# Patient Record
Sex: MALE | Race: WHITE | Employment: FULL TIME | URBAN - METROPOLITAN AREA
[De-identification: names, ages, dates, MRNs, and addresses within clinical notes are randomized per-mention and may not be internally consistent; named-entity substitution may affect disease eponyms.]

---

## 2020-02-12 ENCOUNTER — HOSPITAL ENCOUNTER (EMERGENCY)
Age: 30
Discharge: HOME | End: 2020-02-12
Payer: OTHER GOVERNMENT

## 2020-02-12 VITALS
TEMPERATURE: 98.5 F | DIASTOLIC BLOOD PRESSURE: 90 MMHG | HEART RATE: 88 BPM | RESPIRATION RATE: 14 BRPM | BODY MASS INDEX: 28.88 KG/M2 | HEIGHT: 74 IN | OXYGEN SATURATION: 96 % | SYSTOLIC BLOOD PRESSURE: 177 MMHG | WEIGHT: 225 LBS

## 2020-02-12 PROCEDURE — 6370000000 HC RX 637 (ALT 250 FOR IP): Performed by: PHYSICIAN ASSISTANT

## 2020-02-12 PROCEDURE — 99282 EMERGENCY DEPT VISIT SF MDM: CPT

## 2020-02-12 RX ORDER — OXYMETAZOLINE HYDROCHLORIDE 0.05 G/100ML
2 SPRAY NASAL ONCE
Status: COMPLETED | OUTPATIENT
Start: 2020-02-12 | End: 2020-02-12

## 2020-02-12 RX ADMIN — Medication 2 SPRAY: at 18:10

## 2020-02-12 NOTE — ED NOTES
Discharge instructions given. Patient verbalizes understanding. No other noted or stated problems at this time. Patient will follow up with primary care.      Latasha Mcclellan RN  02/12/20 1887
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
## 2020-10-08 ENCOUNTER — APPOINTMENT (OUTPATIENT)
Dept: CT IMAGING | Age: 30
DRG: 003 | End: 2020-10-08
Payer: COMMERCIAL

## 2020-10-08 ENCOUNTER — HOSPITAL ENCOUNTER (INPATIENT)
Age: 30
LOS: 27 days | Discharge: NURSING, REHAB OR LONG-TERM CARE | DRG: 003 | End: 2020-11-04
Attending: EMERGENCY MEDICINE | Admitting: SURGERY
Payer: COMMERCIAL

## 2020-10-08 ENCOUNTER — APPOINTMENT (OUTPATIENT)
Dept: GENERAL RADIOLOGY | Age: 30
DRG: 003 | End: 2020-10-08
Payer: COMMERCIAL

## 2020-10-08 ENCOUNTER — ANESTHESIA EVENT (OUTPATIENT)
Dept: EMERGENCY DEPT | Age: 30
End: 2020-10-08

## 2020-10-08 ENCOUNTER — ANESTHESIA EVENT (OUTPATIENT)
Dept: EMERGENCY DEPT | Age: 30
DRG: 003 | End: 2020-10-08
Payer: COMMERCIAL

## 2020-10-08 ENCOUNTER — ANESTHESIA (OUTPATIENT)
Dept: EMERGENCY DEPT | Age: 30
DRG: 003 | End: 2020-10-08
Payer: COMMERCIAL

## 2020-10-08 ENCOUNTER — ANESTHESIA (OUTPATIENT)
Dept: EMERGENCY DEPT | Age: 30
End: 2020-10-08
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
## 2020-10-08 LAB
AADO2: 392.5 MMHG
ABO/RH: NORMAL
ACETAMINOPHEN LEVEL: <5 MCG/ML (ref 10–30)
ALBUMIN SERPL-MCNC: 3.9 G/DL (ref 3.5–5.2)
ALP BLD-CCNC: 59 U/L (ref 40–129)
ALT SERPL-CCNC: 38 U/L (ref 0–40)
ANGLE (CLOT STRENGTH): 68.6 DEGREE (ref 59–74)
ANION GAP SERPL CALCULATED.3IONS-SCNC: 16 MMOL/L (ref 7–16)
APTT: 31.2 SEC (ref 24.5–35.1)
AST SERPL-CCNC: 46 U/L (ref 0–39)
B.E.: -7.4 MMOL/L (ref -3–3)
BILIRUB SERPL-MCNC: 0.3 MG/DL (ref 0–1.2)
BUN BLDV-MCNC: 10 MG/DL (ref 6–20)
CALCIUM SERPL-MCNC: 8.2 MG/DL (ref 8.6–10.2)
CHLORIDE BLD-SCNC: 97 MMOL/L (ref 98–107)
CO2: 24 MMOL/L (ref 22–29)
COHB: 1.1 % (ref 0–1.5)
COMMENT: ABNORMAL
CREAT SERPL-MCNC: 1 MG/DL (ref 0.7–1.2)
CRITICAL: ABNORMAL
DATE ANALYZED: ABNORMAL
DATE OF COLLECTION: ABNORMAL
EPL-TEG: 0 % (ref 0–15)
ETHANOL: 55 MG/DL (ref 0–0.08)
FIO2: 100 %
G-TEG: 3 K D/SC (ref 4.5–11)
GFR AFRICAN AMERICAN: >60
GFR NON-AFRICAN AMERICAN: >60 ML/MIN/1.73
GLUCOSE BLD-MCNC: 139 MG/DL (ref 74–99)
HCO3: 20.9 MMOL/L (ref 22–26)
HCT VFR BLD CALC: 49.3 % (ref 37–54)
HEMOGLOBIN: 17.2 G/DL (ref 12.5–16.5)
HHB: 0.8 % (ref 0–5)
INR BLD: 1.1
K (CLOTTING TIME): 1.6 MIN (ref 1–3)
LAB: ABNORMAL
LACTIC ACID: 5.4 MMOL/L (ref 0.5–2.2)
LY30 (FIBRINOLYSIS): 0 % (ref 0–8)
Lab: ABNORMAL
MA (MAX AMPLITUDE): 37.5 MM (ref 50–70)
MCH RBC QN AUTO: 31.9 PG (ref 26–35)
MCHC RBC AUTO-ENTMCNC: 34.9 % (ref 32–34.5)
MCV RBC AUTO: 91.5 FL (ref 80–99.9)
METHB: 0.6 % (ref 0–1.5)
MODE: AC
O2 CONTENT: 24.6 ML/DL
O2 SATURATION: 99.2 % (ref 92–98.5)
O2HB: 97.5 % (ref 94–97)
OPERATOR ID: ABNORMAL
PATIENT TEMP: 37 C
PCO2: 52.7 MMHG (ref 35–45)
PDW BLD-RTO: 12.1 FL (ref 11.5–15)
PEEP/CPAP: 8 CMH2O
PFO2: 2.43 MMHG/%
PH BLOOD GAS: 7.22 (ref 7.35–7.45)
PLATELET # BLD: 307 E9/L (ref 130–450)
PMV BLD AUTO: 9.9 FL (ref 7–12)
PO2: 242.8 MMHG (ref 75–100)
POTASSIUM SERPL-SCNC: 3.5 MMOL/L (ref 3.5–5)
POTASSIUM SERPL-SCNC: 3.7 MMOL/L (ref 3.5–5)
PROTHROMBIN TIME: 12 SEC (ref 9.3–12.4)
R (REACTION TIME): 4.3 MIN (ref 5–10)
RBC # BLD: 5.39 E12/L (ref 3.8–5.8)
RI(T): 162 %
RR MECHANICAL: 16 B/MIN
SALICYLATE, SERUM: <0.3 MG/DL (ref 0–30)
SODIUM BLD-SCNC: 137 MMOL/L (ref 132–146)
SOURCE, BLOOD GAS: ABNORMAL
THB: 17.6 G/DL (ref 11.5–16.5)
TIME ANALYZED: 2249
TOTAL PROTEIN: 6.6 G/DL (ref 6.4–8.3)
TRICYCLIC ANTIDEPRESSANTS SCREEN SERUM: NEGATIVE NG/ML
VT MECHANICAL: 500 ML
WBC # BLD: 26.2 E9/L (ref 4.5–11.5)

## 2020-10-08 PROCEDURE — 2500000003 HC RX 250 WO HCPCS

## 2020-10-08 PROCEDURE — 72170 X-RAY EXAM OF PELVIS: CPT

## 2020-10-08 PROCEDURE — 70486 CT MAXILLOFACIAL W/O DYE: CPT

## 2020-10-08 PROCEDURE — 87081 CULTURE SCREEN ONLY: CPT

## 2020-10-08 PROCEDURE — 72131 CT LUMBAR SPINE W/O DYE: CPT

## 2020-10-08 PROCEDURE — 80053 COMPREHEN METABOLIC PANEL: CPT

## 2020-10-08 PROCEDURE — 31500 INSERT EMERGENCY AIRWAY: CPT

## 2020-10-08 PROCEDURE — 70450 CT HEAD/BRAIN W/O DYE: CPT

## 2020-10-08 PROCEDURE — 72125 CT NECK SPINE W/O DYE: CPT

## 2020-10-08 PROCEDURE — 31500 INSERT EMERGENCY AIRWAY: CPT | Performed by: NURSE ANESTHETIST, CERTIFIED REGISTERED

## 2020-10-08 PROCEDURE — 71260 CT THORAX DX C+: CPT

## 2020-10-08 PROCEDURE — 74177 CT ABD & PELVIS W/CONTRAST: CPT

## 2020-10-08 PROCEDURE — 85610 PROTHROMBIN TIME: CPT

## 2020-10-08 PROCEDURE — 2000000000 HC ICU R&B

## 2020-10-08 PROCEDURE — 86850 RBC ANTIBODY SCREEN: CPT

## 2020-10-08 PROCEDURE — 6810039001 HC L1 TRAUMA PRIORITY

## 2020-10-08 PROCEDURE — 6360000002 HC RX W HCPCS

## 2020-10-08 PROCEDURE — 85347 COAGULATION TIME ACTIVATED: CPT

## 2020-10-08 PROCEDURE — 99285 EMERGENCY DEPT VISIT HI MDM: CPT

## 2020-10-08 PROCEDURE — 85730 THROMBOPLASTIN TIME PARTIAL: CPT

## 2020-10-08 PROCEDURE — 94002 VENT MGMT INPAT INIT DAY: CPT

## 2020-10-08 PROCEDURE — 84132 ASSAY OF SERUM POTASSIUM: CPT

## 2020-10-08 PROCEDURE — 70498 CT ANGIOGRAPHY NECK: CPT

## 2020-10-08 PROCEDURE — 82805 BLOOD GASES W/O2 SATURATION: CPT

## 2020-10-08 PROCEDURE — 86901 BLOOD TYPING SEROLOGIC RH(D): CPT

## 2020-10-08 PROCEDURE — 2580000003 HC RX 258: Performed by: SURGERY

## 2020-10-08 PROCEDURE — 99223 1ST HOSP IP/OBS HIGH 75: CPT | Performed by: SURGERY

## 2020-10-08 PROCEDURE — 71045 X-RAY EXAM CHEST 1 VIEW: CPT

## 2020-10-08 PROCEDURE — G0480 DRUG TEST DEF 1-7 CLASSES: HCPCS

## 2020-10-08 PROCEDURE — 36415 COLL VENOUS BLD VENIPUNCTURE: CPT

## 2020-10-08 PROCEDURE — 80307 DRUG TEST PRSMV CHEM ANLYZR: CPT

## 2020-10-08 PROCEDURE — 83605 ASSAY OF LACTIC ACID: CPT

## 2020-10-08 PROCEDURE — 85576 BLOOD PLATELET AGGREGATION: CPT

## 2020-10-08 PROCEDURE — 6360000004 HC RX CONTRAST MEDICATION: Performed by: EMERGENCY MEDICINE

## 2020-10-08 PROCEDURE — 0BH18EZ INSERTION OF ENDOTRACHEAL AIRWAY INTO TRACHEA, VIA NATURAL OR ARTIFICIAL OPENING ENDOSCOPIC: ICD-10-PCS | Performed by: SURGERY

## 2020-10-08 PROCEDURE — 85384 FIBRINOGEN ACTIVITY: CPT

## 2020-10-08 PROCEDURE — 86900 BLOOD TYPING SEROLOGIC ABO: CPT

## 2020-10-08 PROCEDURE — 85027 COMPLETE CBC AUTOMATED: CPT

## 2020-10-08 PROCEDURE — 5A1955Z RESPIRATORY VENTILATION, GREATER THAN 96 CONSECUTIVE HOURS: ICD-10-PCS | Performed by: SURGERY

## 2020-10-08 PROCEDURE — 72128 CT CHEST SPINE W/O DYE: CPT

## 2020-10-08 RX ORDER — SODIUM CHLORIDE 0.9 % (FLUSH) 0.9 %
10 SYRINGE (ML) INJECTION PRN
Status: DISCONTINUED | OUTPATIENT
Start: 2020-10-08 | End: 2020-10-09 | Stop reason: SDUPTHER

## 2020-10-08 RX ORDER — FENTANYL CITRATE 50 UG/ML
INJECTION, SOLUTION INTRAMUSCULAR; INTRAVENOUS
Status: DISPENSED
Start: 2020-10-08 | End: 2020-10-09

## 2020-10-08 RX ORDER — PROPOFOL 10 MG/ML
10 INJECTION, EMULSION INTRAVENOUS
Status: DISCONTINUED | OUTPATIENT
Start: 2020-10-08 | End: 2020-10-12

## 2020-10-08 RX ORDER — PROPOFOL 10 MG/ML
INJECTION, EMULSION INTRAVENOUS
Status: COMPLETED
Start: 2020-10-08 | End: 2020-10-09

## 2020-10-08 RX ADMIN — Medication 100 MCG/HR: at 23:48

## 2020-10-08 RX ADMIN — SODIUM CHLORIDE: 9 INJECTION, SOLUTION INTRAVENOUS at 23:39

## 2020-10-08 RX ADMIN — IOPAMIDOL 110 ML: 755 INJECTION, SOLUTION INTRAVENOUS at 23:45

## 2020-10-08 ASSESSMENT — PULMONARY FUNCTION TESTS
PIF_VALUE: 19
PIF_VALUE: 20
PIF_VALUE: 23

## 2020-10-09 ENCOUNTER — APPOINTMENT (OUTPATIENT)
Dept: GENERAL RADIOLOGY | Age: 30
DRG: 003 | End: 2020-10-09
Payer: COMMERCIAL

## 2020-10-09 ENCOUNTER — ANESTHESIA EVENT (OUTPATIENT)
Dept: OPERATING ROOM | Age: 30
DRG: 003 | End: 2020-10-09
Payer: COMMERCIAL

## 2020-10-09 ENCOUNTER — APPOINTMENT (OUTPATIENT)
Dept: CT IMAGING | Age: 30
DRG: 003 | End: 2020-10-09
Payer: COMMERCIAL

## 2020-10-09 ENCOUNTER — ANESTHESIA (OUTPATIENT)
Dept: OPERATING ROOM | Age: 30
DRG: 003 | End: 2020-10-09
Payer: COMMERCIAL

## 2020-10-09 VITALS
SYSTOLIC BLOOD PRESSURE: 112 MMHG | DIASTOLIC BLOOD PRESSURE: 50 MMHG | RESPIRATION RATE: 16 BRPM | OXYGEN SATURATION: 100 %

## 2020-10-09 PROBLEM — S22.42XA CLOSED FRACTURE OF MULTIPLE RIBS OF LEFT SIDE: Status: ACTIVE | Noted: 2020-10-09

## 2020-10-09 PROBLEM — S42.102A CLOSED FRACTURE OF LEFT SCAPULA: Status: ACTIVE | Noted: 2020-10-09

## 2020-10-09 PROBLEM — S06.5XAA SUBDURAL HEMATOMA: Status: ACTIVE | Noted: 2020-10-09

## 2020-10-09 PROBLEM — S06.9XAA ORBITAL ROOF CLOSED FRACTURE WITH INTRACRANIAL INJURY (HCC): Status: ACTIVE | Noted: 2020-10-09

## 2020-10-09 PROBLEM — S02.19XA CLOSED FRACTURE OF TEMPORAL BONE (HCC): Status: ACTIVE | Noted: 2020-10-09

## 2020-10-09 PROBLEM — S27.321A CONTUSION OF LEFT LUNG: Status: ACTIVE | Noted: 2020-10-09

## 2020-10-09 PROBLEM — S02.129A ORBITAL ROOF CLOSED FRACTURE WITH INTRACRANIAL INJURY (HCC): Status: ACTIVE | Noted: 2020-10-09

## 2020-10-09 LAB
AADO2: 150 MMHG
ABO/RH: NORMAL
ANGLE (CLOT STRENGTH): 67.4 DEGREE (ref 59–74)
ANION GAP SERPL CALCULATED.3IONS-SCNC: 12 MMOL/L (ref 7–16)
ANTIBODY SCREEN: NORMAL
B.E.: -2 MMOL/L (ref -3–3)
BASOPHILS ABSOLUTE: 0.09 E9/L (ref 0–0.2)
BASOPHILS RELATIVE PERCENT: 0.6 % (ref 0–2)
BUN BLDV-MCNC: 9 MG/DL (ref 6–20)
CALCIUM IONIZED: 1.11 MMOL/L (ref 1.15–1.33)
CALCIUM SERPL-MCNC: 7.7 MG/DL (ref 8.6–10.2)
CHLORIDE BLD-SCNC: 99 MMOL/L (ref 98–107)
CO2: 25 MMOL/L (ref 22–29)
COHB: 0.4 % (ref 0–1.5)
CREAT SERPL-MCNC: 0.9 MG/DL (ref 0.7–1.2)
CRITICAL: ABNORMAL
DATE ANALYZED: ABNORMAL
DATE OF COLLECTION: ABNORMAL
EOSINOPHILS ABSOLUTE: 0.06 E9/L (ref 0.05–0.5)
EOSINOPHILS RELATIVE PERCENT: 0.4 % (ref 0–6)
EPL-TEG: 0.1 % (ref 0–15)
FIO2: 50 %
G-TEG: 7.8 K D/SC (ref 4.5–11)
GFR AFRICAN AMERICAN: >60
GFR NON-AFRICAN AMERICAN: >60 ML/MIN/1.73
GLUCOSE BLD-MCNC: 116 MG/DL (ref 74–99)
HCO3: 22.5 MMOL/L (ref 22–26)
HCT VFR BLD CALC: 41.6 % (ref 37–54)
HEMOGLOBIN: 14.2 G/DL (ref 12.5–16.5)
HHB: 1.5 % (ref 0–5)
IMMATURE GRANULOCYTES #: 0.05 E9/L
IMMATURE GRANULOCYTES %: 0.3 % (ref 0–5)
K (CLOTTING TIME): 1.7 MIN (ref 1–3)
LAB: ABNORMAL
LACTIC ACID: 1.7 MMOL/L (ref 0.5–2.2)
LACTIC ACID: 2.1 MMOL/L (ref 0.5–2.2)
LACTIC ACID: 3.1 MMOL/L (ref 0.5–2.2)
LACTIC ACID: 3.2 MMOL/L (ref 0.5–2.2)
LY30 (FIBRINOLYSIS): 0.1 % (ref 0–8)
LYMPHOCYTES ABSOLUTE: 0.95 E9/L (ref 1.5–4)
LYMPHOCYTES RELATIVE PERCENT: 6.1 % (ref 20–42)
Lab: ABNORMAL
MA (MAX AMPLITUDE): 61.1 MM (ref 50–70)
MAGNESIUM: 1.7 MG/DL (ref 1.6–2.6)
MCH RBC QN AUTO: 31.1 PG (ref 26–35)
MCHC RBC AUTO-ENTMCNC: 34.1 % (ref 32–34.5)
MCV RBC AUTO: 91.2 FL (ref 80–99.9)
METHB: 0.4 % (ref 0–1.5)
MODE: AC
MONOCYTES ABSOLUTE: 1.05 E9/L (ref 0.1–0.95)
MONOCYTES RELATIVE PERCENT: 6.7 % (ref 2–12)
NEUTROPHILS ABSOLUTE: 13.49 E9/L (ref 1.8–7.3)
NEUTROPHILS RELATIVE PERCENT: 85.9 % (ref 43–80)
O2 SATURATION: 98.9 % (ref 92–98.5)
O2HB: 97.7 % (ref 94–97)
OPERATOR ID: 2577
OSMOLALITY: 289 MOSM/KG (ref 285–310)
PATIENT TEMP: 37 C
PCO2: 37.9 MMHG (ref 35–45)
PDW BLD-RTO: 12.1 FL (ref 11.5–15)
PEEP/CPAP: 8 CMH2O
PFO2: 3.03 MMHG/%
PH BLOOD GAS: 7.39 (ref 7.35–7.45)
PHOSPHORUS: 2 MG/DL (ref 2.5–4.5)
PLATELET # BLD: 182 E9/L (ref 130–450)
PMV BLD AUTO: 9.8 FL (ref 7–12)
PO2: 151.4 MMHG (ref 75–100)
POTASSIUM SERPL-SCNC: 4 MMOL/L (ref 3.5–5)
R (REACTION TIME): 4.9 MIN (ref 5–10)
RBC # BLD: 4.56 E12/L (ref 3.8–5.8)
RI(T): 0.99
RR MECHANICAL: 16 B/MIN
SODIUM BLD-SCNC: 136 MMOL/L (ref 132–146)
SODIUM BLD-SCNC: 136 MMOL/L (ref 132–146)
SODIUM BLD-SCNC: 137 MMOL/L (ref 132–146)
SOURCE, BLOOD GAS: ABNORMAL
THB: 14.9 G/DL (ref 11.5–16.5)
TIME ANALYZED: 528
VT MECHANICAL: 500 ML
WBC # BLD: 15.7 E9/L (ref 4.5–11.5)

## 2020-10-09 PROCEDURE — 0KQ70ZZ REPAIR RIGHT UPPER ARM MUSCLE, OPEN APPROACH: ICD-10-PCS | Performed by: SURGERY

## 2020-10-09 PROCEDURE — 70498 CT ANGIOGRAPHY NECK: CPT

## 2020-10-09 PROCEDURE — 83735 ASSAY OF MAGNESIUM: CPT

## 2020-10-09 PROCEDURE — 83605 ASSAY OF LACTIC ACID: CPT

## 2020-10-09 PROCEDURE — 6370000000 HC RX 637 (ALT 250 FOR IP): Performed by: SURGERY

## 2020-10-09 PROCEDURE — 2709999900 HC NON-CHARGEABLE SUPPLY: Performed by: NEUROLOGICAL SURGERY

## 2020-10-09 PROCEDURE — 36556 INSERT NON-TUNNEL CV CATH: CPT

## 2020-10-09 PROCEDURE — 85576 BLOOD PLATELET AGGREGATION: CPT

## 2020-10-09 PROCEDURE — 99291 CRITICAL CARE FIRST HOUR: CPT | Performed by: SURGERY

## 2020-10-09 PROCEDURE — 73130 X-RAY EXAM OF HAND: CPT

## 2020-10-09 PROCEDURE — 2500000003 HC RX 250 WO HCPCS

## 2020-10-09 PROCEDURE — 2580000003 HC RX 258: Performed by: STUDENT IN AN ORGANIZED HEALTH CARE EDUCATION/TRAINING PROGRAM

## 2020-10-09 PROCEDURE — 71045 X-RAY EXAM CHEST 1 VIEW: CPT

## 2020-10-09 PROCEDURE — 73562 X-RAY EXAM OF KNEE 3: CPT

## 2020-10-09 PROCEDURE — 73080 X-RAY EXAM OF ELBOW: CPT

## 2020-10-09 PROCEDURE — 6370000000 HC RX 637 (ALT 250 FOR IP): Performed by: NEUROLOGICAL SURGERY

## 2020-10-09 PROCEDURE — 70450 CT HEAD/BRAIN W/O DYE: CPT

## 2020-10-09 PROCEDURE — 6370000000 HC RX 637 (ALT 250 FOR IP): Performed by: STUDENT IN AN ORGANIZED HEALTH CARE EDUCATION/TRAINING PROGRAM

## 2020-10-09 PROCEDURE — 4A103BD MONITORING OF INTRACRANIAL PRESSURE, PERCUTANEOUS APPROACH: ICD-10-PCS | Performed by: NEUROLOGICAL SURGERY

## 2020-10-09 PROCEDURE — 84295 ASSAY OF SERUM SODIUM: CPT

## 2020-10-09 PROCEDURE — 3600000013 HC SURGERY LEVEL 3 ADDTL 15MIN: Performed by: NEUROLOGICAL SURGERY

## 2020-10-09 PROCEDURE — 2500000003 HC RX 250 WO HCPCS: Performed by: NURSE ANESTHETIST, CERTIFIED REGISTERED

## 2020-10-09 PROCEDURE — 0DH63UZ INSERTION OF FEEDING DEVICE INTO STOMACH, PERCUTANEOUS APPROACH: ICD-10-PCS | Performed by: SURGERY

## 2020-10-09 PROCEDURE — 6360000002 HC RX W HCPCS: Performed by: NURSE ANESTHETIST, CERTIFIED REGISTERED

## 2020-10-09 PROCEDURE — 82330 ASSAY OF CALCIUM: CPT

## 2020-10-09 PROCEDURE — 2000000000 HC ICU R&B

## 2020-10-09 PROCEDURE — 83930 ASSAY OF BLOOD OSMOLALITY: CPT

## 2020-10-09 PROCEDURE — 70480 CT ORBIT/EAR/FOSSA W/O DYE: CPT

## 2020-10-09 PROCEDURE — 85384 FIBRINOGEN ACTIVITY: CPT

## 2020-10-09 PROCEDURE — 85347 COAGULATION TIME ACTIVATED: CPT

## 2020-10-09 PROCEDURE — 0B918ZZ DRAINAGE OF TRACHEA, VIA NATURAL OR ARTIFICIAL OPENING ENDOSCOPIC: ICD-10-PCS | Performed by: SURGERY

## 2020-10-09 PROCEDURE — 3700000001 HC ADD 15 MINUTES (ANESTHESIA): Performed by: NEUROLOGICAL SURGERY

## 2020-10-09 PROCEDURE — 2580000003 HC RX 258

## 2020-10-09 PROCEDURE — 6360000002 HC RX W HCPCS: Performed by: SURGERY

## 2020-10-09 PROCEDURE — 0HQ0XZZ REPAIR SCALP SKIN, EXTERNAL APPROACH: ICD-10-PCS | Performed by: SURGERY

## 2020-10-09 PROCEDURE — 0B938ZZ DRAINAGE OF RIGHT MAIN BRONCHUS, VIA NATURAL OR ARTIFICIAL OPENING ENDOSCOPIC: ICD-10-PCS | Performed by: SURGERY

## 2020-10-09 PROCEDURE — 2580000003 HC RX 258: Performed by: SURGERY

## 2020-10-09 PROCEDURE — 94003 VENT MGMT INPAT SUBQ DAY: CPT

## 2020-10-09 PROCEDURE — 02HV33Z INSERTION OF INFUSION DEVICE INTO SUPERIOR VENA CAVA, PERCUTANEOUS APPROACH: ICD-10-PCS | Performed by: SURGERY

## 2020-10-09 PROCEDURE — 85025 COMPLETE CBC W/AUTO DIFF WBC: CPT

## 2020-10-09 PROCEDURE — 82805 BLOOD GASES W/O2 SATURATION: CPT

## 2020-10-09 PROCEDURE — 0B978ZZ DRAINAGE OF LEFT MAIN BRONCHUS, VIA NATURAL OR ARTIFICIAL OPENING ENDOSCOPIC: ICD-10-PCS | Performed by: SURGERY

## 2020-10-09 PROCEDURE — 2500000003 HC RX 250 WO HCPCS: Performed by: SURGERY

## 2020-10-09 PROCEDURE — 009600Z DRAINAGE OF CEREBRAL VENTRICLE WITH DRAINAGE DEVICE, OPEN APPROACH: ICD-10-PCS | Performed by: NEUROLOGICAL SURGERY

## 2020-10-09 PROCEDURE — 3700000000 HC ANESTHESIA ATTENDED CARE: Performed by: NEUROLOGICAL SURGERY

## 2020-10-09 PROCEDURE — 84100 ASSAY OF PHOSPHORUS: CPT

## 2020-10-09 PROCEDURE — 2500000003 HC RX 250 WO HCPCS: Performed by: NEUROLOGICAL SURGERY

## 2020-10-09 PROCEDURE — 90714 TD VACC NO PRESV 7 YRS+ IM: CPT | Performed by: SURGERY

## 2020-10-09 PROCEDURE — 2500000003 HC RX 250 WO HCPCS: Performed by: STUDENT IN AN ORGANIZED HEALTH CARE EDUCATION/TRAINING PROGRAM

## 2020-10-09 PROCEDURE — 90471 IMMUNIZATION ADMIN: CPT | Performed by: SURGERY

## 2020-10-09 PROCEDURE — 36415 COLL VENOUS BLD VENIPUNCTURE: CPT

## 2020-10-09 PROCEDURE — 80048 BASIC METABOLIC PNL TOTAL CA: CPT

## 2020-10-09 PROCEDURE — 3600000003 HC SURGERY LEVEL 3 BASE: Performed by: NEUROLOGICAL SURGERY

## 2020-10-09 PROCEDURE — 6360000004 HC RX CONTRAST MEDICATION: Performed by: RADIOLOGY

## 2020-10-09 PROCEDURE — 6360000002 HC RX W HCPCS

## 2020-10-09 PROCEDURE — 2580000003 HC RX 258: Performed by: NURSE ANESTHETIST, CERTIFIED REGISTERED

## 2020-10-09 PROCEDURE — 70496 CT ANGIOGRAPHY HEAD: CPT

## 2020-10-09 RX ORDER — DIAPER,BRIEF,INFANT-TODD,DISP
EACH MISCELLANEOUS 3 TIMES DAILY
Status: DISCONTINUED | OUTPATIENT
Start: 2020-10-09 | End: 2020-11-01

## 2020-10-09 RX ORDER — ONDANSETRON 2 MG/ML
4 INJECTION INTRAMUSCULAR; INTRAVENOUS EVERY 6 HOURS PRN
Status: DISCONTINUED | OUTPATIENT
Start: 2020-10-09 | End: 2020-11-04 | Stop reason: HOSPADM

## 2020-10-09 RX ORDER — SENNA PLUS 8.6 MG/1
1 TABLET ORAL DAILY PRN
Status: DISCONTINUED | OUTPATIENT
Start: 2020-10-09 | End: 2020-10-09

## 2020-10-09 RX ORDER — SODIUM CHLORIDE 9 MG/ML
INJECTION, SOLUTION INTRAVENOUS CONTINUOUS
Status: DISCONTINUED | OUTPATIENT
Start: 2020-10-09 | End: 2020-10-09

## 2020-10-09 RX ORDER — DEXAMETHASONE SODIUM PHOSPHATE 1 MG/ML
4 SOLUTION/ DROPS OPHTHALMIC 2 TIMES DAILY
Status: COMPLETED | OUTPATIENT
Start: 2020-10-09 | End: 2020-10-16

## 2020-10-09 RX ORDER — BACITRACIN, NEOMYCIN, POLYMYXIN B 400; 3.5; 5 [USP'U]/G; MG/G; [USP'U]/G
OINTMENT TOPICAL 2 TIMES DAILY
Status: DISCONTINUED | OUTPATIENT
Start: 2020-10-09 | End: 2020-10-24

## 2020-10-09 RX ORDER — VECURONIUM BROMIDE 1 MG/ML
INJECTION, POWDER, LYOPHILIZED, FOR SOLUTION INTRAVENOUS PRN
Status: DISCONTINUED | OUTPATIENT
Start: 2020-10-09 | End: 2020-10-09 | Stop reason: SDUPTHER

## 2020-10-09 RX ORDER — SODIUM CHLORIDE 9 MG/ML
INJECTION, SOLUTION INTRAVENOUS CONTINUOUS PRN
Status: DISCONTINUED | OUTPATIENT
Start: 2020-10-09 | End: 2020-10-09 | Stop reason: SDUPTHER

## 2020-10-09 RX ORDER — FENTANYL CITRATE 50 UG/ML
INJECTION, SOLUTION INTRAMUSCULAR; INTRAVENOUS PRN
Status: DISCONTINUED | OUTPATIENT
Start: 2020-10-09 | End: 2020-10-09 | Stop reason: SDUPTHER

## 2020-10-09 RX ORDER — PROMETHAZINE HYDROCHLORIDE 25 MG/1
12.5 TABLET ORAL EVERY 6 HOURS PRN
Status: DISCONTINUED | OUTPATIENT
Start: 2020-10-09 | End: 2020-10-09

## 2020-10-09 RX ORDER — ACETAMINOPHEN 650 MG
TABLET, EXTENDED RELEASE ORAL
Status: COMPLETED
Start: 2020-10-09 | End: 2020-10-09

## 2020-10-09 RX ORDER — VECURONIUM BROMIDE 1 MG/ML
10 INJECTION, POWDER, LYOPHILIZED, FOR SOLUTION INTRAVENOUS
Status: COMPLETED | OUTPATIENT
Start: 2020-10-09 | End: 2020-10-09

## 2020-10-09 RX ORDER — LEVETIRACETAM 5 MG/ML
500 INJECTION INTRAVASCULAR EVERY 12 HOURS
Status: DISCONTINUED | OUTPATIENT
Start: 2020-10-09 | End: 2020-10-10

## 2020-10-09 RX ORDER — SODIUM CHLORIDE 0.9 % (FLUSH) 0.9 %
10 SYRINGE (ML) INJECTION PRN
Status: DISCONTINUED | OUTPATIENT
Start: 2020-10-09 | End: 2020-11-04 | Stop reason: HOSPADM

## 2020-10-09 RX ORDER — SODIUM CHLORIDE 0.9 % (FLUSH) 0.9 %
10 SYRINGE (ML) INJECTION EVERY 12 HOURS SCHEDULED
Status: DISCONTINUED | OUTPATIENT
Start: 2020-10-09 | End: 2020-11-04 | Stop reason: HOSPADM

## 2020-10-09 RX ORDER — POTASSIUM CHLORIDE 29.8 MG/ML
20 INJECTION INTRAVENOUS
Status: COMPLETED | OUTPATIENT
Start: 2020-10-09 | End: 2020-10-09

## 2020-10-09 RX ORDER — CHLORHEXIDINE GLUCONATE 0.12 MG/ML
15 RINSE ORAL 2 TIMES DAILY
Status: DISCONTINUED | OUTPATIENT
Start: 2020-10-09 | End: 2020-10-24

## 2020-10-09 RX ORDER — TETANUS AND DIPHTHERIA TOXOIDS ADSORBED 2; 2 [LF]/.5ML; [LF]/.5ML
0.5 INJECTION INTRAMUSCULAR ONCE
Status: COMPLETED | OUTPATIENT
Start: 2020-10-09 | End: 2020-10-09

## 2020-10-09 RX ORDER — DIAPER,BRIEF,INFANT-TODD,DISP
EACH MISCELLANEOUS PRN
Status: DISCONTINUED | OUTPATIENT
Start: 2020-10-09 | End: 2020-10-09 | Stop reason: HOSPADM

## 2020-10-09 RX ORDER — VECURONIUM BROMIDE 1 MG/ML
INJECTION, POWDER, LYOPHILIZED, FOR SOLUTION INTRAVENOUS
Status: COMPLETED
Start: 2020-10-09 | End: 2020-10-09

## 2020-10-09 RX ORDER — 3% SODIUM CHLORIDE 3 G/100ML
50 INJECTION, SOLUTION INTRAVENOUS CONTINUOUS
Status: DISPENSED | OUTPATIENT
Start: 2020-10-09 | End: 2020-10-10

## 2020-10-09 RX ORDER — LIDOCAINE HYDROCHLORIDE AND EPINEPHRINE 10; 10 MG/ML; UG/ML
INJECTION, SOLUTION INFILTRATION; PERINEURAL PRN
Status: DISCONTINUED | OUTPATIENT
Start: 2020-10-09 | End: 2020-10-09 | Stop reason: HOSPADM

## 2020-10-09 RX ORDER — LEVETIRACETAM 5 MG/ML
500 INJECTION INTRAVASCULAR ONCE
Status: COMPLETED | OUTPATIENT
Start: 2020-10-09 | End: 2020-10-09

## 2020-10-09 RX ORDER — CIPROFLOXACIN HYDROCHLORIDE 3.5 MG/ML
4 SOLUTION/ DROPS TOPICAL 2 TIMES DAILY
Status: COMPLETED | OUTPATIENT
Start: 2020-10-09 | End: 2020-10-16

## 2020-10-09 RX ORDER — POLYVINYL ALCOHOL 14 MG/ML
1 SOLUTION/ DROPS OPHTHALMIC EVERY 4 HOURS
Status: DISCONTINUED | OUTPATIENT
Start: 2020-10-09 | End: 2020-10-12

## 2020-10-09 RX ADMIN — CHLORHEXIDINE GLUCONATE 0.12% ORAL RINSE 15 ML: 1.2 LIQUID ORAL at 23:37

## 2020-10-09 RX ADMIN — POLYVINYL ALCOHOL 1 DROP: 14 SOLUTION/ DROPS OPHTHALMIC at 04:30

## 2020-10-09 RX ADMIN — VECURONIUM BROMIDE 10 MG: 10 INJECTION, POWDER, LYOPHILIZED, FOR SOLUTION INTRAVENOUS at 00:22

## 2020-10-09 RX ADMIN — VECURONIUM BROMIDE 10 MG: 1 INJECTION, POWDER, LYOPHILIZED, FOR SOLUTION INTRAVENOUS at 00:22

## 2020-10-09 RX ADMIN — SODIUM CHLORIDE 25 ML/HR: 3 INJECTION, SOLUTION INTRAVENOUS at 07:22

## 2020-10-09 RX ADMIN — Medication 10 ML: at 23:37

## 2020-10-09 RX ADMIN — SODIUM CHLORIDE 3 G: 900 INJECTION INTRAVENOUS at 11:23

## 2020-10-09 RX ADMIN — Medication 10 ML: at 08:06

## 2020-10-09 RX ADMIN — SODIUM CHLORIDE: 9 INJECTION, SOLUTION INTRAVENOUS at 11:38

## 2020-10-09 RX ADMIN — METHOCARBAMOL 1000 MG: 100 INJECTION INTRAMUSCULAR; INTRAVENOUS at 15:34

## 2020-10-09 RX ADMIN — SODIUM CHLORIDE 3 G: 900 INJECTION INTRAVENOUS at 12:15

## 2020-10-09 RX ADMIN — BACITRACIN ZINC, POLYMYXIN B SULFATE, NEOMYCIN SULFATE: 400; 5000; 3.5 OINTMENT TOPICAL at 23:38

## 2020-10-09 RX ADMIN — SENNOSIDES 5 ML: 8.8 LIQUID ORAL at 23:38

## 2020-10-09 RX ADMIN — PROPOFOL INJECTABLE EMULSION 50 MCG/KG/MIN: 10 INJECTION, EMULSION INTRAVENOUS at 13:00

## 2020-10-09 RX ADMIN — BACITRACIN ZINC, POLYMYXIN B SULFATE, NEOMYCIN SULFATE: 400; 5000; 3.5 OINTMENT TOPICAL at 09:49

## 2020-10-09 RX ADMIN — DESMOPRESSIN ACETATE 36.28 MCG: 4 SOLUTION INTRAVENOUS at 00:40

## 2020-10-09 RX ADMIN — FAMOTIDINE 20 MG: 10 INJECTION INTRAVENOUS at 08:06

## 2020-10-09 RX ADMIN — METHOCARBAMOL 1000 MG: 100 INJECTION INTRAMUSCULAR; INTRAVENOUS at 23:39

## 2020-10-09 RX ADMIN — LEVETIRACETAM 500 MG: 5 INJECTION INTRAVENOUS at 06:17

## 2020-10-09 RX ADMIN — SODIUM CHLORIDE 3 G: 900 INJECTION INTRAVENOUS at 05:58

## 2020-10-09 RX ADMIN — Medication 200 MCG/HR: at 04:30

## 2020-10-09 RX ADMIN — LEVETIRACETAM 500 MG: 5 INJECTION INTRAVENOUS at 17:38

## 2020-10-09 RX ADMIN — PROPOFOL INJECTABLE EMULSION 45 MCG/KG/MIN: 10 INJECTION, EMULSION INTRAVENOUS at 22:04

## 2020-10-09 RX ADMIN — LEVETIRACETAM 500 MG: 5 INJECTION INTRAVENOUS at 05:42

## 2020-10-09 RX ADMIN — FENTANYL CITRATE 100 MCG: 50 INJECTION, SOLUTION INTRAMUSCULAR; INTRAVENOUS at 11:21

## 2020-10-09 RX ADMIN — FAMOTIDINE 20 MG: 10 INJECTION INTRAVENOUS at 02:32

## 2020-10-09 RX ADMIN — POLYVINYL ALCOHOL 1 DROP: 14 SOLUTION/ DROPS OPHTHALMIC at 07:24

## 2020-10-09 RX ADMIN — SODIUM PHOSPHATE, MONOBASIC, MONOHYDRATE AND SODIUM PHOSPHATE, DIBASIC, ANHYDROUS 10 MMOL: 276; 142 INJECTION, SOLUTION INTRAVENOUS at 09:17

## 2020-10-09 RX ADMIN — SODIUM CHLORIDE 3 G: 900 INJECTION INTRAVENOUS at 18:15

## 2020-10-09 RX ADMIN — TETANUS AND DIPHTHERIA TOXOIDS ADSORBED 0.5 ML: 2; 2 INJECTION INTRAMUSCULAR at 02:00

## 2020-10-09 RX ADMIN — SODIUM CHLORIDE 3 G: 900 INJECTION INTRAVENOUS at 01:00

## 2020-10-09 RX ADMIN — POTASSIUM CHLORIDE 20 MEQ: 400 INJECTION, SOLUTION INTRAVENOUS at 03:01

## 2020-10-09 RX ADMIN — POLYVINYL ALCOHOL 1 DROP: 14 SOLUTION/ DROPS OPHTHALMIC at 15:35

## 2020-10-09 RX ADMIN — POLYVINYL ALCOHOL 1 DROP: 14 SOLUTION/ DROPS OPHTHALMIC at 19:31

## 2020-10-09 RX ADMIN — PROPOFOL INJECTABLE EMULSION 50 MCG/KG/MIN: 10 INJECTION, EMULSION INTRAVENOUS at 02:00

## 2020-10-09 RX ADMIN — CIPROFLOXACIN HYDROCHLORIDE 4 DROP: 3 SOLUTION/ DROPS OPHTHALMIC at 23:38

## 2020-10-09 RX ADMIN — PROPOFOL INJECTABLE EMULSION 50 MCG/KG/MIN: 10 INJECTION, EMULSION INTRAVENOUS at 09:48

## 2020-10-09 RX ADMIN — DEXAMETHASONE SODIUM PHOSPHATE 4 DROP: 1 SOLUTION/ DROPS OPHTHALMIC at 23:38

## 2020-10-09 RX ADMIN — POTASSIUM CHLORIDE 20 MEQ: 400 INJECTION, SOLUTION INTRAVENOUS at 02:11

## 2020-10-09 RX ADMIN — Medication 125 MCG/HR: at 14:56

## 2020-10-09 RX ADMIN — BACITRACIN ZINC: 500 OINTMENT TOPICAL at 08:06

## 2020-10-09 RX ADMIN — MINERAL OIL AND PETROLATUM: 150; 830 OINTMENT OPHTHALMIC at 23:39

## 2020-10-09 RX ADMIN — POTASSIUM CHLORIDE 20 MEQ: 400 INJECTION, SOLUTION INTRAVENOUS at 03:52

## 2020-10-09 RX ADMIN — WATER 10 ML: 1 INJECTION INTRAMUSCULAR; INTRAVENOUS; SUBCUTANEOUS at 00:22

## 2020-10-09 RX ADMIN — PROPOFOL INJECTABLE EMULSION 45 MCG/KG/MIN: 10 INJECTION, EMULSION INTRAVENOUS at 17:38

## 2020-10-09 RX ADMIN — BACITRACIN ZINC: 500 OINTMENT TOPICAL at 23:38

## 2020-10-09 RX ADMIN — CHLORHEXIDINE GLUCONATE 0.12% ORAL RINSE 15 ML: 1.2 LIQUID ORAL at 08:06

## 2020-10-09 RX ADMIN — BACITRACIN ZINC: 500 OINTMENT TOPICAL at 14:17

## 2020-10-09 RX ADMIN — FAMOTIDINE 20 MG: 10 INJECTION INTRAVENOUS at 23:39

## 2020-10-09 RX ADMIN — IOPAMIDOL 60 ML: 755 INJECTION, SOLUTION INTRAVENOUS at 15:19

## 2020-10-09 RX ADMIN — METHOCARBAMOL 1000 MG: 100 INJECTION INTRAMUSCULAR; INTRAVENOUS at 09:17

## 2020-10-09 RX ADMIN — SODIUM CHLORIDE 50 ML/HR: 3 INJECTION, SOLUTION INTRAVENOUS at 22:48

## 2020-10-09 RX ADMIN — Medication: at 00:01

## 2020-10-09 RX ADMIN — VECURONIUM BROMIDE 5 MG: 10 INJECTION, POWDER, LYOPHILIZED, FOR SOLUTION INTRAVENOUS at 11:21

## 2020-10-09 RX ADMIN — Medication: at 09:17

## 2020-10-09 ASSESSMENT — PULMONARY FUNCTION TESTS
PIF_VALUE: 54
PIF_VALUE: 21
PIF_VALUE: 22
PIF_VALUE: 20
PIF_VALUE: 21
PIF_VALUE: 22
PIF_VALUE: 21
PIF_VALUE: 21
PIF_VALUE: 23
PIF_VALUE: 22
PIF_VALUE: 21
PIF_VALUE: 21
PIF_VALUE: 22
PIF_VALUE: 21
PIF_VALUE: 12
PIF_VALUE: 23
PIF_VALUE: 21
PIF_VALUE: 22
PIF_VALUE: 22
PIF_VALUE: 54
PIF_VALUE: 23
PIF_VALUE: 22
PIF_VALUE: 21
PIF_VALUE: 22
PIF_VALUE: 22
PIF_VALUE: 21
PIF_VALUE: 22
PIF_VALUE: 19
PIF_VALUE: 22
PIF_VALUE: 21
PIF_VALUE: 22
PIF_VALUE: 21
PIF_VALUE: 22
PIF_VALUE: 21
PIF_VALUE: 21
PIF_VALUE: 22
PIF_VALUE: 21
PIF_VALUE: 22
PIF_VALUE: 21
PIF_VALUE: 21
PIF_VALUE: 22
PIF_VALUE: 22
PIF_VALUE: 18
PIF_VALUE: 22
PIF_VALUE: 24
PIF_VALUE: 22
PIF_VALUE: 21
PIF_VALUE: 22
PIF_VALUE: 21
PIF_VALUE: 21
PIF_VALUE: 22
PIF_VALUE: 22
PIF_VALUE: 21
PIF_VALUE: 12
PIF_VALUE: 21
PIF_VALUE: 22
PIF_VALUE: 22
PIF_VALUE: 21
PIF_VALUE: 22
PIF_VALUE: 21
PIF_VALUE: 22
PIF_VALUE: 21
PIF_VALUE: 55
PIF_VALUE: 21
PIF_VALUE: 22
PIF_VALUE: 21
PIF_VALUE: 22
PIF_VALUE: 22
PIF_VALUE: 9
PIF_VALUE: 22
PIF_VALUE: 21
PIF_VALUE: 18
PIF_VALUE: 22

## 2020-10-09 ASSESSMENT — LIFESTYLE VARIABLES: SMOKING_STATUS: 1

## 2020-10-09 NOTE — PLAN OF CARE
Problem: Restraint Use - Nonviolent/Non-Self-Destructive Behavior:  Goal: Absence of restraint indications  Description: Absence of restraint indications  10/9/2020 1844 by Naila Orozco RN  Outcome: Not Met This Shift  10/9/2020 1415 by Naila Orozco RN  Outcome: Not Met This Shift  10/9/2020 1415 by Naila Orozco RN  Outcome: Not Met This Shift  10/9/2020 0559 by Troy Watkins RN  Outcome: Not Met This Shift     Problem: Restraint Use - Nonviolent/Non-Self-Destructive Behavior:  Goal: Absence of restraint-related injury  Description: Absence of restraint-related injury  10/9/2020 1844 by Naila Orozco RN  Outcome: Met This Shift  10/9/2020 1415 by Naila Orozco RN  Outcome: Met This Shift  10/9/2020 1415 by Naila Orozco RN  Outcome: Met This Shift  10/9/2020 0559 by Troy Watkins RN  Outcome: Met This Shift

## 2020-10-09 NOTE — ED NOTES
Bleeding from left ear, abrasions noted to left hand, leg and flank area     Triston Garcia, JOSEPH  10/08/20 5850

## 2020-10-09 NOTE — PROGRESS NOTES
fracture-nonoperative management per Ortho    Endo: Monitor Blood Sugars. Target blood glucose less than 180 in the ICU. DVT prophylaxis--SCDS,    GI Prophylaxis--Pepcid  Lines--central line, arterial line 10/9  CODE: FULL     DISPOSITION-Continue ICU Care    Critical care time exclusive of teaching and procedures = 35 min     Pt needs continuous ICU monitoring because the patient is at risk for deterioration from a neurological standpoint.     Jolene Plasencia MD, FACS  10/9/2020  8:36 AM
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
## 2020-10-09 NOTE — FLOWSHEET NOTE
Pt reaching for ETT and other critical equipment. Multiple attempts made to reorient/redirect/educate unsuccessful. Pt at high risk for self harm. Will initiate bilateral soft wrist restraints at this time for pt safety. Will continue to monitor.
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
## 2020-10-09 NOTE — PLAN OF CARE
Problem: Restraint Use - Nonviolent/Non-Self-Destructive Behavior:  Goal: Absence of restraint-related injury  Description: Absence of restraint-related injury  10/9/2020 1415 by Bret Mcallister RN  Outcome: Met This Shift     Problem: Restraint Use - Nonviolent/Non-Self-Destructive Behavior:  Goal: Absence of restraint indications  Description: Absence of restraint indications  10/9/2020 1415 by Bret Mcallister RN  Outcome: Not Met This Shift
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
## 2020-10-09 NOTE — ED NOTES
16 G placed to left AC prior to arrival from [facility] Codey Liao RN  10/08/20 5619
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
## 2020-10-09 NOTE — ANESTHESIA PROCEDURE NOTES
Airway  Date/Time: 10/8/2020 10:42 PM  Urgency: emergent      General Information and Staff    Patient location during procedure: ICU  Resident/CRNA: DEBORA Dolan CRNA  Performed: resident/CRNA     Consent for Airway (if performed for an anesthetic, see related documentation for consents)  Patient identity confirmed: per hospital policy  Consent: The procedure was performed in an emergent situation. Verbal consent not obtained. Written consent not obtained. Risks and benefits: risks, benefits and alternatives were not discussed      Code status verified:yes  Indications and Patient Condition  Indications for airway management: respiratory failure  Spontaneous ventilation: present  Sedation level: deep  Preoxygenated: yes  Patient position: sniffing  MILS not maintained throughout  Mask difficulty assessment: vent by bag mask    Final Airway Details  Final airway type: endotracheal airway      Successful airway: ETT  Cuffed: yes   Successful intubation technique: video laryngoscopy  Facilitating devices/methods: intubating stylet  Endotracheal tube insertion site: oral  Blade size: #4  ETT size (mm): 8.0  Cormack-Lehane Classification: grade IIb - view of arytenoids or posterior of glottis only  Placement verified by: chest auscultation and capnometry   Measured from: teeth  ETT to teeth (cm): 24  Number of attempts at approach: 1  Ventilation between attempts: bag mask  Number of other approaches attempted: 1    Other Attempts  Unsuccessful attempted airways: endotracheal tube  Unsuccessful attempted endotracheal techniques: direct laryngoscopy    Additional Comments  Anterior airway unable to intubate with direct laryngoscopy. Second attempt glidescope 4 used airway secured easily.   BBS= bilateral.

## 2020-10-09 NOTE — PROGRESS NOTES
Neurosurgery  Patient seen and examined/counseled wife extensively at bed side  perrl localized painful stimuli per nursing,sedated now  For ICP/ventric

## 2020-10-09 NOTE — CONSULTS
Topical, BID, Brandon Blake DO    propofol injection, 10 mcg/kg/min, Intravenous, Titrated, Marilu Delarosa DO, Last Rate: 27.2 mL/hr at 10/09/20 0948, 50 mcg/kg/min at 10/09/20 0948    fentaNYL 5 mcg/ml in 0.9%  ml infusion, 25 mcg/hr, Intravenous, Continuous, Hema Baumann MD, Last Rate: 25 mL/hr at 10/09/20 0912, 125 mcg/hr at 10/09/20 0912    ampicillin-sulbactam (UNASYN) 3 g ivpb minibag, 3 g, Intravenous, Q6H, Hema Baumann MD, Last Rate: 200 mL/hr at 10/09/20 1215, 3 g at 10/09/20 1215    Patient has no known allergies. family history is not on file. reports that he has been smoking. His smokeless tobacco use includes chew. He reports current alcohol use. Physical Exam:  Vitals:    10/09/20 1400   BP: 113/66   Pulse: 80   Resp: 16   Temp: 99.1 °F (37.3 °C)   SpO2: 100%     Wt Readings from Last 3 Encounters:   10/09/20 230 lb 2.6 oz (104.4 kg)           General: intubated,c collar  EENT: pt chemically paralyze,pupil equal and non reactive,unable to assess vision and movement  Midface stable,no step offs of mandible unable to asses occlusion secondary to oral tube  Neck: c-collar    Radiology:       Bilateral comminuted skull bone fractures involving bilateral temporal bones    and parietal bones.  Fractures are worse on the left side.  Mild right    holohemispheric subdural hematoma with mild midline shift.  Bilateral frontal    small hemorrhagic contusions.         Left orbital roof mildly displaced fracture.         No evidence for cervical fracture or subluxation.         Critical findings reported to the ER physician at time of dictation.               Assessment/Plan:no surgical intervention at this time for the left orbital roof. No evidence of CSF leak. Re consult for any changes      Tatum Ortega D.D.S., M.D.   Oral & Maxillofacial Surgery  10/9/2020  2:29 PM

## 2020-10-09 NOTE — CONSULTS
allergies. Social History:   TOBACCO:   reports that he has been smoking. His smokeless tobacco use includes chew. ETOH:   reports current alcohol use. DRUGS:   has no history on file for drug. ACTIVITIES OF DAILY LIVING:    OCCUPATION:    Family History:   History reviewed. No pertinent family history.     REVIEW OF SYSTEMS:  Intubated    PHYSICAL EXAM:    VITALS:  /71   Pulse 81   Temp 98.6 °F (37 °C) (Axillary)   Resp 17   Ht 6' 2\" (1.88 m)   Wt 230 lb 2.6 oz (104.4 kg)   SpO2 100%   BMI 29.55 kg/m²   CONSTITUTIONAL: Intubated, agitated, not following commands on this exam  MUSCULOSKELETAL:  Right upper Extremity:  · 3 cm laceration posterior arm just proximal to the elbow, does not appear to track to the joint  · No crepitance range of motion to shoulder, elbow, wrist, hand  · Compartments soft and compressible  · Patient is agitated and does make full composite fist and extend all fingers  · Radial pulse 2+ with brisk capillary refill    Left upper extremity:  · Some ecchymosis posterior shoulder  · No crepitance at the shoulder, elbow, wrist, hand  · Compartments are soft and compressible  · Patient is agitated and does make full composite fist and extend all fingers  · Radial pulse 2+ with brisk capillary refill    Bilateral lower extremities  · Multiple superficial abrasions otherwise skin intact  · Compartment soft compressible  · No crepitance to range of motion at the hip, knee, ankle or foot  · DP/PT pulses 2+ with brisk capillary refill      DATA:    CBC:   Lab Results   Component Value Date    WBC 15.7 10/09/2020    RBC 4.56 10/09/2020    HGB 14.2 10/09/2020    HCT 41.6 10/09/2020    MCV 91.2 10/09/2020    MCH 31.1 10/09/2020    MCHC 34.1 10/09/2020    RDW 12.1 10/09/2020     10/09/2020    MPV 9.8 10/09/2020     PT/INR:    Lab Results   Component Value Date    PROTIME 12.0 10/08/2020    INR 1.1 10/08/2020       Radiology Review:  CT chest:  Demonstrates a mildly displaced fracture of the medial scapular body. No other fractures or dislocations in the extremities    XR hand bilateral:  Demonstrates no acute fractures or dislocations    XR elbow right:  Demonstrates no acute fracture dislocation    XR knee:  Demonstrates no acute fracture dislocation    IMPRESSION:  · Left scapular body fracture    PLAN:  · Sling to left upper extremity  · Nonweightbearing at this time  · Patient will need secondary exam once awake  · Continue trauma management  · No planned orthopedic intervention at this time  · Discussed with Dr. Gabriela Calles        I have seen and evaluated the patient and agree with the above assessment on today's visit. I have performed the key components of the history and physical examination and concur completely with the findings and plans as documented. Agree with ROS, examination, FMH, PMH, PSH, SocHx, and allergies as above. Patient seen and examined. Patient with left scapular body fracture which can be treated nonoperatively. We will follow her closely for further occult injuries. Will reexamine when more awake. Please call with any questions or concerns. Physical Examination:   General appearance: alert, well appearing, and in no distress,  normal appearing weight. No visible signs of trauma   Mental status: alert, oriented to person, place, and time, normal mood, behavior, speech, dress, motor activity, and thought processes  Abdomen: soft, nondistended  Resp:   resp easy and unlabored, no audible wheezes note, normal symmetrical expansion of both hemithoraces  Cardiac: distal pulses palpable, skin and extremities well perfused  Neurological: alert, oriented X3, normal speech, no focal findings or movement disorder noted, motor and sensory grossly normal bilaterally, normal muscle tone, no tremors  HEENT: normochephalic atraumatic, external ears and eyes normal, sclera normal, neck supple, no nasal discharge.    Extremities:   peripheral pulses normal, no edema, redness or tenderness in the calves   Skin: normal coloration, no rashes or open wounds, no suspicious skin lesions noted  Psych: Affect euthymic   Musculoskeletal:   Extremity:  Agree with above examination. ELECTRONICALLY signed by:    Anna Denis MD  10/14/20   This is been dictated utilizing voice recognition software. All efforts have been made to make the note accurate although inadvertent errors may be present.
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
## 2020-10-09 NOTE — ED NOTES
10mg labatolol given at this time, 100mg fentanyl given at this time     Anthony Tubbs RN  10/08/20 [id number]

## 2020-10-09 NOTE — CONSULTS
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 airspace opacity or pleural effusion. The heart is prominent related to portable technique. No pneumothorax. 1.  No acute process in the chest. 2.  Endotracheal tube is 6 cm above the andriy. 3.  Nasogastric tube courses below the level of the diaphragm. Cta Neck W Contrast    Result Date: 10/9/2020  EXAMINATION: CTA OF THE NECK 10/8/2020 10:56 pm TECHNIQUE: CTA of the neck was performed with the administration of intravenous contrast. Multiplanar reformatted images are provided for review. MIP images are provided for review. Stenosis of the internal carotid arteries measured using NASCET criteria. Dose modulation, iterative reconstruction, and/or weight based adjustment of the mA/kV was utilized to reduce the radiation dose to as low as reasonably achievable. COMPARISON: None. HISTORY: ORDERING SYSTEM PROVIDED HISTORY: trauma TECHNOLOGIST PROVIDED HISTORY: Reason for exam:->trauma Has a \"code stroke\" or \"stroke alert\" been called? ->No What reading provider will be dictating this exam?->CRC FINDINGS: AORTIC ARCH/ARCH VESSELS: No dissection or arterial injury. No significant stenosis of the brachiocephalic or subclavian arteries. CAROTID ARTERIES: No dissection, arterial injury, or hemodynamically significant stenosis by NASCET criteria. VERTEBRAL ARTERIES: No dissection, arterial injury, or significant stenosis. SOFT TISSUES: The lung apices are clear. No cervical or superior mediastinal lymphadenopathy. The larynx and pharynx are unremarkable. No acute abnormality of the salivary and thyroid glands. BONES: Partially visualized calvarial comminuted acute fractures are seen bilaterally involving the left temporal occipital bone in the right posterior temporal bone. Right temporal underlying acute subdural hemorrhage is identified measuring up to 5 mm in greatest thickness. Incidental finding of right-sided lung azygos lobe is seen. Unremarkable CTA of the neck. Bilateral calvarial comminuted acute fractures, as discussed above. Underlying partially visualized right temporal acute subdural hemorrhage measuring up to 5 mm in thickness. Please refer to CT head examination, same date, for full description of findings. Xr Chest Abdomen Ng Placement    Result Date: 10/8/2020  EXAMINATION: ONE SUPINE XRAY VIEW(S) OF THE ABDOMEN 10/8/2020 9:52 pm COMPARISON: None. HISTORY: ORDERING SYSTEM PROVIDED HISTORY: trauma, og placement TECHNOLOGIST PROVIDED HISTORY: Reason for exam:->trauma, og placement What reading provider will be dictating this exam?->CRC FINDINGS: Nasogastric tube courses below the level of the diaphragm with distal tip in the expected location of the stomach. There is a distended gas-filled stomach. Satisfactory position of nasogastric tube. ASSESSMENT:  30 y.o. male with longitudinal left temporal bone and external auditory canal fractures. PLAN:  · Ciprodex drops (4 drops) BID to left ear. · Earwick placed by ENT. Will remove in 3-5 days. · Unable to assess facial nerve function at this time. Will assess when patient is awake and able.    · Plan for outpatient audiogram    Patient seen and examined with senior resident and discussed with attending.      Electronically signed by Luis Enrique Wagner DO on 10/9/20 at 3:48 PM EDT
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
## 2020-10-10 ENCOUNTER — APPOINTMENT (OUTPATIENT)
Dept: GENERAL RADIOLOGY | Age: 30
DRG: 003 | End: 2020-10-10
Payer: COMMERCIAL

## 2020-10-10 LAB
AADO2: 127.9 MMHG
ANION GAP SERPL CALCULATED.3IONS-SCNC: 10 MMOL/L (ref 7–16)
B.E.: -2 MMOL/L (ref -3–3)
BASOPHILS ABSOLUTE: 0.07 E9/L (ref 0–0.2)
BASOPHILS RELATIVE PERCENT: 0.8 % (ref 0–2)
BUN BLDV-MCNC: 11 MG/DL (ref 6–20)
CALCIUM IONIZED: 1.17 MMOL/L (ref 1.15–1.33)
CALCIUM SERPL-MCNC: 7.6 MG/DL (ref 8.6–10.2)
CHLORIDE BLD-SCNC: 107 MMOL/L (ref 98–107)
CO2: 24 MMOL/L (ref 22–29)
COHB: 0.5 % (ref 0–1.5)
CREAT SERPL-MCNC: 1 MG/DL (ref 0.7–1.2)
CRITICAL: ABNORMAL
DATE ANALYZED: ABNORMAL
DATE OF COLLECTION: ABNORMAL
EOSINOPHILS ABSOLUTE: 0.34 E9/L (ref 0.05–0.5)
EOSINOPHILS RELATIVE PERCENT: 3.8 % (ref 0–6)
FIO2: 40 %
GFR AFRICAN AMERICAN: >60
GFR NON-AFRICAN AMERICAN: >60 ML/MIN/1.73
GLUCOSE BLD-MCNC: 99 MG/DL (ref 74–99)
HCO3: 22.3 MMOL/L (ref 22–26)
HCT VFR BLD CALC: 34.8 % (ref 37–54)
HEMOGLOBIN: 11.7 G/DL (ref 12.5–16.5)
HHB: 2.5 % (ref 0–5)
IMMATURE GRANULOCYTES #: 0.03 E9/L
IMMATURE GRANULOCYTES %: 0.3 % (ref 0–5)
LAB: ABNORMAL
LACTIC ACID: 1.3 MMOL/L (ref 0.5–2.2)
LYMPHOCYTES ABSOLUTE: 1.03 E9/L (ref 1.5–4)
LYMPHOCYTES RELATIVE PERCENT: 11.6 % (ref 20–42)
Lab: ABNORMAL
MAGNESIUM: 1.9 MG/DL (ref 1.6–2.6)
MCH RBC QN AUTO: 31.1 PG (ref 26–35)
MCHC RBC AUTO-ENTMCNC: 33.6 % (ref 32–34.5)
MCV RBC AUTO: 92.6 FL (ref 80–99.9)
METHB: 0.3 % (ref 0–1.5)
MODE: AC
MONOCYTES ABSOLUTE: 0.92 E9/L (ref 0.1–0.95)
MONOCYTES RELATIVE PERCENT: 10.3 % (ref 2–12)
MRSA CULTURE ONLY: NORMAL
NEUTROPHILS ABSOLUTE: 6.51 E9/L (ref 1.8–7.3)
NEUTROPHILS RELATIVE PERCENT: 73.2 % (ref 43–80)
O2 SATURATION: 97.9 % (ref 92–98.5)
O2HB: 96.7 % (ref 94–97)
OPERATOR ID: 2577
PATIENT TEMP: 37 C
PCO2: 36.6 MMHG (ref 35–45)
PDW BLD-RTO: 12.8 FL (ref 11.5–15)
PEEP/CPAP: 8 CMH2O
PFO2: 2.63 MMHG/%
PH BLOOD GAS: 7.4 (ref 7.35–7.45)
PHOSPHORUS: 2.5 MG/DL (ref 2.5–4.5)
PLATELET # BLD: 143 E9/L (ref 130–450)
PMV BLD AUTO: 10.4 FL (ref 7–12)
PO2: 105.2 MMHG (ref 75–100)
POTASSIUM SERPL-SCNC: 4 MMOL/L (ref 3.5–5)
RBC # BLD: 3.76 E12/L (ref 3.8–5.8)
RI(T): 1.22
RR MECHANICAL: 16 B/MIN
SODIUM BLD-SCNC: 139 MMOL/L (ref 132–146)
SODIUM BLD-SCNC: 140 MMOL/L (ref 132–146)
SODIUM BLD-SCNC: 141 MMOL/L (ref 132–146)
SODIUM BLD-SCNC: 143 MMOL/L (ref 132–146)
SODIUM BLD-SCNC: 143 MMOL/L (ref 132–146)
SOURCE, BLOOD GAS: ABNORMAL
THB: 12.4 G/DL (ref 11.5–16.5)
TIME ANALYZED: 550
VT MECHANICAL: 500 ML
WBC # BLD: 8.9 E9/L (ref 4.5–11.5)

## 2020-10-10 PROCEDURE — 6360000002 HC RX W HCPCS: Performed by: SURGERY

## 2020-10-10 PROCEDURE — 94003 VENT MGMT INPAT SUBQ DAY: CPT

## 2020-10-10 PROCEDURE — 82330 ASSAY OF CALCIUM: CPT

## 2020-10-10 PROCEDURE — 85025 COMPLETE CBC W/AUTO DIFF WBC: CPT

## 2020-10-10 PROCEDURE — 2580000003 HC RX 258: Performed by: STUDENT IN AN ORGANIZED HEALTH CARE EDUCATION/TRAINING PROGRAM

## 2020-10-10 PROCEDURE — 83605 ASSAY OF LACTIC ACID: CPT

## 2020-10-10 PROCEDURE — 71045 X-RAY EXAM CHEST 1 VIEW: CPT

## 2020-10-10 PROCEDURE — 6370000000 HC RX 637 (ALT 250 FOR IP): Performed by: STUDENT IN AN ORGANIZED HEALTH CARE EDUCATION/TRAINING PROGRAM

## 2020-10-10 PROCEDURE — 2500000003 HC RX 250 WO HCPCS: Performed by: STUDENT IN AN ORGANIZED HEALTH CARE EDUCATION/TRAINING PROGRAM

## 2020-10-10 PROCEDURE — 84100 ASSAY OF PHOSPHORUS: CPT

## 2020-10-10 PROCEDURE — 2580000003 HC RX 258: Performed by: SURGERY

## 2020-10-10 PROCEDURE — 6360000002 HC RX W HCPCS: Performed by: STUDENT IN AN ORGANIZED HEALTH CARE EDUCATION/TRAINING PROGRAM

## 2020-10-10 PROCEDURE — 99291 CRITICAL CARE FIRST HOUR: CPT | Performed by: SURGERY

## 2020-10-10 PROCEDURE — 2500000003 HC RX 250 WO HCPCS: Performed by: SURGERY

## 2020-10-10 PROCEDURE — 2000000000 HC ICU R&B

## 2020-10-10 PROCEDURE — 36415 COLL VENOUS BLD VENIPUNCTURE: CPT

## 2020-10-10 PROCEDURE — 6370000000 HC RX 637 (ALT 250 FOR IP): Performed by: SURGERY

## 2020-10-10 PROCEDURE — 36620 INSERTION CATHETER ARTERY: CPT

## 2020-10-10 PROCEDURE — 83735 ASSAY OF MAGNESIUM: CPT

## 2020-10-10 PROCEDURE — 2580000003 HC RX 258

## 2020-10-10 PROCEDURE — 82805 BLOOD GASES W/O2 SATURATION: CPT

## 2020-10-10 PROCEDURE — 80048 BASIC METABOLIC PNL TOTAL CA: CPT

## 2020-10-10 PROCEDURE — 84295 ASSAY OF SERUM SODIUM: CPT

## 2020-10-10 RX ORDER — HEPARIN SODIUM (PORCINE) LOCK FLUSH IV SOLN 100 UNIT/ML 100 UNIT/ML
SOLUTION INTRAVENOUS
Status: DISPENSED
Start: 2020-10-10 | End: 2020-10-11

## 2020-10-10 RX ORDER — 3% SODIUM CHLORIDE 3 G/100ML
INJECTION, SOLUTION INTRAVENOUS
Status: COMPLETED
Start: 2020-10-10 | End: 2020-10-10

## 2020-10-10 RX ORDER — MAGNESIUM SULFATE IN WATER 40 MG/ML
2 INJECTION, SOLUTION INTRAVENOUS ONCE
Status: COMPLETED | OUTPATIENT
Start: 2020-10-10 | End: 2020-10-10

## 2020-10-10 RX ORDER — LEVETIRACETAM 100 MG/ML
500 SOLUTION ORAL 2 TIMES DAILY
Status: COMPLETED | OUTPATIENT
Start: 2020-10-10 | End: 2020-10-15

## 2020-10-10 RX ORDER — 3% SODIUM CHLORIDE 3 G/100ML
50 INJECTION, SOLUTION INTRAVENOUS CONTINUOUS
Status: DISCONTINUED | OUTPATIENT
Start: 2020-10-10 | End: 2020-10-13

## 2020-10-10 RX ADMIN — LEVETIRACETAM 500 MG: 100 SOLUTION ORAL at 18:44

## 2020-10-10 RX ADMIN — PROPOFOL INJECTABLE EMULSION 50 MCG/KG/MIN: 10 INJECTION, EMULSION INTRAVENOUS at 16:14

## 2020-10-10 RX ADMIN — PROPOFOL INJECTABLE EMULSION 50 MCG/KG/MIN: 10 INJECTION, EMULSION INTRAVENOUS at 19:16

## 2020-10-10 RX ADMIN — POLYVINYL ALCOHOL 1 DROP: 14 SOLUTION/ DROPS OPHTHALMIC at 23:49

## 2020-10-10 RX ADMIN — Medication 150 MCG/HR: at 11:35

## 2020-10-10 RX ADMIN — BACITRACIN ZINC: 500 OINTMENT TOPICAL at 20:18

## 2020-10-10 RX ADMIN — FAMOTIDINE 20 MG: 10 INJECTION INTRAVENOUS at 20:18

## 2020-10-10 RX ADMIN — MINERAL OIL AND PETROLATUM: 150; 830 OINTMENT OPHTHALMIC at 06:19

## 2020-10-10 RX ADMIN — PROPOFOL INJECTABLE EMULSION 49.98 MCG/KG/MIN: 10 INJECTION, EMULSION INTRAVENOUS at 23:00

## 2020-10-10 RX ADMIN — METHOCARBAMOL 1000 MG: 100 INJECTION INTRAMUSCULAR; INTRAVENOUS at 20:20

## 2020-10-10 RX ADMIN — MINERAL OIL AND PETROLATUM: 150; 830 OINTMENT OPHTHALMIC at 02:15

## 2020-10-10 RX ADMIN — SODIUM CHLORIDE 50 ML/HR: 3 INJECTION, SOLUTION INTRAVENOUS at 09:59

## 2020-10-10 RX ADMIN — POLYVINYL ALCOHOL 1 DROP: 14 SOLUTION/ DROPS OPHTHALMIC at 08:05

## 2020-10-10 RX ADMIN — FAMOTIDINE 20 MG: 10 INJECTION INTRAVENOUS at 08:04

## 2020-10-10 RX ADMIN — SENNOSIDES 5 ML: 8.8 LIQUID ORAL at 20:19

## 2020-10-10 RX ADMIN — BACITRACIN ZINC, POLYMYXIN B SULFATE, NEOMYCIN SULFATE: 400; 5000; 3.5 OINTMENT TOPICAL at 20:19

## 2020-10-10 RX ADMIN — Medication 150 MCG/HR: at 21:03

## 2020-10-10 RX ADMIN — MINERAL OIL AND PETROLATUM: 150; 830 OINTMENT OPHTHALMIC at 21:10

## 2020-10-10 RX ADMIN — BACITRACIN ZINC: 500 OINTMENT TOPICAL at 08:04

## 2020-10-10 RX ADMIN — SODIUM CHLORIDE 3 G: 900 INJECTION INTRAVENOUS at 06:19

## 2020-10-10 RX ADMIN — SODIUM CHLORIDE 3 G: 900 INJECTION INTRAVENOUS at 23:49

## 2020-10-10 RX ADMIN — CIPROFLOXACIN HYDROCHLORIDE 4 DROP: 3 SOLUTION/ DROPS OPHTHALMIC at 20:20

## 2020-10-10 RX ADMIN — CHLORHEXIDINE GLUCONATE 0.12% ORAL RINSE 15 ML: 1.2 LIQUID ORAL at 20:18

## 2020-10-10 RX ADMIN — PROPOFOL INJECTABLE EMULSION 40 MCG/KG/MIN: 10 INJECTION, EMULSION INTRAVENOUS at 11:34

## 2020-10-10 RX ADMIN — MINERAL OIL AND PETROLATUM: 150; 830 OINTMENT OPHTHALMIC at 08:04

## 2020-10-10 RX ADMIN — CALCIUM GLUCONATE 1 G: 98 INJECTION, SOLUTION INTRAVENOUS at 09:58

## 2020-10-10 RX ADMIN — LEVETIRACETAM 500 MG: 5 INJECTION INTRAVENOUS at 06:19

## 2020-10-10 RX ADMIN — SODIUM CHLORIDE 3 G: 900 INJECTION INTRAVENOUS at 00:56

## 2020-10-10 RX ADMIN — POLYVINYL ALCOHOL 1 DROP: 14 SOLUTION/ DROPS OPHTHALMIC at 11:42

## 2020-10-10 RX ADMIN — DEXAMETHASONE SODIUM PHOSPHATE 4 DROP: 1 SOLUTION/ DROPS OPHTHALMIC at 08:04

## 2020-10-10 RX ADMIN — POLYVINYL ALCOHOL 1 DROP: 14 SOLUTION/ DROPS OPHTHALMIC at 20:24

## 2020-10-10 RX ADMIN — Medication 10 ML: at 20:21

## 2020-10-10 RX ADMIN — METHOCARBAMOL 1000 MG: 100 INJECTION INTRAMUSCULAR; INTRAVENOUS at 09:08

## 2020-10-10 RX ADMIN — CHLORHEXIDINE GLUCONATE 0.12% ORAL RINSE 15 ML: 1.2 LIQUID ORAL at 08:04

## 2020-10-10 RX ADMIN — PROPOFOL INJECTABLE EMULSION 30 MCG/KG/MIN: 10 INJECTION, EMULSION INTRAVENOUS at 06:32

## 2020-10-10 RX ADMIN — PROPOFOL INJECTABLE EMULSION 40 MCG/KG/MIN: 10 INJECTION, EMULSION INTRAVENOUS at 03:07

## 2020-10-10 RX ADMIN — Medication 10 ML: at 08:04

## 2020-10-10 RX ADMIN — MINERAL OIL AND PETROLATUM: 150; 830 OINTMENT OPHTHALMIC at 15:09

## 2020-10-10 RX ADMIN — POLYVINYL ALCOHOL 1 DROP: 14 SOLUTION/ DROPS OPHTHALMIC at 04:49

## 2020-10-10 RX ADMIN — SODIUM PHOSPHATE, MONOBASIC, MONOHYDRATE AND SODIUM PHOSPHATE, DIBASIC, ANHYDROUS 20 MMOL: 276; 142 INJECTION, SOLUTION INTRAVENOUS at 11:42

## 2020-10-10 RX ADMIN — SODIUM CHLORIDE 50 ML/HR: 3 INJECTION, SOLUTION INTRAVENOUS at 21:00

## 2020-10-10 RX ADMIN — CIPROFLOXACIN HYDROCHLORIDE 4 DROP: 3 SOLUTION/ DROPS OPHTHALMIC at 08:04

## 2020-10-10 RX ADMIN — BACITRACIN ZINC, POLYMYXIN B SULFATE, NEOMYCIN SULFATE: 400; 5000; 3.5 OINTMENT TOPICAL at 08:04

## 2020-10-10 RX ADMIN — METHOCARBAMOL 1000 MG: 100 INJECTION INTRAMUSCULAR; INTRAVENOUS at 16:22

## 2020-10-10 RX ADMIN — SODIUM CHLORIDE 3 G: 900 INJECTION INTRAVENOUS at 18:44

## 2020-10-10 RX ADMIN — BACITRACIN ZINC: 500 OINTMENT TOPICAL at 15:10

## 2020-10-10 RX ADMIN — Medication 125 MCG/HR: at 01:14

## 2020-10-10 RX ADMIN — SODIUM CHLORIDE 3 G: 900 INJECTION INTRAVENOUS at 12:13

## 2020-10-10 RX ADMIN — METHOCARBAMOL 1000 MG: 100 INJECTION INTRAMUSCULAR; INTRAVENOUS at 02:15

## 2020-10-10 RX ADMIN — MAGNESIUM SULFATE 2 G: 2 INJECTION INTRAVENOUS at 09:58

## 2020-10-10 RX ADMIN — DEXAMETHASONE SODIUM PHOSPHATE 4 DROP: 1 SOLUTION/ DROPS OPHTHALMIC at 20:20

## 2020-10-10 RX ADMIN — POLYVINYL ALCOHOL 1 DROP: 14 SOLUTION/ DROPS OPHTHALMIC at 15:08

## 2020-10-10 ASSESSMENT — PULMONARY FUNCTION TESTS
PIF_VALUE: 22
PIF_VALUE: 22
PIF_VALUE: 23
PIF_VALUE: 22
PIF_VALUE: 22
PIF_VALUE: 21
PIF_VALUE: 21
PIF_VALUE: 23
PIF_VALUE: 23
PIF_VALUE: 22
PIF_VALUE: 22
PIF_VALUE: 21
PIF_VALUE: 21
PIF_VALUE: 22
PIF_VALUE: 21
PIF_VALUE: 22
PIF_VALUE: 22
PIF_VALUE: 21
PIF_VALUE: 22
PIF_VALUE: 22
PIF_VALUE: 21
PIF_VALUE: 23
PIF_VALUE: 21
PIF_VALUE: 22
PIF_VALUE: 21
PIF_VALUE: 22
PIF_VALUE: 22
PIF_VALUE: 21

## 2020-10-10 NOTE — PROGRESS NOTES
Neurosurg progress note  VITALS:  /65   Pulse 65   Temp 100 °F (37.8 °C) (Axillary)   Resp 16   Ht 6' 2\" (1.88 m)   Wt 235 lb 14.3 oz (107 kg)   SpO2 100%   BMI 30.29 kg/m²   24HR INTAKE/OUTPUT:    Intake/Output Summary (Last 24 hours) at 10/10/2020 0934  Last data filed at 10/10/2020 0840  Gross per 24 hour   Intake 3076.68 ml   Output 1301 ml   Net 1775.68 ml     Xr Pelvis (1-2 Views)    Result Date: 10/8/2020  EXAMINATION: ONE XRAY VIEW OF THE PELVIS 10/8/2020 9:52 pm COMPARISON: None. HISTORY: ORDERING SYSTEM PROVIDED HISTORY: trauma TECHNOLOGIST PROVIDED HISTORY: Reason for exam:->trauma What reading provider will be dictating this exam?->CRC FINDINGS: Left ischial tuberosity is not included on this examination. Entire left hip is not included on this examination. No fracture or dislocation involving pelvis or visualized hips. No diastasis involving sacroiliac joints or symphysis pubis. No fracture or dislocation involving visualized pelvis or hips. Xr Elbow Right (min 3 Views)    Result Date: 10/9/2020  EXAMINATION: THREE XRAY VIEWS OF THE RIGHT ELBOW 10/9/2020 7:00 am COMPARISON: None. HISTORY: ORDERING SYSTEM PROVIDED HISTORY: trauma, Skull fx TECHNOLOGIST PROVIDED HISTORY: Reason for exam:->trauma, Skull fx What reading provider will be dictating this exam?->CRC FINDINGS: There is no fracture dislocation. There is no elbow joint effusion. There are tiny degenerative spurs. No acute process     Xr Hand Left (min 3 Views)    Result Date: 10/9/2020  EXAMINATION: THREE XRAY VIEWS OF THE LEFT HAND; THREE XRAY VIEWS OF THE RIGHT HAND 10/9/2020 7:01 am COMPARISON: None. HISTORY: ORDERING SYSTEM PROVIDED HISTORY: trauma TECHNOLOGIST PROVIDED HISTORY: Reason for exam:->trauma What reading provider will be dictating this exam?->CRC FINDINGS: Positioning of both hands does somewhat limit evaluation. There are no definite fractures or dislocations.   Joint spaces are normal.     No acute process     Xr Hand Right (min 3 Views)    Result Date: 10/9/2020  EXAMINATION: THREE XRAY VIEWS OF THE LEFT HAND; THREE XRAY VIEWS OF THE RIGHT HAND 10/9/2020 7:01 am COMPARISON: None. HISTORY: ORDERING SYSTEM PROVIDED HISTORY: trauma TECHNOLOGIST PROVIDED HISTORY: Reason for exam:->trauma What reading provider will be dictating this exam?->CRC FINDINGS: Positioning of both hands does somewhat limit evaluation. There are no definite fractures or dislocations. Joint spaces are normal.     No acute process     Xr Knee Left (3 Views)    Result Date: 10/9/2020  EXAMINATION: THREE XRAY VIEWS OF THE LEFT KNEE 10/9/2020 7:03 am COMPARISON: None. HISTORY: ORDERING SYSTEM PROVIDED HISTORY: trauma TECHNOLOGIST PROVIDED HISTORY: Reason for exam:->trauma What reading provider will be dictating this exam?->CRC FINDINGS: There is no fracture dislocation. There is no joint effusion or degenerative change. No acute process     Xr Knee Right (3 Views)    Result Date: 10/9/2020  EXAMINATION: THREE XRAY VIEWS OF THE RIGHT KNEE 10/9/2020 8:06 am COMPARISON: None. HISTORY: ORDERING SYSTEM PROVIDED HISTORY: trauma TECHNOLOGIST PROVIDED HISTORY: Reason for exam:->trauma What reading provider will be dictating this exam?->CRC FINDINGS: There is no fracture dislocation. There is no joint space narrowing or effusion. No acute process     Ct Head Wo Contrast    Result Date: 10/9/2020  EXAMINATION: CT OF THE HEAD WITHOUT CONTRAST  10/9/2020 4:07 am TECHNIQUE: CT of the head was performed without the administration of intravenous contrast. Dose modulation, iterative reconstruction, and/or weight based adjustment of the mA/kV was utilized to reduce the radiation dose to as low as reasonably achievable. COMPARISON: CT head 10/08/2020 HISTORY: ORDERING SYSTEM PROVIDED HISTORY: evaluate head bleed TECHNOLOGIST PROVIDED HISTORY: Has a \"code stroke\" or \"stroke alert\" been called? ->No Reason for exam:->evaluate head bleed What reading provider will be dictating this exam?->CRC FINDINGS: BRAIN/VENTRICLES: Interval increase in size of right frontal parenchymal contusion, measuring 14 x 7 x 10 mm, previously 10 x 5 x 5 mm. Additional right frontal contusion, more inferiorly has also increased in size. Punctate contusions in the anterior-inferior frontal lobe along the gyrus rectus have also increased. Scattered bilateral hemispheric subarachnoid hemorrhage. Right convexity subdural hematoma measures up to 6 mm in thickness, not substantially changed. Trace left parietal subdural hematoma measuring 2 mm in thickness. 5 mm leftward midline shift, not substantially changed. No herniation. Patent basal cisterns. ORBITS: The visualized portion of the orbits demonstrate no acute abnormality. SINUSES: Nasopharyngeal opacities with partially imaged esophagogastric and endotracheal tubes. Scattered paranasal sinus opacities. SOFT TISSUES/SKULL:  Nondisplaced left temporal bone fracture extending to the otic capsule. Comminuted mildly displaced left parietal fracture. Nondisplaced right temporal bone fracture which is otic capsule sparing. Diffuse scalp swelling with posterior scalp contusions. Skin staples present. Mild interval increase in size of scattered parenchymal hematomas, mainly in the right frontal lobe, suspicious for diffuse axonal injury. No substantial change in acute right convexity subdural and left parietal subdural hematomas. Stable 5 mm leftward midline shift. Unchanged calvarial fractures, notable for a nondisplaced left temporal bone fracture possibly extending to the otic capsule. Recommend follow-up temporal bone CT.      Ct Head Wo Contrast    Result Date: 10/9/2020  EXAMINATION: CT OF THE HEAD WITHOUT CONTRAST  10/9/2020 12:11 pm TECHNIQUE: CT of the head was performed without the administration of intravenous contrast. Dose modulation, iterative reconstruction, and/or weight based adjustment of the mA/kV was utilized to reduce the radiation dose to as low as reasonably achievable. COMPARISON: 8 hours prior. HISTORY: ORDERING SYSTEM PROVIDED HISTORY: s/p ventric TECHNOLOGIST PROVIDED HISTORY: Reason for exam:->s/p ventric Has a \"code stroke\" or \"stroke alert\" been called? ->No What reading provider will be dictating this exam?->CRC FINDINGS: There has been interval placement of a right frontal approach ventriculostomy catheter terminating within the right lateral ventricle frontal horn abutting the septum pellucidum. There is split like configuration of the right lateral ventricle that may reflect over shunting. There is no temporal horn dilatation. There is diffuse cerebral edema with diffuse sulcal effacement. There is similar appearance of multiple foci of hemorrhagic contusions involving the right frontal lobe and to lesser extent left frontal lobe and right temporal lobe. The hemorrhagic contusions demonstrates mild surrounding edema. There is similar appearance of acute subarachnoid hemorrhage along the bilateral frontal convexities and right temporal convexity and to a lesser extent at the vertices. There is small volume of subdural hemorrhage along the right lateral tentorial leaflet. There is small volume subarachnoid hemorrhage within the interpeduncular cistern. There is new wedge-shaped region of low attenuation involving the left middle cerebellar peduncle and left cerebellar hemisphere, concerning for acute ischemia. There is no significant midline shift. There are bilateral parietal fractures, comminuted on the left, extending into the left temporal bone including the mastoid segment. Please refer to concurrently performed CT temporal bone imaging report. There is additional depressed fracture of the left superior orbital wall. There is diffuse subgaleal soft tissue swelling. Interval placement of right ventriculostomy catheter terminating within the right lateral ventricle frontal horn.   Interval development of slit-like appearance of the right lateral ventricle. Suspect acute ischemia involving the left middle cerebellar peduncle and left cerebellar hemisphere. Similar appearance of intracranial hemorrhage in hemorrhagic contusions as above. Findings discussed with Dr. Severo Ro at 12:53 p.m. on December 9, 2020. Ct Head Wo Contrast    Result Date: 10/9/2020  EXAMINATION: CT OF THE HEAD and cervical spine WITHOUT CONTRAST; CT OF THE FACE WITHOUT CONTRAST  10/8/2020 9:56 pm TECHNIQUE: CT of the head was performed without the administration of intravenous contrast. Dose modulation, iterative reconstruction, and/or weight based adjustment of the mA/kV was utilized to reduce the radiation dose to as low as reasonably achievable.; CT of the face was performed without the administration of intravenous contrast. Multiplanar reformatted images are provided for review. Dose modulation, iterative reconstruction, and/or weight based adjustment of the mA/kV was utilized to reduce the radiation dose to as low as reasonably achievable.; CT of the cervical spine was performed without the administration of intravenous contrast. Multiplanar reformatted images are provided for review. Dose modulation, iterative reconstruction, and/or weight based adjustment of the mA/kV was utilized to reduce the radiation dose to as low as reasonably achievable. COMPARISON: None. HISTORY: ORDERING SYSTEM PROVIDED HISTORY: trauma TECHNOLOGIST PROVIDED HISTORY: Reason for exam:->trauma Has a \"code stroke\" or \"stroke alert\" been called? ->No What reading provider will be dictating this exam?->CRC FINDINGS: Head: There is mild right frontoparietal holohemispheric subdural hemorrhage, measuring up to 6 mm. Mild mass effect and minimal midline shift of approximately 4 mm. Visualized skull demonstrates multiple mildly displaced fractures in the skull, involving left temporal bone, right temporal bone, bilateral parietal bones.   Left temporoparietal bone skull fractures appear to be comminuted in multiple locations. There also small hemorrhagic contusions in bilateral frontal lobes. Small amount of subarachnoid hemorrhage in the right frontal lobe. Mild fluid layering in the sphenoid sinus. Fractures involving the left orbit as well. The mastoid air cells are clear. However, left temporal bone fracture does extend through the region of the left external auditory canal and extends to the middle ear. Cervical spine: Vertebral body heights are intact. Alignment is intact. Facets are normally aligned. The odontoid process is intact. No significant prevertebral soft tissue swelling. Facial bones: Mandible is intact. The zygomatic arches are intact. Nasal bones are intact. Nasal bony septum is midline. Sphenoid temporal buttress is intact. Mildly displaced fracture of the roof of the left orbit. The orbital floor is intact. Globes are intact and not proptotic. No retro bulbar soft tissue hematoma. Mild left periorbital preseptal soft tissue swelling. Bilateral comminuted skull bone fractures involving bilateral temporal bones and parietal bones. Fractures are worse on the left side. Mild right holohemispheric subdural hematoma with mild midline shift. Bilateral frontal small hemorrhagic contusions. Left orbital roof mildly displaced fracture. No evidence for cervical fracture or subluxation. Critical findings reported to the ER physician at time of dictation. Ct Iac Posterior Fossa Wo Contrast    Result Date: 10/9/2020  EXAMINATION: CT OF THE INTERNAL AUDITORY CANAL WITHOUT CONTRAST 10/9/2020 12:11 pm TECHNIQUE: CT of the internal auditory canal was performed without contrast was performed without the administration of intravenous contrast. Multiplanar reformatted images are provided for review.  Dose modulation, iterative reconstruction, and/or weight based adjustment of the mA/kV was utilized to reduce the radiation dose to as low as reasonably achievable. COMPARISON: None HISTORY: ORDERING SYSTEM PROVIDED HISTORY: TRAUMA TECHNOLOGIST PROVIDED HISTORY: Reason for exam:->trauma What reading provider will be dictating this exam?->CRC FINDINGS: RIGHT TEMPORAL BONE:  The right external auditory canal is normal in appearance. There is no right temporal bone fracture identified. There is a small volume of fluid within the right mastoid air cells. The right middle ear is clear. The ossicles are normally aligned. The tegmen tympani is intact. The scutum is intact. There is no osseous dehiscence. The cochlea, vestibule and semicircular canals are normal in appearance. LEFT TEMPORAL BONE: There is a longitudinal fracture of the mastoid segment of the left temporal bone. There is incudomalleolar subluxation involving the head of the malleus and body of the incus. The fracture does not extend into the otic capsule. Hemorrhage is present within the left middle ear and mastoid air cells. A comminuted fracture of the squamous portion of the left temporal bone is noted. The cochlea, vestibule and semicircular canals are normal.     Longitudinal fracture of the mastoid portion of the left temporal bone with associated incudomalleolar subluxation involving the head of the malleus and body of the incus. The fracture does not extend into the otic capsule. Small volume of fluid within the right mastoid air cells but no acute traumatic injury of the mastoid portion of the right temporal bone evident.      Ct Facial Bones Wo Contrast    Result Date: 10/9/2020  EXAMINATION: CT OF THE HEAD and cervical spine WITHOUT CONTRAST; CT OF THE FACE WITHOUT CONTRAST  10/8/2020 9:56 pm TECHNIQUE: CT of the head was performed without the administration of intravenous contrast. Dose modulation, iterative reconstruction, and/or weight based adjustment of the mA/kV was utilized to reduce the radiation dose to as low as reasonably achievable.; CT of the face was performed without the administration of intravenous contrast. Multiplanar reformatted images are provided for review. Dose modulation, iterative reconstruction, and/or weight based adjustment of the mA/kV was utilized to reduce the radiation dose to as low as reasonably achievable.; CT of the cervical spine was performed without the administration of intravenous contrast. Multiplanar reformatted images are provided for review. Dose modulation, iterative reconstruction, and/or weight based adjustment of the mA/kV was utilized to reduce the radiation dose to as low as reasonably achievable. COMPARISON: None. HISTORY: ORDERING SYSTEM PROVIDED HISTORY: trauma TECHNOLOGIST PROVIDED HISTORY: Reason for exam:->trauma Has a \"code stroke\" or \"stroke alert\" been called? ->No What reading provider will be dictating this exam?->CRC FINDINGS: Head: There is mild right frontoparietal holohemispheric subdural hemorrhage, measuring up to 6 mm. Mild mass effect and minimal midline shift of approximately 4 mm. Visualized skull demonstrates multiple mildly displaced fractures in the skull, involving left temporal bone, right temporal bone, bilateral parietal bones. Left temporoparietal bone skull fractures appear to be comminuted in multiple locations. There also small hemorrhagic contusions in bilateral frontal lobes. Small amount of subarachnoid hemorrhage in the right frontal lobe. Mild fluid layering in the sphenoid sinus. Fractures involving the left orbit as well. The mastoid air cells are clear. However, left temporal bone fracture does extend through the region of the left external auditory canal and extends to the middle ear. Cervical spine: Vertebral body heights are intact. Alignment is intact. Facets are normally aligned. The odontoid process is intact. No significant prevertebral soft tissue swelling. Facial bones: Mandible is intact. The zygomatic arches are intact. Nasal bones are intact.   Nasal bony septum is midline. Sphenoid temporal buttress is intact. Mildly displaced fracture of the roof of the left orbit. The orbital floor is intact. Globes are intact and not proptotic. No retro bulbar soft tissue hematoma. Mild left periorbital preseptal soft tissue swelling. Bilateral comminuted skull bone fractures involving bilateral temporal bones and parietal bones. Fractures are worse on the left side. Mild right holohemispheric subdural hematoma with mild midline shift. Bilateral frontal small hemorrhagic contusions. Left orbital roof mildly displaced fracture. No evidence for cervical fracture or subluxation. Critical findings reported to the ER physician at time of dictation. Ct Chest W Contrast    Result Date: 10/9/2020  EXAMINATION: CT OF THE CHEST WITH CONTRAST 10/8/2020 10:56 pm TECHNIQUE: CT of the chest was performed with the administration of intravenous contrast. Multiplanar reformatted images are provided for review. Dose modulation, iterative reconstruction, and/or weight based adjustment of the mA/kV was utilized to reduce the radiation dose to as low as reasonably achievable. COMPARISON: None. HISTORY: ORDERING SYSTEM PROVIDED HISTORY: trauma TECHNOLOGIST PROVIDED HISTORY: Reason for exam:->trauma What reading provider will be dictating this exam?->CRC FINDINGS: An endotracheal and enteric tubes are noted in place and are appropriately positioned. Mediastinum: Normal heart size. The great vessels are within normal limits. No pericardial effusion. No significantly enlarged lymph nodes. Lungs/pleura: Mild dependent congestion involving the bilateral posterior lungs. No pulmonary mass or augustin consolidation. No pleural effusion. Upper Abdomen: Within normal limits. Soft Tissues/Bones: Nondisplaced acute fractures involving the lateral left 5th through 7th ribs. Nondisplaced acute fractures of the posterior left 4th through 7th ribs.   Acute mildly displaced fractures of the posterior left 8th rib. Mildly displaced acute fracture of the left body of the scapula. Nondisplaced acute fractures involving the lateral left 5th through 7th ribs. Nondisplaced acute fractures of the posterior left 4th through 7th ribs. Acute mildly displaced fractures of the posterior left 8th rib. Mildly displaced acute fracture of the left body of the scapula. Ct Cervical Spine Wo Contrast    Result Date: 10/9/2020  EXAMINATION: CT OF THE HEAD and cervical spine WITHOUT CONTRAST; CT OF THE FACE WITHOUT CONTRAST  10/8/2020 9:56 pm TECHNIQUE: CT of the head was performed without the administration of intravenous contrast. Dose modulation, iterative reconstruction, and/or weight based adjustment of the mA/kV was utilized to reduce the radiation dose to as low as reasonably achievable.; CT of the face was performed without the administration of intravenous contrast. Multiplanar reformatted images are provided for review. Dose modulation, iterative reconstruction, and/or weight based adjustment of the mA/kV was utilized to reduce the radiation dose to as low as reasonably achievable.; CT of the cervical spine was performed without the administration of intravenous contrast. Multiplanar reformatted images are provided for review. Dose modulation, iterative reconstruction, and/or weight based adjustment of the mA/kV was utilized to reduce the radiation dose to as low as reasonably achievable. COMPARISON: None. HISTORY: ORDERING SYSTEM PROVIDED HISTORY: trauma TECHNOLOGIST PROVIDED HISTORY: Reason for exam:->trauma Has a \"code stroke\" or \"stroke alert\" been called? ->No What reading provider will be dictating this exam?->CRC FINDINGS: Head: There is mild right frontoparietal holohemispheric subdural hemorrhage, measuring up to 6 mm. Mild mass effect and minimal midline shift of approximately 4 mm.   Visualized skull demonstrates multiple mildly displaced fractures in the skull, involving left temporal bone, right temporal bone, bilateral parietal bones. Left temporoparietal bone skull fractures appear to be comminuted in multiple locations. There also small hemorrhagic contusions in bilateral frontal lobes. Small amount of subarachnoid hemorrhage in the right frontal lobe. Mild fluid layering in the sphenoid sinus. Fractures involving the left orbit as well. The mastoid air cells are clear. However, left temporal bone fracture does extend through the region of the left external auditory canal and extends to the middle ear. Cervical spine: Vertebral body heights are intact. Alignment is intact. Facets are normally aligned. The odontoid process is intact. No significant prevertebral soft tissue swelling. Facial bones: Mandible is intact. The zygomatic arches are intact. Nasal bones are intact. Nasal bony septum is midline. Sphenoid temporal buttress is intact. Mildly displaced fracture of the roof of the left orbit. The orbital floor is intact. Globes are intact and not proptotic. No retro bulbar soft tissue hematoma. Mild left periorbital preseptal soft tissue swelling. Bilateral comminuted skull bone fractures involving bilateral temporal bones and parietal bones. Fractures are worse on the left side. Mild right holohemispheric subdural hematoma with mild midline shift. Bilateral frontal small hemorrhagic contusions. Left orbital roof mildly displaced fracture. No evidence for cervical fracture or subluxation. Critical findings reported to the ER physician at time of dictation. Ct Thoracic Spine Wo Contrast    Result Date: 10/9/2020  EXAMINATION: CT OF THE THORACIC SPINE WITHOUT CONTRAST  10/8/2020 10:56 pm: TECHNIQUE: CT of the thoracic spine was performed without the administration of intravenous contrast. Multiplanar reformatted images are provided for review.  Dose modulation, iterative reconstruction, and/or weight based adjustment of the mA/kV was utilized to reduce the radiation dose to as low as reasonably achievable. COMPARISON: Same day lumbar spine CT; same day CT chest, abdomen and pelvis HISTORY: ORDERING SYSTEM PROVIDED HISTORY: trauma TECHNOLOGIST PROVIDED HISTORY: Reason for exam:->trauma What reading provider will be dictating this exam?->CRC FINDINGS: BONES/ALIGNMENT: There is normal alignment of the spine. The vertebral body heights are maintained. No osseous destructive lesion is seen. DEGENERATIVE CHANGES: Mild spondylosis without significant central canal narrowing. Mild right foraminal narrowing at T11-T12. SOFT TISSUES: No paraspinal mass is seen. No acute thoracic spine abnormality. Ct Lumbar Spine Wo Contrast    Result Date: 10/9/2020  EXAMINATION: CT OF THE LUMBAR SPINE WITHOUT CONTRAST  10/8/2020 TECHNIQUE: CT of the lumbar spine was performed without the administration of intravenous contrast. Multiplanar reformatted images are provided for review. Dose modulation, iterative reconstruction, and/or weight based adjustment of the mA/kV was utilized to reduce the radiation dose to as low as reasonably achievable. COMPARISON: None HISTORY: ORDERING SYSTEM PROVIDED HISTORY: trauma TECHNOLOGIST PROVIDED HISTORY: Reason for exam:->trauma What reading provider will be dictating this exam?->CRC FINDINGS: BONES/ALIGNMENT: Vertebral body heights are maintained. No compression deformity. L5 pars defects with grade 1 anterolisthesis of L5 on S1. DEGENERATIVE CHANGES: Moderate disc desiccation at L5-S1. No significant central canal stenosis. Moderate-to-severe foraminal stenosis at the listhesis level. SOFT TISSUES/RETROPERITONEUM: No paraspinal mass is seen. No acute lumbar spine abnormality. L5 pars defects with grade 1 anterolisthesis of L5 on S1 and moderate-to-severe foraminal stenosis.      Ct Abdomen Pelvis W Iv Contrast Additional Contrast? None    Result Date: 10/9/2020  EXAMINATION: CT OF THE ABDOMEN AND PELVIS WITH CONTRAST 10/8/2020 10:56 pm TECHNIQUE: CT of the abdomen and pelvis was performed with the administration of intravenous contrast. Multiplanar reformatted images are provided for review. Dose modulation, iterative reconstruction, and/or weight based adjustment of the mA/kV was utilized to reduce the radiation dose to as low as reasonably achievable. COMPARISON: None. HISTORY: ORDERING SYSTEM PROVIDED HISTORY: trauma TECHNOLOGIST PROVIDED HISTORY: Additional Contrast?->None Reason for exam:->trauma What reading provider will be dictating this exam?->CRC FINDINGS: Lower Chest: Described in detail on separate report of CT of the chest. Organs: The liver, spleen, pancreas, and bilateral adrenal glands are within normal limits. No radiopaque gallstones. No CT evidence of acute cholecystitis. No intra or extrahepatic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

## 2020-10-10 NOTE — PLAN OF CARE
Problem: Falls - Risk of:  Goal: Will remain free from falls  Description: Will remain free from falls  Outcome: Met This Shift     Problem: Pain:  Goal: Pain level will decrease  Description: Pain level will decrease  Outcome: Met This Shift     Problem: Airway Clearance - Ineffective:  Goal: Ability to maintain a clear airway will improve  Description: Ability to maintain a clear airway will improve  Outcome: Met This Shift     Problem: Pain:  Goal: Pain level will decrease  Description: Pain level will decrease  Outcome: Met This Shift     Problem: Restraint Use - Nonviolent/Non-Self-Destructive Behavior:  Goal: Absence of restraint-related injury  Description: Absence of restraint-related injury  10/10/2020 0847 by Julito Sheriff RN  Outcome: Met This Shift  10/10/2020 0320 by Analisa Vernon RN  Outcome: Met This Shift  10/10/2020 0319 by Analisa Vernon RN  Outcome: Met This Shift
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
## 2020-10-10 NOTE — PLAN OF CARE
Problem: Falls - Risk of:  Goal: Will remain free from falls  Description: Will remain free from falls  10/10/2020 1629 by Dottie Abrams RN  Outcome: Met This Shift  10/10/2020 0847 by Dottie Abrams RN  Outcome: Met This Shift     Problem: Skin Integrity:  Goal: Will show no infection signs and symptoms  Description: Will show no infection signs and symptoms  Outcome: Met This Shift     Problem: Pain:  Goal: Pain level will decrease  Description: Pain level will decrease  Outcome: Met This Shift     Problem: Airway Clearance - Ineffective:  Goal: Ability to maintain a clear airway will improve  Description: Ability to maintain a clear airway will improve  Outcome: Met This Shift     Problem: Pain:  Goal: Pain level will decrease  Description: Pain level will decrease  Outcome: Met This Shift     Problem: Restraint Use - Nonviolent/Non-Self-Destructive Behavior:  Goal: Absence of restraint-related injury  Description: Absence of restraint-related injury  10/10/2020 1629 by Dottie Abrams RN  Outcome: Met This Shift  10/10/2020 0847 by Dottie Abrams RN  Outcome: Met This Shift  10/10/2020 0320 by Nelida Bergeron RN  Outcome: Met This Shift  10/10/2020 0319 by Nelida Bergeron RN  Outcome: Met This Shift

## 2020-10-10 NOTE — FLOWSHEET NOTE
Pt intubated and sedated. Continues to pull at lines and tubes and moves about spontaneously. Verbal re-direction unsuccessful at this time. Bilateral soft wrist restraints continued for patient safety. Will continue to monitor.
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
## 2020-10-10 NOTE — PLAN OF CARE
Problem: Restraint Use - Nonviolent/Non-Self-Destructive Behavior:  Goal: Absence of restraint-related injury  Description: Absence of restraint-related injury       Outcome: Met This Shift     Problem: Restraint Use - Nonviolent/Non-Self-Destructive Behavior:  Goal: Absence of restraint indications  Description: Absence of restraint indications       Outcome: Not Met This Shift

## 2020-10-11 ENCOUNTER — APPOINTMENT (OUTPATIENT)
Dept: GENERAL RADIOLOGY | Age: 30
DRG: 003 | End: 2020-10-11
Payer: COMMERCIAL

## 2020-10-11 LAB
AADO2: 95.2 MMHG
ANION GAP SERPL CALCULATED.3IONS-SCNC: 8 MMOL/L (ref 7–16)
B.E.: -2.6 MMOL/L (ref -3–3)
BASOPHILS ABSOLUTE: 0.02 E9/L (ref 0–0.2)
BASOPHILS RELATIVE PERCENT: 0.3 % (ref 0–2)
BUN BLDV-MCNC: 8 MG/DL (ref 6–20)
CALCIUM IONIZED: 1.16 MMOL/L (ref 1.15–1.33)
CALCIUM SERPL-MCNC: 7.6 MG/DL (ref 8.6–10.2)
CHLORIDE BLD-SCNC: 114 MMOL/L (ref 98–107)
CO2: 21 MMOL/L (ref 22–29)
COHB: 0.3 % (ref 0–1.5)
CREAT SERPL-MCNC: 0.8 MG/DL (ref 0.7–1.2)
CRITICAL: ABNORMAL
DATE ANALYZED: ABNORMAL
DATE OF COLLECTION: ABNORMAL
EOSINOPHILS ABSOLUTE: 0.21 E9/L (ref 0.05–0.5)
EOSINOPHILS RELATIVE PERCENT: 2.8 % (ref 0–6)
FIO2: 40 %
GFR AFRICAN AMERICAN: >60
GFR NON-AFRICAN AMERICAN: >60 ML/MIN/1.73
GLUCOSE BLD-MCNC: 133 MG/DL (ref 74–99)
HCO3: 21.8 MMOL/L (ref 22–26)
HCT VFR BLD CALC: 30.6 % (ref 37–54)
HEMOGLOBIN: 10.1 G/DL (ref 12.5–16.5)
HHB: 2.1 % (ref 0–5)
IMMATURE GRANULOCYTES #: 0.03 E9/L
IMMATURE GRANULOCYTES %: 0.4 % (ref 0–5)
LAB: ABNORMAL
LYMPHOCYTES ABSOLUTE: 0.84 E9/L (ref 1.5–4)
LYMPHOCYTES RELATIVE PERCENT: 11.1 % (ref 20–42)
Lab: ABNORMAL
MAGNESIUM: 2.2 MG/DL (ref 1.6–2.6)
MCH RBC QN AUTO: 30.9 PG (ref 26–35)
MCHC RBC AUTO-ENTMCNC: 33 % (ref 32–34.5)
MCV RBC AUTO: 93.6 FL (ref 80–99.9)
METHB: 0.2 % (ref 0–1.5)
MODE: AC
MONOCYTES ABSOLUTE: 0.5 E9/L (ref 0.1–0.95)
MONOCYTES RELATIVE PERCENT: 6.6 % (ref 2–12)
NEUTROPHILS ABSOLUTE: 5.97 E9/L (ref 1.8–7.3)
NEUTROPHILS RELATIVE PERCENT: 78.8 % (ref 43–80)
O2 SATURATION: 98.8 % (ref 92–98.5)
O2HB: 97.4 % (ref 94–97)
OPERATOR ID: 2577
PATIENT TEMP: 37 C
PCO2: 36.2 MMHG (ref 35–45)
PDW BLD-RTO: 12.8 FL (ref 11.5–15)
PEEP/CPAP: 8 CMH2O
PFO2: 3.46 MMHG/%
PH BLOOD GAS: 7.4 (ref 7.35–7.45)
PHOSPHORUS: 1.8 MG/DL (ref 2.5–4.5)
PLATELET # BLD: 139 E9/L (ref 130–450)
PMV BLD AUTO: 10.5 FL (ref 7–12)
PO2: 138.4 MMHG (ref 75–100)
POTASSIUM SERPL-SCNC: 3.8 MMOL/L (ref 3.5–5)
RBC # BLD: 3.27 E12/L (ref 3.8–5.8)
RI(T): 0.69
RR MECHANICAL: 16 B/MIN
SODIUM BLD-SCNC: 143 MMOL/L (ref 132–146)
SODIUM BLD-SCNC: 144 MMOL/L (ref 132–146)
SODIUM BLD-SCNC: 145 MMOL/L (ref 132–146)
SODIUM BLD-SCNC: 146 MMOL/L (ref 132–146)
SODIUM BLD-SCNC: 148 MMOL/L (ref 132–146)
SOURCE, BLOOD GAS: ABNORMAL
THB: 11.1 G/DL (ref 11.5–16.5)
TIME ANALYZED: 347
TRIGL SERPL-MCNC: 584 MG/DL (ref 0–149)
VT MECHANICAL: 500 ML
WBC # BLD: 7.6 E9/L (ref 4.5–11.5)

## 2020-10-11 PROCEDURE — 6360000002 HC RX W HCPCS: Performed by: SURGERY

## 2020-10-11 PROCEDURE — 6370000000 HC RX 637 (ALT 250 FOR IP): Performed by: STUDENT IN AN ORGANIZED HEALTH CARE EDUCATION/TRAINING PROGRAM

## 2020-10-11 PROCEDURE — 80048 BASIC METABOLIC PNL TOTAL CA: CPT

## 2020-10-11 PROCEDURE — 2580000003 HC RX 258: Performed by: SURGERY

## 2020-10-11 PROCEDURE — 2500000003 HC RX 250 WO HCPCS: Performed by: SURGERY

## 2020-10-11 PROCEDURE — 82805 BLOOD GASES W/O2 SATURATION: CPT

## 2020-10-11 PROCEDURE — 6360000002 HC RX W HCPCS: Performed by: STUDENT IN AN ORGANIZED HEALTH CARE EDUCATION/TRAINING PROGRAM

## 2020-10-11 PROCEDURE — 99291 CRITICAL CARE FIRST HOUR: CPT | Performed by: SURGERY

## 2020-10-11 PROCEDURE — 84478 ASSAY OF TRIGLYCERIDES: CPT

## 2020-10-11 PROCEDURE — 71045 X-RAY EXAM CHEST 1 VIEW: CPT

## 2020-10-11 PROCEDURE — 36415 COLL VENOUS BLD VENIPUNCTURE: CPT

## 2020-10-11 PROCEDURE — 84100 ASSAY OF PHOSPHORUS: CPT

## 2020-10-11 PROCEDURE — 2000000000 HC ICU R&B

## 2020-10-11 PROCEDURE — 6370000000 HC RX 637 (ALT 250 FOR IP): Performed by: SURGERY

## 2020-10-11 PROCEDURE — 83735 ASSAY OF MAGNESIUM: CPT

## 2020-10-11 PROCEDURE — 82330 ASSAY OF CALCIUM: CPT

## 2020-10-11 PROCEDURE — 85025 COMPLETE CBC W/AUTO DIFF WBC: CPT

## 2020-10-11 PROCEDURE — 2580000003 HC RX 258: Performed by: STUDENT IN AN ORGANIZED HEALTH CARE EDUCATION/TRAINING PROGRAM

## 2020-10-11 PROCEDURE — 36620 INSERTION CATHETER ARTERY: CPT

## 2020-10-11 PROCEDURE — 37799 UNLISTED PX VASCULAR SURGERY: CPT

## 2020-10-11 PROCEDURE — 84295 ASSAY OF SERUM SODIUM: CPT

## 2020-10-11 PROCEDURE — 94003 VENT MGMT INPAT SUBQ DAY: CPT

## 2020-10-11 RX ORDER — FAMOTIDINE 20 MG/1
20 TABLET, FILM COATED ORAL 2 TIMES DAILY
Status: DISCONTINUED | OUTPATIENT
Start: 2020-10-11 | End: 2020-11-01

## 2020-10-11 RX ORDER — OXYCODONE HCL 5 MG/5 ML
10 SOLUTION, ORAL ORAL EVERY 6 HOURS
Status: DISCONTINUED | OUTPATIENT
Start: 2020-10-11 | End: 2020-10-15

## 2020-10-11 RX ADMIN — Medication 100 MCG/HR: at 15:56

## 2020-10-11 RX ADMIN — SENNOSIDES 5 ML: 8.8 LIQUID ORAL at 20:32

## 2020-10-11 RX ADMIN — BACITRACIN ZINC: 500 OINTMENT TOPICAL at 13:09

## 2020-10-11 RX ADMIN — SODIUM CHLORIDE 3 G: 900 INJECTION INTRAVENOUS at 18:03

## 2020-10-11 RX ADMIN — POTASSIUM & SODIUM PHOSPHATES POWDER PACK 280-160-250 MG 500 MG: 280-160-250 PACK at 16:17

## 2020-10-11 RX ADMIN — BACITRACIN ZINC, POLYMYXIN B SULFATE, NEOMYCIN SULFATE: 400; 5000; 3.5 OINTMENT TOPICAL at 20:34

## 2020-10-11 RX ADMIN — POLYVINYL ALCOHOL 1 DROP: 14 SOLUTION/ DROPS OPHTHALMIC at 03:16

## 2020-10-11 RX ADMIN — SODIUM CHLORIDE 50 ML/HR: 3 INJECTION, SOLUTION INTRAVENOUS at 07:56

## 2020-10-11 RX ADMIN — PROPOFOL INJECTABLE EMULSION 50 MCG/KG/MIN: 10 INJECTION, EMULSION INTRAVENOUS at 16:24

## 2020-10-11 RX ADMIN — CIPROFLOXACIN HYDROCHLORIDE 4 DROP: 3 SOLUTION/ DROPS OPHTHALMIC at 08:43

## 2020-10-11 RX ADMIN — Medication 10 ML: at 20:31

## 2020-10-11 RX ADMIN — SODIUM CHLORIDE 3 G: 900 INJECTION INTRAVENOUS at 05:45

## 2020-10-11 RX ADMIN — POTASSIUM & SODIUM PHOSPHATES POWDER PACK 280-160-250 MG 500 MG: 280-160-250 PACK at 20:32

## 2020-10-11 RX ADMIN — OXYCODONE HYDROCHLORIDE 10 MG: 5 SOLUTION ORAL at 21:30

## 2020-10-11 RX ADMIN — Medication 150 MCG/HR: at 05:15

## 2020-10-11 RX ADMIN — OXYCODONE HYDROCHLORIDE 10 MG: 5 SOLUTION ORAL at 10:30

## 2020-10-11 RX ADMIN — MINERAL OIL AND PETROLATUM: 150; 830 OINTMENT OPHTHALMIC at 01:15

## 2020-10-11 RX ADMIN — FAMOTIDINE 20 MG: 10 INJECTION INTRAVENOUS at 08:43

## 2020-10-11 RX ADMIN — PROPOFOL INJECTABLE EMULSION 50 MCG/KG/MIN: 10 INJECTION, EMULSION INTRAVENOUS at 23:59

## 2020-10-11 RX ADMIN — PROPOFOL INJECTABLE EMULSION 50 MCG/KG/MIN: 10 INJECTION, EMULSION INTRAVENOUS at 20:13

## 2020-10-11 RX ADMIN — BACITRACIN ZINC, POLYMYXIN B SULFATE, NEOMYCIN SULFATE: 400; 5000; 3.5 OINTMENT TOPICAL at 08:43

## 2020-10-11 RX ADMIN — LEVETIRACETAM 500 MG: 100 SOLUTION ORAL at 20:33

## 2020-10-11 RX ADMIN — POLYVINYL ALCOHOL 1 DROP: 14 SOLUTION/ DROPS OPHTHALMIC at 15:31

## 2020-10-11 RX ADMIN — MINERAL OIL AND PETROLATUM: 150; 830 OINTMENT OPHTHALMIC at 17:07

## 2020-10-11 RX ADMIN — PROPOFOL INJECTABLE EMULSION 50 MCG/KG/MIN: 10 INJECTION, EMULSION INTRAVENOUS at 03:08

## 2020-10-11 RX ADMIN — POLYVINYL ALCOHOL 1 DROP: 14 SOLUTION/ DROPS OPHTHALMIC at 11:45

## 2020-10-11 RX ADMIN — CHLORHEXIDINE GLUCONATE 0.12% ORAL RINSE 15 ML: 1.2 LIQUID ORAL at 08:43

## 2020-10-11 RX ADMIN — MINERAL OIL AND PETROLATUM: 150; 830 OINTMENT OPHTHALMIC at 21:30

## 2020-10-11 RX ADMIN — PROPOFOL INJECTABLE EMULSION 50 MCG/KG/MIN: 10 INJECTION, EMULSION INTRAVENOUS at 05:45

## 2020-10-11 RX ADMIN — MINERAL OIL AND PETROLATUM: 150; 830 OINTMENT OPHTHALMIC at 13:09

## 2020-10-11 RX ADMIN — PROPOFOL INJECTABLE EMULSION 50 MCG/KG/MIN: 10 INJECTION, EMULSION INTRAVENOUS at 09:33

## 2020-10-11 RX ADMIN — MINERAL OIL AND PETROLATUM: 150; 830 OINTMENT OPHTHALMIC at 08:03

## 2020-10-11 RX ADMIN — METHOCARBAMOL 1000 MG: 100 INJECTION INTRAMUSCULAR; INTRAVENOUS at 03:15

## 2020-10-11 RX ADMIN — POTASSIUM & SODIUM PHOSPHATES POWDER PACK 280-160-250 MG 500 MG: 280-160-250 PACK at 13:09

## 2020-10-11 RX ADMIN — SODIUM CHLORIDE 3 G: 900 INJECTION INTRAVENOUS at 12:19

## 2020-10-11 RX ADMIN — POLYVINYL ALCOHOL 1 DROP: 14 SOLUTION/ DROPS OPHTHALMIC at 08:04

## 2020-10-11 RX ADMIN — OXYCODONE HYDROCHLORIDE 10 MG: 5 SOLUTION ORAL at 16:17

## 2020-10-11 RX ADMIN — BACITRACIN ZINC: 500 OINTMENT TOPICAL at 08:43

## 2020-10-11 RX ADMIN — LEVETIRACETAM 500 MG: 100 SOLUTION ORAL at 08:43

## 2020-10-11 RX ADMIN — POTASSIUM BICARBONATE 40 MEQ: 782 TABLET, EFFERVESCENT ORAL at 10:30

## 2020-10-11 RX ADMIN — MINERAL OIL AND PETROLATUM: 150; 830 OINTMENT OPHTHALMIC at 05:45

## 2020-10-11 RX ADMIN — CIPROFLOXACIN HYDROCHLORIDE 4 DROP: 3 SOLUTION/ DROPS OPHTHALMIC at 20:35

## 2020-10-11 RX ADMIN — Medication 10 ML: at 08:43

## 2020-10-11 RX ADMIN — DEXAMETHASONE SODIUM PHOSPHATE 4 DROP: 1 SOLUTION/ DROPS OPHTHALMIC at 20:34

## 2020-10-11 RX ADMIN — POLYVINYL ALCOHOL 1 DROP: 14 SOLUTION/ DROPS OPHTHALMIC at 19:55

## 2020-10-11 RX ADMIN — BACITRACIN ZINC: 500 OINTMENT TOPICAL at 20:33

## 2020-10-11 RX ADMIN — SODIUM CHLORIDE 50 ML/HR: 3 INJECTION, SOLUTION INTRAVENOUS at 18:00

## 2020-10-11 RX ADMIN — FAMOTIDINE 20 MG: 20 TABLET ORAL at 20:33

## 2020-10-11 RX ADMIN — DEXAMETHASONE SODIUM PHOSPHATE 4 DROP: 1 SOLUTION/ DROPS OPHTHALMIC at 08:43

## 2020-10-11 RX ADMIN — CHLORHEXIDINE GLUCONATE 0.12% ORAL RINSE 15 ML: 1.2 LIQUID ORAL at 20:31

## 2020-10-11 ASSESSMENT — PULMONARY FUNCTION TESTS
PIF_VALUE: 23
PIF_VALUE: 24
PIF_VALUE: 24
PIF_VALUE: 26
PIF_VALUE: 27
PIF_VALUE: 23
PIF_VALUE: 25
PIF_VALUE: 23
PIF_VALUE: 25
PIF_VALUE: 24
PIF_VALUE: 25
PIF_VALUE: 25
PIF_VALUE: 24
PIF_VALUE: 23
PIF_VALUE: 23
PIF_VALUE: 24
PIF_VALUE: 25
PIF_VALUE: 27
PIF_VALUE: 23
PIF_VALUE: 24
PIF_VALUE: 25
PIF_VALUE: 25
PIF_VALUE: 27
PIF_VALUE: 23
PIF_VALUE: 26
PIF_VALUE: 23
PIF_VALUE: 25
PIF_VALUE: 25
PIF_VALUE: 24
PIF_VALUE: 27
PIF_VALUE: 24

## 2020-10-11 NOTE — FLOWSHEET NOTE
Pt becomes extremely agitated at times and reaches for ETT/critical medical equipment. Pt unable to follow verbal commands at this time. Bilateral soft wrist restraints remain in place for pt safety.

## 2020-10-11 NOTE — CONSULTS
Provides: 1152 kcal, 53 gm pro, 787 ml free water  Additional Calorie Sources:   propofol @ 27.2 ml/hr = 718 lipid kcal    Anthropometric Measures:  · Height: 6' 2\" (188 cm)  · Current Body Weight: 230 lb (104.3 kg)(first measured 10/9, noted bed wt 253# 10/11 w/ +fluid balance at this time)   · Admission Body Weight: 230 lb (104.3 kg)(bed scale 10/9)    · Usual Body Weight: (UTO, no wt hx per EMR)     · Ideal Body Weight: 190 lbs; % Ideal Body Weight 121.1 %   · BMI: 29.5  · BMI Categories: Overweight (BMI 25.0-29. 9)       Nutrition Diagnosis:   · Inadequate oral intake related to impaired respiratory function as evidenced by NPO or clear liquid status due to medical condition, intubation, nutrition support - enteral nutrition      Nutrition Interventions:   Food and/or Nutrient Delivery:  Modify Tube Feeding(Recommend Immune Enhancing formula @ 40 ml/hr + 1 protein modular daily)  Nutrition Education/Counseling:  Education not indicated   Coordination of Nutrition Care:  Continued Inpatient Monitoring    Goals: Tolerance to TF at goal rate       Nutrition Monitoring and Evaluation:   Food/Nutrient Intake Outcomes:  Enteral Nutrition Intake/Tolerance  Physical Signs/Symptoms Outcomes:  Biochemical Data, GI Status, Fluid Status or Edema, Hemodynamic Status, Nutrition Focused Physical Findings, Skin, Weight     Discharge Planning:     Too soon to determine     Electronically signed by Villa Haji, MS, RD, LD on 10/11/20 at 12:24 PM EDT    Contact: [phone]

## 2020-10-11 NOTE — DISCHARGE SUMMARY
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 the mA/kV was utilized to reduce the radiation dose to as low as reasonably achievable. COMPARISON: CT head from 12/30 5 p.m. HISTORY: ORDERING SYSTEM PROVIDED HISTORY: ischemic TECHNOLOGIST PROVIDED HISTORY: Reason for exam:->ischemic Has a \"code stroke\" or \"stroke alert\" been called? ->No What reading provider will be dictating this exam?->CRC; ORDERING SYSTEM PROVIDED HISTORY: trauma TECHNOLOGIST PROVIDED HISTORY: Reason for exam:->trauma Has a \"code stroke\" or \"stroke alert\" been called? ->No What reading provider will be dictating this exam?->CRC FINDINGS: CTA NECK: AORTIC ARCH/ARCH VESSELS: No dissection or arterial injury. No significant stenosis of the brachiocephalic or subclavian arteries. CAROTID ARTERIES: No dissection, arterial injury, or hemodynamically significant stenosis by NASCET criteria. VERTEBRAL ARTERIES: No dissection, arterial injury, or significant stenosis. SOFT TISSUES: There are patchy and nodular infiltrates within the right lung. BONES: See below. CTA HEAD: ANTERIOR CIRCULATION: No significant stenosis of the intracranial internal carotid, anterior cerebral, or middle cerebral arteries. No aneurysm. Bilateral posterior communicating arteries are present.  POSTERIOR CIRCULATION: No significant stenosis of the vertebral, basilar, or posterior cerebral arteries. No aneurysm. OTHER: No dural venous sinus thrombosis on this non-dedicated study. BRAIN: There is diffuse scalp soft tissue thickening with redemonstration of a comminuted fractures of the posterior skull extending through the left temporal bone. .  A right frontal approach endoventricular device is present with re-expansion of the right lateral ventricle. There are intraparenchymal hematomas within the right frontal and right temporal lobes as well as a small amount of subdural blood over the right cerebral convexity. There is 3.7 mm of right-to-left midline shift. Re-expansion of the right lateral ventricle since the prior exam obtained at 12:35 PM. Otherwise, stable appearance of the brain and skull. No acute arterial injury visualized within the head or neck. Incidentally noted patchy and nodular infiltrates within the right lung, worrisome for pneumonia. Cta Neck W Contrast    Result Date: 10/9/2020  EXAMINATION: CTA OF THE NECK 10/8/2020 10:56 pm TECHNIQUE: CTA of the neck was performed with the administration of intravenous contrast. Multiplanar reformatted images are provided for review. MIP images are provided for review. Stenosis of the internal carotid arteries measured using NASCET criteria. Dose modulation, iterative reconstruction, and/or weight based adjustment of the mA/kV was utilized to reduce the radiation dose to as low as reasonably achievable. COMPARISON: None. HISTORY: ORDERING SYSTEM PROVIDED HISTORY: trauma TECHNOLOGIST PROVIDED HISTORY: Reason for exam:->trauma Has a \"code stroke\" or \"stroke alert\" been called? ->No What reading provider will be dictating this exam?->CRC FINDINGS: AORTIC ARCH/ARCH VESSELS: No dissection or arterial injury. No significant stenosis of the brachiocephalic or subclavian arteries.  CAROTID ARTERIES: No dissection, arterial injury, or hemodynamically as low as reasonably achievable.; CTA of the neck was performed with the administration of intravenous contrast. Multiplanar reformatted images are provided for review. MIP images are provided for review. Stenosis of the internal carotid arteries measured using NASCET criteria. Dose modulation, iterative reconstruction, and/or weight based adjustment of the mA/kV was utilized to reduce the radiation dose to as low as reasonably achievable. COMPARISON: CT head from 12/30 5 p.m. HISTORY: ORDERING SYSTEM PROVIDED HISTORY: ischemic TECHNOLOGIST PROVIDED HISTORY: Reason for exam:->ischemic Has a \"code stroke\" or \"stroke alert\" been called? ->No What reading provider will be dictating this exam?->CRC; ORDERING SYSTEM PROVIDED HISTORY: trauma TECHNOLOGIST PROVIDED HISTORY: Reason for exam:->trauma Has a \"code stroke\" or \"stroke alert\" been called? ->No What reading provider will be dictating this exam?->CRC FINDINGS: CTA NECK: AORTIC ARCH/ARCH VESSELS: No dissection or arterial injury. No significant stenosis of the brachiocephalic or subclavian arteries. CAROTID ARTERIES: No dissection, arterial injury, or hemodynamically significant stenosis by NASCET criteria. VERTEBRAL ARTERIES: No dissection, arterial injury, or significant stenosis. SOFT TISSUES: There are patchy and nodular infiltrates within the right lung. BONES: See below. CTA HEAD: ANTERIOR CIRCULATION: No significant stenosis of the intracranial internal carotid, anterior cerebral, or middle cerebral arteries. No aneurysm. Bilateral posterior communicating arteries are present. POSTERIOR CIRCULATION: No significant stenosis of the vertebral, basilar, or posterior cerebral arteries. No aneurysm. OTHER: No dural venous sinus thrombosis on this non-dedicated study. BRAIN: There is diffuse scalp soft tissue thickening with redemonstration of a comminuted fractures of the posterior skull extending through the left temporal bone. .  A right frontal approach endoventricular device is present with re-expansion of the right lateral ventricle. There are intraparenchymal hematomas within the right frontal and right temporal lobes as well as a small amount of subdural blood over the right cerebral convexity. There is 3.7 mm of right-to-left midline shift. Re-expansion of the right lateral ventricle since the prior exam obtained at 12:35 PM. Otherwise, stable appearance of the brain and skull. No acute arterial injury visualized within the head or neck. Incidentally noted patchy and nodular infiltrates within the right lung, worrisome for pneumonia. Discharge Exam:  Patient with eyes open but nonverbal.     Not following commands  Heart: Regular  Lungs: Fairly clear bilaterally  Abdomen: Soft; bowel sounds active; PEG in place; nontender  Skin: Warm/dry  Extremities: Moving all 4 extremities       Disposition: long term care facility    In process/preliminary results:  Outstanding Order Results     No orders found from 9/9/2020 to 10/9/2020. Patient Instructions:   Discharge Medication List as of 11/4/2020  3:55 PM           Details   cloNIDine (CATAPRES) 0.2 MG tablet Take 1 tablet by mouth 3 times daily, Disp-60 tablet,R-3DC to SNF      QUEtiapine (SEROQUEL) 200 MG tablet Take 1 tablet by mouth 2 times daily, Disp-60 tablet,R-3DC to SNF              Details   Omega-3 Fatty Acids (FISH OIL) 1000 MG CPDR Take 3,000 mg by mouth 2 times dailyHistorical Med      hydrOXYzine (ATARAX) 25 MG tablet Take 25 mg by mouth 3 times daily as needed for ItchingHistorical Med      venlafaxine 37.5 MG extended release tablet Take 37.5 mg by mouth daily (with breakfast)Historical Med             SURGERY DISCHARGE INSTRUCTIONS    You may be drowsy or lightheaded after receiving sedation or anesthesia. A responsible person should be with you for the next 24 hours. · FOLLOW UP: Call office to schedule follow-up appointment in 1 weeks.     · DIET: Advance your diet as tolerated. Start with light diet and progress to your normal diet as you feel like eating. If you experience nausea or repeated episodes of vomiting which persist beyond 12-24 hours, notify your doctor. · ACTIVITY: Rest today. Increase activity gradually. No driving while on prescription pain medication. No heavy lifting for 4 weeks - nothing over 10 lbs, or heavier than a milk jug.    · SHOWER/BATHING: Okay to shower in 24 hours after procedure. No tub bathing, swimming or soaking for 2 weeks. · MEDICATIONS: Take as prescribed. You may take over the counter ibuprofen or tylenol for pain as directed, limit total amount of acetaminophen (tylenol) to 3 grams per 24-hour period. Okay to resume anticoagulant medication after 24hrs. You may experience constipation while taking pain medication, you may take over the counter stool softeners (Docusate/Colace or Senokot-S) as directed. SPECIAL INSTRUCTIONS:   Call physician if they or any other problems occur:  - Call the office if you have a fever over >101F, or if your incision becomes red, tender, or drains more than a small amount of clear fluid  - Fever over 101°    - Redness, swelling, hardness or warmth at the operative site  - Unrelieved nausea    - Foul smelling or cloudy drainage at the operative site   - Unrelieved pain    - Blood soaked dressing (Some oozing may be normal)       Subdural Hematoma: Care Instructions  Your Care Instructions  A subdural hematoma is a buildup of blood between the layers of tissue that cover the brain. The blood collects under the layer closest to the skull. (This layer is called the dura.) The bleeding is most often caused by a head injury, but there can be other causes. In an older adult, even a minor injury can lead to a subdural hematoma. The buildup of blood inside the skull can put pressure on the brain. This may cause symptoms, such as a severe headache, confusion, or seizures.   There are two kinds of hematomas: acute and chronic. With an acute hematoma, symptoms start soon after the injury. With a chronic hematoma, it may be days or weeks before symptoms appear. Doctors use imaging tests to find the buildup of blood. You may have a test such as a CT scan or MRI. The doctor may also do a test to check the pressure inside your skull. Bleeding inside the skull may get worse over time. So it is very important to pay attention to your symptoms. And be sure to see your doctor for follow-up testing. In some cases, treatment is needed to remove the blood. This helps relieve the pressure on the brain. Your doctor may make one or two small holes in your skull. For a large hematoma, the doctor may need to remove a piece of the skull. You may not need treatment if you have a small hematoma that is not causing symptoms. If you take aspirin or some other blood thinner, you may need to stop taking it. The doctor may give you treatment to undo the effects of the blood thinner. This can help prevent more bleeding in the skull. The doctor has checked you carefully, but problems can develop later. If you notice any problems or new symptoms, get medical treatment right away. Follow-up care is a key part of your treatment and safety. Be sure to make and go to all appointments, and call your doctor if you are having problems. It's also a good idea to know your test results and keep a list of the medicines you take. How can you care for yourself at home? For an acute hematoma  Follow your doctor's instructions. He or she will tell you if you need someone to watch you closely for the next 24 hours or longer. For a chronic hematoma  Get plenty of sleep at night, and take it easy during the day. Rest is the best way to recover. Avoid activities that are physically or mentally demanding. These include housework, exercise, paperwork, video games, text messaging, and using the computer.  You may need to change your work or school schedule for a while. Return to your normal activities slowly. Do not try to do too much at once. For either type of hematoma  Do not drink alcohol until your doctor says it is okay. Don't drive a car, ride a bike, or operate machinery until your doctor says it's okay. If you take aspirin or some other blood thinner, be sure to talk to your doctor. He or she will tell you if and when to start taking this medicine again. Make sure that you understand exactly what your doctor wants you to do. If you normally take medicine, your doctor will tell you if and when you can restart it. He or she will also give you instructions about taking any new medicines. When should you call for help? Call 911 anytime you think you may need emergency care. For example, call if:    You have a seizure.     You passed out (lost consciousness).     You are confused or can't stay awake. Call your doctor now or seek immediate medical care if:    You have new or worse vomiting.     You feel less alert.     You have new weakness or numbness in any part of your body.     You have a headache that is getting worse. Watch closely for changes in your health, and be sure to contact your doctor if:    You do not get better as expected.     You have new symptoms, such as headaches, trouble concentrating, or changes in mood. Where can you learn more? Go to https://[employer]amemploi.us.[employer]. org and sign in to your [employer] account. Enter Y396 in the State [facility] facility box to learn more about \"Subdural Hematoma: Care Instructions. \"     If you do not have an account, please click on the \"Sign Up Now\" link. Current as of: November 20, 2019               Content Version: 12.6  © 8446-0840 [employer]. Care instructions adapted under license by [facility] ([facility]).  If you have questions about a medical condition or this instruction, always ask your healthcare professional. [address]  any warranty or liability for your use of this information.    -       Follow up:   [address] [place] [address] [phone] [facility] [phone]  Schedule an appointment as soon as possible for a visit in 1 week  For routine followup, for post-op check    Rosaleen Mcburney, C/ Steve Decker [phone]  [address], [address] [place] [address] [phone]      As needed       Signed:  Miranda Johnson MD  11/11/2020  8:09 AM

## 2020-10-11 NOTE — PLAN OF CARE
Problem: Falls - Risk of:  Goal: Will remain free from falls  Description: Will remain free from falls  Outcome: Met This Shift     Problem: Skin Integrity:  Goal: Will show no infection signs and symptoms  Description: Will show no infection signs and symptoms  Outcome: Met This Shift  Goal: Absence of new skin breakdown  Description: Absence of new skin breakdown  Outcome: Met This Shift     Problem: Pain:  Goal: Pain level will decrease  Description: Pain level will decrease  Outcome: Met This Shift  Goal: Control of acute pain  Description: Control of acute pain  Outcome: Met This Shift    Problem: Airway Clearance - Ineffective:  Goal: Ability to maintain a clear airway will improve  Description: Ability to maintain a clear airway will improve  Outcome: Met This Shift     Problem: Anxiety/Stress:  Goal: Level of anxiety will decrease  Description: Level of anxiety will decrease  Outcome: Met This Shift     Problem: Aspiration:  Goal: Absence of aspiration  Description: Absence of aspiration  Outcome: Met This Shift     Problem: Cardiac Output - Decreased:  Goal: Hemodynamic stability will improve  Description: Hemodynamic stability will improve  Outcome: Met This Shift     Problem: Fluid Volume - Imbalance:  Goal: Absence of imbalanced fluid volume signs and symptoms  Description: Absence of imbalanced fluid volume signs and symptoms  Outcome: Met This Shift     Problem: Gas Exchange - Impaired:  Goal: Levels of oxygenation will improve  Description: Levels of oxygenation will improve  Outcome: Met This Shift     Problem: Nutrition Deficit:  Goal: Ability to achieve adequate nutritional intake will improve  Description: Ability to achieve adequate nutritional intake will improve  Outcome: Met This Shift     Problem: Pain:  Goal: Pain level will decrease  Description: Pain level will decrease  Outcome: Met This Shift    Goal: Control of acute pain  Description: Control of acute pain  Outcome: Met This Shift Problem: Skin Integrity - Impaired:  Goal: Will show no infection signs and symptoms  Description: Will show no infection signs and symptoms  Outcome: Met This Shift     Problem: Mental Status - Impaired:  Goal: Mental status will be restored to baseline  Description: Mental status will be restored to baseline  Outcome: Met This Shift     Problem: Restraint Use - Nonviolent/Non-Self-Destructive Behavior:  Goal: Absence of restraint-related injury  Description: Absence of restraint-related injury  Outcome: Met This Shift     Problem: Restraint Use - Nonviolent/Non-Self-Destructive Behavior:  Goal: Absence of restraint indications  Description: Absence of restraint indications  Outcome: Not Met This Shift

## 2020-10-11 NOTE — PLAN OF CARE
Problem: Falls - Risk of:  Goal: Will remain free from falls  Description: Will remain free from falls  10/11/2020 0326 by Chaya Carbajal RN  Outcome: Met This Shift  10/10/2020 2228 by Dayanara Fuel  Outcome: Met This Shift  10/10/2020 1629 by Aime Lua RN  Outcome: Met This Shift  Goal: Absence of physical injury  Description: Absence of physical injury  10/11/2020 0326 by Chaya Carbajal RN  Outcome: Met This Shift  10/10/2020 2228 by Dayanara Fuel  Outcome: Met This Shift     Problem: Skin Integrity:  Goal: Will show no infection signs and symptoms  Description: Will show no infection signs and symptoms  10/11/2020 0326 by Chaya Carbajal RN  Outcome: Met This Shift  10/10/2020 2228 by Dayanara Fuel  Outcome: Met This Shift  10/10/2020 1629 by Aime Lua RN  Outcome: Met This Shift  Goal: Absence of new skin breakdown  Description: Absence of new skin breakdown  10/11/2020 0326 by Chaya Carbajal RN  Outcome: Met This Shift  10/10/2020 2228 by Dayanara Fuel  Outcome: Met This Shift     Problem: Pain:  Description: Pain management should include both nonpharmacologic and pharmacologic interventions.   Goal: Pain level will decrease  Description: Pain level will decrease  10/11/2020 0326 by Chaya Carbajal RN  Outcome: Met This Shift  10/10/2020 2228 by Dayanara Fuel  Outcome: Met This Shift  Goal: Control of acute pain  Description: Control of acute pain  10/11/2020 0326 by Chaya Carbajal RN  Outcome: Met This Shift  10/10/2020 2228 by Dayanara Fuel  Outcome: Met This Shift  Goal: Control of chronic pain  Description: Control of chronic pain  10/11/2020 0326 by Chaya Carbajal RN  Outcome: Met This Shift  10/10/2020 2228 by Dayanara Fuel  Outcome: Met This Shift     Problem: Airway Clearance - Ineffective:  Goal: Ability to maintain a clear airway will improve  Description: Ability to maintain a clear airway will improve  10/11/2020 0326 by Chaya Carbajal RN  Outcome: Met This Shift  10/10/2020 2228 by Reinier Ruvalcaba  Outcome: Met This Shift     Problem: Anxiety/Stress:  Goal: Level of anxiety will decrease  Description: Level of anxiety will decrease  10/11/2020 0326 by Porfirio Dumont RN  Outcome: Met This Shift  10/10/2020 2228 by Reinier Ruvalcaba  Outcome: Met This Shift     Problem: Aspiration:  Goal: Absence of aspiration  Description: Absence of aspiration  10/11/2020 0326 by Porfirio Dumont RN  Outcome: Met This Shift  10/10/2020 2228 by Reinier Ruvalcaba  Outcome: Met This Shift  10/10/2020 1629 by Farshad Ceballos RN  Outcome: Ongoing     Problem:  Bowel Function - Altered:  Goal: Bowel elimination is within specified parameters  Description: Bowel elimination is within specified parameters  10/11/2020 0326 by Porfirio Dumont RN  Outcome: Met This Shift  10/10/2020 2228 by Reinier Ruvalcaba  Outcome: Met This Shift     Problem: Cardiac Output - Decreased:  Goal: Hemodynamic stability will improve  Description: Hemodynamic stability will improve  10/11/2020 0326 by Porfirio Dumont RN  Outcome: Met This Shift  10/10/2020 2228 by Reinier Ruvalcaba  Outcome: Met This Shift     Problem: Fluid Volume - Imbalance:  Goal: Absence of imbalanced fluid volume signs and symptoms  Description: Absence of imbalanced fluid volume signs and symptoms  10/11/2020 0326 by Porfirio Dumont RN  Outcome: Met This Shift  10/10/2020 2228 by Reinier Ruvalcaba  Outcome: Met This Shift     Problem: Gas Exchange - Impaired:  Goal: Levels of oxygenation will improve  Description: Levels of oxygenation will improve  10/11/2020 0326 by Porfirio Dumont RN  Outcome: Met This Shift  10/10/2020 2228 by Reinier Ruvalcaba  Outcome: Met This Shift     Problem: Mental Status - Impaired:  Goal: Mental status will be restored to baseline  Description: Mental status will be restored to baseline  10/11/2020 0326 by Porfirio Dumont RN  Outcome: Met This Shift  10/10/2020 2228 by Reinier Ruvalcaba  Outcome: Met This Shift     Problem: Nutrition Deficit:  Goal: Ability to achieve adequate nutritional intake will improve  Description: Ability to achieve adequate nutritional intake will improve  10/11/2020 0326 by Henry Acosta RN  Outcome: Met This Shift  10/10/2020 2228 by Emeka Hernandez  Outcome: Met This Shift     Problem: Pain:  Description: Pain management should include both nonpharmacologic and pharmacologic interventions.   Goal: Pain level will decrease  Description: Pain level will decrease  10/11/2020 0326 by Henry Acosta RN  Outcome: Met This Shift  10/10/2020 2228 by Emeka Hernandez  Outcome: Met This Shift  Goal: Recognizes and communicates pain  Description: Recognizes and communicates pain  10/11/2020 0326 by Henry Acosta RN  Outcome: Met This Shift  10/10/2020 2228 by Emeka Hernandez  Outcome: Met This Shift  Goal: Control of acute pain  Description: Control of acute pain  10/11/2020 0326 by Henry Acosta RN  Outcome: Met This Shift  10/10/2020 2228 by Emeka Hernandez  Outcome: Met This Shift     Problem: Skin Integrity - Impaired:  Goal: Will show no infection signs and symptoms  Description: Will show no infection signs and symptoms  10/11/2020 0326 by Henry Acosta RN  Outcome: Met This Shift  10/10/2020 2228 by Emeka Hernandez  Outcome: Met This Shift     Problem: Sleep Pattern Disturbance:  Goal: Appears well-rested  Description: Appears well-rested  10/11/2020 0326 by Henry Acosta RN  Outcome: Met This Shift  10/10/2020 2228 by Emeka Hernandez  Outcome: Met This Shift     Problem: Restraint Use - Nonviolent/Non-Self-Destructive Behavior:  Goal: Absence of restraint-related injury  Description: Absence of restraint-related injury  10/11/2020 0326 by Henry Acosta RN  Outcome: Met This Shift  10/10/2020 2315 by Henry Acosta RN  Outcome: Met This Shift  10/10/2020 2228 by Emeka Hernandez  Outcome: Met This Shift  10/10/2020 1629 by Raysa Wright RN  Outcome: Met This Shift

## 2020-10-11 NOTE — PLAN OF CARE
Problem: Falls - Risk of:  Goal: Will remain free from falls  Description: Will remain free from falls  10/10/2020 2228 by Sallie Lancaster  Outcome: Met This Shift  10/10/2020 1629 by Lizzie Gomez RN  Outcome: Met This Shift  10/10/2020 0847 by Lizzie Gomez RN  Outcome: Met This Shift  Goal: Absence of physical injury  Description: Absence of physical injury  Outcome: Met This Shift     Problem: Skin Integrity:  Goal: Will show no infection signs and symptoms  Description: Will show no infection signs and symptoms  10/10/2020 2228 by Sallie Lancaster  Outcome: Met This Shift  10/10/2020 1629 by Lizzie Gomez RN  Outcome: Met This Shift  Goal: Absence of new skin breakdown  Description: Absence of new skin breakdown  Outcome: Met This Shift     Problem: Pain:  Goal: Pain level will decrease  Description: Pain level will decrease  10/10/2020 2228 by Sallie Lancaster  Outcome: Met This Shift  10/10/2020 0847 by Lizzie Gomez RN  Outcome: Met This Shift  Goal: Control of acute pain  Description: Control of acute pain  Outcome: Met This Shift  Goal: Control of chronic pain  Description: Control of chronic pain  Outcome: Met This Shift     Problem: Airway Clearance - Ineffective:  Goal: Ability to maintain a clear airway will improve  Description: Ability to maintain a clear airway will improve  10/10/2020 2228 by Sallie Lancaster  Outcome: Met This Shift  10/10/2020 0847 by Lizzie Gomez RN  Outcome: Met This Shift     Problem: Anxiety/Stress:  Goal: Level of anxiety will decrease  Description: Level of anxiety will decrease  10/10/2020 2228 by Sallie Lancaster  Outcome: Met This Shift  10/10/2020 0847 by Lizzie Gomez RN  Outcome: Ongoing     Problem: Aspiration:  Goal: Absence of aspiration  Description: Absence of aspiration  10/10/2020 2228 by Sallie Lancaster  Outcome: Met This Shift  10/10/2020 1629 by Lizzie Gomez RN  Outcome: Ongoing     Problem:  Bowel Function - Altered:  Goal: Bowel elimination is within specified parameters  Description: Bowel elimination is within specified parameters  10/10/2020 2228 by Lamount Mask  Outcome: Met This Shift  10/10/2020 0847 by Kimberly Herrera RN  Outcome: Ongoing     Problem: Cardiac Output - Decreased:  Goal: Hemodynamic stability will improve  Description: Hemodynamic stability will improve  Outcome: Met This Shift     Problem: Fluid Volume - Imbalance:  Goal: Absence of imbalanced fluid volume signs and symptoms  Description: Absence of imbalanced fluid volume signs and symptoms  Outcome: Met This Shift     Problem: Gas Exchange - Impaired:  Goal: Levels of oxygenation will improve  Description: Levels of oxygenation will improve  Outcome: Met This Shift     Problem: Mental Status - Impaired:  Goal: Mental status will be restored to baseline  Description: Mental status will be restored to baseline  10/10/2020 2228 by Lamount Mask  Outcome: Met This Shift  10/10/2020 0847 by Kimberly Herrera RN  Outcome: Ongoing     Problem: Nutrition Deficit:  Goal: Ability to achieve adequate nutritional intake will improve  Description: Ability to achieve adequate nutritional intake will improve  Outcome: Met This Shift     Problem: Pain:  Goal: Pain level will decrease  Description: Pain level will decrease  10/10/2020 2228 by Lamount Mask  Outcome: Met This Shift  10/10/2020 0847 by Kimberly Herrera RN  Outcome: Met This Shift  Goal: Recognizes and communicates pain  Description: Recognizes and communicates pain  Outcome: Met This Shift  Goal: Control of acute pain  Description: Control of acute pain  Outcome: Met This Shift     Problem: Skin Integrity - Impaired:  Goal: Will show no infection signs and symptoms  Description: Will show no infection signs and symptoms  Outcome: Met This Shift     Problem: Sleep Pattern Disturbance:  Goal: Appears well-rested  Description: Appears well-rested  Outcome: Met This Shift     Problem: Restraint Use - Nonviolent/Non-Self-Destructive Behavior:  Goal: Absence of restraint indications  Description: Absence of restraint indications  10/10/2020 2228 by Karla Ashley  Outcome: Met This Shift  10/10/2020 1629 by Adalberto Platt RN  Outcome: Not Met This Shift  10/10/2020 0847 by Adalberto Platt RN  Outcome: Not Met This Shift  Goal: Absence of restraint-related injury  Description: Absence of restraint-related injury  10/10/2020 2228 by Karla Ashley  Outcome: Met This Shift  10/10/2020 1629 by Adalberto Platt, JOSEPH  Outcome: Met This Shift  10/10/2020 0847 by Adalberto Platt RN  Outcome: Met This Shift

## 2020-10-11 NOTE — FLOWSHEET NOTE
Pt becomes agitated at times and reaches for ETT/critical medical equipment. Bilateral soft wrist restraints remain in place for pt safety.
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
## 2020-10-11 NOTE — FLOWSHEET NOTE
Pt will reach for lines, tubes, equipment and fails to redirect to verbal  Commands. Restraints secured for patient safety.
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
## 2020-10-11 NOTE — PLAN OF CARE
Problem: Restraint Use - Nonviolent/Non-Self-Destructive Behavior:  Goal: Absence of restraint-related injury  Description: Absence of restraint-related injury  10/10/2020 2315 by Mignon Pabon RN  Outcome: Met This Shift  10/10/2020 2228 by Lamount Mask  Outcome: Met This Shift  10/10/2020 1629 by Kimberly Herrera RN  Outcome: Met This Shift     Problem: Restraint Use - Nonviolent/Non-Self-Destructive Behavior:  Goal: Absence of restraint indications  Description: Absence of restraint indications  10/10/2020 2315 by Mignon Pabon RN  Outcome: Not Met This Shift  10/10/2020 2228 by Lamtricia Lynch  Outcome: Met This Shift  10/10/2020 1629 by Kimberly Herrera RN  Outcome: Not Met This Shift

## 2020-10-12 ENCOUNTER — APPOINTMENT (OUTPATIENT)
Dept: GENERAL RADIOLOGY | Age: 30
DRG: 003 | End: 2020-10-12
Payer: COMMERCIAL

## 2020-10-12 LAB
AADO2: 109.7 MMHG
ANION GAP SERPL CALCULATED.3IONS-SCNC: 10 MMOL/L (ref 7–16)
ANISOCYTOSIS: ABNORMAL
B.E.: -0.8 MMOL/L (ref -3–3)
BASOPHILS ABSOLUTE: 0 E9/L (ref 0–0.2)
BASOPHILS RELATIVE PERCENT: 0.2 % (ref 0–2)
BUN BLDV-MCNC: 6 MG/DL (ref 6–20)
CALCIUM IONIZED: 1.23 MMOL/L (ref 1.15–1.33)
CALCIUM SERPL-MCNC: 7.7 MG/DL (ref 8.6–10.2)
CHLORIDE BLD-SCNC: 118 MMOL/L (ref 98–107)
CO2: 24 MMOL/L (ref 22–29)
COHB: 0.3 % (ref 0–1.5)
CREAT SERPL-MCNC: 0.9 MG/DL (ref 0.7–1.2)
CRITICAL: ABNORMAL
DATE ANALYZED: ABNORMAL
DATE OF COLLECTION: ABNORMAL
EOSINOPHILS ABSOLUTE: 0 E9/L (ref 0.05–0.5)
EOSINOPHILS RELATIVE PERCENT: 1.7 % (ref 0–6)
FIO2: 40 %
GFR AFRICAN AMERICAN: >60
GFR NON-AFRICAN AMERICAN: >60 ML/MIN/1.73
GLUCOSE BLD-MCNC: 147 MG/DL (ref 74–99)
HCO3: 24.6 MMOL/L (ref 22–26)
HCT VFR BLD CALC: 31 % (ref 37–54)
HEMOGLOBIN: 10.1 G/DL (ref 12.5–16.5)
HHB: 2.6 % (ref 0–5)
LAB: ABNORMAL
LYMPHOCYTES ABSOLUTE: 0.48 E9/L (ref 1.5–4)
LYMPHOCYTES RELATIVE PERCENT: 5.3 % (ref 20–42)
Lab: ABNORMAL
MAGNESIUM: 2.1 MG/DL (ref 1.6–2.6)
MCH RBC QN AUTO: 31.2 PG (ref 26–35)
MCHC RBC AUTO-ENTMCNC: 32.6 % (ref 32–34.5)
MCV RBC AUTO: 95.7 FL (ref 80–99.9)
METAMYELOCYTES RELATIVE PERCENT: 0.9 % (ref 0–1)
METHB: 0.3 % (ref 0–1.5)
MODE: AC
MONOCYTES ABSOLUTE: 0.48 E9/L (ref 0.1–0.95)
MONOCYTES RELATIVE PERCENT: 5.3 % (ref 2–12)
NEUTROPHILS ABSOLUTE: 8.64 E9/L (ref 1.8–7.3)
NEUTROPHILS RELATIVE PERCENT: 88.6 % (ref 43–80)
O2 SATURATION: 98.1 % (ref 92–98.5)
O2HB: 96.8 % (ref 94–97)
OPERATOR ID: 2577
PATIENT TEMP: 37 C
PCO2: 43.7 MMHG (ref 35–45)
PDW BLD-RTO: 13.1 FL (ref 11.5–15)
PEEP/CPAP: 8 CMH2O
PFO2: 2.88 MMHG/%
PH BLOOD GAS: 7.37 (ref 7.35–7.45)
PHOSPHORUS: 3 MG/DL (ref 2.5–4.5)
PLATELET # BLD: 176 E9/L (ref 130–450)
PMV BLD AUTO: 10.2 FL (ref 7–12)
PO2: 115.2 MMHG (ref 75–100)
POLYCHROMASIA: ABNORMAL
POTASSIUM SERPL-SCNC: 3.8 MMOL/L (ref 3.5–5)
RBC # BLD: 3.24 E12/L (ref 3.8–5.8)
RI(T): 0.95
RR MECHANICAL: 16 B/MIN
SODIUM BLD-SCNC: 150 MMOL/L (ref 132–146)
SODIUM BLD-SCNC: 150 MMOL/L (ref 132–146)
SODIUM BLD-SCNC: 152 MMOL/L (ref 132–146)
SODIUM BLD-SCNC: 152 MMOL/L (ref 132–146)
SODIUM BLD-SCNC: 153 MMOL/L (ref 132–146)
SOURCE, BLOOD GAS: ABNORMAL
THB: 10.8 G/DL (ref 11.5–16.5)
TIME ANALYZED: 514
VT MECHANICAL: 500 ML
WBC # BLD: 9.6 E9/L (ref 4.5–11.5)

## 2020-10-12 PROCEDURE — 6370000000 HC RX 637 (ALT 250 FOR IP): Performed by: STUDENT IN AN ORGANIZED HEALTH CARE EDUCATION/TRAINING PROGRAM

## 2020-10-12 PROCEDURE — 2580000003 HC RX 258: Performed by: SURGERY

## 2020-10-12 PROCEDURE — 87070 CULTURE OTHR SPECIMN AEROBIC: CPT

## 2020-10-12 PROCEDURE — 2580000003 HC RX 258: Performed by: STUDENT IN AN ORGANIZED HEALTH CARE EDUCATION/TRAINING PROGRAM

## 2020-10-12 PROCEDURE — 80048 BASIC METABOLIC PNL TOTAL CA: CPT

## 2020-10-12 PROCEDURE — 85025 COMPLETE CBC W/AUTO DIFF WBC: CPT

## 2020-10-12 PROCEDURE — 36415 COLL VENOUS BLD VENIPUNCTURE: CPT

## 2020-10-12 PROCEDURE — 2000000000 HC ICU R&B

## 2020-10-12 PROCEDURE — 84295 ASSAY OF SERUM SODIUM: CPT

## 2020-10-12 PROCEDURE — 82805 BLOOD GASES W/O2 SATURATION: CPT

## 2020-10-12 PROCEDURE — 6360000002 HC RX W HCPCS: Performed by: SURGERY

## 2020-10-12 PROCEDURE — 87205 SMEAR GRAM STAIN: CPT

## 2020-10-12 PROCEDURE — 83735 ASSAY OF MAGNESIUM: CPT

## 2020-10-12 PROCEDURE — 71045 X-RAY EXAM CHEST 1 VIEW: CPT

## 2020-10-12 PROCEDURE — 84100 ASSAY OF PHOSPHORUS: CPT

## 2020-10-12 PROCEDURE — 94003 VENT MGMT INPAT SUBQ DAY: CPT

## 2020-10-12 PROCEDURE — 6360000002 HC RX W HCPCS: Performed by: STUDENT IN AN ORGANIZED HEALTH CARE EDUCATION/TRAINING PROGRAM

## 2020-10-12 PROCEDURE — 87206 SMEAR FLUORESCENT/ACID STAI: CPT

## 2020-10-12 PROCEDURE — 70450 CT HEAD/BRAIN W/O DYE: CPT

## 2020-10-12 PROCEDURE — 6370000000 HC RX 637 (ALT 250 FOR IP): Performed by: SURGERY

## 2020-10-12 PROCEDURE — 82330 ASSAY OF CALCIUM: CPT

## 2020-10-12 PROCEDURE — 99291 CRITICAL CARE FIRST HOUR: CPT | Performed by: SURGERY

## 2020-10-12 PROCEDURE — 2500000003 HC RX 250 WO HCPCS: Performed by: SURGERY

## 2020-10-12 RX ORDER — VENLAFAXINE HYDROCHLORIDE 37.5 MG/1
37.5 TABLET, EXTENDED RELEASE ORAL
COMMUNITY

## 2020-10-12 RX ORDER — POLYVINYL ALCOHOL 14 MG/ML
1 SOLUTION/ DROPS OPHTHALMIC EVERY 4 HOURS
Status: DISCONTINUED | OUTPATIENT
Start: 2020-10-12 | End: 2020-10-24

## 2020-10-12 RX ORDER — LABETALOL HYDROCHLORIDE 5 MG/ML
10 INJECTION, SOLUTION INTRAVENOUS EVERY 30 MIN PRN
Status: DISCONTINUED | OUTPATIENT
Start: 2020-10-12 | End: 2020-10-23

## 2020-10-12 RX ORDER — MORPHINE SULFATE 2 MG/ML
2 INJECTION, SOLUTION INTRAMUSCULAR; INTRAVENOUS EVERY 4 HOURS PRN
Status: DISCONTINUED | OUTPATIENT
Start: 2020-10-12 | End: 2020-10-13

## 2020-10-12 RX ORDER — PROPOFOL 10 MG/ML
10 INJECTION, EMULSION INTRAVENOUS
Status: DISCONTINUED | OUTPATIENT
Start: 2020-10-12 | End: 2020-10-21

## 2020-10-12 RX ORDER — ACETAMINOPHEN 160 MG/5ML
650 SOLUTION ORAL EVERY 4 HOURS PRN
Status: DISCONTINUED | OUTPATIENT
Start: 2020-10-12 | End: 2020-10-15

## 2020-10-12 RX ORDER — MORPHINE SULFATE 4 MG/ML
4 INJECTION, SOLUTION INTRAMUSCULAR; INTRAVENOUS EVERY 4 HOURS PRN
Status: DISCONTINUED | OUTPATIENT
Start: 2020-10-12 | End: 2020-10-13

## 2020-10-12 RX ORDER — HEPARIN SODIUM 10000 [USP'U]/ML
5000 INJECTION, SOLUTION INTRAVENOUS; SUBCUTANEOUS EVERY 8 HOURS SCHEDULED
Status: DISCONTINUED | OUTPATIENT
Start: 2020-10-12 | End: 2020-10-25

## 2020-10-12 RX ORDER — OXYCODONE HYDROCHLORIDE 10 MG/1
10 TABLET ORAL EVERY 6 HOURS
Status: DISCONTINUED | OUTPATIENT
Start: 2020-10-12 | End: 2020-10-12

## 2020-10-12 RX ORDER — LEVETIRACETAM 100 MG/ML
750 SOLUTION ORAL 2 TIMES DAILY
Status: CANCELLED | OUTPATIENT
Start: 2020-10-12 | End: 2020-10-15

## 2020-10-12 RX ADMIN — CHLORHEXIDINE GLUCONATE 0.12% ORAL RINSE 15 ML: 1.2 LIQUID ORAL at 21:20

## 2020-10-12 RX ADMIN — MINERAL OIL AND PETROLATUM: 150; 830 OINTMENT OPHTHALMIC at 01:01

## 2020-10-12 RX ADMIN — CHLORHEXIDINE GLUCONATE 0.12% ORAL RINSE 15 ML: 1.2 LIQUID ORAL at 08:47

## 2020-10-12 RX ADMIN — HEPARIN SODIUM 5000 UNITS: 10000 INJECTION INTRAVENOUS; SUBCUTANEOUS at 21:21

## 2020-10-12 RX ADMIN — OXYCODONE HYDROCHLORIDE 10 MG: 5 SOLUTION ORAL at 04:05

## 2020-10-12 RX ADMIN — FAMOTIDINE 20 MG: 20 TABLET ORAL at 21:20

## 2020-10-12 RX ADMIN — BACITRACIN ZINC: 500 OINTMENT TOPICAL at 21:20

## 2020-10-12 RX ADMIN — POTASSIUM & SODIUM PHOSPHATES POWDER PACK 280-160-250 MG 500 MG: 280-160-250 PACK at 21:20

## 2020-10-12 RX ADMIN — POTASSIUM & SODIUM PHOSPHATES POWDER PACK 280-160-250 MG 500 MG: 280-160-250 PACK at 12:30

## 2020-10-12 RX ADMIN — BACITRACIN ZINC: 500 OINTMENT TOPICAL at 13:29

## 2020-10-12 RX ADMIN — POTASSIUM & SODIUM PHOSPHATES POWDER PACK 280-160-250 MG 500 MG: 280-160-250 PACK at 16:09

## 2020-10-12 RX ADMIN — SENNOSIDES 5 ML: 8.8 LIQUID ORAL at 21:42

## 2020-10-12 RX ADMIN — BACITRACIN ZINC, POLYMYXIN B SULFATE, NEOMYCIN SULFATE: 400; 5000; 3.5 OINTMENT TOPICAL at 08:47

## 2020-10-12 RX ADMIN — BACITRACIN ZINC: 500 OINTMENT TOPICAL at 08:48

## 2020-10-12 RX ADMIN — FAMOTIDINE 20 MG: 20 TABLET ORAL at 08:47

## 2020-10-12 RX ADMIN — BACITRACIN ZINC, POLYMYXIN B SULFATE, NEOMYCIN SULFATE: 400; 5000; 3.5 OINTMENT TOPICAL at 21:40

## 2020-10-12 RX ADMIN — MINERAL OIL AND PETROLATUM: 150; 830 OINTMENT OPHTHALMIC at 13:29

## 2020-10-12 RX ADMIN — Medication 10 ML: at 08:47

## 2020-10-12 RX ADMIN — POTASSIUM BICARBONATE 20 MEQ: 782 TABLET, EFFERVESCENT ORAL at 08:45

## 2020-10-12 RX ADMIN — LEVETIRACETAM 500 MG: 100 SOLUTION ORAL at 08:48

## 2020-10-12 RX ADMIN — POLYVINYL ALCOHOL 1 DROP: 14 SOLUTION/ DROPS OPHTHALMIC at 00:01

## 2020-10-12 RX ADMIN — OXYCODONE HYDROCHLORIDE 10 MG: 5 SOLUTION ORAL at 16:07

## 2020-10-12 RX ADMIN — PROPOFOL INJECTABLE EMULSION 45 MCG/KG/MIN: 10 INJECTION, EMULSION INTRAVENOUS at 10:17

## 2020-10-12 RX ADMIN — SODIUM CHLORIDE 3 G: 900 INJECTION INTRAVENOUS at 00:00

## 2020-10-12 RX ADMIN — MINERAL OIL AND PETROLATUM: 150; 830 OINTMENT OPHTHALMIC at 05:56

## 2020-10-12 RX ADMIN — DEXAMETHASONE SODIUM PHOSPHATE 4 DROP: 1 SOLUTION/ DROPS OPHTHALMIC at 08:47

## 2020-10-12 RX ADMIN — CIPROFLOXACIN HYDROCHLORIDE 4 DROP: 3 SOLUTION/ DROPS OPHTHALMIC at 08:47

## 2020-10-12 RX ADMIN — PROPOFOL INJECTABLE EMULSION 30 MCG/KG/MIN: 10 INJECTION, EMULSION INTRAVENOUS at 13:27

## 2020-10-12 RX ADMIN — CIPROFLOXACIN HYDROCHLORIDE 4 DROP: 3 SOLUTION/ DROPS OPHTHALMIC at 21:20

## 2020-10-12 RX ADMIN — HEPARIN SODIUM 5000 UNITS: 10000 INJECTION INTRAVENOUS; SUBCUTANEOUS at 13:38

## 2020-10-12 RX ADMIN — POLYVINYL ALCOHOL 1 DROP: 14 SOLUTION/ DROPS OPHTHALMIC at 08:35

## 2020-10-12 RX ADMIN — SODIUM CHLORIDE 50 ML/HR: 3 INJECTION, SOLUTION INTRAVENOUS at 16:13

## 2020-10-12 RX ADMIN — PROPOFOL INJECTABLE EMULSION 50 MCG/KG/MIN: 10 INJECTION, EMULSION INTRAVENOUS at 06:34

## 2020-10-12 RX ADMIN — OXYCODONE HYDROCHLORIDE 10 MG: 5 SOLUTION ORAL at 21:34

## 2020-10-12 RX ADMIN — MINERAL OIL AND PETROLATUM: 150; 830 OINTMENT OPHTHALMIC at 21:20

## 2020-10-12 RX ADMIN — POLYVINYL ALCOHOL 1 DROP: 14 SOLUTION/ DROPS OPHTHALMIC at 03:18

## 2020-10-12 RX ADMIN — POTASSIUM & SODIUM PHOSPHATES POWDER PACK 280-160-250 MG 500 MG: 280-160-250 PACK at 08:48

## 2020-10-12 RX ADMIN — SODIUM CHLORIDE 3 G: 900 INJECTION INTRAVENOUS at 18:41

## 2020-10-12 RX ADMIN — Medication 100 MCG/HR: at 02:37

## 2020-10-12 RX ADMIN — MINERAL OIL AND PETROLATUM: 150; 830 OINTMENT OPHTHALMIC at 08:48

## 2020-10-12 RX ADMIN — ACETAMINOPHEN ORAL SOLUTION 650 MG: 650 SOLUTION ORAL at 16:03

## 2020-10-12 RX ADMIN — POLYVINYL ALCOHOL 1 DROP: 14 SOLUTION/ DROPS OPHTHALMIC at 16:07

## 2020-10-12 RX ADMIN — PROPOFOL INJECTABLE EMULSION 30 MCG/KG/MIN: 10 INJECTION, EMULSION INTRAVENOUS at 21:00

## 2020-10-12 RX ADMIN — SODIUM CHLORIDE 3 G: 900 INJECTION INTRAVENOUS at 05:56

## 2020-10-12 RX ADMIN — OXYCODONE HYDROCHLORIDE 10 MG: 5 SOLUTION ORAL at 10:23

## 2020-10-12 RX ADMIN — POLYVINYL ALCOHOL 1 DROP: 14 SOLUTION/ DROPS OPHTHALMIC at 20:21

## 2020-10-12 RX ADMIN — DEXAMETHASONE SODIUM PHOSPHATE 4 DROP: 1 SOLUTION/ DROPS OPHTHALMIC at 21:20

## 2020-10-12 RX ADMIN — Medication 10 ML: at 21:42

## 2020-10-12 RX ADMIN — SODIUM CHLORIDE 3 G: 900 INJECTION INTRAVENOUS at 11:45

## 2020-10-12 RX ADMIN — PROPOFOL INJECTABLE EMULSION 50 MCG/KG/MIN: 10 INJECTION, EMULSION INTRAVENOUS at 02:38

## 2020-10-12 RX ADMIN — SODIUM CHLORIDE 50 ML/HR: 3 INJECTION, SOLUTION INTRAVENOUS at 04:21

## 2020-10-12 RX ADMIN — POLYVINYL ALCOHOL 1 DROP: 14 SOLUTION/ DROPS OPHTHALMIC at 11:40

## 2020-10-12 RX ADMIN — LEVETIRACETAM 500 MG: 100 SOLUTION ORAL at 21:34

## 2020-10-12 ASSESSMENT — PULMONARY FUNCTION TESTS
PIF_VALUE: 24
PIF_VALUE: 31
PIF_VALUE: 25
PIF_VALUE: 24
PIF_VALUE: 26
PIF_VALUE: 25
PIF_VALUE: 25
PIF_VALUE: 26
PIF_VALUE: 23
PIF_VALUE: 28
PIF_VALUE: 23
PIF_VALUE: 25
PIF_VALUE: 21
PIF_VALUE: 24
PIF_VALUE: 24
PIF_VALUE: 28
PIF_VALUE: 24
PIF_VALUE: 28
PIF_VALUE: 24
PIF_VALUE: 24
PIF_VALUE: 25
PIF_VALUE: 27
PIF_VALUE: 32
PIF_VALUE: 26
PIF_VALUE: 26

## 2020-10-12 ASSESSMENT — PAIN SCALES - GENERAL
PAINLEVEL_OUTOF10: 0
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
## 2020-10-12 NOTE — PLAN OF CARE
Problem: Mental Status - Impaired:  Goal: Mental status will be restored to baseline  Description: Mental status will be restored to baseline  10/11/2020 1046 by Nicolás Panchal RN  Outcome: Not Met This Shift     Problem: Restraint Use - Nonviolent/Non-Self-Destructive Behavior:  Goal: Absence of restraint indications  Description: Absence of restraint indications  10/11/2020 2159 by Catalina Porter RN  Outcome: Not Met This Shift  10/11/2020 1814 by Nicolás Panchal RN  Outcome: Not Met This Shift  10/11/2020 1046 by Nicolás Panchal RN  Outcome: Not Met This Shift     Problem: Restraint Use - Nonviolent/Non-Self-Destructive Behavior:  Goal: Absence of restraint-related injury  Description: Absence of restraint-related injury  10/11/2020 2159 by Catalina Porter RN  Outcome: Met This Shift  10/11/2020 1814 by Nicolás Panchal RN  Outcome: Met This Shift  10/11/2020 1046 by Nicolás Panchal RN  Outcome: Met This Shift

## 2020-10-12 NOTE — PROGRESS NOTES
Neurosurg progress note  VITALS:  /60   Pulse 76   Temp 99.9 °F (37.7 °C) (Axillary)   Resp 16   Ht 6' 2\" (1.88 m)   Wt 253 lb 4.9 oz (114.9 kg)   SpO2 96%   BMI 32.52 kg/m²   24HR PULSE RANGE: Pulse  Av.4  Min: 69  Max: 89  Xr Pelvis (1-2 Views)    Result Date: 10/8/2020  EXAMINATION: ONE XRAY VIEW OF THE PELVIS 10/8/2020 9:52 pm COMPARISON: None. HISTORY: ORDERING SYSTEM PROVIDED HISTORY: trauma TECHNOLOGIST PROVIDED HISTORY: Reason for exam:->trauma What reading provider will be dictating this exam?->CRC FINDINGS: Left ischial tuberosity is not included on this examination. Entire left hip is not included on this examination. No fracture or dislocation involving pelvis or visualized hips. No diastasis involving sacroiliac joints or symphysis pubis. No fracture or dislocation involving visualized pelvis or hips. Xr Elbow Right (min 3 Views)    Result Date: 10/9/2020  EXAMINATION: THREE XRAY VIEWS OF THE RIGHT ELBOW 10/9/2020 7:00 am COMPARISON: None. HISTORY: ORDERING SYSTEM PROVIDED HISTORY: trauma, Skull fx TECHNOLOGIST PROVIDED HISTORY: Reason for exam:->trauma, Skull fx What reading provider will be dictating this exam?->CRC FINDINGS: There is no fracture dislocation. There is no elbow joint effusion. There are tiny degenerative spurs. No acute process     Xr Hand Left (min 3 Views)    Result Date: 10/9/2020  EXAMINATION: THREE XRAY VIEWS OF THE LEFT HAND; THREE XRAY VIEWS OF THE RIGHT HAND 10/9/2020 7:01 am COMPARISON: None. HISTORY: ORDERING SYSTEM PROVIDED HISTORY: trauma TECHNOLOGIST PROVIDED HISTORY: Reason for exam:->trauma What reading provider will be dictating this exam?->CRC FINDINGS: Positioning of both hands does somewhat limit evaluation. There are no definite fractures or dislocations.   Joint spaces are normal.     No acute process     Xr Hand Right (min 3 Views)    Result Date: 10/9/2020  EXAMINATION: THREE XRAY VIEWS OF THE LEFT HAND; THREE XRAY VIEWS OF THE RIGHT HAND 10/9/2020 7:01 am COMPARISON: None. HISTORY: ORDERING SYSTEM PROVIDED HISTORY: trauma TECHNOLOGIST PROVIDED HISTORY: Reason for exam:->trauma What reading provider will be dictating this exam?->CRC FINDINGS: Positioning of both hands does somewhat limit evaluation. There are no definite fractures or dislocations. Joint spaces are normal.     No acute process     Xr Knee Left (3 Views)    Result Date: 10/9/2020  EXAMINATION: THREE XRAY VIEWS OF THE LEFT KNEE 10/9/2020 7:03 am COMPARISON: None. HISTORY: ORDERING SYSTEM PROVIDED HISTORY: trauma TECHNOLOGIST PROVIDED HISTORY: Reason for exam:->trauma What reading provider will be dictating this exam?->CRC FINDINGS: There is no fracture dislocation. There is no joint effusion or degenerative change. No acute process     Xr Knee Right (3 Views)    Result Date: 10/9/2020  EXAMINATION: THREE XRAY VIEWS OF THE RIGHT KNEE 10/9/2020 8:06 am COMPARISON: None. HISTORY: ORDERING SYSTEM PROVIDED HISTORY: trauma TECHNOLOGIST PROVIDED HISTORY: Reason for exam:->trauma What reading provider will be dictating this exam?->CRC FINDINGS: There is no fracture dislocation. There is no joint space narrowing or effusion. No acute process     Ct Head Wo Contrast    Result Date: 10/9/2020  EXAMINATION: CT OF THE HEAD WITHOUT CONTRAST  10/9/2020 4:07 am TECHNIQUE: CT of the head was performed without the administration of intravenous contrast. Dose modulation, iterative reconstruction, and/or weight based adjustment of the mA/kV was utilized to reduce the radiation dose to as low as reasonably achievable. COMPARISON: CT head 10/08/2020 HISTORY: ORDERING SYSTEM PROVIDED HISTORY: evaluate head bleed TECHNOLOGIST PROVIDED HISTORY: Has a \"code stroke\" or \"stroke alert\" been called? ->No Reason for exam:->evaluate head bleed What reading provider will be dictating this exam?->CRC FINDINGS: BRAIN/VENTRICLES: Interval increase in size of right frontal parenchymal contusion, measuring 14 x 7 x 10 mm, previously 10 x 5 x 5 mm. Additional right frontal contusion, more inferiorly has also increased in size. Punctate contusions in the anterior-inferior frontal lobe along the gyrus rectus have also increased. Scattered bilateral hemispheric subarachnoid hemorrhage. Right convexity subdural hematoma measures up to 6 mm in thickness, not substantially changed. Trace left parietal subdural hematoma measuring 2 mm in thickness. 5 mm leftward midline shift, not substantially changed. No herniation. Patent basal cisterns. ORBITS: The visualized portion of the orbits demonstrate no acute abnormality. SINUSES: Nasopharyngeal opacities with partially imaged esophagogastric and endotracheal tubes. Scattered paranasal sinus opacities. SOFT TISSUES/SKULL:  Nondisplaced left temporal bone fracture extending to the otic capsule. Comminuted mildly displaced left parietal fracture. Nondisplaced right temporal bone fracture which is otic capsule sparing. Diffuse scalp swelling with posterior scalp contusions. Skin staples present. Mild interval increase in size of scattered parenchymal hematomas, mainly in the right frontal lobe, suspicious for diffuse axonal injury. No substantial change in acute right convexity subdural and left parietal subdural hematomas. Stable 5 mm leftward midline shift. Unchanged calvarial fractures, notable for a nondisplaced left temporal bone fracture possibly extending to the otic capsule. Recommend follow-up temporal bone CT. Ct Head Wo Contrast    Result Date: 10/9/2020  EXAMINATION: CT OF THE HEAD WITHOUT CONTRAST  10/9/2020 12:11 pm TECHNIQUE: CT of the head was performed without the administration of intravenous contrast. Dose modulation, iterative reconstruction, and/or weight based adjustment of the mA/kV was utilized to reduce the radiation dose to as low as reasonably achievable. COMPARISON: 8 hours prior.  HISTORY: ORDERING SYSTEM PROVIDED HISTORY: s/p ventric TECHNOLOGIST PROVIDED HISTORY: Reason for exam:->s/p ventric Has a \"code stroke\" or \"stroke alert\" been called? ->No What reading provider will be dictating this exam?->CRC FINDINGS: There has been interval placement of a right frontal approach ventriculostomy catheter terminating within the right lateral ventricle frontal horn abutting the septum pellucidum. There is split like configuration of the right lateral ventricle that may reflect over shunting. There is no temporal horn dilatation. There is diffuse cerebral edema with diffuse sulcal effacement. There is similar appearance of multiple foci of hemorrhagic contusions involving the right frontal lobe and to lesser extent left frontal lobe and right temporal lobe. The hemorrhagic contusions demonstrates mild surrounding edema. There is similar appearance of acute subarachnoid hemorrhage along the bilateral frontal convexities and right temporal convexity and to a lesser extent at the vertices. There is small volume of subdural hemorrhage along the right lateral tentorial leaflet. There is small volume subarachnoid hemorrhage within the interpeduncular cistern. There is new wedge-shaped region of low attenuation involving the left middle cerebellar peduncle and left cerebellar hemisphere, concerning for acute ischemia. There is no significant midline shift. There are bilateral parietal fractures, comminuted on the left, extending into the left temporal bone including the mastoid segment. Please refer to concurrently performed CT temporal bone imaging report. There is additional depressed fracture of the left superior orbital wall. There is diffuse subgaleal soft tissue swelling. Interval placement of right ventriculostomy catheter terminating within the right lateral ventricle frontal horn. Interval development of slit-like appearance of the right lateral ventricle.  Suspect acute ischemia involving the left middle cerebellar peduncle and Small amount of subarachnoid hemorrhage in the right frontal lobe. Mild fluid layering in the sphenoid sinus. Fractures involving the left orbit as well. The mastoid air cells are clear. However, left temporal bone fracture does extend through the region of the left external auditory canal and extends to the middle ear. Cervical spine: Vertebral body heights are intact. Alignment is intact. Facets are normally aligned. The odontoid process is intact. No significant prevertebral soft tissue swelling. Facial bones: Mandible is intact. The zygomatic arches are intact. Nasal bones are intact. Nasal bony septum is midline. Sphenoid temporal buttress is intact. Mildly displaced fracture of the roof of the left orbit. The orbital floor is intact. Globes are intact and not proptotic. No retro bulbar soft tissue hematoma. Mild left periorbital preseptal soft tissue swelling. Bilateral comminuted skull bone fractures involving bilateral temporal bones and parietal bones. Fractures are worse on the left side. Mild right holohemispheric subdural hematoma with mild midline shift. Bilateral frontal small hemorrhagic contusions. Left orbital roof mildly displaced fracture. No evidence for cervical fracture or subluxation. Critical findings reported to the ER physician at time of dictation. Ct Iac Posterior Fossa Wo Contrast    Result Date: 10/10/2020  EXAMINATION: CT OF THE INTERNAL AUDITORY CANAL WITHOUT CONTRAST 10/9/2020 12:11 pm TECHNIQUE: CT of the internal auditory canal was performed without contrast was performed without the administration of intravenous contrast. Multiplanar reformatted images are provided for review. Dose modulation, iterative reconstruction, and/or weight based adjustment of the mA/kV was utilized to reduce the radiation dose to as low as reasonably achievable.  COMPARISON: None HISTORY: ORDERING SYSTEM PROVIDED HISTORY: TRAUMA TECHNOLOGIST PROVIDED HISTORY: Reason for exam:->trauma What reading provider will be dictating this exam?->CRC FINDINGS: RIGHT TEMPORAL BONE:  The right external auditory canal is normal in appearance. There is no right temporal bone fracture identified. There is a small volume of fluid within the right mastoid air cells. The right middle ear is clear. The ossicles are normally aligned. The tegmen tympani is intact. The scutum is intact. There is no osseous dehiscence. The cochlea, vestibule and semicircular canals are normal in appearance. LEFT TEMPORAL BONE: There is a longitudinal fracture of the mastoid segment of the left temporal bone. There is incudomalleolar subluxation involving the head of the malleus and body of the incus. The fracture does not extend into the otic capsule. Hemorrhage is present within the left middle ear and mastoid air cells. A comminuted fracture of the squamous portion of the left temporal bone is noted. The cochlea, vestibule and semicircular canals are normal.     Longitudinal fracture of the mastoid portion of the left temporal bone with associated incudomalleolar subluxation involving the head of the malleus and body of the incus. The fracture does not extend into the otic capsule. Small volume of fluid within the right mastoid air cells but no acute traumatic injury of the mastoid portion of the right temporal bone evident. Ct Facial Bones Wo Contrast    Result Date: 10/9/2020  EXAMINATION: CT OF THE HEAD and cervical spine WITHOUT CONTRAST; CT OF THE FACE WITHOUT CONTRAST  10/8/2020 9:56 pm TECHNIQUE: CT of the head was performed without the administration of intravenous contrast. Dose modulation, iterative reconstruction, and/or weight based adjustment of the mA/kV was utilized to reduce the radiation dose to as low as reasonably achievable.; CT of the face was performed without the administration of intravenous contrast. Multiplanar reformatted images are provided for review.  Dose modulation, iterative reconstruction, and/or weight based adjustment of the mA/kV was utilized to reduce the radiation dose to as low as reasonably achievable.; CT of the cervical spine was performed without the administration of intravenous contrast. Multiplanar reformatted images are provided for review. Dose modulation, iterative reconstruction, and/or weight based adjustment of the mA/kV was utilized to reduce the radiation dose to as low as reasonably achievable. COMPARISON: None. HISTORY: ORDERING SYSTEM PROVIDED HISTORY: trauma TECHNOLOGIST PROVIDED HISTORY: Reason for exam:->trauma Has a \"code stroke\" or \"stroke alert\" been called? ->No What reading provider will be dictating this exam?->CRC FINDINGS: Head: There is mild right frontoparietal holohemispheric subdural hemorrhage, measuring up to 6 mm. Mild mass effect and minimal midline shift of approximately 4 mm. Visualized skull demonstrates multiple mildly displaced fractures in the skull, involving left temporal bone, right temporal bone, bilateral parietal bones. Left temporoparietal bone skull fractures appear to be comminuted in multiple locations. There also small hemorrhagic contusions in bilateral frontal lobes. Small amount of subarachnoid hemorrhage in the right frontal lobe. Mild fluid layering in the sphenoid sinus. Fractures involving the left orbit as well. The mastoid air cells are clear. However, left temporal bone fracture does extend through the region of the left external auditory canal and extends to the middle ear. Cervical spine: Vertebral body heights are intact. Alignment is intact. Facets are normally aligned. The odontoid process is intact. No significant prevertebral soft tissue swelling. Facial bones: Mandible is intact. The zygomatic arches are intact. Nasal bones are intact. Nasal bony septum is midline. Sphenoid temporal buttress is intact. Mildly displaced fracture of the roof of the left orbit.   The orbital floor is intact. Globes are intact and not proptotic. No retro bulbar soft tissue hematoma. Mild left periorbital preseptal soft tissue swelling. Bilateral comminuted skull bone fractures involving bilateral temporal bones and parietal bones. Fractures are worse on the left side. Mild right holohemispheric subdural hematoma with mild midline shift. Bilateral frontal small hemorrhagic contusions. Left orbital roof mildly displaced fracture. No evidence for cervical fracture or subluxation. Critical findings reported to the ER physician at time of dictation. Ct Chest W Contrast    Result Date: 10/9/2020  EXAMINATION: CT OF THE CHEST WITH CONTRAST 10/8/2020 10:56 pm TECHNIQUE: CT of the chest was performed with the administration of intravenous contrast. Multiplanar reformatted images are provided for review. Dose modulation, iterative reconstruction, and/or weight based adjustment of the mA/kV was utilized to reduce the radiation dose to as low as reasonably achievable. COMPARISON: None. HISTORY: ORDERING SYSTEM PROVIDED HISTORY: trauma TECHNOLOGIST PROVIDED HISTORY: Reason for exam:->trauma What reading provider will be dictating this exam?->CRC FINDINGS: An endotracheal and enteric tubes are noted in place and are appropriately positioned. Mediastinum: Normal heart size. The great vessels are within normal limits. No pericardial effusion. No significantly enlarged lymph nodes. Lungs/pleura: Mild dependent congestion involving the bilateral posterior lungs. No pulmonary mass or augustin consolidation. No pleural effusion. Upper Abdomen: Within normal limits. Soft Tissues/Bones: Nondisplaced acute fractures involving the lateral left 5th through 7th ribs. Nondisplaced acute fractures of the posterior left 4th through 7th ribs. Acute mildly displaced fractures of the posterior left 8th rib. Mildly displaced acute fracture of the left body of the scapula.      Nondisplaced acute fractures involving the lateral left 5th through 7th ribs. Nondisplaced acute fractures of the posterior left 4th through 7th ribs. Acute mildly displaced fractures of the posterior left 8th rib. Mildly displaced acute fracture of the left body of the scapula. Ct Cervical Spine Wo Contrast    Result Date: 10/9/2020  EXAMINATION: CT OF THE HEAD and cervical spine WITHOUT CONTRAST; CT OF THE FACE WITHOUT CONTRAST  10/8/2020 9:56 pm TECHNIQUE: CT of the head was performed without the administration of intravenous contrast. Dose modulation, iterative reconstruction, and/or weight based adjustment of the mA/kV was utilized to reduce the radiation dose to as low as reasonably achievable.; CT of the face was performed without the administration of intravenous contrast. Multiplanar reformatted images are provided for review. Dose modulation, iterative reconstruction, and/or weight based adjustment of the mA/kV was utilized to reduce the radiation dose to as low as reasonably achievable.; CT of the cervical spine was performed without the administration of intravenous contrast. Multiplanar reformatted images are provided for review. Dose modulation, iterative reconstruction, and/or weight based adjustment of the mA/kV was utilized to reduce the radiation dose to as low as reasonably achievable. COMPARISON: None. HISTORY: ORDERING SYSTEM PROVIDED HISTORY: trauma TECHNOLOGIST PROVIDED HISTORY: Reason for exam:->trauma Has a \"code stroke\" or \"stroke alert\" been called? ->No What reading provider will be dictating this exam?->CRC FINDINGS: Head: There is mild right frontoparietal holohemispheric subdural hemorrhage, measuring up to 6 mm. Mild mass effect and minimal midline shift of approximately 4 mm. Visualized skull demonstrates multiple mildly displaced fractures in the skull, involving left temporal bone, right temporal bone, bilateral parietal bones. Left temporoparietal bone skull fractures appear to be comminuted in multiple locations.   There also small hemorrhagic contusions in bilateral frontal lobes. Small amount of subarachnoid hemorrhage in the right frontal lobe. Mild fluid layering in the sphenoid sinus. Fractures involving the left orbit as well. The mastoid air cells are clear. However, left temporal bone fracture does extend through the region of the left external auditory canal and extends to the middle ear. Cervical spine: Vertebral body heights are intact. Alignment is intact. Facets are normally aligned. The odontoid process is intact. No significant prevertebral soft tissue swelling. Facial bones: Mandible is intact. The zygomatic arches are intact. Nasal bones are intact. Nasal bony septum is midline. Sphenoid temporal buttress is intact. Mildly displaced fracture of the roof of the left orbit. The orbital floor is intact. Globes are intact and not proptotic. No retro bulbar soft tissue hematoma. Mild left periorbital preseptal soft tissue swelling. Bilateral comminuted skull bone fractures involving bilateral temporal bones and parietal bones. Fractures are worse on the left side. Mild right holohemispheric subdural hematoma with mild midline shift. Bilateral frontal small hemorrhagic contusions. Left orbital roof mildly displaced fracture. No evidence for cervical fracture or subluxation. Critical findings reported to the ER physician at time of dictation. Ct Thoracic Spine Wo Contrast    Result Date: 10/9/2020  EXAMINATION: CT OF THE THORACIC SPINE WITHOUT CONTRAST  10/8/2020 10:56 pm: TECHNIQUE: CT of the thoracic spine was performed without the administration of intravenous contrast. Multiplanar reformatted images are provided for review. Dose modulation, iterative reconstruction, and/or weight based adjustment of the mA/kV was utilized to reduce the radiation dose to as low as reasonably achievable.  COMPARISON: Same day lumbar spine CT; same day CT chest, abdomen and pelvis HISTORY: ORDERING SYSTEM review. Dose modulation, iterative reconstruction, and/or weight based adjustment of the mA/kV was utilized to reduce the radiation dose to as low as reasonably achievable. COMPARISON: None. HISTORY: ORDERING SYSTEM PROVIDED HISTORY: trauma TECHNOLOGIST PROVIDED HISTORY: Additional Contrast?->None Reason for exam:->trauma What reading provider will be dictating this exam?->CRC FINDINGS: Lower Chest: Described in detail on separate report of CT of the chest. Organs: The liver, spleen, pancreas, and bilateral adrenal glands are within normal limits. No radiopaque gallstones. No CT evidence of acute cholecystitis. No intra or extrahepatic ductal dilatation. The bilateral kidneys are within normal limits. No renal cysts or masses are noted. No hydronephrosis or hydroureter. GI/Bowel: The small and large bowel demonstrate normal caliber and appearance. A normal-appearing appendix is identified. Pelvis: A soft tissue structure is noted in the distal right inguinal canal which could represent a deeply retracted testicle versus an undescended testicle. Recommend clinical correlation. The urinary bladder is nearly decompressed with a Bentley catheter in place. Peritoneum/Retroperitoneum: No free fluid or free air. Bones/Soft Tissues: Multiple rib fractures, as described in report of CT of the chest.  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 A soft tissue structure is noted in the distal right inguinal canal presumably representing the right testicle and could represent a deeply retracted testicle versus an undescended testicle. Recommend clinical correlation. Xr Chest Portable    Result Date: 10/9/2020  EXAMINATION: ONE XRAY VIEW OF THE CHEST 10/9/2020 6:59 am COMPARISON: 10/09/2020 HISTORY: ORDERING SYSTEM PROVIDED HISTORY: intubated TECHNOLOGIST PROVIDED HISTORY: Reason for exam:->intubated What reading provider will be dictating this exam?->CRC FINDINGS: Lines/tubes are appropriate. achievable.; CTA of the neck was performed with the administration of intravenous contrast. Multiplanar reformatted images are provided for review. MIP images are provided for review. Stenosis of the internal carotid arteries measured using NASCET criteria. Dose modulation, iterative reconstruction, and/or weight based adjustment of the mA/kV was utilized to reduce the radiation dose to as low as reasonably achievable. COMPARISON: CT head from 12/30 5 p.m. HISTORY: ORDERING SYSTEM PROVIDED HISTORY: ischemic TECHNOLOGIST PROVIDED HISTORY: Reason for exam:->ischemic Has a \"code stroke\" or \"stroke alert\" been called? ->No What reading provider will be dictating this exam?->CRC; ORDERING SYSTEM PROVIDED HISTORY: trauma TECHNOLOGIST PROVIDED HISTORY: Reason for exam:->trauma Has a \"code stroke\" or \"stroke alert\" been called? ->No What reading provider will be dictating this exam?->CRC FINDINGS: CTA NECK: AORTIC ARCH/ARCH VESSELS: No dissection or arterial injury. No significant stenosis of the brachiocephalic or subclavian arteries. CAROTID ARTERIES: No dissection, arterial injury, or hemodynamically significant stenosis by NASCET criteria. VERTEBRAL ARTERIES: No dissection, arterial injury, or significant stenosis. SOFT TISSUES: There are patchy and nodular infiltrates within the right lung. BONES: See below. CTA HEAD: ANTERIOR CIRCULATION: No significant stenosis of the intracranial internal carotid, anterior cerebral, or middle cerebral arteries. No aneurysm. Bilateral posterior communicating arteries are present. POSTERIOR CIRCULATION: No significant stenosis of the vertebral, basilar, or posterior cerebral arteries. No aneurysm. OTHER: No dural venous sinus thrombosis on this non-dedicated study. BRAIN: There is diffuse scalp soft tissue thickening with redemonstration of a comminuted fractures of the posterior skull extending through the left temporal bone. .  A right frontal approach endoventricular device is present with re-expansion of the right lateral ventricle. There are intraparenchymal hematomas within the right frontal and right temporal lobes as well as a small amount of subdural blood over the right cerebral convexity. There is 3.7 mm of right-to-left midline shift. Re-expansion of the right lateral ventricle since the prior exam obtained at 12:35 PM. Otherwise, stable appearance of the brain and skull. No acute arterial injury visualized within the head or neck. Incidentally noted patchy and nodular infiltrates within the right lung, worrisome for pneumonia. Cta Neck W Contrast    Result Date: 10/9/2020  EXAMINATION: CTA OF THE NECK 10/8/2020 10:56 pm TECHNIQUE: CTA of the neck was performed with the administration of intravenous contrast. Multiplanar reformatted images are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

## 2020-10-12 NOTE — PLAN OF CARE
Problem: Restraint Use - Nonviolent/Non-Self-Destructive Behavior:  Goal: Absence of restraint indications  Description: Absence of restraint indications  10/12/2020 0818 by Grisel Gordon RN  Outcome: Not Met This Shift     Problem: Restraint Use - Nonviolent/Non-Self-Destructive Behavior:  Goal: Absence of restraint-related injury  Description: Absence of restraint-related injury  10/12/2020 0818 by Grisel Gordon RN  Outcome: Met This Shift

## 2020-10-12 NOTE — PLAN OF CARE
Problem: Falls - Risk of:  Goal: Will remain free from falls  Description: Will remain free from falls  10/12/2020 0643 by Morgan Hunt  Outcome: Met This Shift  10/12/2020 0003 by Aria Quintana RN  Outcome: Met This Shift  10/11/2020 2200 by Aria Quintana RN  Outcome: Met This Shift  10/11/2020 2159 by Aria Quintana RN  Outcome: Met This Shift  Goal: Absence of physical injury  Description: Absence of physical injury  10/12/2020 0643 by Morgan Hunt  Outcome: Met This Shift  10/12/2020 0003 by Aria Quintana RN  Outcome: Met This Shift  10/11/2020 2200 by Aria Quintana RN  Outcome: Met This Shift  10/11/2020 2159 by Aria Quintana RN  Outcome: Met This Shift     Problem: Skin Integrity:  Goal: Will show no infection signs and symptoms  Description: Will show no infection signs and symptoms  10/12/2020 0643 by Morgan Hunt  Outcome: Met This Shift  10/12/2020 0003 by Aria Quintana RN  Outcome: Met This Shift  10/11/2020 2200 by Aria Quintana RN  Outcome: Met This Shift  Goal: Absence of new skin breakdown  Description: Absence of new skin breakdown  10/12/2020 0643 by Morgan Hunt  Outcome: Met This Shift  10/12/2020 0003 by Aria Quintana RN  Outcome: Met This Shift  10/11/2020 2200 by Aria Quintana RN  Outcome: Met This Shift     Problem: Pain:  Goal: Pain level will decrease  Description: Pain level will decrease  10/12/2020 0643 by Morgan Hunt  Outcome: Met This Shift  10/12/2020 0003 by Aria Qunitana RN  Outcome: Met This Shift  10/11/2020 2200 by Aria Quintana RN  Outcome: Met This Shift  Goal: Control of acute pain  Description: Control of acute pain  10/12/2020 0643 by Morgan Hunt  Outcome: Met This Shift  10/12/2020 0003 by Aria Quintana RN  Outcome: Met This Shift  10/11/2020 2200 by Aria Quintana RN  Outcome: Met This Shift  Goal: Control of chronic pain  Description: Control of chronic pain  10/12/2020 0643 by Morgan Hunt  Outcome: Met This Shift  10/12/2020 0003 by Aria Quintana RN  Outcome: 2200 by Catalina Porter RN  Outcome: Met This Shift     Problem: Gas Exchange - Impaired:  Goal: Levels of oxygenation will improve  Description: Levels of oxygenation will improve  10/12/2020 0643 by Cimarron Coupe  Outcome: Met This Shift  10/12/2020 0003 by Catalina Porter RN  Outcome: Met This Shift  10/11/2020 2200 by Catalina Porter RN  Outcome: Met This Shift     Problem: Mental Status - Impaired:  Goal: Mental status will be restored to baseline  Description: Mental status will be restored to baseline  10/12/2020 0643 by Josiah Coupe  Outcome: Met This Shift  10/12/2020 0003 by Catalina Porter RN  Outcome: Met This Shift  10/11/2020 2200 by Catalina Porter RN  Outcome: Met This Shift     Problem: Nutrition Deficit:  Goal: Ability to achieve adequate nutritional intake will improve  Description: Ability to achieve adequate nutritional intake will improve  10/12/2020 0643 by Josiah Coupe  Outcome: Met This Shift  10/12/2020 0003 by Catalina Porter RN  Outcome: Met This Shift  10/11/2020 2200 by Catalina Porter RN  Outcome: Met This Shift     Problem: Pain:  Goal: Pain level will decrease  Description: Pain level will decrease  10/12/2020 0643 by Josiah Coupe  Outcome: Met This Shift  10/12/2020 0003 by Catalina Porter RN  Outcome: Met This Shift  10/11/2020 2200 by Catalina Porter RN  Outcome: Met This Shift  Goal: Recognizes and communicates pain  Description: Recognizes and communicates pain  10/12/2020 0643 by Josiah Garrido  Outcome: Met This Shift  10/12/2020 0003 by Catalnia Porter RN  Outcome: Met This Shift  10/11/2020 2200 by Catalina Porter RN  Outcome: Met This Shift  Goal: Control of acute pain  Description: Control of acute pain  10/12/2020 0643 by Josiah Coupe  Outcome: Met This Shift  10/12/2020 0003 by Catalina Porter RN  Outcome: Met This Shift  10/11/2020 2200 by Catalina Porter RN  Outcome: Met This Shift     Problem: Skin Integrity - Impaired:  Goal: Will show no infection signs and symptoms  Description: Will show no infection signs and symptoms  10/12/2020 0643 by Kaia Das  Outcome: Met This Shift  10/12/2020 0003 by Orion Logan RN  Outcome: Met This Shift  10/11/2020 2200 by Orion Logan RN  Outcome: Met This Shift     Problem: Sleep Pattern Disturbance:  Goal: Appears well-rested  Description: Appears well-rested  10/12/2020 0643 by Kaia Das  Outcome: Met This Shift  10/12/2020 0003 by Orion Logan RN  Outcome: Met This Shift  10/11/2020 2200 by Orion Lgoan RN  Outcome: Met This Shift     Problem: Restraint Use - Nonviolent/Non-Self-Destructive Behavior:  Goal: Absence of restraint indications  Description: Absence of restraint indications  10/12/2020 0643 by Kaia Das  Outcome: Met This Shift  10/12/2020 0003 by Orion Logan RN  Outcome: Not Met This Shift  10/11/2020 2200 by Orion Logan RN  Outcome: Not Met This Shift  10/11/2020 2159 by Orion Logan RN  Outcome: Not Met This Shift  10/11/2020 1814 by Sarah Park RN  Outcome: Not Met This Shift  Goal: Absence of restraint-related injury  Description: Absence of restraint-related injury  10/12/2020 0643 by Kaia Das  Outcome: Met This Shift  10/12/2020 0003 by Orion Logan RN  Outcome: Met This Shift  10/11/2020 2200 by Orion Logan RN  Outcome: Met This Shift  10/11/2020 2159 by Orion Logan RN  Outcome: Met This Shift  10/11/2020 1814 by Sarah Park RN  Outcome: Met This Shift

## 2020-10-12 NOTE — FLOWSHEET NOTE
Portable stat head ct scan ordered by Dr Myrtle Haque. Yamileth in xray dept notified. Pt readied for scan.

## 2020-10-12 NOTE — FLOWSHEET NOTE
Pt withdrawing to painful stimulus on left side of body, no movement noted over right side of body. Perrla 3-2mm. Propofol and Fentanyl IV paused for sedation vacation no change in assessment after meds paused. Dr. Yaz Zapien examined patient and confirmed findings. Dr. Link Hiss also aware. Dr. Yane Bazan notified via [employer].

## 2020-10-12 NOTE — PLAN OF CARE
Problem: Falls - Risk of:  Goal: Will remain free from falls  Description: Will remain free from falls  10/12/2020 0003 by Emilie Dan RN  Outcome: Met This Shift  10/11/2020 2200 by Emilie Dan RN  Outcome: Met This Shift  10/11/2020 2159 by Emilie Dan RN  Outcome: Met This Shift  10/11/2020 1046 by Richard Nash RN  Outcome: Met This Shift  Goal: Absence of physical injury  Description: Absence of physical injury  10/12/2020 0003 by Emilie Dan RN  Outcome: Met This Shift  10/11/2020 2200 by Emilie Dan RN  Outcome: Met This Shift  10/11/2020 2159 by Emilie Dan RN  Outcome: Met This Shift     Problem: Skin Integrity:  Goal: Will show no infection signs and symptoms  Description: Will show no infection signs and symptoms  10/12/2020 0003 by Emilie Dan RN  Outcome: Met This Shift  10/11/2020 2200 by Emilie Dan RN  Outcome: Met This Shift  10/11/2020 1046 by Richard Nash RN  Outcome: Met This Shift  Goal: Absence of new skin breakdown  Description: Absence of new skin breakdown  10/12/2020 0003 by Emilie Dan RN  Outcome: Met This Shift  10/11/2020 2200 by Emilie Dan RN  Outcome: Met This Shift  10/11/2020 1046 by Richard Nash RN  Outcome: Met This Shift     Problem: Pain:  Description: Pain management should include both nonpharmacologic and pharmacologic interventions.   Goal: Pain level will decrease  Description: Pain level will decrease  10/12/2020 0003 by Emilie Dan RN  Outcome: Met This Shift  10/11/2020 2200 by Emilie Dan RN  Outcome: Met This Shift  10/11/2020 1046 by Richard Nash RN  Outcome: Met This Shift  Goal: Control of acute pain  Description: Control of acute pain  10/12/2020 0003 by Emilie Dan RN  Outcome: Met This Shift  10/11/2020 2200 by Emilie Dan RN  Outcome: Met This Shift  10/11/2020 1046 by Richard Nash RN  Outcome: Met This Shift  Goal: Control of chronic pain  Description: Control of chronic pain  10/12/2020 0003 by Suresh Ta RN  Outcome: Met This Shift  10/11/2020 2200 by Suresh Ta RN  Outcome: Met This Shift     Problem: Airway Clearance - Ineffective:  Goal: Ability to maintain a clear airway will improve  Description: Ability to maintain a clear airway will improve  10/12/2020 0003 by Suresh Ta RN  Outcome: Met This Shift  10/11/2020 2200 by Suresh Ta RN  Outcome: Met This Shift  10/11/2020 1046 by Yolis Anglin RN  Outcome: Met This Shift     Problem: Anxiety/Stress:  Goal: Level of anxiety will decrease  Description: Level of anxiety will decrease  10/12/2020 0003 by Suresh Ta RN  Outcome: Met This Shift  10/11/2020 2200 by Suresh Ta RN  Outcome: Met This Shift  10/11/2020 1046 by Yolis Anglin RN  Outcome: Met This Shift     Problem: Aspiration:  Goal: Absence of aspiration  Description: Absence of aspiration  10/12/2020 0003 by Suresh Ta RN  Outcome: Met This Shift  10/11/2020 2200 by Suresh Ta RN  Outcome: Met This Shift  10/11/2020 1046 by Yolis Anglin RN  Outcome: Met This Shift     Problem:  Bowel Function - Altered:  Goal: Bowel elimination is within specified parameters  Description: Bowel elimination is within specified parameters  10/12/2020 0003 by Suresh Ta RN  Outcome: Met This Shift  10/11/2020 2200 by Suresh Ta RN  Outcome: Met This Shift     Problem: Cardiac Output - Decreased:  Goal: Hemodynamic stability will improve  Description: Hemodynamic stability will improve  10/12/2020 0003 by Suresh Ta RN  Outcome: Met This Shift  10/11/2020 2200 by Suresh Ta RN  Outcome: Met This Shift  10/11/2020 1046 by Yolis Anglin RN  Outcome: Met This Shift     Problem: Fluid Volume - Imbalance:  Goal: Absence of imbalanced fluid volume signs and symptoms  Description: Absence of imbalanced fluid volume signs and symptoms  10/12/2020 0003 by Suresh Ta RN  Outcome: Met This Shift  10/11/2020 2200 by Suresh Ta RN  Outcome: Met This infection signs and symptoms  Description: Will show no infection signs and symptoms  10/12/2020 0003 by Maryclare Claude, RN  Outcome: Met This Shift  10/11/2020 2200 by Maryclare Claude, RN  Outcome: Met This Shift  10/11/2020 1046 by Luisa Ruvalcaba RN  Outcome: Met This Shift     Problem: Sleep Pattern Disturbance:  Goal: Appears well-rested  Description: Appears well-rested  10/12/2020 0003 by Maryclare Claude, RN  Outcome: Met This Shift  10/11/2020 2200 by Maryclare Claude, RN  Outcome: Met This Shift     Problem: Restraint Use - Nonviolent/Non-Self-Destructive Behavior:  Goal: Absence of restraint-related injury  Description: Absence of restraint-related injury  10/12/2020 0003 by Maryclare Claude, RN  Outcome: Met This Shift  10/11/2020 2200 by Maryclare Claude, RN  Outcome: Met This Shift  10/11/2020 2159 by Maryclare Claude, RN  Outcome: Met This Shift  10/11/2020 1814 by Luisa Ruvalcaba RN  Outcome: Met This Shift  10/11/2020 1046 by Luisa Ruvalcaba RN  Outcome: Met This Shift

## 2020-10-12 NOTE — PLAN OF CARE
Problem: Falls - Risk of:  Goal: Will remain free from falls  Description: Will remain free from falls  10/11/2020 2159 by Nico Douglas RN  Outcome: Met This Shift  10/11/2020 1046 by Lucio Henry RN  Outcome: Met This Shift     Problem: Falls - Risk of:  Goal: Will remain free from falls  Description: Will remain free from falls  10/11/2020 2200 by Nico Douglas RN  Outcome: Met This Shift  10/11/2020 2159 by Nico Douglas RN  Outcome: Met This Shift  10/11/2020 1046 by Lucio Henry RN  Outcome: Met This Shift  Goal: Absence of physical injury  Description: Absence of physical injury  10/11/2020 2200 by Nico Douglas RN  Outcome: Met This Shift  10/11/2020 2159 by Nico Douglas RN  Outcome: Met This Shift     Problem: Skin Integrity:  Goal: Will show no infection signs and symptoms  Description: Will show no infection signs and symptoms  10/11/2020 2200 by Nico Douglas RN  Outcome: Met This Shift  10/11/2020 1046 by Lucio Henry RN  Outcome: Met This Shift  Goal: Absence of new skin breakdown  Description: Absence of new skin breakdown  10/11/2020 2200 by Nico Douglas RN  Outcome: Met This Shift  10/11/2020 1046 by Lucio Henry RN  Outcome: Met This Shift     Problem: Pain:  Description: Pain management should include both nonpharmacologic and pharmacologic interventions.   Goal: Pain level will decrease  Description: Pain level will decrease  10/11/2020 2200 by Nico Douglas RN  Outcome: Met This Shift  10/11/2020 1046 by Lucio Henry RN  Outcome: Met This Shift  Goal: Control of acute pain  Description: Control of acute pain  10/11/2020 2200 by Nico Douglas RN  Outcome: Met This Shift  10/11/2020 1046 by Lucio Henry RN  Outcome: Met This Shift  Goal: Control of chronic pain  Description: Control of chronic pain  Outcome: Met This Shift     Problem: Airway Clearance - Ineffective:  Goal: Ability to maintain a clear airway will improve  Description: Ability to maintain a clear airway will improve  10/11/2020 2200 by Mayra Valencia RN  Outcome: Met This Shift  10/11/2020 1046 by Aramis Tillman RN  Outcome: Met This Shift     Problem: Anxiety/Stress:  Goal: Level of anxiety will decrease  Description: Level of anxiety will decrease  10/11/2020 2200 by Mayra Valencia RN  Outcome: Met This Shift  10/11/2020 1046 by Aramis Tillman RN  Outcome: Met This Shift     Problem: Aspiration:  Goal: Absence of aspiration  Description: Absence of aspiration  10/11/2020 2200 by Mayra Valencia RN  Outcome: Met This Shift  10/11/2020 1046 by Aramis Tillman RN  Outcome: Met This Shift     Problem:  Bowel Function - Altered:  Goal: Bowel elimination is within specified parameters  Description: Bowel elimination is within specified parameters  Outcome: Met This Shift     Problem: Cardiac Output - Decreased:  Goal: Hemodynamic stability will improve  Description: Hemodynamic stability will improve  10/11/2020 2200 by Mayra Valencia RN  Outcome: Met This Shift  10/11/2020 1046 by Aramis Tillman RN  Outcome: Met This Shift     Problem: Fluid Volume - Imbalance:  Goal: Absence of imbalanced fluid volume signs and symptoms  Description: Absence of imbalanced fluid volume signs and symptoms  10/11/2020 2200 by Mayra Valencia RN  Outcome: Met This Shift  10/11/2020 1046 by Aramis Tillman RN  Outcome: Met This Shift     Problem: Gas Exchange - Impaired:  Goal: Levels of oxygenation will improve  Description: Levels of oxygenation will improve  10/11/2020 2200 by Mayra Valencia RN  Outcome: Met This Shift  10/11/2020 1046 by Aramis Tillman RN  Outcome: Met This Shift     Problem: Mental Status - Impaired:  Goal: Mental status will be restored to baseline  Description: Mental status will be restored to baseline  10/11/2020 2200 by Mayra Valencia RN  Outcome: Met This Shift  10/11/2020 1046 by Aramis Tillman RN  Outcome: Not Met This Shift     Problem: Nutrition Deficit:  Goal: Ability to achieve adequate nutritional intake will improve  Description: Ability to achieve adequate nutritional intake will improve  10/11/2020 2200 by Maggie Zhu RN  Outcome: Met This Shift  10/11/2020 1046 by Coleen Sebastian RN  Outcome: Met This Shift     Problem: Pain:  Description: Pain management should include both nonpharmacologic and pharmacologic interventions.   Goal: Pain level will decrease  Description: Pain level will decrease  10/11/2020 2200 by Maggie Zhu RN  Outcome: Met This Shift  10/11/2020 1046 by Coleen Sebastian RN  Outcome: Met This Shift  Goal: Recognizes and communicates pain  Description: Recognizes and communicates pain  Outcome: Met This Shift  Goal: Control of acute pain  Description: Control of acute pain  Outcome: Met This Shift     Problem: Skin Integrity - Impaired:  Goal: Will show no infection signs and symptoms  Description: Will show no infection signs and symptoms  10/11/2020 2200 by Maggie Zhu RN  Outcome: Met This Shift  10/11/2020 1046 by Coleen Sebastian RN  Outcome: Met This Shift     Problem: Sleep Pattern Disturbance:  Goal: Appears well-rested  Description: Appears well-rested  Outcome: Met This Shift     Problem: Restraint Use - Nonviolent/Non-Self-Destructive Behavior:  Goal: Absence of restraint-related injury  Description: Absence of restraint-related injury  10/11/2020 2200 by Maggie Zhu RN  Outcome: Met This Shift  10/11/2020 2159 by Maggie Zhu RN  Outcome: Met This Shift  10/11/2020 1814 by Coleen Sebastian RN  Outcome: Met This Shift  10/11/2020 1046 by Coleen Sebastian RN  Outcome: Met This Shift

## 2020-10-13 ENCOUNTER — APPOINTMENT (OUTPATIENT)
Dept: GENERAL RADIOLOGY | Age: 30
DRG: 003 | End: 2020-10-13
Payer: COMMERCIAL

## 2020-10-13 LAB
AADO2: 214 MMHG
ANION GAP SERPL CALCULATED.3IONS-SCNC: 8 MMOL/L (ref 7–16)
ANISOCYTOSIS: ABNORMAL
B.E.: -1.8 MMOL/L (ref -3–3)
BASOPHILS ABSOLUTE: 0 E9/L (ref 0–0.2)
BASOPHILS RELATIVE PERCENT: 0.3 % (ref 0–2)
BUN BLDV-MCNC: 9 MG/DL (ref 6–20)
CALCIUM IONIZED: 1.24 MMOL/L (ref 1.15–1.33)
CALCIUM SERPL-MCNC: 8.2 MG/DL (ref 8.6–10.2)
CHLORIDE BLD-SCNC: 118 MMOL/L (ref 98–107)
CO2: 28 MMOL/L (ref 22–29)
COHB: 0.2 % (ref 0–1.5)
CREAT SERPL-MCNC: 0.8 MG/DL (ref 0.7–1.2)
CRITICAL: ABNORMAL
DATE ANALYZED: ABNORMAL
DATE OF COLLECTION: ABNORMAL
EOSINOPHILS ABSOLUTE: 0.1 E9/L (ref 0.05–0.5)
EOSINOPHILS RELATIVE PERCENT: 0.9 % (ref 0–6)
FIO2: 50 %
GFR AFRICAN AMERICAN: >60
GFR NON-AFRICAN AMERICAN: >60 ML/MIN/1.73
GLUCOSE BLD-MCNC: 151 MG/DL (ref 74–99)
GRAM STAIN ORDERABLE: NORMAL
HCO3: 22.7 MMOL/L (ref 22–26)
HCT VFR BLD CALC: 30.6 % (ref 37–54)
HEMOGLOBIN: 9.7 G/DL (ref 12.5–16.5)
HHB: 4.3 % (ref 0–5)
HYPOCHROMIA: ABNORMAL
LAB: ABNORMAL
LYMPHOCYTES ABSOLUTE: 0.22 E9/L (ref 1.5–4)
LYMPHOCYTES RELATIVE PERCENT: 1.8 % (ref 20–42)
Lab: ABNORMAL
MAGNESIUM: 2 MG/DL (ref 1.6–2.6)
MCH RBC QN AUTO: 31.2 PG (ref 26–35)
MCHC RBC AUTO-ENTMCNC: 31.7 % (ref 32–34.5)
MCV RBC AUTO: 98.4 FL (ref 80–99.9)
METHB: 0.3 % (ref 0–1.5)
MODE: AC
MONOCYTES ABSOLUTE: 0.9 E9/L (ref 0.1–0.95)
MONOCYTES RELATIVE PERCENT: 7.9 % (ref 2–12)
NEUTROPHILS ABSOLUTE: 10.08 E9/L (ref 1.8–7.3)
NEUTROPHILS RELATIVE PERCENT: 89.5 % (ref 43–80)
O2 SATURATION: 96.8 % (ref 92–98.5)
O2HB: 95.2 % (ref 94–97)
OPERATOR ID: 2577
PATIENT TEMP: 37 C
PCO2: 37.4 MMHG (ref 35–45)
PDW BLD-RTO: 13.2 FL (ref 11.5–15)
PEEP/CPAP: 8 CMH2O
PFO2: 1.76 MMHG/%
PH BLOOD GAS: 7.4 (ref 7.35–7.45)
PHOSPHORUS: 3.7 MG/DL (ref 2.5–4.5)
PLATELET # BLD: 184 E9/L (ref 130–450)
PMV BLD AUTO: 10.1 FL (ref 7–12)
PO2: 87.9 MMHG (ref 75–100)
POLYCHROMASIA: ABNORMAL
POTASSIUM SERPL-SCNC: 3.5 MMOL/L (ref 3.5–5)
RBC # BLD: 3.11 E12/L (ref 3.8–5.8)
RI(T): 2.43
RR MECHANICAL: 16 B/MIN
SODIUM BLD-SCNC: 151 MMOL/L (ref 132–146)
SODIUM BLD-SCNC: 151 MMOL/L (ref 132–146)
SODIUM BLD-SCNC: 152 MMOL/L (ref 132–146)
SODIUM BLD-SCNC: 154 MMOL/L (ref 132–146)
SODIUM BLD-SCNC: 157 MMOL/L (ref 132–146)
SOURCE, BLOOD GAS: ABNORMAL
THB: 11 G/DL (ref 11.5–16.5)
TIME ANALYZED: 430
VT MECHANICAL: 500 ML
WBC # BLD: 11.2 E9/L (ref 4.5–11.5)

## 2020-10-13 PROCEDURE — 83735 ASSAY OF MAGNESIUM: CPT

## 2020-10-13 PROCEDURE — 99291 CRITICAL CARE FIRST HOUR: CPT | Performed by: SURGERY

## 2020-10-13 PROCEDURE — 84295 ASSAY OF SERUM SODIUM: CPT

## 2020-10-13 PROCEDURE — 85025 COMPLETE CBC W/AUTO DIFF WBC: CPT

## 2020-10-13 PROCEDURE — 2580000003 HC RX 258: Performed by: SURGERY

## 2020-10-13 PROCEDURE — 82330 ASSAY OF CALCIUM: CPT

## 2020-10-13 PROCEDURE — 6360000002 HC RX W HCPCS

## 2020-10-13 PROCEDURE — 6370000000 HC RX 637 (ALT 250 FOR IP): Performed by: SURGERY

## 2020-10-13 PROCEDURE — 6370000000 HC RX 637 (ALT 250 FOR IP): Performed by: STUDENT IN AN ORGANIZED HEALTH CARE EDUCATION/TRAINING PROGRAM

## 2020-10-13 PROCEDURE — 71045 X-RAY EXAM CHEST 1 VIEW: CPT

## 2020-10-13 PROCEDURE — 6360000002 HC RX W HCPCS: Performed by: SURGERY

## 2020-10-13 PROCEDURE — 36415 COLL VENOUS BLD VENIPUNCTURE: CPT

## 2020-10-13 PROCEDURE — 94003 VENT MGMT INPAT SUBQ DAY: CPT

## 2020-10-13 PROCEDURE — 82805 BLOOD GASES W/O2 SATURATION: CPT

## 2020-10-13 PROCEDURE — 2500000003 HC RX 250 WO HCPCS: Performed by: STUDENT IN AN ORGANIZED HEALTH CARE EDUCATION/TRAINING PROGRAM

## 2020-10-13 PROCEDURE — 6360000002 HC RX W HCPCS: Performed by: STUDENT IN AN ORGANIZED HEALTH CARE EDUCATION/TRAINING PROGRAM

## 2020-10-13 PROCEDURE — 84100 ASSAY OF PHOSPHORUS: CPT

## 2020-10-13 PROCEDURE — 80048 BASIC METABOLIC PNL TOTAL CA: CPT

## 2020-10-13 PROCEDURE — 2000000000 HC ICU R&B

## 2020-10-13 PROCEDURE — 94640 AIRWAY INHALATION TREATMENT: CPT

## 2020-10-13 RX ORDER — SODIUM CHLORIDE FOR INHALATION 3 %
4 VIAL, NEBULIZER (ML) INHALATION PRN
Status: DISCONTINUED | OUTPATIENT
Start: 2020-10-13 | End: 2020-10-14

## 2020-10-13 RX ORDER — MORPHINE SULFATE 4 MG/ML
4 INJECTION, SOLUTION INTRAMUSCULAR; INTRAVENOUS EVERY 4 HOURS PRN
Status: DISCONTINUED | OUTPATIENT
Start: 2020-10-13 | End: 2020-10-19

## 2020-10-13 RX ORDER — MORPHINE SULFATE 2 MG/ML
2 INJECTION, SOLUTION INTRAMUSCULAR; INTRAVENOUS EVERY 4 HOURS PRN
Status: DISCONTINUED | OUTPATIENT
Start: 2020-10-13 | End: 2020-10-19

## 2020-10-13 RX ORDER — PROPRANOLOL HYDROCHLORIDE 10 MG/1
10 TABLET ORAL 3 TIMES DAILY
Status: DISCONTINUED | OUTPATIENT
Start: 2020-10-13 | End: 2020-10-15

## 2020-10-13 RX ORDER — MORPHINE SULFATE 2 MG/ML
INJECTION, SOLUTION INTRAMUSCULAR; INTRAVENOUS
Status: COMPLETED
Start: 2020-10-13 | End: 2020-10-13

## 2020-10-13 RX ORDER — MORPHINE SULFATE 2 MG/ML
2 INJECTION, SOLUTION INTRAMUSCULAR; INTRAVENOUS ONCE
Status: COMPLETED | OUTPATIENT
Start: 2020-10-13 | End: 2020-10-13

## 2020-10-13 RX ADMIN — POTASSIUM & SODIUM PHOSPHATES POWDER PACK 280-160-250 MG 500 MG: 280-160-250 PACK at 14:00

## 2020-10-13 RX ADMIN — POLYVINYL ALCOHOL 1 DROP: 14 SOLUTION/ DROPS OPHTHALMIC at 08:11

## 2020-10-13 RX ADMIN — POLYVINYL ALCOHOL 1 DROP: 14 SOLUTION/ DROPS OPHTHALMIC at 00:26

## 2020-10-13 RX ADMIN — MORPHINE SULFATE 2 MG: 2 INJECTION, SOLUTION INTRAMUSCULAR; INTRAVENOUS at 15:35

## 2020-10-13 RX ADMIN — SENNOSIDES 5 ML: 8.8 LIQUID ORAL at 22:33

## 2020-10-13 RX ADMIN — MORPHINE SULFATE 2 MG: 2 INJECTION, SOLUTION INTRAMUSCULAR; INTRAVENOUS at 03:44

## 2020-10-13 RX ADMIN — POLYVINYL ALCOHOL 1 DROP: 14 SOLUTION/ DROPS OPHTHALMIC at 20:30

## 2020-10-13 RX ADMIN — BACITRACIN ZINC, POLYMYXIN B SULFATE, NEOMYCIN SULFATE: 400; 5000; 3.5 OINTMENT TOPICAL at 22:06

## 2020-10-13 RX ADMIN — OXYCODONE HYDROCHLORIDE 10 MG: 5 SOLUTION ORAL at 09:36

## 2020-10-13 RX ADMIN — LABETALOL HYDROCHLORIDE 10 MG: 5 INJECTION INTRAVENOUS at 06:43

## 2020-10-13 RX ADMIN — BACITRACIN ZINC: 500 OINTMENT TOPICAL at 14:09

## 2020-10-13 RX ADMIN — OXYCODONE HYDROCHLORIDE 10 MG: 5 SOLUTION ORAL at 03:50

## 2020-10-13 RX ADMIN — PROPOFOL INJECTABLE EMULSION 30 MCG/KG/MIN: 10 INJECTION, EMULSION INTRAVENOUS at 00:59

## 2020-10-13 RX ADMIN — LABETALOL HYDROCHLORIDE 10 MG: 5 INJECTION INTRAVENOUS at 03:03

## 2020-10-13 RX ADMIN — POLYVINYL ALCOHOL 1 DROP: 14 SOLUTION/ DROPS OPHTHALMIC at 11:51

## 2020-10-13 RX ADMIN — POTASSIUM & SODIUM PHOSPHATES POWDER PACK 280-160-250 MG 500 MG: 280-160-250 PACK at 22:28

## 2020-10-13 RX ADMIN — HEPARIN SODIUM 5000 UNITS: 10000 INJECTION INTRAVENOUS; SUBCUTANEOUS at 14:09

## 2020-10-13 RX ADMIN — CHLORHEXIDINE GLUCONATE 0.12% ORAL RINSE 15 ML: 1.2 LIQUID ORAL at 22:03

## 2020-10-13 RX ADMIN — HEPARIN SODIUM 5000 UNITS: 10000 INJECTION INTRAVENOUS; SUBCUTANEOUS at 22:21

## 2020-10-13 RX ADMIN — LABETALOL HYDROCHLORIDE 10 MG: 5 INJECTION INTRAVENOUS at 16:13

## 2020-10-13 RX ADMIN — FAMOTIDINE 20 MG: 20 TABLET ORAL at 22:23

## 2020-10-13 RX ADMIN — LABETALOL HYDROCHLORIDE 10 MG: 5 INJECTION INTRAVENOUS at 22:13

## 2020-10-13 RX ADMIN — MORPHINE SULFATE 2 MG: 2 INJECTION, SOLUTION INTRAMUSCULAR; INTRAVENOUS at 02:53

## 2020-10-13 RX ADMIN — BACITRACIN ZINC: 500 OINTMENT TOPICAL at 09:06

## 2020-10-13 RX ADMIN — PROPOFOL INJECTABLE EMULSION 30 MCG/KG/MIN: 10 INJECTION, EMULSION INTRAVENOUS at 13:09

## 2020-10-13 RX ADMIN — POTASSIUM & SODIUM PHOSPHATES POWDER PACK 280-160-250 MG 500 MG: 280-160-250 PACK at 18:11

## 2020-10-13 RX ADMIN — SODIUM CHLORIDE SOLN NEBU 3% 4 ML: 3 NEBU SOLN at 12:23

## 2020-10-13 RX ADMIN — OXYCODONE HYDROCHLORIDE 10 MG: 5 SOLUTION ORAL at 16:07

## 2020-10-13 RX ADMIN — CIPROFLOXACIN HYDROCHLORIDE 4 DROP: 3 SOLUTION/ DROPS OPHTHALMIC at 22:05

## 2020-10-13 RX ADMIN — Medication 10 ML: at 22:09

## 2020-10-13 RX ADMIN — POLYVINYL ALCOHOL 1 DROP: 14 SOLUTION/ DROPS OPHTHALMIC at 03:50

## 2020-10-13 RX ADMIN — SODIUM CHLORIDE 3 G: 900 INJECTION INTRAVENOUS at 00:26

## 2020-10-13 RX ADMIN — POTASSIUM BICARBONATE 40 MEQ: 782 TABLET, EFFERVESCENT ORAL at 22:08

## 2020-10-13 RX ADMIN — SODIUM CHLORIDE 3 G: 900 INJECTION INTRAVENOUS at 11:51

## 2020-10-13 RX ADMIN — ACETAMINOPHEN ORAL SOLUTION 650 MG: 650 SOLUTION ORAL at 18:36

## 2020-10-13 RX ADMIN — MINERAL OIL AND PETROLATUM: 150; 830 OINTMENT OPHTHALMIC at 05:25

## 2020-10-13 RX ADMIN — SODIUM CHLORIDE 50 ML/HR: 3 INJECTION, SOLUTION INTRAVENOUS at 02:14

## 2020-10-13 RX ADMIN — SODIUM CHLORIDE 3 G: 900 INJECTION INTRAVENOUS at 18:29

## 2020-10-13 RX ADMIN — Medication 10 ML: at 14:54

## 2020-10-13 RX ADMIN — MINERAL OIL AND PETROLATUM: 150; 830 OINTMENT OPHTHALMIC at 02:53

## 2020-10-13 RX ADMIN — MINERAL OIL AND PETROLATUM: 150; 830 OINTMENT OPHTHALMIC at 22:07

## 2020-10-13 RX ADMIN — LEVETIRACETAM 500 MG: 100 SOLUTION ORAL at 09:06

## 2020-10-13 RX ADMIN — MINERAL OIL AND PETROLATUM: 150; 830 OINTMENT OPHTHALMIC at 09:36

## 2020-10-13 RX ADMIN — POLYVINYL ALCOHOL 1 DROP: 14 SOLUTION/ DROPS OPHTHALMIC at 16:14

## 2020-10-13 RX ADMIN — LEVETIRACETAM 500 MG: 100 SOLUTION ORAL at 22:24

## 2020-10-13 RX ADMIN — ACETAMINOPHEN ORAL SOLUTION 650 MG: 650 SOLUTION ORAL at 03:44

## 2020-10-13 RX ADMIN — DEXAMETHASONE SODIUM PHOSPHATE 4 DROP: 1 SOLUTION/ DROPS OPHTHALMIC at 22:05

## 2020-10-13 RX ADMIN — SODIUM CHLORIDE 3 G: 900 INJECTION INTRAVENOUS at 05:22

## 2020-10-13 RX ADMIN — PROPRANOLOL HYDROCHLORIDE 10 MG: 10 TABLET ORAL at 16:17

## 2020-10-13 RX ADMIN — Medication 10 ML: at 14:53

## 2020-10-13 RX ADMIN — LABETALOL HYDROCHLORIDE 10 MG: 5 INJECTION INTRAVENOUS at 14:52

## 2020-10-13 RX ADMIN — POTASSIUM BICARBONATE 40 MEQ: 782 TABLET, EFFERVESCENT ORAL at 06:35

## 2020-10-13 RX ADMIN — FAMOTIDINE 20 MG: 20 TABLET ORAL at 09:06

## 2020-10-13 RX ADMIN — SODIUM CHLORIDE SOLN NEBU 3% 4 ML: 3 NEBU SOLN at 01:56

## 2020-10-13 RX ADMIN — ACETAMINOPHEN ORAL SOLUTION 650 MG: 650 SOLUTION ORAL at 22:37

## 2020-10-13 RX ADMIN — MINERAL OIL AND PETROLATUM: 150; 830 OINTMENT OPHTHALMIC at 18:11

## 2020-10-13 RX ADMIN — Medication 10 ML: at 09:06

## 2020-10-13 RX ADMIN — OXYCODONE HYDROCHLORIDE 10 MG: 5 SOLUTION ORAL at 22:25

## 2020-10-13 RX ADMIN — POTASSIUM & SODIUM PHOSPHATES POWDER PACK 280-160-250 MG 500 MG: 280-160-250 PACK at 09:06

## 2020-10-13 RX ADMIN — MINERAL OIL AND PETROLATUM: 150; 830 OINTMENT OPHTHALMIC at 14:09

## 2020-10-13 RX ADMIN — CIPROFLOXACIN HYDROCHLORIDE 4 DROP: 3 SOLUTION/ DROPS OPHTHALMIC at 09:07

## 2020-10-13 RX ADMIN — BACITRACIN ZINC: 500 OINTMENT TOPICAL at 22:03

## 2020-10-13 RX ADMIN — DEXAMETHASONE SODIUM PHOSPHATE 4 DROP: 1 SOLUTION/ DROPS OPHTHALMIC at 09:07

## 2020-10-13 RX ADMIN — PROPRANOLOL HYDROCHLORIDE 10 MG: 10 TABLET ORAL at 23:21

## 2020-10-13 RX ADMIN — PROPOFOL INJECTABLE EMULSION 30 MCG/KG/MIN: 10 INJECTION, EMULSION INTRAVENOUS at 18:29

## 2020-10-13 RX ADMIN — PROPOFOL INJECTABLE EMULSION 30 MCG/KG/MIN: 10 INJECTION, EMULSION INTRAVENOUS at 07:26

## 2020-10-13 RX ADMIN — CHLORHEXIDINE GLUCONATE 0.12% ORAL RINSE 15 ML: 1.2 LIQUID ORAL at 09:06

## 2020-10-13 RX ADMIN — BACITRACIN ZINC, POLYMYXIN B SULFATE, NEOMYCIN SULFATE: 400; 5000; 3.5 OINTMENT TOPICAL at 11:51

## 2020-10-13 RX ADMIN — HEPARIN SODIUM 5000 UNITS: 10000 INJECTION INTRAVENOUS; SUBCUTANEOUS at 05:22

## 2020-10-13 ASSESSMENT — PULMONARY FUNCTION TESTS
PIF_VALUE: 23
PIF_VALUE: 25
PIF_VALUE: 24
PIF_VALUE: 26
PIF_VALUE: 24
PIF_VALUE: 23
PIF_VALUE: 21
PIF_VALUE: 26
PIF_VALUE: 27
PIF_VALUE: 25
PIF_VALUE: 26
PIF_VALUE: 26
PIF_VALUE: 25
PIF_VALUE: 24
PIF_VALUE: 21
PIF_VALUE: 25
PIF_VALUE: 20
PIF_VALUE: 24
PIF_VALUE: 25
PIF_VALUE: 22
PIF_VALUE: 26
PIF_VALUE: 24
PIF_VALUE: 23
PIF_VALUE: 19
PIF_VALUE: 27
PIF_VALUE: 23
PEFR_L/MIN: 70
PIF_VALUE: 26
PIF_VALUE: 28
PIF_VALUE: 24
PIF_VALUE: 24
PIF_VALUE: 23
PIF_VALUE: 26
PIF_VALUE: 24
PIF_VALUE: 22

## 2020-10-13 ASSESSMENT — PAIN SCALES - GENERAL
PAINLEVEL_OUTOF10: 0
PAINLEVEL_OUTOF10: 3
PAINLEVEL_OUTOF10: 8
PAINLEVEL_OUTOF10: 4
PAINLEVEL_OUTOF10: 0
PAINLEVEL_OUTOF10: 3
PAINLEVEL_OUTOF10: 4
PAINLEVEL_OUTOF10: 0
PAINLEVEL_OUTOF10: 6
PAINLEVEL_OUTOF10: 4
PAINLEVEL_OUTOF10: 0
PAINLEVEL_OUTOF10: 6
PAINLEVEL_OUTOF10: 0
PAINLEVEL_OUTOF10: 0
PAINLEVEL_OUTOF10: 4

## 2020-10-13 NOTE — FLOWSHEET NOTE
Although pt purposeful with LUE, restraint no longer required at this time. Will continue to monitor.

## 2020-10-13 NOTE — PROGRESS NOTES
Neurosurg progress note  VITALS:  BP (!) 147/64   Pulse 93   Temp 99.1 °F (37.3 °C) (Axillary)   Resp 19   Ht 6' 2\" (1.88 m)   Wt 266 lb 1.5 oz (120.7 kg)   SpO2 95%   BMI 34.16 kg/m²   24HR INTAKE/OUTPUT:    Intake/Output Summary (Last 24 hours) at 10/13/2020 0804  Last data filed at 10/13/2020 0700  Gross per 24 hour   Intake 2270 ml   Output 2250 ml   Net 20 ml     ICP PRESSURE RANGE:  ICP Mean (mmHg)  Av.2 mmHg  Min: 6 mmHg  Max: 34 mmHg  Xr Pelvis (1-2 Views)    Result Date: 10/8/2020  EXAMINATION: ONE XRAY VIEW OF THE PELVIS 10/8/2020 9:52 pm COMPARISON: None. HISTORY: ORDERING SYSTEM PROVIDED HISTORY: trauma TECHNOLOGIST PROVIDED HISTORY: Reason for exam:->trauma What reading provider will be dictating this exam?->CRC FINDINGS: Left ischial tuberosity is not included on this examination. Entire left hip is not included on this examination. No fracture or dislocation involving pelvis or visualized hips. No diastasis involving sacroiliac joints or symphysis pubis. No fracture or dislocation involving visualized pelvis or hips. Xr Elbow Right (min 3 Views)    Result Date: 10/9/2020  EXAMINATION: THREE XRAY VIEWS OF THE RIGHT ELBOW 10/9/2020 7:00 am COMPARISON: None. HISTORY: ORDERING SYSTEM PROVIDED HISTORY: trauma, Skull fx TECHNOLOGIST PROVIDED HISTORY: Reason for exam:->trauma, Skull fx What reading provider will be dictating this exam?->CRC FINDINGS: There is no fracture dislocation. There is no elbow joint effusion. There are tiny degenerative spurs. No acute process     Xr Hand Left (min 3 Views)    Result Date: 10/9/2020  EXAMINATION: THREE XRAY VIEWS OF THE LEFT HAND; THREE XRAY VIEWS OF THE RIGHT HAND 10/9/2020 7:01 am COMPARISON: None. HISTORY: ORDERING SYSTEM PROVIDED HISTORY: trauma TECHNOLOGIST PROVIDED HISTORY: Reason for exam:->trauma What reading provider will be dictating this exam?->CRC FINDINGS: Positioning of both hands does somewhat limit evaluation.   There are alert\" been called? ->No Reason for exam:->evaluate head bleed What reading provider will be dictating this exam?->CRC FINDINGS: BRAIN/VENTRICLES: Interval increase in size of right frontal parenchymal contusion, measuring 14 x 7 x 10 mm, previously 10 x 5 x 5 mm. Additional right frontal contusion, more inferiorly has also increased in size. Punctate contusions in the anterior-inferior frontal lobe along the gyrus rectus have also increased. Scattered bilateral hemispheric subarachnoid hemorrhage. Right convexity subdural hematoma measures up to 6 mm in thickness, not substantially changed. Trace left parietal subdural hematoma measuring 2 mm in thickness. 5 mm leftward midline shift, not substantially changed. No herniation. Patent basal cisterns. ORBITS: The visualized portion of the orbits demonstrate no acute abnormality. SINUSES: Nasopharyngeal opacities with partially imaged esophagogastric and endotracheal tubes. Scattered paranasal sinus opacities. SOFT TISSUES/SKULL:  Nondisplaced left temporal bone fracture extending to the otic capsule. Comminuted mildly displaced left parietal fracture. Nondisplaced right temporal bone fracture which is otic capsule sparing. Diffuse scalp swelling with posterior scalp contusions. Skin staples present. Mild interval increase in size of scattered parenchymal hematomas, mainly in the right frontal lobe, suspicious for diffuse axonal injury. No substantial change in acute right convexity subdural and left parietal subdural hematomas. Stable 5 mm leftward midline shift. Unchanged calvarial fractures, notable for a nondisplaced left temporal bone fracture possibly extending to the otic capsule. Recommend follow-up temporal bone CT.      Ct Head Wo Contrast    Result Date: 10/9/2020  EXAMINATION: CT OF THE HEAD WITHOUT CONTRAST  10/9/2020 12:11 pm TECHNIQUE: CT of the head was performed without the administration of intravenous contrast. Dose modulation, iterative reconstruction, and/or weight based adjustment of the mA/kV was utilized to reduce the radiation dose to as low as reasonably achievable. COMPARISON: 8 hours prior. HISTORY: ORDERING SYSTEM PROVIDED HISTORY: s/p ventric TECHNOLOGIST PROVIDED HISTORY: Reason for exam:->s/p ventric Has a \"code stroke\" or \"stroke alert\" been called? ->No What reading provider will be dictating this exam?->CRC FINDINGS: There has been interval placement of a right frontal approach ventriculostomy catheter terminating within the right lateral ventricle frontal horn abutting the septum pellucidum. There is split like configuration of the right lateral ventricle that may reflect over shunting. There is no temporal horn dilatation. There is diffuse cerebral edema with diffuse sulcal effacement. There is similar appearance of multiple foci of hemorrhagic contusions involving the right frontal lobe and to lesser extent left frontal lobe and right temporal lobe. The hemorrhagic contusions demonstrates mild surrounding edema. There is similar appearance of acute subarachnoid hemorrhage along the bilateral frontal convexities and right temporal convexity and to a lesser extent at the vertices. There is small volume of subdural hemorrhage along the right lateral tentorial leaflet. There is small volume subarachnoid hemorrhage within the interpeduncular cistern. There is new wedge-shaped region of low attenuation involving the left middle cerebellar peduncle and left cerebellar hemisphere, concerning for acute ischemia. There is no significant midline shift. There are bilateral parietal fractures, comminuted on the left, extending into the left temporal bone including the mastoid segment. Please refer to concurrently performed CT temporal bone imaging report. There is additional depressed fracture of the left superior orbital wall. There is diffuse subgaleal soft tissue swelling.      Interval placement of right ventriculostomy catheter terminating within the right lateral ventricle frontal horn. Interval development of slit-like appearance of the right lateral ventricle. Suspect acute ischemia involving the left middle cerebellar peduncle and left cerebellar hemisphere. Similar appearance of intracranial hemorrhage in hemorrhagic contusions as above. Findings discussed with Dr. Siena Starks at 12:53 p.m. on December 9, 2020. Ct Head Wo Contrast    Result Date: 10/9/2020  EXAMINATION: CT OF THE HEAD and cervical spine WITHOUT CONTRAST; CT OF THE FACE WITHOUT CONTRAST  10/8/2020 9:56 pm TECHNIQUE: CT of the head was performed without the administration of intravenous contrast. Dose modulation, iterative reconstruction, and/or weight based adjustment of the mA/kV was utilized to reduce the radiation dose to as low as reasonably achievable.; CT of the face was performed without the administration of intravenous contrast. Multiplanar reformatted images are provided for review. Dose modulation, iterative reconstruction, and/or weight based adjustment of the mA/kV was utilized to reduce the radiation dose to as low as reasonably achievable.; CT of the cervical spine was performed without the administration of intravenous contrast. Multiplanar reformatted images are provided for review. Dose modulation, iterative reconstruction, and/or weight based adjustment of the mA/kV was utilized to reduce the radiation dose to as low as reasonably achievable. COMPARISON: None. HISTORY: ORDERING SYSTEM PROVIDED HISTORY: trauma TECHNOLOGIST PROVIDED HISTORY: Reason for exam:->trauma Has a \"code stroke\" or \"stroke alert\" been called? ->No What reading provider will be dictating this exam?->CRC FINDINGS: Head: There is mild right frontoparietal holohemispheric subdural hemorrhage, measuring up to 6 mm. Mild mass effect and minimal midline shift of approximately 4 mm.   Visualized skull demonstrates multiple mildly displaced fractures in the skull, involving left temporal bone, right temporal bone, bilateral parietal bones. Left temporoparietal bone skull fractures appear to be comminuted in multiple locations. There also small hemorrhagic contusions in bilateral frontal lobes. Small amount of subarachnoid hemorrhage in the right frontal lobe. Mild fluid layering in the sphenoid sinus. Fractures involving the left orbit as well. The mastoid air cells are clear. However, left temporal bone fracture does extend through the region of the left external auditory canal and extends to the middle ear. Cervical spine: Vertebral body heights are intact. Alignment is intact. Facets are normally aligned. The odontoid process is intact. No significant prevertebral soft tissue swelling. Facial bones: Mandible is intact. The zygomatic arches are intact. Nasal bones are intact. Nasal bony septum is midline. Sphenoid temporal buttress is intact. Mildly displaced fracture of the roof of the left orbit. The orbital floor is intact. Globes are intact and not proptotic. No retro bulbar soft tissue hematoma. Mild left periorbital preseptal soft tissue swelling. Bilateral comminuted skull bone fractures involving bilateral temporal bones and parietal bones. Fractures are worse on the left side. Mild right holohemispheric subdural hematoma with mild midline shift. Bilateral frontal small hemorrhagic contusions. Left orbital roof mildly displaced fracture. No evidence for cervical fracture or subluxation. Critical findings reported to the ER physician at time of dictation. Ct Iac Posterior Fossa Wo Contrast    Result Date: 10/10/2020  EXAMINATION: CT OF THE INTERNAL AUDITORY CANAL WITHOUT CONTRAST 10/9/2020 12:11 pm TECHNIQUE: CT of the internal auditory canal was performed without contrast was performed without the administration of intravenous contrast. Multiplanar reformatted images are provided for review.  Dose modulation, iterative reconstruction, and/or weight based adjustment of the mA/kV was utilized to reduce the radiation dose to as low as reasonably achievable. COMPARISON: None HISTORY: ORDERING SYSTEM PROVIDED HISTORY: TRAUMA TECHNOLOGIST PROVIDED HISTORY: Reason for exam:->trauma What reading provider will be dictating this exam?->CRC FINDINGS: RIGHT TEMPORAL BONE:  The right external auditory canal is normal in appearance. There is no right temporal bone fracture identified. There is a small volume of fluid within the right mastoid air cells. The right middle ear is clear. The ossicles are normally aligned. The tegmen tympani is intact. The scutum is intact. There is no osseous dehiscence. The cochlea, vestibule and semicircular canals are normal in appearance. LEFT TEMPORAL BONE: There is a longitudinal fracture of the mastoid segment of the left temporal bone. There is incudomalleolar subluxation involving the head of the malleus and body of the incus. The fracture does not extend into the otic capsule. Hemorrhage is present within the left middle ear and mastoid air cells. A comminuted fracture of the squamous portion of the left temporal bone is noted. The cochlea, vestibule and semicircular canals are normal.     Longitudinal fracture of the mastoid portion of the left temporal bone with associated incudomalleolar subluxation involving the head of the malleus and body of the incus. The fracture does not extend into the otic capsule. Small volume of fluid within the right mastoid air cells but no acute traumatic injury of the mastoid portion of the right temporal bone evident.      Ct Facial Bones Wo Contrast    Result Date: 10/9/2020  EXAMINATION: CT OF THE HEAD and cervical spine WITHOUT CONTRAST; CT OF THE FACE WITHOUT CONTRAST  10/8/2020 9:56 pm TECHNIQUE: CT of the head was performed without the administration of intravenous contrast. Dose modulation, iterative reconstruction, and/or weight based adjustment of the mA/kV was utilized to reduce the zygomatic arches are intact. Nasal bones are intact. Nasal bony septum is midline. Sphenoid temporal buttress is intact. Mildly displaced fracture of the roof of the left orbit. The orbital floor is intact. Globes are intact and not proptotic. No retro bulbar soft tissue hematoma. Mild left periorbital preseptal soft tissue swelling. Bilateral comminuted skull bone fractures involving bilateral temporal bones and parietal bones. Fractures are worse on the left side. Mild right holohemispheric subdural hematoma with mild midline shift. Bilateral frontal small hemorrhagic contusions. Left orbital roof mildly displaced fracture. No evidence for cervical fracture or subluxation. Critical findings reported to the ER physician at time of dictation. Ct Chest W Contrast    Result Date: 10/9/2020  EXAMINATION: CT OF THE CHEST WITH CONTRAST 10/8/2020 10:56 pm TECHNIQUE: CT of the chest was performed with the administration of intravenous contrast. Multiplanar reformatted images are provided for review. Dose modulation, iterative reconstruction, and/or weight based adjustment of the mA/kV was utilized to reduce the radiation dose to as low as reasonably achievable. COMPARISON: None. HISTORY: ORDERING SYSTEM PROVIDED HISTORY: trauma TECHNOLOGIST PROVIDED HISTORY: Reason for exam:->trauma What reading provider will be dictating this exam?->CRC FINDINGS: An endotracheal and enteric tubes are noted in place and are appropriately positioned. Mediastinum: Normal heart size. The great vessels are within normal limits. No pericardial effusion. No significantly enlarged lymph nodes. Lungs/pleura: Mild dependent congestion involving the bilateral posterior lungs. No pulmonary mass or augustin consolidation. No pleural effusion. Upper Abdomen: Within normal limits. Soft Tissues/Bones: Nondisplaced acute fractures involving the lateral left 5th through 7th ribs.   Nondisplaced acute fractures of the posterior left 4th through 7th ribs. Acute mildly displaced fractures of the posterior left 8th rib. Mildly displaced acute fracture of the left body of the scapula. Nondisplaced acute fractures involving the lateral left 5th through 7th ribs. Nondisplaced acute fractures of the posterior left 4th through 7th ribs. Acute mildly displaced fractures of the posterior left 8th rib. Mildly displaced acute fracture of the left body of the scapula. Ct Cervical Spine Wo Contrast    Result Date: 10/9/2020  EXAMINATION: CT OF THE HEAD and cervical spine WITHOUT CONTRAST; CT OF THE FACE WITHOUT CONTRAST  10/8/2020 9:56 pm TECHNIQUE: CT of the head was performed without the administration of intravenous contrast. Dose modulation, iterative reconstruction, and/or weight based adjustment of the mA/kV was utilized to reduce the radiation dose to as low as reasonably achievable.; CT of the face was performed without the administration of intravenous contrast. Multiplanar reformatted images are provided for review. Dose modulation, iterative reconstruction, and/or weight based adjustment of the mA/kV was utilized to reduce the radiation dose to as low as reasonably achievable.; CT of the cervical spine was performed without the administration of intravenous contrast. Multiplanar reformatted images are provided for review. Dose modulation, iterative reconstruction, and/or weight based adjustment of the mA/kV was utilized to reduce the radiation dose to as low as reasonably achievable. COMPARISON: None. HISTORY: ORDERING SYSTEM PROVIDED HISTORY: trauma TECHNOLOGIST PROVIDED HISTORY: Reason for exam:->trauma Has a \"code stroke\" or \"stroke alert\" been called? ->No What reading provider will be dictating this exam?->CRC FINDINGS: Head: There is mild right frontoparietal holohemispheric subdural hemorrhage, measuring up to 6 mm. Mild mass effect and minimal midline shift of approximately 4 mm.   Visualized skull demonstrates multiple mildly displaced fractures in the skull, involving left temporal bone, right temporal bone, bilateral parietal bones. Left temporoparietal bone skull fractures appear to be comminuted in multiple locations. There also small hemorrhagic contusions in bilateral frontal lobes. Small amount of subarachnoid hemorrhage in the right frontal lobe. Mild fluid layering in the sphenoid sinus. Fractures involving the left orbit as well. The mastoid air cells are clear. However, left temporal bone fracture does extend through the region of the left external auditory canal and extends to the middle ear. Cervical spine: Vertebral body heights are intact. Alignment is intact. Facets are normally aligned. The odontoid process is intact. No significant prevertebral soft tissue swelling. Facial bones: Mandible is intact. The zygomatic arches are intact. Nasal bones are intact. Nasal bony septum is midline. Sphenoid temporal buttress is intact. Mildly displaced fracture of the roof of the left orbit. The orbital floor is intact. Globes are intact and not proptotic. No retro bulbar soft tissue hematoma. Mild left periorbital preseptal soft tissue swelling. Bilateral comminuted skull bone fractures involving bilateral temporal bones and parietal bones. Fractures are worse on the left side. Mild right holohemispheric subdural hematoma with mild midline shift. Bilateral frontal small hemorrhagic contusions. Left orbital roof mildly displaced fracture. No evidence for cervical fracture or subluxation. Critical findings reported to the ER physician at time of dictation. Ct Thoracic Spine Wo Contrast    Result Date: 10/9/2020  EXAMINATION: CT OF THE THORACIC SPINE WITHOUT CONTRAST  10/8/2020 10:56 pm: TECHNIQUE: CT of the thoracic spine was performed without the administration of intravenous contrast. Multiplanar reformatted images are provided for review.  Dose modulation, iterative reconstruction, and/or weight based adjustment of the mA/kV was utilized to reduce the radiation dose to as low as reasonably achievable. COMPARISON: Same day lumbar spine CT; same day CT chest, abdomen and pelvis HISTORY: ORDERING SYSTEM PROVIDED HISTORY: trauma TECHNOLOGIST PROVIDED HISTORY: Reason for exam:->trauma What reading provider will be dictating this exam?->CRC FINDINGS: BONES/ALIGNMENT: There is normal alignment of the spine. The vertebral body heights are maintained. No osseous destructive lesion is seen. DEGENERATIVE CHANGES: Mild spondylosis without significant central canal narrowing. Mild right foraminal narrowing at T11-T12. SOFT TISSUES: No paraspinal mass is seen. No acute thoracic spine abnormality. Ct Lumbar Spine Wo Contrast    Result Date: 10/9/2020  EXAMINATION: CT OF THE LUMBAR SPINE WITHOUT CONTRAST  10/8/2020 TECHNIQUE: CT of the lumbar spine was performed without the administration of intravenous contrast. Multiplanar reformatted images are provided for review. Dose modulation, iterative reconstruction, and/or weight based adjustment of the mA/kV was utilized to reduce the radiation dose to as low as reasonably achievable. COMPARISON: None HISTORY: ORDERING SYSTEM PROVIDED HISTORY: trauma TECHNOLOGIST PROVIDED HISTORY: Reason for exam:->trauma What reading provider will be dictating this exam?->CRC FINDINGS: BONES/ALIGNMENT: Vertebral body heights are maintained. No compression deformity. L5 pars defects with grade 1 anterolisthesis of L5 on S1. DEGENERATIVE CHANGES: Moderate disc desiccation at L5-S1. No significant central canal stenosis. Moderate-to-severe foraminal stenosis at the listhesis level. SOFT TISSUES/RETROPERITONEUM: No paraspinal mass is seen. No acute lumbar spine abnormality. L5 pars defects with grade 1 anterolisthesis of L5 on S1 and moderate-to-severe foraminal stenosis.      Ct Abdomen Pelvis W Iv Contrast Additional Contrast? None    Result Date: 10/9/2020  EXAMINATION: CT OF THE ABDOMEN AND PELVIS WITH CONTRAST 10/8/2020 10:56 pm TECHNIQUE: CT of the abdomen and pelvis was performed with the administration of intravenous contrast. Multiplanar reformatted images are provided for review. Dose modulation, iterative reconstruction, and/or weight based adjustment of the mA/kV was utilized to reduce the radiation dose to as low as reasonably achievable. COMPARISON: None. HISTORY: ORDERING SYSTEM PROVIDED HISTORY: trauma TECHNOLOGIST PROVIDED HISTORY: Additional Contrast?->None Reason for exam:->trauma What reading provider will be dictating this exam?->CRC FINDINGS: Lower Chest: Described in detail on separate report of CT of the chest. Organs: The liver, spleen, pancreas, and bilateral adrenal glands are within normal limits. No radiopaque gallstones. No CT evidence of acute cholecystitis. No intra or extrahepatic ductal dilatation. The bilateral kidneys are within normal limits. No renal cysts or masses are noted. No hydronephrosis or hydroureter. GI/Bowel: The small and large bowel demonstrate normal caliber and appearance. A normal-appearing appendix is identified. Pelvis: A soft tissue structure is noted in the distal right inguinal canal which could represent a deeply retracted testicle versus an undescended testicle. Recommend clinical correlation. The urinary bladder is nearly decompressed with a Bentley catheter in place. Peritoneum/Retroperitoneum: No free fluid or free air. Bones/Soft Tissues: Multiple rib fractures, as described in report of CT of the chest.  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 A soft tissue structure is noted in the distal right inguinal canal presumably representing the right testicle and could represent a deeply retracted testicle versus an undescended testicle. Recommend clinical correlation.      Xr Chest Portable    Result Date: 10/9/2020  EXAMINATION: ONE XRAY VIEW OF THE CHEST 10/9/2020 6:59 am COMPARISON: 10/09/2020 HISTORY: ORDERING SYSTEM PROVIDED HISTORY: intubated TECHNOLOGIST PROVIDED HISTORY: Reason for exam:->intubated What reading provider will be dictating this exam?->CRC FINDINGS: Lines/tubes are appropriate. Heart size is normal.  There are no infiltrates or effusions. No acute process     Xr Chest Portable    Result Date: 10/9/2020  EXAMINATION: ONE XRAY VIEW OF THE CHEST 10/9/2020 12:42 am COMPARISON: 10/08/2020 at this 2248 hours. Lavell Pham HISTORY: ORDERING SYSTEM PROVIDED HISTORY: Line Placement TECHNOLOGIST PROVIDED HISTORY: Reason for exam:->Line Placement What reading provider will be dictating this exam?->CRC FINDINGS: Heart is not enlarged. Endotracheal tube and enteric tube are in place. Left IJ CVC is in place with tip in the proximal SVC. No pneumothorax. No pleural effusions. No pulmonary edema or pneumothorax. Endotracheal tube, enteric tube and left IJ CVC is in place with no pneumothorax. Xr Chest 1 View    Result Date: 10/8/2020  EXAMINATION: ONE XRAY VIEW OF THE CHEST 10/8/2020 9:52 pm COMPARISON: None. HISTORY: ORDERING SYSTEM PROVIDED HISTORY: trauma TECHNOLOGIST PROVIDED HISTORY: Reason for exam:->trauma What reading provider will be dictating this exam?->CRC FINDINGS: Endotracheal tube is present with distal tip approximately 6 cm above the andriy. Nasogastric tube courses below the level of the diaphragm with distal tip not included on this examination. No focal airspace opacity or pleural effusion. The heart is prominent related to portable technique. No pneumothorax. 1.  No acute process in the chest. 2.  Endotracheal tube is 6 cm above the andriy. 3.  Nasogastric tube courses below the level of the diaphragm.      Cta Neck W Contrast    Result Date: 10/9/2020  EXAMINATION: CTA OF THE HEAD WITH CONTRAST; CTA OF THE NECK 10/9/2020 3:17 pm: TECHNIQUE: CTA of the head/brain was performed with the administration of intravenous contrast. Multiplanar reformatted images are provided for review. MIP images are provided for review. Dose modulation, iterative reconstruction, and/or weight based adjustment of the mA/kV was utilized to reduce the radiation dose to as low as reasonably achievable.; CTA of the neck was performed with the administration of intravenous contrast. Multiplanar reformatted images are provided for review. MIP images are provided for review. Stenosis of the internal carotid arteries measured using NASCET criteria. Dose modulation, iterative reconstruction, and/or weight based adjustment of the mA/kV was utilized to reduce the radiation dose to as low as reasonably achievable. COMPARISON: CT head from 12/30 5 p.m. HISTORY: ORDERING SYSTEM PROVIDED HISTORY: ischemic TECHNOLOGIST PROVIDED HISTORY: Reason for exam:->ischemic Has a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

## 2020-10-13 NOTE — PROGRESS NOTES
L ear wick removed. Canal with dried blood, minimal swelling, no active bleeding, TM partially visualized. R TM and canal intact. Unable to assess facial nerve at this time secondary to intubation. Complete 7 day course of ciprodex drops.  Outpt follow up for audiogram and facial N check

## 2020-10-13 NOTE — PLAN OF CARE
Problem: Falls - Risk of:  Goal: Will remain free from falls  Outcome: Met This Shift     Problem: Falls - Risk of:  Goal: Absence of physical injury  Outcome: Met This Shift     Problem: Skin Integrity:  Goal: Will show no infection signs and symptoms  Outcome: Met This Shift     Problem: Skin Integrity:  Goal: Absence of new skin breakdown  Outcome: Met This Shift     Problem: Pain:  Description: Pain management should include both nonpharmacologic and pharmacologic interventions. Goal: Pain level will decrease  Outcome: Met This Shift     Problem: Pain:  Description: Pain management should include both nonpharmacologic and pharmacologic interventions. Goal: Control of acute pain  Outcome: Met This Shift     Problem: Aspiration:  Goal: Absence of aspiration  Outcome: Met This Shift     Problem: Pain:  Description: Pain management should include both nonpharmacologic and pharmacologic interventions. Goal: Pain level will decrease  Outcome: Met This Shift     Problem: Pain:  Description: Pain management should include both nonpharmacologic and pharmacologic interventions. Goal: Control of acute pain  Outcome: Met This Shift   Plan of care discussed with patient/family. Patient/family incorporated into plan of care. Randi Watts
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
## 2020-10-14 ENCOUNTER — APPOINTMENT (OUTPATIENT)
Dept: GENERAL RADIOLOGY | Age: 30
DRG: 003 | End: 2020-10-14
Payer: COMMERCIAL

## 2020-10-14 LAB
AADO2: 217.7 MMHG
ANION GAP SERPL CALCULATED.3IONS-SCNC: 10 MMOL/L (ref 7–16)
B.E.: 3.7 MMOL/L (ref -3–3)
BACTERIA: ABNORMAL /HPF
BASOPHILS ABSOLUTE: 0.05 E9/L (ref 0–0.2)
BASOPHILS RELATIVE PERCENT: 0.5 % (ref 0–2)
BILIRUBIN URINE: NEGATIVE
BLOOD, URINE: ABNORMAL
BUN BLDV-MCNC: 17 MG/DL (ref 6–20)
CALCIUM IONIZED: 1.22 MMOL/L (ref 1.15–1.33)
CALCIUM SERPL-MCNC: 8.4 MG/DL (ref 8.6–10.2)
CHLORIDE BLD-SCNC: 115 MMOL/L (ref 98–107)
CLARITY: CLEAR
CO2: 27 MMOL/L (ref 22–29)
COHB: 0.3 % (ref 0–1.5)
COLOR: YELLOW
CREAT SERPL-MCNC: 0.8 MG/DL (ref 0.7–1.2)
CRITICAL: ABNORMAL
DATE ANALYZED: ABNORMAL
DATE OF COLLECTION: ABNORMAL
EOSINOPHILS ABSOLUTE: 0.33 E9/L (ref 0.05–0.5)
EOSINOPHILS RELATIVE PERCENT: 3.1 % (ref 0–6)
EPITHELIAL CELLS, UA: ABNORMAL /HPF
FIO2: 50 %
GFR AFRICAN AMERICAN: >60
GFR NON-AFRICAN AMERICAN: >60 ML/MIN/1.73
GLUCOSE BLD-MCNC: 122 MG/DL (ref 74–99)
GLUCOSE URINE: NEGATIVE MG/DL
HCO3: 28.2 MMOL/L (ref 22–26)
HCT VFR BLD CALC: 31.3 % (ref 37–54)
HEMOGLOBIN: 9.4 G/DL (ref 12.5–16.5)
HHB: 5.4 % (ref 0–5)
IMMATURE GRANULOCYTES #: 0.12 E9/L
IMMATURE GRANULOCYTES %: 1.1 % (ref 0–5)
KETONES, URINE: NEGATIVE MG/DL
LAB: ABNORMAL
LEUKOCYTE ESTERASE, URINE: NEGATIVE
LYMPHOCYTES ABSOLUTE: 0.97 E9/L (ref 1.5–4)
LYMPHOCYTES RELATIVE PERCENT: 9 % (ref 20–42)
Lab: ABNORMAL
MAGNESIUM: 2.2 MG/DL (ref 1.6–2.6)
MCH RBC QN AUTO: 30 PG (ref 26–35)
MCHC RBC AUTO-ENTMCNC: 30 % (ref 32–34.5)
MCV RBC AUTO: 100 FL (ref 80–99.9)
METHB: 0.1 % (ref 0–1.5)
MODE: AC
MONOCYTES ABSOLUTE: 1.19 E9/L (ref 0.1–0.95)
MONOCYTES RELATIVE PERCENT: 11 % (ref 2–12)
NEUTROPHILS ABSOLUTE: 8.13 E9/L (ref 1.8–7.3)
NEUTROPHILS RELATIVE PERCENT: 75.3 % (ref 43–80)
NITRITE, URINE: NEGATIVE
O2 SATURATION: 96 % (ref 92–98.5)
O2HB: 94.2 % (ref 94–97)
OPERATOR ID: ABNORMAL
PATIENT TEMP: 37 C
PCO2: 42.6 MMHG (ref 35–45)
PDW BLD-RTO: 13.2 FL (ref 11.5–15)
PEEP/CPAP: 8 CMH2O
PFO2: 1.57 MMHG/%
PH BLOOD GAS: 7.44 (ref 7.35–7.45)
PH UA: 7.5 (ref 5–9)
PHOSPHORUS: 3.7 MG/DL (ref 2.5–4.5)
PLATELET # BLD: 210 E9/L (ref 130–450)
PMV BLD AUTO: 10.5 FL (ref 7–12)
PO2: 78.4 MMHG (ref 75–100)
POTASSIUM SERPL-SCNC: 3.5 MMOL/L (ref 3.5–5)
PROTEIN UA: 30 MG/DL
RBC # BLD: 3.13 E12/L (ref 3.8–5.8)
RBC UA: ABNORMAL /HPF (ref 0–2)
RI(T): 2.78
RR MECHANICAL: 16 B/MIN
SODIUM BLD-SCNC: 150 MMOL/L (ref 132–146)
SODIUM BLD-SCNC: 151 MMOL/L (ref 132–146)
SODIUM BLD-SCNC: 152 MMOL/L (ref 132–146)
SODIUM BLD-SCNC: 152 MMOL/L (ref 132–146)
SODIUM BLD-SCNC: 153 MMOL/L (ref 132–146)
SOURCE, BLOOD GAS: ABNORMAL
SPECIFIC GRAVITY UA: 1.02 (ref 1–1.03)
THB: 10.6 G/DL (ref 11.5–16.5)
TIME ANALYZED: 433
UROBILINOGEN, URINE: >=8 E.U./DL
VT MECHANICAL: 500 ML
WBC # BLD: 10.8 E9/L (ref 4.5–11.5)
WBC UA: ABNORMAL /HPF (ref 0–5)

## 2020-10-14 PROCEDURE — 99291 CRITICAL CARE FIRST HOUR: CPT | Performed by: SURGERY

## 2020-10-14 PROCEDURE — 6370000000 HC RX 637 (ALT 250 FOR IP): Performed by: STUDENT IN AN ORGANIZED HEALTH CARE EDUCATION/TRAINING PROGRAM

## 2020-10-14 PROCEDURE — 2000000000 HC ICU R&B

## 2020-10-14 PROCEDURE — 2580000003 HC RX 258: Performed by: SURGERY

## 2020-10-14 PROCEDURE — 87070 CULTURE OTHR SPECIMN AEROBIC: CPT

## 2020-10-14 PROCEDURE — 82805 BLOOD GASES W/O2 SATURATION: CPT

## 2020-10-14 PROCEDURE — 81001 URINALYSIS AUTO W/SCOPE: CPT

## 2020-10-14 PROCEDURE — 6370000000 HC RX 637 (ALT 250 FOR IP): Performed by: SURGERY

## 2020-10-14 PROCEDURE — 6360000002 HC RX W HCPCS: Performed by: SURGERY

## 2020-10-14 PROCEDURE — 84100 ASSAY OF PHOSPHORUS: CPT

## 2020-10-14 PROCEDURE — 37799 UNLISTED PX VASCULAR SURGERY: CPT

## 2020-10-14 PROCEDURE — 84295 ASSAY OF SERUM SODIUM: CPT

## 2020-10-14 PROCEDURE — 82330 ASSAY OF CALCIUM: CPT

## 2020-10-14 PROCEDURE — 83735 ASSAY OF MAGNESIUM: CPT

## 2020-10-14 PROCEDURE — 71045 X-RAY EXAM CHEST 1 VIEW: CPT

## 2020-10-14 PROCEDURE — 85025 COMPLETE CBC W/AUTO DIFF WBC: CPT

## 2020-10-14 PROCEDURE — 36415 COLL VENOUS BLD VENIPUNCTURE: CPT

## 2020-10-14 PROCEDURE — 87106 FUNGI IDENTIFICATION YEAST: CPT

## 2020-10-14 PROCEDURE — 87088 URINE BACTERIA CULTURE: CPT

## 2020-10-14 PROCEDURE — 6360000002 HC RX W HCPCS: Performed by: STUDENT IN AN ORGANIZED HEALTH CARE EDUCATION/TRAINING PROGRAM

## 2020-10-14 PROCEDURE — 99221 1ST HOSP IP/OBS SF/LOW 40: CPT | Performed by: ORTHOPAEDIC SURGERY

## 2020-10-14 PROCEDURE — 80048 BASIC METABOLIC PNL TOTAL CA: CPT

## 2020-10-14 PROCEDURE — 23570 CLTX SCAPULAR FX W/O MNPJ: CPT | Performed by: ORTHOPAEDIC SURGERY

## 2020-10-14 PROCEDURE — 87206 SMEAR FLUORESCENT/ACID STAI: CPT

## 2020-10-14 PROCEDURE — 94003 VENT MGMT INPAT SUBQ DAY: CPT

## 2020-10-14 PROCEDURE — 94640 AIRWAY INHALATION TREATMENT: CPT

## 2020-10-14 RX ORDER — ROCURONIUM BROMIDE 10 MG/ML
0.6 INJECTION, SOLUTION INTRAVENOUS SEE ADMIN INSTRUCTIONS
Status: DISCONTINUED | OUTPATIENT
Start: 2020-10-15 | End: 2020-10-16

## 2020-10-14 RX ORDER — SODIUM CHLORIDE FOR INHALATION 3 %
4 VIAL, NEBULIZER (ML) INHALATION EVERY 6 HOURS
Status: DISCONTINUED | OUTPATIENT
Start: 2020-10-14 | End: 2020-10-25

## 2020-10-14 RX ORDER — SODIUM CHLORIDE 9 MG/ML
INJECTION, SOLUTION INTRAVENOUS CONTINUOUS
Status: DISCONTINUED | OUTPATIENT
Start: 2020-10-15 | End: 2020-10-16

## 2020-10-14 RX ORDER — FUROSEMIDE 10 MG/ML
20 INJECTION INTRAMUSCULAR; INTRAVENOUS ONCE
Status: COMPLETED | OUTPATIENT
Start: 2020-10-14 | End: 2020-10-14

## 2020-10-14 RX ORDER — FENTANYL CITRATE 50 UG/ML
100 INJECTION, SOLUTION INTRAMUSCULAR; INTRAVENOUS SEE ADMIN INSTRUCTIONS
Status: DISCONTINUED | OUTPATIENT
Start: 2020-10-15 | End: 2020-10-16

## 2020-10-14 RX ADMIN — OXYCODONE HYDROCHLORIDE 10 MG: 5 SOLUTION ORAL at 21:35

## 2020-10-14 RX ADMIN — CIPROFLOXACIN HYDROCHLORIDE 4 DROP: 3 SOLUTION/ DROPS OPHTHALMIC at 21:12

## 2020-10-14 RX ADMIN — BACITRACIN ZINC, POLYMYXIN B SULFATE, NEOMYCIN SULFATE: 400; 5000; 3.5 OINTMENT TOPICAL at 08:47

## 2020-10-14 RX ADMIN — Medication 10 ML: at 08:46

## 2020-10-14 RX ADMIN — DEXAMETHASONE SODIUM PHOSPHATE 4 DROP: 1 SOLUTION/ DROPS OPHTHALMIC at 08:47

## 2020-10-14 RX ADMIN — PROPOFOL INJECTABLE EMULSION 25 MCG/KG/MIN: 10 INJECTION, EMULSION INTRAVENOUS at 20:17

## 2020-10-14 RX ADMIN — HEPARIN SODIUM 5000 UNITS: 10000 INJECTION INTRAVENOUS; SUBCUTANEOUS at 14:20

## 2020-10-14 RX ADMIN — POTASSIUM & SODIUM PHOSPHATES POWDER PACK 280-160-250 MG 500 MG: 280-160-250 PACK at 08:46

## 2020-10-14 RX ADMIN — CHLORHEXIDINE GLUCONATE 0.12% ORAL RINSE 15 ML: 1.2 LIQUID ORAL at 21:12

## 2020-10-14 RX ADMIN — ACETAMINOPHEN ORAL SOLUTION 650 MG: 650 SOLUTION ORAL at 18:30

## 2020-10-14 RX ADMIN — POLYVINYL ALCOHOL 1 DROP: 14 SOLUTION/ DROPS OPHTHALMIC at 21:11

## 2020-10-14 RX ADMIN — Medication 10 ML: at 21:16

## 2020-10-14 RX ADMIN — SENNOSIDES 5 ML: 8.8 LIQUID ORAL at 21:15

## 2020-10-14 RX ADMIN — MORPHINE SULFATE 4 MG: 4 INJECTION, SOLUTION INTRAMUSCULAR; INTRAVENOUS at 03:23

## 2020-10-14 RX ADMIN — MINERAL OIL AND PETROLATUM: 150; 830 OINTMENT OPHTHALMIC at 16:03

## 2020-10-14 RX ADMIN — POLYVINYL ALCOHOL 1 DROP: 14 SOLUTION/ DROPS OPHTHALMIC at 04:16

## 2020-10-14 RX ADMIN — POTASSIUM & SODIUM PHOSPHATES POWDER PACK 280-160-250 MG 500 MG: 280-160-250 PACK at 13:17

## 2020-10-14 RX ADMIN — HEPARIN SODIUM 5000 UNITS: 10000 INJECTION INTRAVENOUS; SUBCUTANEOUS at 05:47

## 2020-10-14 RX ADMIN — CIPROFLOXACIN HYDROCHLORIDE 4 DROP: 3 SOLUTION/ DROPS OPHTHALMIC at 08:47

## 2020-10-14 RX ADMIN — FAMOTIDINE 20 MG: 20 TABLET ORAL at 08:46

## 2020-10-14 RX ADMIN — PROPOFOL INJECTABLE EMULSION 30 MCG/KG/MIN: 10 INJECTION, EMULSION INTRAVENOUS at 06:04

## 2020-10-14 RX ADMIN — FUROSEMIDE 20 MG: 20 INJECTION, SOLUTION INTRAMUSCULAR; INTRAVENOUS at 16:02

## 2020-10-14 RX ADMIN — BACITRACIN ZINC, POLYMYXIN B SULFATE, NEOMYCIN SULFATE: 400; 5000; 3.5 OINTMENT TOPICAL at 21:14

## 2020-10-14 RX ADMIN — OXYCODONE HYDROCHLORIDE 10 MG: 5 SOLUTION ORAL at 04:15

## 2020-10-14 RX ADMIN — POLYVINYL ALCOHOL 1 DROP: 14 SOLUTION/ DROPS OPHTHALMIC at 16:03

## 2020-10-14 RX ADMIN — MINERAL OIL AND PETROLATUM: 150; 830 OINTMENT OPHTHALMIC at 10:11

## 2020-10-14 RX ADMIN — POLYVINYL ALCOHOL 1 DROP: 14 SOLUTION/ DROPS OPHTHALMIC at 12:01

## 2020-10-14 RX ADMIN — PROPRANOLOL HYDROCHLORIDE 10 MG: 10 TABLET ORAL at 21:16

## 2020-10-14 RX ADMIN — FAMOTIDINE 20 MG: 20 TABLET ORAL at 21:24

## 2020-10-14 RX ADMIN — BACITRACIN ZINC: 500 OINTMENT TOPICAL at 21:11

## 2020-10-14 RX ADMIN — OXYCODONE HYDROCHLORIDE 10 MG: 5 SOLUTION ORAL at 16:01

## 2020-10-14 RX ADMIN — DEXAMETHASONE SODIUM PHOSPHATE 4 DROP: 1 SOLUTION/ DROPS OPHTHALMIC at 21:13

## 2020-10-14 RX ADMIN — MINERAL OIL AND PETROLATUM: 150; 830 OINTMENT OPHTHALMIC at 14:20

## 2020-10-14 RX ADMIN — POTASSIUM BICARBONATE 40 MEQ: 782 TABLET, EFFERVESCENT ORAL at 08:30

## 2020-10-14 RX ADMIN — CHLORHEXIDINE GLUCONATE 0.12% ORAL RINSE 15 ML: 1.2 LIQUID ORAL at 08:48

## 2020-10-14 RX ADMIN — POLYVINYL ALCOHOL 1 DROP: 14 SOLUTION/ DROPS OPHTHALMIC at 08:30

## 2020-10-14 RX ADMIN — POLYVINYL ALCOHOL 1 DROP: 14 SOLUTION/ DROPS OPHTHALMIC at 00:20

## 2020-10-14 RX ADMIN — LEVETIRACETAM 500 MG: 100 SOLUTION ORAL at 08:45

## 2020-10-14 RX ADMIN — POTASSIUM BICARBONATE 40 MEQ: 782 TABLET, EFFERVESCENT ORAL at 16:01

## 2020-10-14 RX ADMIN — HEPARIN SODIUM 5000 UNITS: 10000 INJECTION INTRAVENOUS; SUBCUTANEOUS at 21:24

## 2020-10-14 RX ADMIN — ACETAMINOPHEN ORAL SOLUTION 650 MG: 650 SOLUTION ORAL at 02:03

## 2020-10-14 RX ADMIN — ACETAMINOPHEN ORAL SOLUTION 650 MG: 650 SOLUTION ORAL at 12:02

## 2020-10-14 RX ADMIN — MINERAL OIL AND PETROLATUM: 150; 830 OINTMENT OPHTHALMIC at 02:02

## 2020-10-14 RX ADMIN — BACITRACIN ZINC: 500 OINTMENT TOPICAL at 14:55

## 2020-10-14 RX ADMIN — OXYCODONE HYDROCHLORIDE 10 MG: 5 SOLUTION ORAL at 10:10

## 2020-10-14 RX ADMIN — PROPOFOL INJECTABLE EMULSION 30 MCG/KG/MIN: 10 INJECTION, EMULSION INTRAVENOUS at 01:11

## 2020-10-14 RX ADMIN — PROPOFOL INJECTABLE EMULSION 25 MCG/KG/MIN: 10 INJECTION, EMULSION INTRAVENOUS at 13:16

## 2020-10-14 RX ADMIN — MORPHINE SULFATE 2 MG: 2 INJECTION, SOLUTION INTRAMUSCULAR; INTRAVENOUS at 18:42

## 2020-10-14 RX ADMIN — PROPRANOLOL HYDROCHLORIDE 10 MG: 10 TABLET ORAL at 08:46

## 2020-10-14 RX ADMIN — SODIUM CHLORIDE SOLN NEBU 3% 4 ML: 3 NEBU SOLN at 14:38

## 2020-10-14 RX ADMIN — MINERAL OIL AND PETROLATUM: 150; 830 OINTMENT OPHTHALMIC at 21:24

## 2020-10-14 RX ADMIN — SODIUM CHLORIDE SOLN NEBU 3% 4 ML: 3 NEBU SOLN at 19:48

## 2020-10-14 RX ADMIN — BACITRACIN ZINC: 500 OINTMENT TOPICAL at 08:45

## 2020-10-14 RX ADMIN — PROPRANOLOL HYDROCHLORIDE 10 MG: 10 TABLET ORAL at 14:55

## 2020-10-14 RX ADMIN — LEVETIRACETAM 500 MG: 100 SOLUTION ORAL at 21:24

## 2020-10-14 ASSESSMENT — PULMONARY FUNCTION TESTS
PIF_VALUE: 27
PIF_VALUE: 24
PIF_VALUE: 25
PIF_VALUE: 29
PIF_VALUE: 28
PIF_VALUE: 30
PIF_VALUE: 25
PIF_VALUE: 26
PIF_VALUE: 27
PIF_VALUE: 26
PIF_VALUE: 25
PIF_VALUE: 29
PIF_VALUE: 25
PIF_VALUE: 26
PIF_VALUE: 28
PIF_VALUE: 28
PIF_VALUE: 25
PIF_VALUE: 28
PIF_VALUE: 27
PIF_VALUE: 33
PIF_VALUE: 32
PIF_VALUE: 25
PIF_VALUE: 28
PIF_VALUE: 24
PIF_VALUE: 28
PIF_VALUE: 25
PIF_VALUE: 27
PIF_VALUE: 28
PIF_VALUE: 22
PIF_VALUE: 27

## 2020-10-14 ASSESSMENT — PAIN SCALES - GENERAL
PAINLEVEL_OUTOF10: 8
PAINLEVEL_OUTOF10: 4
PAINLEVEL_OUTOF10: 4
PAINLEVEL_OUTOF10: 0
PAINLEVEL_OUTOF10: 8
PAINLEVEL_OUTOF10: 0
PAINLEVEL_OUTOF10: 4

## 2020-10-14 NOTE — PROGRESS NOTES
Neurosurg progress note  VITALS:  BP (!) 146/63   Pulse 70   Temp 100.4 °F (38 °C)   Resp 16   Ht 6' 2\" (1.88 m)   Wt 282 lb 10.1 oz (128.2 kg)   SpO2 96%   BMI 36.29 kg/m²   24HR INTAKE/OUTPUT:    Intake/Output Summary (Last 24 hours) at 10/14/2020 1034  Last data filed at 10/14/2020 1000  Gross per 24 hour   Intake 1994 ml   Output 1900 ml   Net 94 ml     Xr Pelvis (1-2 Views)    Result Date: 10/8/2020  EXAMINATION: ONE XRAY VIEW OF THE PELVIS 10/8/2020 9:52 pm COMPARISON: None. HISTORY: ORDERING SYSTEM PROVIDED HISTORY: trauma TECHNOLOGIST PROVIDED HISTORY: Reason for exam:->trauma What reading provider will be dictating this exam?->CRC FINDINGS: Left ischial tuberosity is not included on this examination. Entire left hip is not included on this examination. No fracture or dislocation involving pelvis or visualized hips. No diastasis involving sacroiliac joints or symphysis pubis. No fracture or dislocation involving visualized pelvis or hips. Xr Elbow Right (min 3 Views)    Result Date: 10/9/2020  EXAMINATION: THREE XRAY VIEWS OF THE RIGHT ELBOW 10/9/2020 7:00 am COMPARISON: None. HISTORY: ORDERING SYSTEM PROVIDED HISTORY: trauma, Skull fx TECHNOLOGIST PROVIDED HISTORY: Reason for exam:->trauma, Skull fx What reading provider will be dictating this exam?->CRC FINDINGS: There is no fracture dislocation. There is no elbow joint effusion. There are tiny degenerative spurs. No acute process     Xr Hand Left (min 3 Views)    Result Date: 10/9/2020  EXAMINATION: THREE XRAY VIEWS OF THE LEFT HAND; THREE XRAY VIEWS OF THE RIGHT HAND 10/9/2020 7:01 am COMPARISON: None. HISTORY: ORDERING SYSTEM PROVIDED HISTORY: trauma TECHNOLOGIST PROVIDED HISTORY: Reason for exam:->trauma What reading provider will be dictating this exam?->CRC FINDINGS: Positioning of both hands does somewhat limit evaluation. There are no definite fractures or dislocations.   Joint spaces are normal.     No acute process     Xr Hand Right (min 3 Views)    Result Date: 10/9/2020  EXAMINATION: THREE XRAY VIEWS OF THE LEFT HAND; THREE XRAY VIEWS OF THE RIGHT HAND 10/9/2020 7:01 am COMPARISON: None. HISTORY: ORDERING SYSTEM PROVIDED HISTORY: trauma TECHNOLOGIST PROVIDED HISTORY: Reason for exam:->trauma What reading provider will be dictating this exam?->CRC FINDINGS: Positioning of both hands does somewhat limit evaluation. There are no definite fractures or dislocations. Joint spaces are normal.     No acute process     Xr Knee Left (3 Views)    Result Date: 10/9/2020  EXAMINATION: THREE XRAY VIEWS OF THE LEFT KNEE 10/9/2020 7:03 am COMPARISON: None. HISTORY: ORDERING SYSTEM PROVIDED HISTORY: trauma TECHNOLOGIST PROVIDED HISTORY: Reason for exam:->trauma What reading provider will be dictating this exam?->CRC FINDINGS: There is no fracture dislocation. There is no joint effusion or degenerative change. No acute process     Xr Knee Right (3 Views)    Result Date: 10/9/2020  EXAMINATION: THREE XRAY VIEWS OF THE RIGHT KNEE 10/9/2020 8:06 am COMPARISON: None. HISTORY: ORDERING SYSTEM PROVIDED HISTORY: trauma TECHNOLOGIST PROVIDED HISTORY: Reason for exam:->trauma What reading provider will be dictating this exam?->CRC FINDINGS: There is no fracture dislocation. There is no joint space narrowing or effusion. No acute process     Ct Head Wo Contrast    Result Date: 10/9/2020  EXAMINATION: CT OF THE HEAD WITHOUT CONTRAST  10/9/2020 4:07 am TECHNIQUE: CT of the head was performed without the administration of intravenous contrast. Dose modulation, iterative reconstruction, and/or weight based adjustment of the mA/kV was utilized to reduce the radiation dose to as low as reasonably achievable. COMPARISON: CT head 10/08/2020 HISTORY: ORDERING SYSTEM PROVIDED HISTORY: evaluate head bleed TECHNOLOGIST PROVIDED HISTORY: Has a \"code stroke\" or \"stroke alert\" been called? ->No Reason for exam:->evaluate head bleed What reading provider will be dictating this exam?->CRC FINDINGS: BRAIN/VENTRICLES: Interval increase in size of right frontal parenchymal contusion, measuring 14 x 7 x 10 mm, previously 10 x 5 x 5 mm. Additional right frontal contusion, more inferiorly has also increased in size. Punctate contusions in the anterior-inferior frontal lobe along the gyrus rectus have also increased. Scattered bilateral hemispheric subarachnoid hemorrhage. Right convexity subdural hematoma measures up to 6 mm in thickness, not substantially changed. Trace left parietal subdural hematoma measuring 2 mm in thickness. 5 mm leftward midline shift, not substantially changed. No herniation. Patent basal cisterns. ORBITS: The visualized portion of the orbits demonstrate no acute abnormality. SINUSES: Nasopharyngeal opacities with partially imaged esophagogastric and endotracheal tubes. Scattered paranasal sinus opacities. SOFT TISSUES/SKULL:  Nondisplaced left temporal bone fracture extending to the otic capsule. Comminuted mildly displaced left parietal fracture. Nondisplaced right temporal bone fracture which is otic capsule sparing. Diffuse scalp swelling with posterior scalp contusions. Skin staples present. Mild interval increase in size of scattered parenchymal hematomas, mainly in the right frontal lobe, suspicious for diffuse axonal injury. No substantial change in acute right convexity subdural and left parietal subdural hematomas. Stable 5 mm leftward midline shift. Unchanged calvarial fractures, notable for a nondisplaced left temporal bone fracture possibly extending to the otic capsule. Recommend follow-up temporal bone CT.      Ct Head Wo Contrast    Result Date: 10/9/2020  EXAMINATION: CT OF THE HEAD WITHOUT CONTRAST  10/9/2020 12:11 pm TECHNIQUE: CT of the head was performed without the administration of intravenous contrast. Dose modulation, iterative reconstruction, and/or weight based adjustment of the mA/kV was utilized to reduce the radiation dose to as low as reasonably achievable. COMPARISON: 8 hours prior. HISTORY: ORDERING SYSTEM PROVIDED HISTORY: s/p ventric TECHNOLOGIST PROVIDED HISTORY: Reason for exam:->s/p ventric Has a \"code stroke\" or \"stroke alert\" been called? ->No What reading provider will be dictating this exam?->CRC FINDINGS: There has been interval placement of a right frontal approach ventriculostomy catheter terminating within the right lateral ventricle frontal horn abutting the septum pellucidum. There is split like configuration of the right lateral ventricle that may reflect over shunting. There is no temporal horn dilatation. There is diffuse cerebral edema with diffuse sulcal effacement. There is similar appearance of multiple foci of hemorrhagic contusions involving the right frontal lobe and to lesser extent left frontal lobe and right temporal lobe. The hemorrhagic contusions demonstrates mild surrounding edema. There is similar appearance of acute subarachnoid hemorrhage along the bilateral frontal convexities and right temporal convexity and to a lesser extent at the vertices. There is small volume of subdural hemorrhage along the right lateral tentorial leaflet. There is small volume subarachnoid hemorrhage within the interpeduncular cistern. There is new wedge-shaped region of low attenuation involving the left middle cerebellar peduncle and left cerebellar hemisphere, concerning for acute ischemia. There is no significant midline shift. There are bilateral parietal fractures, comminuted on the left, extending into the left temporal bone including the mastoid segment. Please refer to concurrently performed CT temporal bone imaging report. There is additional depressed fracture of the left superior orbital wall. There is diffuse subgaleal soft tissue swelling. Interval placement of right ventriculostomy catheter terminating within the right lateral ventricle frontal horn.   Interval development of slit-like appearance of the right lateral ventricle. Suspect acute ischemia involving the left middle cerebellar peduncle and left cerebellar hemisphere. Similar appearance of intracranial hemorrhage in hemorrhagic contusions as above. Findings discussed with Dr. Siena Starks at 12:53 p.m. on December 9, 2020. Ct Head Wo Contrast    Result Date: 10/9/2020  EXAMINATION: CT OF THE HEAD and cervical spine WITHOUT CONTRAST; CT OF THE FACE WITHOUT CONTRAST  10/8/2020 9:56 pm TECHNIQUE: CT of the head was performed without the administration of intravenous contrast. Dose modulation, iterative reconstruction, and/or weight based adjustment of the mA/kV was utilized to reduce the radiation dose to as low as reasonably achievable.; CT of the face was performed without the administration of intravenous contrast. Multiplanar reformatted images are provided for review. Dose modulation, iterative reconstruction, and/or weight based adjustment of the mA/kV was utilized to reduce the radiation dose to as low as reasonably achievable.; CT of the cervical spine was performed without the administration of intravenous contrast. Multiplanar reformatted images are provided for review. Dose modulation, iterative reconstruction, and/or weight based adjustment of the mA/kV was utilized to reduce the radiation dose to as low as reasonably achievable. COMPARISON: None. HISTORY: ORDERING SYSTEM PROVIDED HISTORY: trauma TECHNOLOGIST PROVIDED HISTORY: Reason for exam:->trauma Has a \"code stroke\" or \"stroke alert\" been called? ->No What reading provider will be dictating this exam?->CRC FINDINGS: Head: There is mild right frontoparietal holohemispheric subdural hemorrhage, measuring up to 6 mm. Mild mass effect and minimal midline shift of approximately 4 mm. Visualized skull demonstrates multiple mildly displaced fractures in the skull, involving left temporal bone, right temporal bone, bilateral parietal bones.   Left temporoparietal bone skull fractures appear to be comminuted in multiple locations. There also small hemorrhagic contusions in bilateral frontal lobes. Small amount of subarachnoid hemorrhage in the right frontal lobe. Mild fluid layering in the sphenoid sinus. Fractures involving the left orbit as well. The mastoid air cells are clear. However, left temporal bone fracture does extend through the region of the left external auditory canal and extends to the middle ear. Cervical spine: Vertebral body heights are intact. Alignment is intact. Facets are normally aligned. The odontoid process is intact. No significant prevertebral soft tissue swelling. Facial bones: Mandible is intact. The zygomatic arches are intact. Nasal bones are intact. Nasal bony septum is midline. Sphenoid temporal buttress is intact. Mildly displaced fracture of the roof of the left orbit. The orbital floor is intact. Globes are intact and not proptotic. No retro bulbar soft tissue hematoma. Mild left periorbital preseptal soft tissue swelling. Bilateral comminuted skull bone fractures involving bilateral temporal bones and parietal bones. Fractures are worse on the left side. Mild right holohemispheric subdural hematoma with mild midline shift. Bilateral frontal small hemorrhagic contusions. Left orbital roof mildly displaced fracture. No evidence for cervical fracture or subluxation. Critical findings reported to the ER physician at time of dictation. Ct Iac Posterior Fossa Wo Contrast    Result Date: 10/10/2020  EXAMINATION: CT OF THE INTERNAL AUDITORY CANAL WITHOUT CONTRAST 10/9/2020 12:11 pm TECHNIQUE: CT of the internal auditory canal was performed without contrast was performed without the administration of intravenous contrast. Multiplanar reformatted images are provided for review.  Dose modulation, iterative reconstruction, and/or weight based adjustment of the mA/kV was utilized to reduce the radiation dose to as low as reasonably achievable. COMPARISON: None HISTORY: ORDERING SYSTEM PROVIDED HISTORY: TRAUMA TECHNOLOGIST PROVIDED HISTORY: Reason for exam:->trauma What reading provider will be dictating this exam?->CRC FINDINGS: RIGHT TEMPORAL BONE:  The right external auditory canal is normal in appearance. There is no right temporal bone fracture identified. There is a small volume of fluid within the right mastoid air cells. The right middle ear is clear. The ossicles are normally aligned. The tegmen tympani is intact. The scutum is intact. There is no osseous dehiscence. The cochlea, vestibule and semicircular canals are normal in appearance. LEFT TEMPORAL BONE: There is a longitudinal fracture of the mastoid segment of the left temporal bone. There is incudomalleolar subluxation involving the head of the malleus and body of the incus. The fracture does not extend into the otic capsule. Hemorrhage is present within the left middle ear and mastoid air cells. A comminuted fracture of the squamous portion of the left temporal bone is noted. The cochlea, vestibule and semicircular canals are normal.     Longitudinal fracture of the mastoid portion of the left temporal bone with associated incudomalleolar subluxation involving the head of the malleus and body of the incus. The fracture does not extend into the otic capsule. Small volume of fluid within the right mastoid air cells but no acute traumatic injury of the mastoid portion of the right temporal bone evident.      Ct Facial Bones Wo Contrast    Result Date: 10/9/2020  EXAMINATION: CT OF THE HEAD and cervical spine WITHOUT CONTRAST; CT OF THE FACE WITHOUT CONTRAST  10/8/2020 9:56 pm TECHNIQUE: CT of the head was performed without the administration of intravenous contrast. Dose modulation, iterative reconstruction, and/or weight based adjustment of the mA/kV was utilized to reduce the radiation dose to as low as reasonably achievable.; CT of the face was performed without the administration of intravenous contrast. Multiplanar reformatted images are provided for review. Dose modulation, iterative reconstruction, and/or weight based adjustment of the mA/kV was utilized to reduce the radiation dose to as low as reasonably achievable.; CT of the cervical spine was performed without the administration of intravenous contrast. Multiplanar reformatted images are provided for review. Dose modulation, iterative reconstruction, and/or weight based adjustment of the mA/kV was utilized to reduce the radiation dose to as low as reasonably achievable. COMPARISON: None. HISTORY: ORDERING SYSTEM PROVIDED HISTORY: trauma TECHNOLOGIST PROVIDED HISTORY: Reason for exam:->trauma Has a \"code stroke\" or \"stroke alert\" been called? ->No What reading provider will be dictating this exam?->CRC FINDINGS: Head: There is mild right frontoparietal holohemispheric subdural hemorrhage, measuring up to 6 mm. Mild mass effect and minimal midline shift of approximately 4 mm. Visualized skull demonstrates multiple mildly displaced fractures in the skull, involving left temporal bone, right temporal bone, bilateral parietal bones. Left temporoparietal bone skull fractures appear to be comminuted in multiple locations. There also small hemorrhagic contusions in bilateral frontal lobes. Small amount of subarachnoid hemorrhage in the right frontal lobe. Mild fluid layering in the sphenoid sinus. Fractures involving the left orbit as well. The mastoid air cells are clear. However, left temporal bone fracture does extend through the region of the left external auditory canal and extends to the middle ear. Cervical spine: Vertebral body heights are intact. Alignment is intact. Facets are normally aligned. The odontoid process is intact. No significant prevertebral soft tissue swelling. Facial bones: Mandible is intact. The zygomatic arches are intact. Nasal bones are intact. Nasal bony septum is midline. Sphenoid temporal buttress is intact. Mildly displaced fracture of the roof of the left orbit. The orbital floor is intact. Globes are intact and not proptotic. No retro bulbar soft tissue hematoma. Mild left periorbital preseptal soft tissue swelling. Bilateral comminuted skull bone fractures involving bilateral temporal bones and parietal bones. Fractures are worse on the left side. Mild right holohemispheric subdural hematoma with mild midline shift. Bilateral frontal small hemorrhagic contusions. Left orbital roof mildly displaced fracture. No evidence for cervical fracture or subluxation. Critical findings reported to the ER physician at time of dictation. Ct Chest W Contrast    Result Date: 10/9/2020  EXAMINATION: CT OF THE CHEST WITH CONTRAST 10/8/2020 10:56 pm TECHNIQUE: CT of the chest was performed with the administration of intravenous contrast. Multiplanar reformatted images are provided for review. Dose modulation, iterative reconstruction, and/or weight based adjustment of the mA/kV was utilized to reduce the radiation dose to as low as reasonably achievable. COMPARISON: None. HISTORY: ORDERING SYSTEM PROVIDED HISTORY: trauma TECHNOLOGIST PROVIDED HISTORY: Reason for exam:->trauma What reading provider will be dictating this exam?->CRC FINDINGS: An endotracheal and enteric tubes are noted in place and are appropriately positioned. Mediastinum: Normal heart size. The great vessels are within normal limits. No pericardial effusion. No significantly enlarged lymph nodes. Lungs/pleura: Mild dependent congestion involving the bilateral posterior lungs. No pulmonary mass or augustin consolidation. No pleural effusion. Upper Abdomen: Within normal limits. Soft Tissues/Bones: Nondisplaced acute fractures involving the lateral left 5th through 7th ribs. Nondisplaced acute fractures of the posterior left 4th through 7th ribs. Acute mildly displaced fractures of the posterior left 8th rib. Mildly displaced acute fracture of the left body of the scapula. Nondisplaced acute fractures involving the lateral left 5th through 7th ribs. Nondisplaced acute fractures of the posterior left 4th through 7th ribs. Acute mildly displaced fractures of the posterior left 8th rib. Mildly displaced acute fracture of the left body of the scapula. Ct Cervical Spine Wo Contrast    Result Date: 10/9/2020  EXAMINATION: CT OF THE HEAD and cervical spine WITHOUT CONTRAST; CT OF THE FACE WITHOUT CONTRAST  10/8/2020 9:56 pm TECHNIQUE: CT of the head was performed without the administration of intravenous contrast. Dose modulation, iterative reconstruction, and/or weight based adjustment of the mA/kV was utilized to reduce the radiation dose to as low as reasonably achievable.; CT of the face was performed without the administration of intravenous contrast. Multiplanar reformatted images are provided for review. Dose modulation, iterative reconstruction, and/or weight based adjustment of the mA/kV was utilized to reduce the radiation dose to as low as reasonably achievable.; CT of the cervical spine was performed without the administration of intravenous contrast. Multiplanar reformatted images are provided for review. Dose modulation, iterative reconstruction, and/or weight based adjustment of the mA/kV was utilized to reduce the radiation dose to as low as reasonably achievable. COMPARISON: None. HISTORY: ORDERING SYSTEM PROVIDED HISTORY: trauma TECHNOLOGIST PROVIDED HISTORY: Reason for exam:->trauma Has a \"code stroke\" or \"stroke alert\" been called? ->No What reading provider will be dictating this exam?->CRC FINDINGS: Head: There is mild right frontoparietal holohemispheric subdural hemorrhage, measuring up to 6 mm. Mild mass effect and minimal midline shift of approximately 4 mm.   Visualized skull demonstrates multiple mildly displaced fractures in the skull, involving left temporal bone, right temporal bone, bilateral parietal bones. Left temporoparietal bone skull fractures appear to be comminuted in multiple locations. There also small hemorrhagic contusions in bilateral frontal lobes. Small amount of subarachnoid hemorrhage in the right frontal lobe. Mild fluid layering in the sphenoid sinus. Fractures involving the left orbit as well. The mastoid air cells are clear. However, left temporal bone fracture does extend through the region of the left external auditory canal and extends to the middle ear. Cervical spine: Vertebral body heights are intact. Alignment is intact. Facets are normally aligned. The odontoid process is intact. No significant prevertebral soft tissue swelling. Facial bones: Mandible is intact. The zygomatic arches are intact. Nasal bones are intact. Nasal bony septum is midline. Sphenoid temporal buttress is intact. Mildly displaced fracture of the roof of the left orbit. The orbital floor is intact. Globes are intact and not proptotic. No retro bulbar soft tissue hematoma. Mild left periorbital preseptal soft tissue swelling. Bilateral comminuted skull bone fractures involving bilateral temporal bones and parietal bones. Fractures are worse on the left side. Mild right holohemispheric subdural hematoma with mild midline shift. Bilateral frontal small hemorrhagic contusions. Left orbital roof mildly displaced fracture. No evidence for cervical fracture or subluxation. Critical findings reported to the ER physician at time of dictation. Ct Thoracic Spine Wo Contrast    Result Date: 10/9/2020  EXAMINATION: CT OF THE THORACIC SPINE WITHOUT CONTRAST  10/8/2020 10:56 pm: TECHNIQUE: CT of the thoracic spine was performed without the administration of intravenous contrast. Multiplanar reformatted images are provided for review.  Dose modulation, iterative reconstruction, and/or weight based adjustment of the mA/kV was utilized to reduce the radiation dose to as low as reasonably achievable. COMPARISON: Same day lumbar spine CT; same day CT chest, abdomen and pelvis HISTORY: ORDERING SYSTEM PROVIDED HISTORY: trauma TECHNOLOGIST PROVIDED HISTORY: Reason for exam:->trauma What reading provider will be dictating this exam?->CRC FINDINGS: BONES/ALIGNMENT: There is normal alignment of the spine. The vertebral body heights are maintained. No osseous destructive lesion is seen. DEGENERATIVE CHANGES: Mild spondylosis without significant central canal narrowing. Mild right foraminal narrowing at T11-T12. SOFT TISSUES: No paraspinal mass is seen. No acute thoracic spine abnormality. Ct Lumbar Spine Wo Contrast    Result Date: 10/9/2020  EXAMINATION: CT OF THE LUMBAR SPINE WITHOUT CONTRAST  10/8/2020 TECHNIQUE: CT of the lumbar spine was performed without the administration of intravenous contrast. Multiplanar reformatted images are provided for review. Dose modulation, iterative reconstruction, and/or weight based adjustment of the mA/kV was utilized to reduce the radiation dose to as low as reasonably achievable. COMPARISON: None HISTORY: ORDERING SYSTEM PROVIDED HISTORY: trauma TECHNOLOGIST PROVIDED HISTORY: Reason for exam:->trauma What reading provider will be dictating this exam?->CRC FINDINGS: BONES/ALIGNMENT: Vertebral body heights are maintained. No compression deformity. L5 pars defects with grade 1 anterolisthesis of L5 on S1. DEGENERATIVE CHANGES: Moderate disc desiccation at L5-S1. No significant central canal stenosis. Moderate-to-severe foraminal stenosis at the listhesis level. SOFT TISSUES/RETROPERITONEUM: No paraspinal mass is seen. No acute lumbar spine abnormality. L5 pars defects with grade 1 anterolisthesis of L5 on S1 and moderate-to-severe foraminal stenosis.      Ct Abdomen Pelvis W Iv Contrast Additional Contrast? None    Result Date: 10/9/2020  EXAMINATION: CT OF THE ABDOMEN AND PELVIS WITH CONTRAST 10/8/2020 10:56 pm TECHNIQUE: CT of the abdomen and pelvis was performed with the administration of intravenous contrast. Multiplanar reformatted images are provided for review. Dose modulation, iterative reconstruction, and/or weight based adjustment of the mA/kV was utilized to reduce the radiation dose to as low as reasonably achievable. COMPARISON: None. HISTORY: ORDERING SYSTEM PROVIDED HISTORY: trauma TECHNOLOGIST PROVIDED HISTORY: Additional Contrast?->None Reason for exam:->trauma What reading provider will be dictating this exam?->CRC FINDINGS: Lower Chest: Described in detail on separate report of CT of the chest. Organs: The liver, spleen, pancreas, and bilateral adrenal glands are within normal limits. No radiopaque gallstones. No CT evidence of acute cholecystitis. No intra or extrahepatic ductal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

## 2020-10-14 NOTE — FLOWSHEET NOTE
Patient reaching for ETT with right hand   Verbal direction has no effect.  Soft wrist restraints to continue for patient safety
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
## 2020-10-14 NOTE — PLAN OF CARE
Problem: Restraint Use - Nonviolent/Non-Self-Destructive Behavior:  Goal: Absence of restraint indications  10/14/2020 1120 by Michaelyn Bernheim, RN  Outcome: Completed    Goal: Absence of restraint-related injury  10/14/2020 1120 by Michaelyn Bernheim, RN  Outcome: Completed  Plan of care discussed with patient/family. Patient/family incorporated into plan of care.

## 2020-10-15 ENCOUNTER — APPOINTMENT (OUTPATIENT)
Dept: GENERAL RADIOLOGY | Age: 30
DRG: 003 | End: 2020-10-15
Payer: COMMERCIAL

## 2020-10-15 LAB
AADO2: 208.1 MMHG
ANION GAP SERPL CALCULATED.3IONS-SCNC: 11 MMOL/L (ref 7–16)
B.E.: 4.2 MMOL/L (ref -3–3)
BACTERIA: ABNORMAL /HPF
BASOPHILS ABSOLUTE: 0.06 E9/L (ref 0–0.2)
BASOPHILS RELATIVE PERCENT: 0.6 % (ref 0–2)
BILIRUBIN URINE: NEGATIVE
BLOOD, URINE: ABNORMAL
BUN BLDV-MCNC: 22 MG/DL (ref 6–20)
CALCIUM IONIZED: 1.2 MMOL/L (ref 1.15–1.33)
CALCIUM SERPL-MCNC: 8.7 MG/DL (ref 8.6–10.2)
CHLORIDE BLD-SCNC: 107 MMOL/L (ref 98–107)
CLARITY: CLEAR
CO2: 28 MMOL/L (ref 22–29)
COHB: 0.3 % (ref 0–1.5)
COLOR: YELLOW
CREAT SERPL-MCNC: 0.8 MG/DL (ref 0.7–1.2)
CRITICAL: ABNORMAL
CULTURE, RESPIRATORY: ABNORMAL
CULTURE, RESPIRATORY: ABNORMAL
DATE ANALYZED: ABNORMAL
DATE OF COLLECTION: ABNORMAL
EOSINOPHILS ABSOLUTE: 0.5 E9/L (ref 0.05–0.5)
EOSINOPHILS RELATIVE PERCENT: 5.2 % (ref 0–6)
FIO2: 50 %
GFR AFRICAN AMERICAN: >60
GFR NON-AFRICAN AMERICAN: >60 ML/MIN/1.73
GLUCOSE BLD-MCNC: 118 MG/DL (ref 74–99)
GLUCOSE URINE: NEGATIVE MG/DL
HCO3: 28.4 MMOL/L (ref 22–26)
HCT VFR BLD CALC: 30.6 % (ref 37–54)
HEMOGLOBIN: 9.5 G/DL (ref 12.5–16.5)
HHB: 3.7 % (ref 0–5)
IMMATURE GRANULOCYTES #: 0.15 E9/L
IMMATURE GRANULOCYTES %: 1.6 % (ref 0–5)
KETONES, URINE: NEGATIVE MG/DL
LAB: ABNORMAL
LEUKOCYTE ESTERASE, URINE: NEGATIVE
LYMPHOCYTES ABSOLUTE: 1.22 E9/L (ref 1.5–4)
LYMPHOCYTES RELATIVE PERCENT: 12.7 % (ref 20–42)
Lab: ABNORMAL
MAGNESIUM: 2.4 MG/DL (ref 1.6–2.6)
MCH RBC QN AUTO: 30.4 PG (ref 26–35)
MCHC RBC AUTO-ENTMCNC: 31 % (ref 32–34.5)
MCV RBC AUTO: 97.8 FL (ref 80–99.9)
METHB: 0.2 % (ref 0–1.5)
MODE: AC
MONOCYTES ABSOLUTE: 0.95 E9/L (ref 0.1–0.95)
MONOCYTES RELATIVE PERCENT: 9.9 % (ref 2–12)
NEUTROPHILS ABSOLUTE: 6.7 E9/L (ref 1.8–7.3)
NEUTROPHILS RELATIVE PERCENT: 70 % (ref 43–80)
NITRITE, URINE: NEGATIVE
O2 CONTENT: 13.7 ML/DL
O2 SATURATION: 96.3 % (ref 92–98.5)
O2HB: 95.8 % (ref 94–97)
OPERATOR ID: ABNORMAL
ORGANISM: ABNORMAL
PATIENT TEMP: 37 C
PCO2: 41 MMHG (ref 35–45)
PDW BLD-RTO: 13.1 FL (ref 11.5–15)
PEEP/CPAP: 8 CMH2O
PFO2: 1.8 MMHG/%
PH BLOOD GAS: 7.46 (ref 7.35–7.45)
PH UA: 7.5 (ref 5–9)
PHOSPHORUS: 3.9 MG/DL (ref 2.5–4.5)
PLATELET # BLD: 228 E9/L (ref 130–450)
PMV BLD AUTO: 10.5 FL (ref 7–12)
PO2: 89.8 MMHG (ref 75–100)
POTASSIUM SERPL-SCNC: 3.8 MMOL/L (ref 3.5–5)
PROTEIN UA: ABNORMAL MG/DL
RBC # BLD: 3.13 E12/L (ref 3.8–5.8)
RBC UA: ABNORMAL /HPF (ref 0–2)
RI(T): 232 %
RR MECHANICAL: 16 B/MIN
SMEAR, RESPIRATORY: ABNORMAL
SODIUM BLD-SCNC: 145 MMOL/L (ref 132–146)
SODIUM BLD-SCNC: 146 MMOL/L (ref 132–146)
SOURCE, BLOOD GAS: ABNORMAL
SPECIFIC GRAVITY UA: 1.01 (ref 1–1.03)
THB: 10.1 G/DL (ref 11.5–16.5)
TIME ANALYZED: 519
UROBILINOGEN, URINE: >=8 E.U./DL
VT MECHANICAL: 500 ML
WBC # BLD: 9.6 E9/L (ref 4.5–11.5)
WBC UA: ABNORMAL /HPF (ref 0–5)

## 2020-10-15 PROCEDURE — 84100 ASSAY OF PHOSPHORUS: CPT

## 2020-10-15 PROCEDURE — 6360000002 HC RX W HCPCS: Performed by: STUDENT IN AN ORGANIZED HEALTH CARE EDUCATION/TRAINING PROGRAM

## 2020-10-15 PROCEDURE — 2580000003 HC RX 258: Performed by: STUDENT IN AN ORGANIZED HEALTH CARE EDUCATION/TRAINING PROGRAM

## 2020-10-15 PROCEDURE — 5A1955Z RESPIRATORY VENTILATION, GREATER THAN 96 CONSECUTIVE HOURS: ICD-10-PCS | Performed by: SURGERY

## 2020-10-15 PROCEDURE — 99291 CRITICAL CARE FIRST HOUR: CPT | Performed by: SURGERY

## 2020-10-15 PROCEDURE — 99152 MOD SED SAME PHYS/QHP 5/>YRS: CPT | Performed by: SURGERY

## 2020-10-15 PROCEDURE — 85025 COMPLETE CBC W/AUTO DIFF WBC: CPT

## 2020-10-15 PROCEDURE — 87040 BLOOD CULTURE FOR BACTERIA: CPT

## 2020-10-15 PROCEDURE — 94640 AIRWAY INHALATION TREATMENT: CPT

## 2020-10-15 PROCEDURE — 0B113F4 BYPASS TRACHEA TO CUTANEOUS WITH TRACHEOSTOMY DEVICE, PERCUTANEOUS APPROACH: ICD-10-PCS | Performed by: SURGERY

## 2020-10-15 PROCEDURE — 82805 BLOOD GASES W/O2 SATURATION: CPT

## 2020-10-15 PROCEDURE — 31600 PLANNED TRACHEOSTOMY: CPT | Performed by: SURGERY

## 2020-10-15 PROCEDURE — 37799 UNLISTED PX VASCULAR SURGERY: CPT

## 2020-10-15 PROCEDURE — 36556 INSERT NON-TUNNEL CV CATH: CPT

## 2020-10-15 PROCEDURE — 6370000000 HC RX 637 (ALT 250 FOR IP): Performed by: STUDENT IN AN ORGANIZED HEALTH CARE EDUCATION/TRAINING PROGRAM

## 2020-10-15 PROCEDURE — 80048 BASIC METABOLIC PNL TOTAL CA: CPT

## 2020-10-15 PROCEDURE — 71045 X-RAY EXAM CHEST 1 VIEW: CPT

## 2020-10-15 PROCEDURE — 2580000003 HC RX 258

## 2020-10-15 PROCEDURE — 6370000000 HC RX 637 (ALT 250 FOR IP): Performed by: SURGERY

## 2020-10-15 PROCEDURE — 81001 URINALYSIS AUTO W/SCOPE: CPT

## 2020-10-15 PROCEDURE — 6360000002 HC RX W HCPCS

## 2020-10-15 PROCEDURE — 94003 VENT MGMT INPAT SUBQ DAY: CPT

## 2020-10-15 PROCEDURE — 2709999900 HC NON-CHARGEABLE SUPPLY: Performed by: SURGERY

## 2020-10-15 PROCEDURE — 3609013300 HC EGD TUBE PLACEMENT: Performed by: SURGERY

## 2020-10-15 PROCEDURE — 36415 COLL VENOUS BLD VENIPUNCTURE: CPT

## 2020-10-15 PROCEDURE — 2500000003 HC RX 250 WO HCPCS: Performed by: STUDENT IN AN ORGANIZED HEALTH CARE EDUCATION/TRAINING PROGRAM

## 2020-10-15 PROCEDURE — 2580000003 HC RX 258: Performed by: SURGERY

## 2020-10-15 PROCEDURE — 99153 MOD SED SAME PHYS/QHP EA: CPT | Performed by: SURGERY

## 2020-10-15 PROCEDURE — 82330 ASSAY OF CALCIUM: CPT

## 2020-10-15 PROCEDURE — 6360000002 HC RX W HCPCS: Performed by: SURGERY

## 2020-10-15 PROCEDURE — 83735 ASSAY OF MAGNESIUM: CPT

## 2020-10-15 PROCEDURE — 2500000003 HC RX 250 WO HCPCS: Performed by: SURGERY

## 2020-10-15 PROCEDURE — 84295 ASSAY OF SERUM SODIUM: CPT

## 2020-10-15 PROCEDURE — 05HM33Z INSERTION OF INFUSION DEVICE INTO RIGHT INTERNAL JUGULAR VEIN, PERCUTANEOUS APPROACH: ICD-10-PCS | Performed by: SURGERY

## 2020-10-15 PROCEDURE — 2000000000 HC ICU R&B

## 2020-10-15 PROCEDURE — 43246 EGD PLACE GASTROSTOMY TUBE: CPT | Performed by: SURGERY

## 2020-10-15 RX ORDER — VECURONIUM BROMIDE 1 MG/ML
10 INJECTION, POWDER, LYOPHILIZED, FOR SOLUTION INTRAVENOUS ONCE
Status: COMPLETED | OUTPATIENT
Start: 2020-10-15 | End: 2020-10-15

## 2020-10-15 RX ORDER — LIDOCAINE HYDROCHLORIDE 20 MG/ML
INJECTION, SOLUTION INTRAVENOUS
Status: COMPLETED
Start: 2020-10-15 | End: 2020-10-15

## 2020-10-15 RX ORDER — MEPERIDINE HYDROCHLORIDE 25 MG/ML
50 INJECTION INTRAMUSCULAR; INTRAVENOUS; SUBCUTANEOUS EVERY 4 HOURS PRN
Status: DISCONTINUED | OUTPATIENT
Start: 2020-10-15 | End: 2020-10-21

## 2020-10-15 RX ORDER — ACETAMINOPHEN 160 MG/5ML
650 SOLUTION ORAL EVERY 4 HOURS
Status: DISCONTINUED | OUTPATIENT
Start: 2020-10-15 | End: 2020-10-24

## 2020-10-15 RX ORDER — ENALAPRILAT 2.5 MG/2ML
1.25 INJECTION INTRAVENOUS EVERY 6 HOURS PRN
Status: DISCONTINUED | OUTPATIENT
Start: 2020-10-15 | End: 2020-10-23

## 2020-10-15 RX ORDER — FENTANYL CITRATE 50 UG/ML
100 INJECTION, SOLUTION INTRAMUSCULAR; INTRAVENOUS ONCE
Status: COMPLETED | OUTPATIENT
Start: 2020-10-15 | End: 2020-10-15

## 2020-10-15 RX ORDER — OXYCODONE HCL 5 MG/5 ML
5 SOLUTION, ORAL ORAL EVERY 6 HOURS
Status: DISCONTINUED | OUTPATIENT
Start: 2020-10-15 | End: 2020-10-22

## 2020-10-15 RX ORDER — MEPERIDINE HYDROCHLORIDE 25 MG/ML
25 INJECTION INTRAMUSCULAR; INTRAVENOUS; SUBCUTANEOUS EVERY 4 HOURS PRN
Status: DISCONTINUED | OUTPATIENT
Start: 2020-10-15 | End: 2020-10-15

## 2020-10-15 RX ORDER — LIDOCAINE HYDROCHLORIDE 10 MG/ML
INJECTION, SOLUTION EPIDURAL; INFILTRATION; INTRACAUDAL; PERINEURAL PRN
Status: DISCONTINUED | OUTPATIENT
Start: 2020-10-15 | End: 2020-10-15 | Stop reason: ALTCHOICE

## 2020-10-15 RX ORDER — MEPERIDINE HYDROCHLORIDE 25 MG/ML
25 INJECTION INTRAMUSCULAR; INTRAVENOUS; SUBCUTANEOUS ONCE
Status: COMPLETED | OUTPATIENT
Start: 2020-10-15 | End: 2020-10-15

## 2020-10-15 RX ORDER — VECURONIUM BROMIDE 1 MG/ML
10 INJECTION, POWDER, LYOPHILIZED, FOR SOLUTION INTRAVENOUS SEE ADMIN INSTRUCTIONS
Status: COMPLETED | OUTPATIENT
Start: 2020-10-15 | End: 2020-10-15

## 2020-10-15 RX ADMIN — CEFAZOLIN 3 G: 10 INJECTION, POWDER, FOR SOLUTION INTRAVENOUS at 14:16

## 2020-10-15 RX ADMIN — LIDOCAINE HYDROCHLORIDE 100 MG: 20 INJECTION, SOLUTION INTRAVENOUS at 14:12

## 2020-10-15 RX ADMIN — MORPHINE SULFATE 2 MG: 2 INJECTION, SOLUTION INTRAMUSCULAR; INTRAVENOUS at 01:58

## 2020-10-15 RX ADMIN — POLYVINYL ALCOHOL 1 DROP: 14 SOLUTION/ DROPS OPHTHALMIC at 04:08

## 2020-10-15 RX ADMIN — ACETAMINOPHEN ORAL SOLUTION 650 MG: 650 SOLUTION ORAL at 01:57

## 2020-10-15 RX ADMIN — OXYCODONE HYDROCHLORIDE 10 MG: 5 SOLUTION ORAL at 09:32

## 2020-10-15 RX ADMIN — MINERAL OIL AND PETROLATUM: 150; 830 OINTMENT OPHTHALMIC at 07:53

## 2020-10-15 RX ADMIN — ACETAMINOPHEN ORAL SOLUTION 650 MG: 650 SOLUTION ORAL at 22:17

## 2020-10-15 RX ADMIN — FAMOTIDINE 20 MG: 20 TABLET ORAL at 08:21

## 2020-10-15 RX ADMIN — SODIUM CHLORIDE: 9 INJECTION, SOLUTION INTRAVENOUS at 00:07

## 2020-10-15 RX ADMIN — Medication 10 ML: at 08:26

## 2020-10-15 RX ADMIN — MEPERIDINE HYDROCHLORIDE 25 MG: 25 INJECTION INTRAMUSCULAR; INTRAVENOUS; SUBCUTANEOUS at 21:00

## 2020-10-15 RX ADMIN — HEPARIN SODIUM 5000 UNITS: 10000 INJECTION INTRAVENOUS; SUBCUTANEOUS at 16:33

## 2020-10-15 RX ADMIN — POLYVINYL ALCOHOL 1 DROP: 14 SOLUTION/ DROPS OPHTHALMIC at 12:20

## 2020-10-15 RX ADMIN — Medication 10 ML: at 20:36

## 2020-10-15 RX ADMIN — HEPARIN SODIUM 5000 UNITS: 10000 INJECTION INTRAVENOUS; SUBCUTANEOUS at 22:16

## 2020-10-15 RX ADMIN — POTASSIUM BICARBONATE 20 MEQ: 782 TABLET, EFFERVESCENT ORAL at 16:32

## 2020-10-15 RX ADMIN — CIPROFLOXACIN HYDROCHLORIDE 4 DROP: 3 SOLUTION/ DROPS OPHTHALMIC at 20:35

## 2020-10-15 RX ADMIN — MINERAL OIL AND PETROLATUM: 150; 830 OINTMENT OPHTHALMIC at 16:34

## 2020-10-15 RX ADMIN — MINERAL OIL AND PETROLATUM: 150; 830 OINTMENT OPHTHALMIC at 22:16

## 2020-10-15 RX ADMIN — DEXAMETHASONE SODIUM PHOSPHATE 4 DROP: 1 SOLUTION/ DROPS OPHTHALMIC at 20:35

## 2020-10-15 RX ADMIN — PIPERACILLIN AND TAZOBACTAM 3.38 G: 3; .375 INJECTION, POWDER, LYOPHILIZED, FOR SOLUTION INTRAVENOUS at 16:33

## 2020-10-15 RX ADMIN — HEPARIN SODIUM 5000 UNITS: 10000 INJECTION INTRAVENOUS; SUBCUTANEOUS at 06:13

## 2020-10-15 RX ADMIN — DEXAMETHASONE SODIUM PHOSPHATE 4 DROP: 1 SOLUTION/ DROPS OPHTHALMIC at 08:24

## 2020-10-15 RX ADMIN — PROPOFOL INJECTABLE EMULSION 25 MCG/KG/MIN: 10 INJECTION, EMULSION INTRAVENOUS at 11:20

## 2020-10-15 RX ADMIN — POLYVINYL ALCOHOL 1 DROP: 14 SOLUTION/ DROPS OPHTHALMIC at 16:33

## 2020-10-15 RX ADMIN — WATER: 1 INJECTION INTRAMUSCULAR; INTRAVENOUS; SUBCUTANEOUS at 15:57

## 2020-10-15 RX ADMIN — PROPOFOL INJECTABLE EMULSION 30 MCG/KG/MIN: 10 INJECTION, EMULSION INTRAVENOUS at 18:21

## 2020-10-15 RX ADMIN — VECURONIUM BROMIDE 10 MG: 1 INJECTION, POWDER, LYOPHILIZED, FOR SOLUTION INTRAVENOUS at 14:02

## 2020-10-15 RX ADMIN — POLYVINYL ALCOHOL 1 DROP: 14 SOLUTION/ DROPS OPHTHALMIC at 00:05

## 2020-10-15 RX ADMIN — CHLORHEXIDINE GLUCONATE 0.12% ORAL RINSE 15 ML: 1.2 LIQUID ORAL at 20:38

## 2020-10-15 RX ADMIN — MINERAL OIL AND PETROLATUM: 150; 830 OINTMENT OPHTHALMIC at 01:45

## 2020-10-15 RX ADMIN — LEVETIRACETAM 500 MG: 100 SOLUTION ORAL at 08:21

## 2020-10-15 RX ADMIN — BACITRACIN ZINC: 500 OINTMENT TOPICAL at 08:25

## 2020-10-15 RX ADMIN — ACETAMINOPHEN ORAL SOLUTION 650 MG: 650 SOLUTION ORAL at 16:32

## 2020-10-15 RX ADMIN — FENTANYL CITRATE 100 MCG: 50 INJECTION, SOLUTION INTRAMUSCULAR; INTRAVENOUS at 13:50

## 2020-10-15 RX ADMIN — SODIUM CHLORIDE 25 ML: 9 INJECTION, SOLUTION INTRAVENOUS at 20:32

## 2020-10-15 RX ADMIN — VANCOMYCIN HYDROCHLORIDE 1250 MG: 1 INJECTION, POWDER, LYOPHILIZED, FOR SOLUTION INTRAVENOUS at 17:26

## 2020-10-15 RX ADMIN — POLYVINYL ALCOHOL 1 DROP: 14 SOLUTION/ DROPS OPHTHALMIC at 08:23

## 2020-10-15 RX ADMIN — SODIUM CHLORIDE SOLN NEBU 3% 4 ML: 3 NEBU SOLN at 01:58

## 2020-10-15 RX ADMIN — MINERAL OIL AND PETROLATUM: 150; 830 OINTMENT OPHTHALMIC at 17:42

## 2020-10-15 RX ADMIN — VECURONIUM BROMIDE 10 MG: 1 INJECTION, POWDER, LYOPHILIZED, FOR SOLUTION INTRAVENOUS at 14:10

## 2020-10-15 RX ADMIN — CHLORHEXIDINE GLUCONATE 0.12% ORAL RINSE 15 ML: 1.2 LIQUID ORAL at 08:21

## 2020-10-15 RX ADMIN — BACITRACIN ZINC: 500 OINTMENT TOPICAL at 20:36

## 2020-10-15 RX ADMIN — PROPOFOL INJECTABLE EMULSION 30 MCG/KG/MIN: 10 INJECTION, EMULSION INTRAVENOUS at 22:16

## 2020-10-15 RX ADMIN — SODIUM CHLORIDE SOLN NEBU 3% 4 ML: 3 NEBU SOLN at 08:32

## 2020-10-15 RX ADMIN — FAMOTIDINE 20 MG: 20 TABLET ORAL at 20:38

## 2020-10-15 RX ADMIN — SODIUM CHLORIDE SOLN NEBU 3% 4 ML: 3 NEBU SOLN at 20:16

## 2020-10-15 RX ADMIN — FENTANYL CITRATE 100 MCG: 50 INJECTION, SOLUTION INTRAMUSCULAR; INTRAVENOUS at 14:09

## 2020-10-15 RX ADMIN — BACITRACIN ZINC, POLYMYXIN B SULFATE, NEOMYCIN SULFATE: 400; 5000; 3.5 OINTMENT TOPICAL at 20:35

## 2020-10-15 RX ADMIN — MEPERIDINE HYDROCHLORIDE 25 MG: 25 INJECTION INTRAMUSCULAR; INTRAVENOUS; SUBCUTANEOUS at 22:15

## 2020-10-15 RX ADMIN — POLYVINYL ALCOHOL 1 DROP: 14 SOLUTION/ DROPS OPHTHALMIC at 20:35

## 2020-10-15 RX ADMIN — OXYCODONE HYDROCHLORIDE 10 MG: 5 SOLUTION ORAL at 04:07

## 2020-10-15 RX ADMIN — OXYCODONE HYDROCHLORIDE 5 MG: 5 SOLUTION ORAL at 22:17

## 2020-10-15 RX ADMIN — OXYCODONE HYDROCHLORIDE 5 MG: 5 SOLUTION ORAL at 16:32

## 2020-10-15 RX ADMIN — CIPROFLOXACIN HYDROCHLORIDE 4 DROP: 3 SOLUTION/ DROPS OPHTHALMIC at 08:24

## 2020-10-15 RX ADMIN — Medication 100 MG: at 14:12

## 2020-10-15 RX ADMIN — SENNOSIDES 5 ML: 8.8 LIQUID ORAL at 20:36

## 2020-10-15 RX ADMIN — PROPOFOL INJECTABLE EMULSION 30 MCG/KG/MIN: 10 INJECTION, EMULSION INTRAVENOUS at 14:30

## 2020-10-15 RX ADMIN — MINERAL OIL AND PETROLATUM: 150; 830 OINTMENT OPHTHALMIC at 09:33

## 2020-10-15 RX ADMIN — BACITRACIN ZINC: 500 OINTMENT TOPICAL at 16:30

## 2020-10-15 RX ADMIN — BACITRACIN ZINC, POLYMYXIN B SULFATE, NEOMYCIN SULFATE: 400; 5000; 3.5 OINTMENT TOPICAL at 08:25

## 2020-10-15 RX ADMIN — WATER: 1 INJECTION INTRAMUSCULAR; INTRAVENOUS; SUBCUTANEOUS at 15:55

## 2020-10-15 ASSESSMENT — PULMONARY FUNCTION TESTS
PIF_VALUE: 26
PIF_VALUE: 24
PIF_VALUE: 25
PIF_VALUE: 23
PIF_VALUE: 28
PIF_VALUE: 25
PIF_VALUE: 100
PIF_VALUE: 36
PIF_VALUE: 38
PIF_VALUE: 33
PIF_VALUE: 25
PIF_VALUE: 44
PIF_VALUE: 26
PIF_VALUE: 25
PIF_VALUE: 22
PIF_VALUE: 26
PIF_VALUE: 45
PIF_VALUE: 27
PIF_VALUE: 29
PIF_VALUE: 26
PIF_VALUE: 29
PIF_VALUE: 25
PIF_VALUE: 26
PIF_VALUE: 27
PIF_VALUE: 28
PIF_VALUE: 28
PIF_VALUE: 35
PIF_VALUE: 25
PIF_VALUE: 23
PIF_VALUE: 25
PIF_VALUE: 25
PIF_VALUE: 23
PIF_VALUE: 27
PIF_VALUE: 30
PIF_VALUE: 26
PIF_VALUE: 30
PIF_VALUE: 29
PIF_VALUE: 100
PIF_VALUE: 22
PIF_VALUE: 37

## 2020-10-15 ASSESSMENT — PAIN SCALES - GENERAL
PAINLEVEL_OUTOF10: 2
PAINLEVEL_OUTOF10: 0
PAINLEVEL_OUTOF10: 6
PAINLEVEL_OUTOF10: 6
PAINLEVEL_OUTOF10: 0

## 2020-10-15 NOTE — FLOWSHEET NOTE
Pt reaches with his right hand despite redirection and explanation. Pt is a risk of harm to self and is placed in one point right wrist restraint for safety. Will continue to monitor for the need.
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
## 2020-10-15 NOTE — PROGRESS NOTES
process     Xr Hand Right (min 3 Views)    Result Date: 10/9/2020  EXAMINATION: THREE XRAY VIEWS OF THE LEFT HAND; THREE XRAY VIEWS OF THE RIGHT HAND 10/9/2020 7:01 am COMPARISON: None. HISTORY: ORDERING SYSTEM PROVIDED HISTORY: trauma TECHNOLOGIST PROVIDED HISTORY: Reason for exam:->trauma What reading provider will be dictating this exam?->CRC FINDINGS: Positioning of both hands does somewhat limit evaluation. There are no definite fractures or dislocations. Joint spaces are normal.     No acute process     Xr Knee Left (3 Views)    Result Date: 10/9/2020  EXAMINATION: THREE XRAY VIEWS OF THE LEFT KNEE 10/9/2020 7:03 am COMPARISON: None. HISTORY: ORDERING SYSTEM PROVIDED HISTORY: trauma TECHNOLOGIST PROVIDED HISTORY: Reason for exam:->trauma What reading provider will be dictating this exam?->CRC FINDINGS: There is no fracture dislocation. There is no joint effusion or degenerative change. No acute process     Xr Knee Right (3 Views)    Result Date: 10/9/2020  EXAMINATION: THREE XRAY VIEWS OF THE RIGHT KNEE 10/9/2020 8:06 am COMPARISON: None. HISTORY: ORDERING SYSTEM PROVIDED HISTORY: trauma TECHNOLOGIST PROVIDED HISTORY: Reason for exam:->trauma What reading provider will be dictating this exam?->CRC FINDINGS: There is no fracture dislocation. There is no joint space narrowing or effusion. No acute process     Ct Head Wo Contrast    Result Date: 10/9/2020  EXAMINATION: CT OF THE HEAD WITHOUT CONTRAST  10/9/2020 4:07 am TECHNIQUE: CT of the head was performed without the administration of intravenous contrast. Dose modulation, iterative reconstruction, and/or weight based adjustment of the mA/kV was utilized to reduce the radiation dose to as low as reasonably achievable. COMPARISON: CT head 10/08/2020 HISTORY: ORDERING SYSTEM PROVIDED HISTORY: evaluate head bleed TECHNOLOGIST PROVIDED HISTORY: Has a \"code stroke\" or \"stroke alert\" been called? ->No Reason for exam:->evaluate head bleed What reading provider will be dictating this exam?->CRC FINDINGS: BRAIN/VENTRICLES: Interval increase in size of right frontal parenchymal contusion, measuring 14 x 7 x 10 mm, previously 10 x 5 x 5 mm. Additional right frontal contusion, more inferiorly has also increased in size. Punctate contusions in the anterior-inferior frontal lobe along the gyrus rectus have also increased. Scattered bilateral hemispheric subarachnoid hemorrhage. Right convexity subdural hematoma measures up to 6 mm in thickness, not substantially changed. Trace left parietal subdural hematoma measuring 2 mm in thickness. 5 mm leftward midline shift, not substantially changed. No herniation. Patent basal cisterns. ORBITS: The visualized portion of the orbits demonstrate no acute abnormality. SINUSES: Nasopharyngeal opacities with partially imaged esophagogastric and endotracheal tubes. Scattered paranasal sinus opacities. SOFT TISSUES/SKULL:  Nondisplaced left temporal bone fracture extending to the otic capsule. Comminuted mildly displaced left parietal fracture. Nondisplaced right temporal bone fracture which is otic capsule sparing. Diffuse scalp swelling with posterior scalp contusions. Skin staples present. Mild interval increase in size of scattered parenchymal hematomas, mainly in the right frontal lobe, suspicious for diffuse axonal injury. No substantial change in acute right convexity subdural and left parietal subdural hematomas. Stable 5 mm leftward midline shift. Unchanged calvarial fractures, notable for a nondisplaced left temporal bone fracture possibly extending to the otic capsule. Recommend follow-up temporal bone CT.      Ct Head Wo Contrast    Result Date: 10/9/2020  EXAMINATION: CT OF THE HEAD WITHOUT CONTRAST  10/9/2020 12:11 pm TECHNIQUE: CT of the head was performed without the administration of intravenous contrast. Dose modulation, iterative reconstruction, and/or weight based adjustment of the mA/kV was utilized development of slit-like appearance of the right lateral ventricle. Suspect acute ischemia involving the left middle cerebellar peduncle and left cerebellar hemisphere. Similar appearance of intracranial hemorrhage in hemorrhagic contusions as above. Findings discussed with Dr. Fredy Denny at 12:53 p.m. on December 9, 2020. Ct Head Wo Contrast    Result Date: 10/9/2020  EXAMINATION: CT OF THE HEAD and cervical spine WITHOUT CONTRAST; CT OF THE FACE WITHOUT CONTRAST  10/8/2020 9:56 pm TECHNIQUE: CT of the head was performed without the administration of intravenous contrast. Dose modulation, iterative reconstruction, and/or weight based adjustment of the mA/kV was utilized to reduce the radiation dose to as low as reasonably achievable.; CT of the face was performed without the administration of intravenous contrast. Multiplanar reformatted images are provided for review. Dose modulation, iterative reconstruction, and/or weight based adjustment of the mA/kV was utilized to reduce the radiation dose to as low as reasonably achievable.; CT of the cervical spine was performed without the administration of intravenous contrast. Multiplanar reformatted images are provided for review. Dose modulation, iterative reconstruction, and/or weight based adjustment of the mA/kV was utilized to reduce the radiation dose to as low as reasonably achievable. COMPARISON: None. HISTORY: ORDERING SYSTEM PROVIDED HISTORY: trauma TECHNOLOGIST PROVIDED HISTORY: Reason for exam:->trauma Has a \"code stroke\" or \"stroke alert\" been called? ->No What reading provider will be dictating this exam?->CRC FINDINGS: Head: There is mild right frontoparietal holohemispheric subdural hemorrhage, measuring up to 6 mm. Mild mass effect and minimal midline shift of approximately 4 mm. Visualized skull demonstrates multiple mildly displaced fractures in the skull, involving left temporal bone, right temporal bone, bilateral parietal bones.   Left temporoparietal bone skull fractures appear to be comminuted in multiple locations. There also small hemorrhagic contusions in bilateral frontal lobes. Small amount of subarachnoid hemorrhage in the right frontal lobe. Mild fluid layering in the sphenoid sinus. Fractures involving the left orbit as well. The mastoid air cells are clear. However, left temporal bone fracture does extend through the region of the left external auditory canal and extends to the middle ear. Cervical spine: Vertebral body heights are intact. Alignment is intact. Facets are normally aligned. The odontoid process is intact. No significant prevertebral soft tissue swelling. Facial bones: Mandible is intact. The zygomatic arches are intact. Nasal bones are intact. Nasal bony septum is midline. Sphenoid temporal buttress is intact. Mildly displaced fracture of the roof of the left orbit. The orbital floor is intact. Globes are intact and not proptotic. No retro bulbar soft tissue hematoma. Mild left periorbital preseptal soft tissue swelling. Bilateral comminuted skull bone fractures involving bilateral temporal bones and parietal bones. Fractures are worse on the left side. Mild right holohemispheric subdural hematoma with mild midline shift. Bilateral frontal small hemorrhagic contusions. Left orbital roof mildly displaced fracture. No evidence for cervical fracture or subluxation. Critical findings reported to the ER physician at time of dictation. Ct Iac Posterior Fossa Wo Contrast    Result Date: 10/10/2020  EXAMINATION: CT OF THE INTERNAL AUDITORY CANAL WITHOUT CONTRAST 10/9/2020 12:11 pm TECHNIQUE: CT of the internal auditory canal was performed without contrast was performed without the administration of intravenous contrast. Multiplanar reformatted images are provided for review.  Dose modulation, iterative reconstruction, and/or weight based adjustment of the mA/kV was utilized to reduce the radiation dose to as low as reasonably achievable. COMPARISON: None HISTORY: ORDERING SYSTEM PROVIDED HISTORY: TRAUMA TECHNOLOGIST PROVIDED HISTORY: Reason for exam:->trauma What reading provider will be dictating this exam?->CRC FINDINGS: RIGHT TEMPORAL BONE:  The right external auditory canal is normal in appearance. There is no right temporal bone fracture identified. There is a small volume of fluid within the right mastoid air cells. The right middle ear is clear. The ossicles are normally aligned. The tegmen tympani is intact. The scutum is intact. There is no osseous dehiscence. The cochlea, vestibule and semicircular canals are normal in appearance. LEFT TEMPORAL BONE: There is a longitudinal fracture of the mastoid segment of the left temporal bone. There is incudomalleolar subluxation involving the head of the malleus and body of the incus. The fracture does not extend into the otic capsule. Hemorrhage is present within the left middle ear and mastoid air cells. A comminuted fracture of the squamous portion of the left temporal bone is noted. The cochlea, vestibule and semicircular canals are normal.     Longitudinal fracture of the mastoid portion of the left temporal bone with associated incudomalleolar subluxation involving the head of the malleus and body of the incus. The fracture does not extend into the otic capsule. Small volume of fluid within the right mastoid air cells but no acute traumatic injury of the mastoid portion of the right temporal bone evident.      Ct Facial Bones Wo Contrast    Result Date: 10/9/2020  EXAMINATION: CT OF THE HEAD and cervical spine WITHOUT CONTRAST; CT OF THE FACE WITHOUT CONTRAST  10/8/2020 9:56 pm TECHNIQUE: CT of the head was performed without the administration of intravenous contrast. Dose modulation, iterative reconstruction, and/or weight based adjustment of the mA/kV was utilized to reduce the radiation dose to as low as reasonably achievable.; CT of the face was performed without the administration of intravenous contrast. Multiplanar reformatted images are provided for review. Dose modulation, iterative reconstruction, and/or weight based adjustment of the mA/kV was utilized to reduce the radiation dose to as low as reasonably achievable.; CT of the cervical spine was performed without the administration of intravenous contrast. Multiplanar reformatted images are provided for review. Dose modulation, iterative reconstruction, and/or weight based adjustment of the mA/kV was utilized to reduce the radiation dose to as low as reasonably achievable. COMPARISON: None. HISTORY: ORDERING SYSTEM PROVIDED HISTORY: trauma TECHNOLOGIST PROVIDED HISTORY: Reason for exam:->trauma Has a \"code stroke\" or \"stroke alert\" been called? ->No What reading provider will be dictating this exam?->CRC FINDINGS: Head: There is mild right frontoparietal holohemispheric subdural hemorrhage, measuring up to 6 mm. Mild mass effect and minimal midline shift of approximately 4 mm. Visualized skull demonstrates multiple mildly displaced fractures in the skull, involving left temporal bone, right temporal bone, bilateral parietal bones. Left temporoparietal bone skull fractures appear to be comminuted in multiple locations. There also small hemorrhagic contusions in bilateral frontal lobes. Small amount of subarachnoid hemorrhage in the right frontal lobe. Mild fluid layering in the sphenoid sinus. Fractures involving the left orbit as well. The mastoid air cells are clear. However, left temporal bone fracture does extend through the region of the left external auditory canal and extends to the middle ear. Cervical spine: Vertebral body heights are intact. Alignment is intact. Facets are normally aligned. The odontoid process is intact. No significant prevertebral soft tissue swelling. Facial bones: Mandible is intact. The zygomatic arches are intact. Nasal bones are intact.   Nasal bony septum is midline. Sphenoid temporal buttress is intact. Mildly displaced fracture of the roof of the left orbit. The orbital floor is intact. Globes are intact and not proptotic. No retro bulbar soft tissue hematoma. Mild left periorbital preseptal soft tissue swelling. Bilateral comminuted skull bone fractures involving bilateral temporal bones and parietal bones. Fractures are worse on the left side. Mild right holohemispheric subdural hematoma with mild midline shift. Bilateral frontal small hemorrhagic contusions. Left orbital roof mildly displaced fracture. No evidence for cervical fracture or subluxation. Critical findings reported to the ER physician at time of dictation. Ct Chest W Contrast    Result Date: 10/9/2020  EXAMINATION: CT OF THE CHEST WITH CONTRAST 10/8/2020 10:56 pm TECHNIQUE: CT of the chest was performed with the administration of intravenous contrast. Multiplanar reformatted images are provided for review. Dose modulation, iterative reconstruction, and/or weight based adjustment of the mA/kV was utilized to reduce the radiation dose to as low as reasonably achievable. COMPARISON: None. HISTORY: ORDERING SYSTEM PROVIDED HISTORY: trauma TECHNOLOGIST PROVIDED HISTORY: Reason for exam:->trauma What reading provider will be dictating this exam?->CRC FINDINGS: An endotracheal and enteric tubes are noted in place and are appropriately positioned. Mediastinum: Normal heart size. The great vessels are within normal limits. No pericardial effusion. No significantly enlarged lymph nodes. Lungs/pleura: Mild dependent congestion involving the bilateral posterior lungs. No pulmonary mass or augustin consolidation. No pleural effusion. Upper Abdomen: Within normal limits. Soft Tissues/Bones: Nondisplaced acute fractures involving the lateral left 5th through 7th ribs. Nondisplaced acute fractures of the posterior left 4th through 7th ribs.   Acute mildly displaced fractures of the posterior left 8th rib. Mildly displaced acute fracture of the left body of the scapula. Nondisplaced acute fractures involving the lateral left 5th through 7th ribs. Nondisplaced acute fractures of the posterior left 4th through 7th ribs. Acute mildly displaced fractures of the posterior left 8th rib. Mildly displaced acute fracture of the left body of the scapula. Ct Cervical Spine Wo Contrast    Result Date: 10/9/2020  EXAMINATION: CT OF THE HEAD and cervical spine WITHOUT CONTRAST; CT OF THE FACE WITHOUT CONTRAST  10/8/2020 9:56 pm TECHNIQUE: CT of the head was performed without the administration of intravenous contrast. Dose modulation, iterative reconstruction, and/or weight based adjustment of the mA/kV was utilized to reduce the radiation dose to as low as reasonably achievable.; CT of the face was performed without the administration of intravenous contrast. Multiplanar reformatted images are provided for review. Dose modulation, iterative reconstruction, and/or weight based adjustment of the mA/kV was utilized to reduce the radiation dose to as low as reasonably achievable.; CT of the cervical spine was performed without the administration of intravenous contrast. Multiplanar reformatted images are provided for review. Dose modulation, iterative reconstruction, and/or weight based adjustment of the mA/kV was utilized to reduce the radiation dose to as low as reasonably achievable. COMPARISON: None. HISTORY: ORDERING SYSTEM PROVIDED HISTORY: trauma TECHNOLOGIST PROVIDED HISTORY: Reason for exam:->trauma Has a \"code stroke\" or \"stroke alert\" been called? ->No What reading provider will be dictating this exam?->CRC FINDINGS: Head: There is mild right frontoparietal holohemispheric subdural hemorrhage, measuring up to 6 mm. Mild mass effect and minimal midline shift of approximately 4 mm.   Visualized skull demonstrates multiple mildly displaced fractures in the skull, involving left temporal bone, right temporal bone, bilateral parietal bones. Left temporoparietal bone skull fractures appear to be comminuted in multiple locations. There also small hemorrhagic contusions in bilateral frontal lobes. Small amount of subarachnoid hemorrhage in the right frontal lobe. Mild fluid layering in the sphenoid sinus. Fractures involving the left orbit as well. The mastoid air cells are clear. However, left temporal bone fracture does extend through the region of the left external auditory canal and extends to the middle ear. Cervical spine: Vertebral body heights are intact. Alignment is intact. Facets are normally aligned. The odontoid process is intact. No significant prevertebral soft tissue swelling. Facial bones: Mandible is intact. The zygomatic arches are intact. Nasal bones are intact. Nasal bony septum is midline. Sphenoid temporal buttress is intact. Mildly displaced fracture of the roof of the left orbit. The orbital floor is intact. Globes are intact and not proptotic. No retro bulbar soft tissue hematoma. Mild left periorbital preseptal soft tissue swelling. Bilateral comminuted skull bone fractures involving bilateral temporal bones and parietal bones. Fractures are worse on the left side. Mild right holohemispheric subdural hematoma with mild midline shift. Bilateral frontal small hemorrhagic contusions. Left orbital roof mildly displaced fracture. No evidence for cervical fracture or subluxation. Critical findings reported to the ER physician at time of dictation. Ct Thoracic Spine Wo Contrast    Result Date: 10/9/2020  EXAMINATION: CT OF THE THORACIC SPINE WITHOUT CONTRAST  10/8/2020 10:56 pm: TECHNIQUE: CT of the thoracic spine was performed without the administration of intravenous contrast. Multiplanar reformatted images are provided for review.  Dose modulation, iterative reconstruction, and/or weight based adjustment of the mA/kV was utilized to reduce the radiation dose to as low as reasonably achievable. COMPARISON: Same day lumbar spine CT; same day CT chest, abdomen and pelvis HISTORY: ORDERING SYSTEM PROVIDED HISTORY: trauma TECHNOLOGIST PROVIDED HISTORY: Reason for exam:->trauma What reading provider will be dictating this exam?->CRC FINDINGS: BONES/ALIGNMENT: There is normal alignment of the spine. The vertebral body heights are maintained. No osseous destructive lesion is seen. DEGENERATIVE CHANGES: Mild spondylosis without significant central canal narrowing. Mild right foraminal narrowing at T11-T12. SOFT TISSUES: No paraspinal mass is seen. No acute thoracic spine abnormality. Ct Lumbar Spine Wo Contrast    Result Date: 10/9/2020  EXAMINATION: CT OF THE LUMBAR SPINE WITHOUT CONTRAST  10/8/2020 TECHNIQUE: CT of the lumbar spine was performed without the administration of intravenous contrast. Multiplanar reformatted images are provided for review. Dose modulation, iterative reconstruction, and/or weight based adjustment of the mA/kV was utilized to reduce the radiation dose to as low as reasonably achievable. COMPARISON: None HISTORY: ORDERING SYSTEM PROVIDED HISTORY: trauma TECHNOLOGIST PROVIDED HISTORY: Reason for exam:->trauma What reading provider will be dictating this exam?->CRC FINDINGS: BONES/ALIGNMENT: Vertebral body heights are maintained. No compression deformity. L5 pars defects with grade 1 anterolisthesis of L5 on S1. DEGENERATIVE CHANGES: Moderate disc desiccation at L5-S1. No significant central canal stenosis. Moderate-to-severe foraminal stenosis at the listhesis level. SOFT TISSUES/RETROPERITONEUM: No paraspinal mass is seen. No acute lumbar spine abnormality. L5 pars defects with grade 1 anterolisthesis of L5 on S1 and moderate-to-severe foraminal stenosis.      Ct Abdomen Pelvis W Iv Contrast Additional Contrast? None    Result Date: 10/9/2020  EXAMINATION: CT OF THE ABDOMEN AND PELVIS WITH CONTRAST 10/8/2020 10:56 pm TECHNIQUE: CT of the abdomen and pelvis was performed with the administration of intravenous contrast. Multiplanar reformatted images are provided for review. Dose modulation, iterative reconstruction, and/or weight based adjustment of the mA/kV was utilized to reduce the radiation dose to as low as reasonably achievable. COMPARISON: None. HISTORY: ORDERING SYSTEM PROVIDED HISTORY: trauma TECHNOLOGIST PROVIDED HISTORY: Additional Contrast?->None Reason for exam:->trauma What reading provider will be dictating this exam?->CRC FINDINGS: Lower Chest: Described in detail on separate report of CT of the chest. Organs: The liver, spleen, pancreas, and bilateral adrenal glands are within normal limits. No radiopaque gallstones. No CT evidence of acute cholecystitis. No intra or extrahepatic ductal dilatation. The bilateral kidneys are within normal limits. No renal cysts or masses are noted. No hydronephrosis or hydroureter. GI/Bowel: The small and large bowel demonstrate normal caliber and appearance. A normal-appearing appendix is identified. Pelvis: A soft tissue structure is noted in the distal right inguinal canal which could represent a deeply retracted testicle versus an undescended testicle. Recommend clinical correlation. The urinary bladder is nearly decompressed with a Bentley catheter in place. Peritoneum/Retroperitoneum: No free fluid or free air. Bones/Soft Tissues: Multiple rib fractures, as described in report of CT of the chest.  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 A soft tissue structure is noted in the distal right inguinal canal presumably representing the right testicle and could represent a deeply retracted testicle versus an undescended testicle. Recommend clinical correlation.      Xr Chest Portable    Result Date: 10/9/2020  EXAMINATION: ONE XRAY VIEW OF THE CHEST 10/9/2020 6:59 am COMPARISON: 10/09/2020 HISTORY: ORDERING SYSTEM PROVIDED HISTORY: intubated TECHNOLOGIST PROVIDED HISTORY: Reason for exam:->intubated What reading provider will be dictating this exam?->CRC FINDINGS: Lines/tubes are appropriate. Heart size is normal.  There are no infiltrates or effusions. No acute process     Xr Chest Portable    Result Date: 10/9/2020  EXAMINATION: ONE XRAY VIEW OF THE CHEST 10/9/2020 12:42 am COMPARISON: 10/08/2020 at this 2248 hours. [place] HISTORY: ORDERING SYSTEM PROVIDED HISTORY: Line Placement TECHNOLOGIST PROVIDED HISTORY: Reason for exam:->Line Placement What reading provider will be dictating this exam?->CRC FINDINGS: Heart is not enlarged. Endotracheal tube and enteric tube are in place. Left IJ CVC is in place with tip in the proximal SVC. No pneumothorax. No pleural effusions. No pulmonary edema or pneumothorax. Endotracheal tube, enteric tube and left IJ CVC is in place with no pneumothorax. Xr Chest 1 View    Result Date: 10/8/2020  EXAMINATION: ONE XRAY VIEW OF THE CHEST 10/8/2020 9:52 pm COMPARISON: None. HISTORY: ORDERING SYSTEM PROVIDED HISTORY: trauma TECHNOLOGIST PROVIDED HISTORY: Reason for exam:->trauma What reading provider will be dictating this exam?->CRC FINDINGS: Endotracheal tube is present with distal tip approximately 6 cm above the andriy. Nasogastric tube courses below the level of the diaphragm with distal tip not included on this examination. No focal airspace opacity or pleural effusion. The heart is prominent related to portable technique. No pneumothorax. 1.  No acute process in the chest. 2.  Endotracheal tube is 6 cm above the andriy. 3.  Nasogastric tube courses below the level of the diaphragm. Cta Neck W Contrast    Result Date: 10/9/2020  EXAMINATION: CTA OF THE HEAD WITH CONTRAST; CTA OF THE NECK 10/9/2020 3:17 pm: TECHNIQUE: CTA of the head/brain was performed with the administration of intravenous contrast. Multiplanar reformatted images are provided for review. MIP images are provided for review.  Dose modulation, iterative reconstruction, and/or weight based adjustment of the mA/kV was utilized to reduce the radiation dose to as low as reasonably achievable.; CTA of the neck was performed with the administration of intravenous contrast. Multiplanar reformatted images are provided for review. MIP images are provided for review. Stenosis of the internal carotid arteries measured using NASCET criteria. Dose modulation, iterative reconstruction, and/or weight based adjustment of the mA/kV was utilized to reduce the radiation dose to as low as reasonably achievable. COMPARISON: CT head from 12/30 5 p.m. HISTORY: ORDERING SYSTEM PROVIDED HISTORY: ischemic TECHNOLOGIST PROVIDED HISTORY: Reason for exam:->ischemic Has a \"code stroke\" or \"stroke alert\" been called? ->No What reading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

## 2020-10-15 NOTE — PROGRESS NOTES
PEG tube inserted per Dr Myesha Braxton without diffculty . PEG 2.5cm @ skin. DSD and binder applied. Pt tolerated well.
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
## 2020-10-16 ENCOUNTER — APPOINTMENT (OUTPATIENT)
Dept: GENERAL RADIOLOGY | Age: 30
DRG: 003 | End: 2020-10-16
Payer: COMMERCIAL

## 2020-10-16 LAB
AADO2: 256.3 MMHG
ANION GAP SERPL CALCULATED.3IONS-SCNC: 9 MMOL/L (ref 7–16)
B.E.: 1.2 MMOL/L (ref -3–3)
BASOPHILS ABSOLUTE: 0.03 E9/L (ref 0–0.2)
BASOPHILS RELATIVE PERCENT: 0.3 % (ref 0–2)
BUN BLDV-MCNC: 23 MG/DL (ref 6–20)
CALCIUM IONIZED: 1.23 MMOL/L (ref 1.15–1.33)
CALCIUM SERPL-MCNC: 8.5 MG/DL (ref 8.6–10.2)
CHLORIDE BLD-SCNC: 106 MMOL/L (ref 98–107)
CO2: 26 MMOL/L (ref 22–29)
COHB: 0.3 % (ref 0–1.5)
CREAT SERPL-MCNC: 0.6 MG/DL (ref 0.7–1.2)
CRITICAL: ABNORMAL
CULTURE, RESPIRATORY: ABNORMAL
CULTURE, RESPIRATORY: ABNORMAL
DATE ANALYZED: ABNORMAL
DATE OF COLLECTION: ABNORMAL
EOSINOPHILS ABSOLUTE: 0.38 E9/L (ref 0.05–0.5)
EOSINOPHILS RELATIVE PERCENT: 4.3 % (ref 0–6)
FIO2: 60 %
GFR AFRICAN AMERICAN: >60
GFR NON-AFRICAN AMERICAN: >60 ML/MIN/1.73
GLUCOSE BLD-MCNC: 112 MG/DL (ref 74–99)
HCO3: 25.4 MMOL/L (ref 22–26)
HCT VFR BLD CALC: 29.1 % (ref 37–54)
HEMOGLOBIN: 9.3 G/DL (ref 12.5–16.5)
HHB: 2.7 % (ref 0–5)
IMMATURE GRANULOCYTES #: 0.13 E9/L
IMMATURE GRANULOCYTES %: 1.5 % (ref 0–5)
LAB: ABNORMAL
LYMPHOCYTES ABSOLUTE: 0.93 E9/L (ref 1.5–4)
LYMPHOCYTES RELATIVE PERCENT: 10.5 % (ref 20–42)
Lab: ABNORMAL
MAGNESIUM: 2.5 MG/DL (ref 1.6–2.6)
MCH RBC QN AUTO: 30.4 PG (ref 26–35)
MCHC RBC AUTO-ENTMCNC: 32 % (ref 32–34.5)
MCV RBC AUTO: 95.1 FL (ref 80–99.9)
METHB: 0.2 % (ref 0–1.5)
MODE: AC
MONOCYTES ABSOLUTE: 0.82 E9/L (ref 0.1–0.95)
MONOCYTES RELATIVE PERCENT: 9.3 % (ref 2–12)
NEUTROPHILS ABSOLUTE: 6.53 E9/L (ref 1.8–7.3)
NEUTROPHILS RELATIVE PERCENT: 74.1 % (ref 43–80)
O2 SATURATION: 98.3 % (ref 92–98.5)
O2HB: 96.8 % (ref 94–97)
OPERATOR ID: ABNORMAL
ORGANISM: ABNORMAL
PATIENT TEMP: 37 C
PCO2: 38.8 MMHG (ref 35–45)
PDW BLD-RTO: 12.7 FL (ref 11.5–15)
PEEP/CPAP: 8 CMH2O
PFO2: 1.9 MMHG/%
PH BLOOD GAS: 7.43 (ref 7.35–7.45)
PHOSPHORUS: 3.6 MG/DL (ref 2.5–4.5)
PLATELET # BLD: 244 E9/L (ref 130–450)
PMV BLD AUTO: 10 FL (ref 7–12)
PO2: 113.8 MMHG (ref 75–100)
POTASSIUM SERPL-SCNC: 3.4 MMOL/L (ref 3.5–5)
RBC # BLD: 3.06 E12/L (ref 3.8–5.8)
RI(T): 2.25
RR MECHANICAL: 16 B/MIN
SMEAR, RESPIRATORY: ABNORMAL
SODIUM BLD-SCNC: 141 MMOL/L (ref 132–146)
SOURCE, BLOOD GAS: ABNORMAL
THB: 10.9 G/DL (ref 11.5–16.5)
TIME ANALYZED: 608
TRIGL SERPL-MCNC: 329 MG/DL (ref 0–149)
URINE CULTURE, ROUTINE: NORMAL
VANCOMYCIN TROUGH: 9.1 MCG/ML (ref 5–16)
VT MECHANICAL: 500 ML
WBC # BLD: 8.8 E9/L (ref 4.5–11.5)

## 2020-10-16 PROCEDURE — 6360000002 HC RX W HCPCS: Performed by: STUDENT IN AN ORGANIZED HEALTH CARE EDUCATION/TRAINING PROGRAM

## 2020-10-16 PROCEDURE — 82805 BLOOD GASES W/O2 SATURATION: CPT

## 2020-10-16 PROCEDURE — 82330 ASSAY OF CALCIUM: CPT

## 2020-10-16 PROCEDURE — 99291 CRITICAL CARE FIRST HOUR: CPT | Performed by: SURGERY

## 2020-10-16 PROCEDURE — 2000000000 HC ICU R&B

## 2020-10-16 PROCEDURE — 94640 AIRWAY INHALATION TREATMENT: CPT

## 2020-10-16 PROCEDURE — 84478 ASSAY OF TRIGLYCERIDES: CPT

## 2020-10-16 PROCEDURE — 36415 COLL VENOUS BLD VENIPUNCTURE: CPT

## 2020-10-16 PROCEDURE — 85025 COMPLETE CBC W/AUTO DIFF WBC: CPT

## 2020-10-16 PROCEDURE — 6370000000 HC RX 637 (ALT 250 FOR IP): Performed by: SURGERY

## 2020-10-16 PROCEDURE — 94003 VENT MGMT INPAT SUBQ DAY: CPT

## 2020-10-16 PROCEDURE — 80048 BASIC METABOLIC PNL TOTAL CA: CPT

## 2020-10-16 PROCEDURE — 2500000003 HC RX 250 WO HCPCS: Performed by: SURGERY

## 2020-10-16 PROCEDURE — 2580000003 HC RX 258: Performed by: SURGERY

## 2020-10-16 PROCEDURE — 6370000000 HC RX 637 (ALT 250 FOR IP): Performed by: STUDENT IN AN ORGANIZED HEALTH CARE EDUCATION/TRAINING PROGRAM

## 2020-10-16 PROCEDURE — 80202 ASSAY OF VANCOMYCIN: CPT

## 2020-10-16 PROCEDURE — 37799 UNLISTED PX VASCULAR SURGERY: CPT

## 2020-10-16 PROCEDURE — 71045 X-RAY EXAM CHEST 1 VIEW: CPT

## 2020-10-16 PROCEDURE — 6360000002 HC RX W HCPCS: Performed by: SURGERY

## 2020-10-16 PROCEDURE — 2580000003 HC RX 258: Performed by: STUDENT IN AN ORGANIZED HEALTH CARE EDUCATION/TRAINING PROGRAM

## 2020-10-16 PROCEDURE — 83735 ASSAY OF MAGNESIUM: CPT

## 2020-10-16 PROCEDURE — 84100 ASSAY OF PHOSPHORUS: CPT

## 2020-10-16 RX ORDER — FENTANYL CITRATE 50 UG/ML
75 INJECTION, SOLUTION INTRAMUSCULAR; INTRAVENOUS
Status: DISCONTINUED | OUTPATIENT
Start: 2020-10-16 | End: 2020-10-20

## 2020-10-16 RX ORDER — POTASSIUM CHLORIDE 29.8 MG/ML
40 INJECTION INTRAVENOUS ONCE
Status: COMPLETED | OUTPATIENT
Start: 2020-10-16 | End: 2020-10-16

## 2020-10-16 RX ORDER — MIDAZOLAM HYDROCHLORIDE 1 MG/ML
2 INJECTION INTRAMUSCULAR; INTRAVENOUS EVERY 4 HOURS PRN
Status: DISCONTINUED | OUTPATIENT
Start: 2020-10-16 | End: 2020-10-19

## 2020-10-16 RX ADMIN — ACETAMINOPHEN ORAL SOLUTION 650 MG: 650 SOLUTION ORAL at 14:20

## 2020-10-16 RX ADMIN — MINERAL OIL AND PETROLATUM: 150; 830 OINTMENT OPHTHALMIC at 06:34

## 2020-10-16 RX ADMIN — FENTANYL CITRATE 75 MCG: 50 INJECTION, SOLUTION INTRAMUSCULAR; INTRAVENOUS at 19:56

## 2020-10-16 RX ADMIN — ACETAMINOPHEN ORAL SOLUTION 650 MG: 650 SOLUTION ORAL at 22:24

## 2020-10-16 RX ADMIN — CIPROFLOXACIN HYDROCHLORIDE 4 DROP: 3 SOLUTION/ DROPS OPHTHALMIC at 09:46

## 2020-10-16 RX ADMIN — HEPARIN SODIUM 5000 UNITS: 10000 INJECTION INTRAVENOUS; SUBCUTANEOUS at 21:53

## 2020-10-16 RX ADMIN — BACITRACIN ZINC: 500 OINTMENT TOPICAL at 14:20

## 2020-10-16 RX ADMIN — PROPOFOL INJECTABLE EMULSION 35 MCG/KG/MIN: 10 INJECTION, EMULSION INTRAVENOUS at 18:15

## 2020-10-16 RX ADMIN — ACETAMINOPHEN ORAL SOLUTION 650 MG: 650 SOLUTION ORAL at 06:34

## 2020-10-16 RX ADMIN — VANCOMYCIN HYDROCHLORIDE 1250 MG: 1 INJECTION, POWDER, LYOPHILIZED, FOR SOLUTION INTRAVENOUS at 09:44

## 2020-10-16 RX ADMIN — MIDAZOLAM 2 MG: 1 INJECTION INTRAMUSCULAR; INTRAVENOUS at 00:46

## 2020-10-16 RX ADMIN — PROPOFOL INJECTABLE EMULSION 50 MCG/KG/MIN: 10 INJECTION, EMULSION INTRAVENOUS at 05:10

## 2020-10-16 RX ADMIN — HEPARIN SODIUM 5000 UNITS: 10000 INJECTION INTRAVENOUS; SUBCUTANEOUS at 06:34

## 2020-10-16 RX ADMIN — CHLORHEXIDINE GLUCONATE 0.12% ORAL RINSE 15 ML: 1.2 LIQUID ORAL at 09:45

## 2020-10-16 RX ADMIN — PROPOFOL INJECTABLE EMULSION 50 MCG/KG/MIN: 10 INJECTION, EMULSION INTRAVENOUS at 01:59

## 2020-10-16 RX ADMIN — SODIUM CHLORIDE 25 ML: 9 INJECTION, SOLUTION INTRAVENOUS at 12:07

## 2020-10-16 RX ADMIN — PIPERACILLIN AND TAZOBACTAM 3.38 G: 3; .375 INJECTION, POWDER, LYOPHILIZED, FOR SOLUTION INTRAVENOUS at 09:45

## 2020-10-16 RX ADMIN — SENNOSIDES 5 ML: 8.8 LIQUID ORAL at 20:39

## 2020-10-16 RX ADMIN — DEXAMETHASONE SODIUM PHOSPHATE 4 DROP: 1 SOLUTION/ DROPS OPHTHALMIC at 12:03

## 2020-10-16 RX ADMIN — ACETAMINOPHEN ORAL SOLUTION 650 MG: 650 SOLUTION ORAL at 01:47

## 2020-10-16 RX ADMIN — MINERAL OIL AND PETROLATUM: 150; 830 OINTMENT OPHTHALMIC at 09:44

## 2020-10-16 RX ADMIN — MINERAL OIL AND PETROLATUM: 150; 830 OINTMENT OPHTHALMIC at 14:20

## 2020-10-16 RX ADMIN — SODIUM CHLORIDE SOLN NEBU 3% 4 ML: 3 NEBU SOLN at 19:37

## 2020-10-16 RX ADMIN — PIPERACILLIN AND TAZOBACTAM 3.38 G: 3; .375 INJECTION, POWDER, LYOPHILIZED, FOR SOLUTION INTRAVENOUS at 16:04

## 2020-10-16 RX ADMIN — FENTANYL CITRATE 75 MCG: 50 INJECTION, SOLUTION INTRAMUSCULAR; INTRAVENOUS at 12:29

## 2020-10-16 RX ADMIN — PROPOFOL INJECTABLE EMULSION 35 MCG/KG/MIN: 10 INJECTION, EMULSION INTRAVENOUS at 09:44

## 2020-10-16 RX ADMIN — MEPERIDINE HYDROCHLORIDE 50 MG: 25 INJECTION, SOLUTION INTRAMUSCULAR; INTRAVENOUS; SUBCUTANEOUS at 09:45

## 2020-10-16 RX ADMIN — OXYCODONE HYDROCHLORIDE 5 MG: 5 SOLUTION ORAL at 21:54

## 2020-10-16 RX ADMIN — POLYVINYL ALCOHOL 1 DROP: 14 SOLUTION/ DROPS OPHTHALMIC at 20:10

## 2020-10-16 RX ADMIN — SODIUM CHLORIDE SOLN NEBU 3% 4 ML: 3 NEBU SOLN at 02:13

## 2020-10-16 RX ADMIN — SODIUM CHLORIDE 25 ML: 9 INJECTION, SOLUTION INTRAVENOUS at 04:00

## 2020-10-16 RX ADMIN — BACITRACIN ZINC: 500 OINTMENT TOPICAL at 20:39

## 2020-10-16 RX ADMIN — POLYVINYL ALCOHOL 1 DROP: 14 SOLUTION/ DROPS OPHTHALMIC at 16:05

## 2020-10-16 RX ADMIN — FAMOTIDINE 20 MG: 20 TABLET ORAL at 20:38

## 2020-10-16 RX ADMIN — FENTANYL CITRATE 75 MCG: 50 INJECTION, SOLUTION INTRAMUSCULAR; INTRAVENOUS at 18:14

## 2020-10-16 RX ADMIN — MEPERIDINE HYDROCHLORIDE 50 MG: 25 INJECTION, SOLUTION INTRAMUSCULAR; INTRAVENOUS; SUBCUTANEOUS at 16:03

## 2020-10-16 RX ADMIN — MINERAL OIL AND PETROLATUM: 150; 830 OINTMENT OPHTHALMIC at 18:20

## 2020-10-16 RX ADMIN — OXYCODONE HYDROCHLORIDE 5 MG: 5 SOLUTION ORAL at 09:46

## 2020-10-16 RX ADMIN — Medication 10 ML: at 20:38

## 2020-10-16 RX ADMIN — MINERAL OIL AND PETROLATUM: 150; 830 OINTMENT OPHTHALMIC at 21:54

## 2020-10-16 RX ADMIN — POLYVINYL ALCOHOL 1 DROP: 14 SOLUTION/ DROPS OPHTHALMIC at 12:07

## 2020-10-16 RX ADMIN — BACITRACIN ZINC, POLYMYXIN B SULFATE, NEOMYCIN SULFATE: 400; 5000; 3.5 OINTMENT TOPICAL at 09:47

## 2020-10-16 RX ADMIN — Medication 10 ML: at 09:45

## 2020-10-16 RX ADMIN — POTASSIUM CHLORIDE 40 MEQ: 400 INJECTION, SOLUTION INTRAVENOUS at 09:45

## 2020-10-16 RX ADMIN — PROPOFOL INJECTABLE EMULSION 35 MCG/KG/MIN: 10 INJECTION, EMULSION INTRAVENOUS at 23:09

## 2020-10-16 RX ADMIN — OXYCODONE HYDROCHLORIDE 5 MG: 5 SOLUTION ORAL at 04:26

## 2020-10-16 RX ADMIN — PROPOFOL INJECTABLE EMULSION 35 MCG/KG/MIN: 10 INJECTION, EMULSION INTRAVENOUS at 14:07

## 2020-10-16 RX ADMIN — VANCOMYCIN HYDROCHLORIDE 1250 MG: 1 INJECTION, POWDER, LYOPHILIZED, FOR SOLUTION INTRAVENOUS at 16:04

## 2020-10-16 RX ADMIN — PIPERACILLIN AND TAZOBACTAM 3.38 G: 3; .375 INJECTION, POWDER, LYOPHILIZED, FOR SOLUTION INTRAVENOUS at 00:14

## 2020-10-16 RX ADMIN — SODIUM CHLORIDE 25 ML: 9 INJECTION, SOLUTION INTRAVENOUS at 20:19

## 2020-10-16 RX ADMIN — BACITRACIN ZINC: 500 OINTMENT TOPICAL at 09:47

## 2020-10-16 RX ADMIN — HEPARIN SODIUM 5000 UNITS: 10000 INJECTION INTRAVENOUS; SUBCUTANEOUS at 14:20

## 2020-10-16 RX ADMIN — POLYVINYL ALCOHOL 1 DROP: 14 SOLUTION/ DROPS OPHTHALMIC at 06:34

## 2020-10-16 RX ADMIN — CHLORHEXIDINE GLUCONATE 0.12% ORAL RINSE 15 ML: 1.2 LIQUID ORAL at 20:38

## 2020-10-16 RX ADMIN — BACITRACIN ZINC, POLYMYXIN B SULFATE, NEOMYCIN SULFATE: 400; 5000; 3.5 OINTMENT TOPICAL at 20:39

## 2020-10-16 RX ADMIN — FENTANYL CITRATE 75 MCG: 50 INJECTION, SOLUTION INTRAMUSCULAR; INTRAVENOUS at 04:26

## 2020-10-16 RX ADMIN — ENALAPRILAT 1.25 MG: 1.25 INJECTION INTRAVENOUS at 22:33

## 2020-10-16 RX ADMIN — POLYVINYL ALCOHOL 1 DROP: 14 SOLUTION/ DROPS OPHTHALMIC at 00:13

## 2020-10-16 RX ADMIN — VANCOMYCIN HYDROCHLORIDE 1250 MG: 1 INJECTION, POWDER, LYOPHILIZED, FOR SOLUTION INTRAVENOUS at 00:14

## 2020-10-16 RX ADMIN — ACETAMINOPHEN ORAL SOLUTION 650 MG: 650 SOLUTION ORAL at 09:57

## 2020-10-16 RX ADMIN — FENTANYL CITRATE 75 MCG: 50 INJECTION, SOLUTION INTRAMUSCULAR; INTRAVENOUS at 06:33

## 2020-10-16 RX ADMIN — POLYVINYL ALCOHOL 1 DROP: 14 SOLUTION/ DROPS OPHTHALMIC at 04:26

## 2020-10-16 RX ADMIN — MEPERIDINE HYDROCHLORIDE 50 MG: 25 INJECTION, SOLUTION INTRAMUSCULAR; INTRAVENOUS; SUBCUTANEOUS at 20:50

## 2020-10-16 RX ADMIN — MEPERIDINE HYDROCHLORIDE 50 MG: 25 INJECTION, SOLUTION INTRAMUSCULAR; INTRAVENOUS; SUBCUTANEOUS at 01:57

## 2020-10-16 RX ADMIN — FAMOTIDINE 20 MG: 20 TABLET ORAL at 09:44

## 2020-10-16 RX ADMIN — SODIUM CHLORIDE SOLN NEBU 3% 4 ML: 3 NEBU SOLN at 07:58

## 2020-10-16 RX ADMIN — MINERAL OIL AND PETROLATUM: 150; 830 OINTMENT OPHTHALMIC at 01:47

## 2020-10-16 RX ADMIN — OXYCODONE HYDROCHLORIDE 5 MG: 5 SOLUTION ORAL at 16:04

## 2020-10-16 RX ADMIN — ACETAMINOPHEN ORAL SOLUTION 650 MG: 650 SOLUTION ORAL at 18:19

## 2020-10-16 ASSESSMENT — PULMONARY FUNCTION TESTS
PIF_VALUE: 12
PIF_VALUE: 11
PIF_VALUE: 20
PIF_VALUE: 23
PIF_VALUE: 19
PIF_VALUE: 16
PIF_VALUE: 16
PIF_VALUE: 20
PIF_VALUE: 12
PIF_VALUE: 19
PIF_VALUE: 14
PIF_VALUE: 21
PIF_VALUE: 17
PIF_VALUE: 21
PIF_VALUE: 20
PIF_VALUE: 13
PIF_VALUE: 15
PIF_VALUE: 22
PIF_VALUE: 21
PIF_VALUE: 22
PIF_VALUE: 32
PIF_VALUE: 17
PIF_VALUE: 23
PIF_VALUE: 20
PIF_VALUE: 13
PIF_VALUE: 17
PIF_VALUE: 26
PIF_VALUE: 12
PIF_VALUE: 20
PIF_VALUE: 12
PIF_VALUE: 12
PIF_VALUE: 19

## 2020-10-16 ASSESSMENT — PAIN SCALES - GENERAL
PAINLEVEL_OUTOF10: 0
PAINLEVEL_OUTOF10: 6
PAINLEVEL_OUTOF10: 0
PAINLEVEL_OUTOF10: 0
PAINLEVEL_OUTOF10: 3
PAINLEVEL_OUTOF10: 6
PAINLEVEL_OUTOF10: 0
PAINLEVEL_OUTOF10: 6
PAINLEVEL_OUTOF10: 6

## 2020-10-16 NOTE — FLOWSHEET NOTE
Pt reaches with his right hand despite redirection and explanation. Pt is a risk of harm to self and remains in one point right wrist restraint for safety.  Will continue to monitor for the need.
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
## 2020-10-16 NOTE — FLOWSHEET NOTE
Patient continues to move rt arm up towards trach unable to redirect at this time will continue to monitor

## 2020-10-16 NOTE — PROGRESS NOTES
Neurosurg progress note  VITALS:  BP (!) 145/65   Pulse 61   Temp 98.4 °F (36.9 °C) (Bladder)   Resp 23   Ht 6' 2\" (1.88 m)   Wt 255 lb 4.7 oz (115.8 kg)   SpO2 96%   BMI 32.78 kg/m²   24HR INTAKE/OUTPUT:    Intake/Output Summary (Last 24 hours) at 10/16/2020 1615  Last data filed at 10/16/2020 1200  Gross per 24 hour   Intake 2121 ml   Output 1278 ml   Net 843 ml     Xr Pelvis (1-2 Views)    Result Date: 10/8/2020  EXAMINATION: ONE XRAY VIEW OF THE PELVIS 10/8/2020 9:52 pm COMPARISON: None. HISTORY: ORDERING SYSTEM PROVIDED HISTORY: trauma TECHNOLOGIST PROVIDED HISTORY: Reason for exam:->trauma What reading provider will be dictating this exam?->CRC FINDINGS: Left ischial tuberosity is not included on this examination. Entire left hip is not included on this examination. No fracture or dislocation involving pelvis or visualized hips. No diastasis involving sacroiliac joints or symphysis pubis. No fracture or dislocation involving visualized pelvis or hips. Xr Elbow Right (min 3 Views)    Result Date: 10/9/2020  EXAMINATION: THREE XRAY VIEWS OF THE RIGHT ELBOW 10/9/2020 7:00 am COMPARISON: None. HISTORY: ORDERING SYSTEM PROVIDED HISTORY: trauma, Skull fx TECHNOLOGIST PROVIDED HISTORY: Reason for exam:->trauma, Skull fx What reading provider will be dictating this exam?->CRC FINDINGS: There is no fracture dislocation. There is no elbow joint effusion. There are tiny degenerative spurs. No acute process     Xr Hand Left (min 3 Views)    Result Date: 10/9/2020  EXAMINATION: THREE XRAY VIEWS OF THE LEFT HAND; THREE XRAY VIEWS OF THE RIGHT HAND 10/9/2020 7:01 am COMPARISON: None. HISTORY: ORDERING SYSTEM PROVIDED HISTORY: trauma TECHNOLOGIST PROVIDED HISTORY: Reason for exam:->trauma What reading provider will be dictating this exam?->CRC FINDINGS: Positioning of both hands does somewhat limit evaluation. There are no definite fractures or dislocations.   Joint spaces are normal.     No acute process     Xr Hand Right (min 3 Views)    Result Date: 10/9/2020  EXAMINATION: THREE XRAY VIEWS OF THE LEFT HAND; THREE XRAY VIEWS OF THE RIGHT HAND 10/9/2020 7:01 am COMPARISON: None. HISTORY: ORDERING SYSTEM PROVIDED HISTORY: trauma TECHNOLOGIST PROVIDED HISTORY: Reason for exam:->trauma What reading provider will be dictating this exam?->CRC FINDINGS: Positioning of both hands does somewhat limit evaluation. There are no definite fractures or dislocations. Joint spaces are normal.     No acute process     Xr Knee Left (3 Views)    Result Date: 10/9/2020  EXAMINATION: THREE XRAY VIEWS OF THE LEFT KNEE 10/9/2020 7:03 am COMPARISON: None. HISTORY: ORDERING SYSTEM PROVIDED HISTORY: trauma TECHNOLOGIST PROVIDED HISTORY: Reason for exam:->trauma What reading provider will be dictating this exam?->CRC FINDINGS: There is no fracture dislocation. There is no joint effusion or degenerative change. No acute process     Xr Knee Right (3 Views)    Result Date: 10/9/2020  EXAMINATION: THREE XRAY VIEWS OF THE RIGHT KNEE 10/9/2020 8:06 am COMPARISON: None. HISTORY: ORDERING SYSTEM PROVIDED HISTORY: trauma TECHNOLOGIST PROVIDED HISTORY: Reason for exam:->trauma What reading provider will be dictating this exam?->CRC FINDINGS: There is no fracture dislocation. There is no joint space narrowing or effusion. No acute process     Ct Head Wo Contrast    Result Date: 10/9/2020  EXAMINATION: CT OF THE HEAD WITHOUT CONTRAST  10/9/2020 4:07 am TECHNIQUE: CT of the head was performed without the administration of intravenous contrast. Dose modulation, iterative reconstruction, and/or weight based adjustment of the mA/kV was utilized to reduce the radiation dose to as low as reasonably achievable. COMPARISON: CT head 10/08/2020 HISTORY: ORDERING SYSTEM PROVIDED HISTORY: evaluate head bleed TECHNOLOGIST PROVIDED HISTORY: Has a \"code stroke\" or \"stroke alert\" been called? ->No Reason for exam:->evaluate head bleed What reading provider will be dictating this exam?->CRC FINDINGS: BRAIN/VENTRICLES: Interval increase in size of right frontal parenchymal contusion, measuring 14 x 7 x 10 mm, previously 10 x 5 x 5 mm. Additional right frontal contusion, more inferiorly has also increased in size. Punctate contusions in the anterior-inferior frontal lobe along the gyrus rectus have also increased. Scattered bilateral hemispheric subarachnoid hemorrhage. Right convexity subdural hematoma measures up to 6 mm in thickness, not substantially changed. Trace left parietal subdural hematoma measuring 2 mm in thickness. 5 mm leftward midline shift, not substantially changed. No herniation. Patent basal cisterns. ORBITS: The visualized portion of the orbits demonstrate no acute abnormality. SINUSES: Nasopharyngeal opacities with partially imaged esophagogastric and endotracheal tubes. Scattered paranasal sinus opacities. SOFT TISSUES/SKULL:  Nondisplaced left temporal bone fracture extending to the otic capsule. Comminuted mildly displaced left parietal fracture. Nondisplaced right temporal bone fracture which is otic capsule sparing. Diffuse scalp swelling with posterior scalp contusions. Skin staples present. Mild interval increase in size of scattered parenchymal hematomas, mainly in the right frontal lobe, suspicious for diffuse axonal injury. No substantial change in acute right convexity subdural and left parietal subdural hematomas. Stable 5 mm leftward midline shift. Unchanged calvarial fractures, notable for a nondisplaced left temporal bone fracture possibly extending to the otic capsule. Recommend follow-up temporal bone CT.      Ct Head Wo Contrast    Result Date: 10/9/2020  EXAMINATION: CT OF THE HEAD WITHOUT CONTRAST  10/9/2020 12:11 pm TECHNIQUE: CT of the head was performed without the administration of intravenous contrast. Dose modulation, iterative reconstruction, and/or weight based adjustment of the mA/kV was utilized to reduce the radiation dose to as low as reasonably achievable. COMPARISON: 8 hours prior. HISTORY: ORDERING SYSTEM PROVIDED HISTORY: s/p ventric TECHNOLOGIST PROVIDED HISTORY: Reason for exam:->s/p ventric Has a \"code stroke\" or \"stroke alert\" been called? ->No What reading provider will be dictating this exam?->CRC FINDINGS: There has been interval placement of a right frontal approach ventriculostomy catheter terminating within the right lateral ventricle frontal horn abutting the septum pellucidum. There is split like configuration of the right lateral ventricle that may reflect over shunting. There is no temporal horn dilatation. There is diffuse cerebral edema with diffuse sulcal effacement. There is similar appearance of multiple foci of hemorrhagic contusions involving the right frontal lobe and to lesser extent left frontal lobe and right temporal lobe. The hemorrhagic contusions demonstrates mild surrounding edema. There is similar appearance of acute subarachnoid hemorrhage along the bilateral frontal convexities and right temporal convexity and to a lesser extent at the vertices. There is small volume of subdural hemorrhage along the right lateral tentorial leaflet. There is small volume subarachnoid hemorrhage within the interpeduncular cistern. There is new wedge-shaped region of low attenuation involving the left middle cerebellar peduncle and left cerebellar hemisphere, concerning for acute ischemia. There is no significant midline shift. There are bilateral parietal fractures, comminuted on the left, extending into the left temporal bone including the mastoid segment. Please refer to concurrently performed CT temporal bone imaging report. There is additional depressed fracture of the left superior orbital wall. There is diffuse subgaleal soft tissue swelling. Interval placement of right ventriculostomy catheter terminating within the right lateral ventricle frontal horn.   Interval development of slit-like appearance of the right lateral ventricle. Suspect acute ischemia involving the left middle cerebellar peduncle and left cerebellar hemisphere. Similar appearance of intracranial hemorrhage in hemorrhagic contusions as above. Findings discussed with Dr. Ellie Sesay at 12:53 p.m. on December 9, 2020. Ct Head Wo Contrast    Result Date: 10/9/2020  EXAMINATION: CT OF THE HEAD and cervical spine WITHOUT CONTRAST; CT OF THE FACE WITHOUT CONTRAST  10/8/2020 9:56 pm TECHNIQUE: CT of the head was performed without the administration of intravenous contrast. Dose modulation, iterative reconstruction, and/or weight based adjustment of the mA/kV was utilized to reduce the radiation dose to as low as reasonably achievable.; CT of the face was performed without the administration of intravenous contrast. Multiplanar reformatted images are provided for review. Dose modulation, iterative reconstruction, and/or weight based adjustment of the mA/kV was utilized to reduce the radiation dose to as low as reasonably achievable.; CT of the cervical spine was performed without the administration of intravenous contrast. Multiplanar reformatted images are provided for review. Dose modulation, iterative reconstruction, and/or weight based adjustment of the mA/kV was utilized to reduce the radiation dose to as low as reasonably achievable. COMPARISON: None. HISTORY: ORDERING SYSTEM PROVIDED HISTORY: trauma TECHNOLOGIST PROVIDED HISTORY: Reason for exam:->trauma Has a \"code stroke\" or \"stroke alert\" been called? ->No What reading provider will be dictating this exam?->CRC FINDINGS: Head: There is mild right frontoparietal holohemispheric subdural hemorrhage, measuring up to 6 mm. Mild mass effect and minimal midline shift of approximately 4 mm. Visualized skull demonstrates multiple mildly displaced fractures in the skull, involving left temporal bone, right temporal bone, bilateral parietal bones.   Left temporoparietal bone skull fractures appear to be comminuted in multiple locations. There also small hemorrhagic contusions in bilateral frontal lobes. Small amount of subarachnoid hemorrhage in the right frontal lobe. Mild fluid layering in the sphenoid sinus. Fractures involving the left orbit as well. The mastoid air cells are clear. However, left temporal bone fracture does extend through the region of the left external auditory canal and extends to the middle ear. Cervical spine: Vertebral body heights are intact. Alignment is intact. Facets are normally aligned. The odontoid process is intact. No significant prevertebral soft tissue swelling. Facial bones: Mandible is intact. The zygomatic arches are intact. Nasal bones are intact. Nasal bony septum is midline. Sphenoid temporal buttress is intact. Mildly displaced fracture of the roof of the left orbit. The orbital floor is intact. Globes are intact and not proptotic. No retro bulbar soft tissue hematoma. Mild left periorbital preseptal soft tissue swelling. Bilateral comminuted skull bone fractures involving bilateral temporal bones and parietal bones. Fractures are worse on the left side. Mild right holohemispheric subdural hematoma with mild midline shift. Bilateral frontal small hemorrhagic contusions. Left orbital roof mildly displaced fracture. No evidence for cervical fracture or subluxation. Critical findings reported to the ER physician at time of dictation. Ct Iac Posterior Fossa Wo Contrast    Result Date: 10/10/2020  EXAMINATION: CT OF THE INTERNAL AUDITORY CANAL WITHOUT CONTRAST 10/9/2020 12:11 pm TECHNIQUE: CT of the internal auditory canal was performed without contrast was performed without the administration of intravenous contrast. Multiplanar reformatted images are provided for review.  Dose modulation, iterative reconstruction, and/or weight based adjustment of the mA/kV was utilized to reduce the radiation dose to as low as reasonably achievable. COMPARISON: None HISTORY: ORDERING SYSTEM PROVIDED HISTORY: TRAUMA TECHNOLOGIST PROVIDED HISTORY: Reason for exam:->trauma What reading provider will be dictating this exam?->CRC FINDINGS: RIGHT TEMPORAL BONE:  The right external auditory canal is normal in appearance. There is no right temporal bone fracture identified. There is a small volume of fluid within the right mastoid air cells. The right middle ear is clear. The ossicles are normally aligned. The tegmen tympani is intact. The scutum is intact. There is no osseous dehiscence. The cochlea, vestibule and semicircular canals are normal in appearance. LEFT TEMPORAL BONE: There is a longitudinal fracture of the mastoid segment of the left temporal bone. There is incudomalleolar subluxation involving the head of the malleus and body of the incus. The fracture does not extend into the otic capsule. Hemorrhage is present within the left middle ear and mastoid air cells. A comminuted fracture of the squamous portion of the left temporal bone is noted. The cochlea, vestibule and semicircular canals are normal.     Longitudinal fracture of the mastoid portion of the left temporal bone with associated incudomalleolar subluxation involving the head of the malleus and body of the incus. The fracture does not extend into the otic capsule. Small volume of fluid within the right mastoid air cells but no acute traumatic injury of the mastoid portion of the right temporal bone evident.      Ct Facial Bones Wo Contrast    Result Date: 10/9/2020  EXAMINATION: CT OF THE HEAD and cervical spine WITHOUT CONTRAST; CT OF THE FACE WITHOUT CONTRAST  10/8/2020 9:56 pm TECHNIQUE: CT of the head was performed without the administration of intravenous contrast. Dose modulation, iterative reconstruction, and/or weight based adjustment of the mA/kV was utilized to reduce the radiation dose to as low as reasonably achievable.; CT of the face was performed without the administration of intravenous contrast. Multiplanar reformatted images are provided for review. Dose modulation, iterative reconstruction, and/or weight based adjustment of the mA/kV was utilized to reduce the radiation dose to as low as reasonably achievable.; CT of the cervical spine was performed without the administration of intravenous contrast. Multiplanar reformatted images are provided for review. Dose modulation, iterative reconstruction, and/or weight based adjustment of the mA/kV was utilized to reduce the radiation dose to as low as reasonably achievable. COMPARISON: None. HISTORY: ORDERING SYSTEM PROVIDED HISTORY: trauma TECHNOLOGIST PROVIDED HISTORY: Reason for exam:->trauma Has a \"code stroke\" or \"stroke alert\" been called? ->No What reading provider will be dictating this exam?->CRC FINDINGS: Head: There is mild right frontoparietal holohemispheric subdural hemorrhage, measuring up to 6 mm. Mild mass effect and minimal midline shift of approximately 4 mm. Visualized skull demonstrates multiple mildly displaced fractures in the skull, involving left temporal bone, right temporal bone, bilateral parietal bones. Left temporoparietal bone skull fractures appear to be comminuted in multiple locations. There also small hemorrhagic contusions in bilateral frontal lobes. Small amount of subarachnoid hemorrhage in the right frontal lobe. Mild fluid layering in the sphenoid sinus. Fractures involving the left orbit as well. The mastoid air cells are clear. However, left temporal bone fracture does extend through the region of the left external auditory canal and extends to the middle ear. Cervical spine: Vertebral body heights are intact. Alignment is intact. Facets are normally aligned. The odontoid process is intact. No significant prevertebral soft tissue swelling. Facial bones: Mandible is intact. The zygomatic arches are intact. Nasal bones are intact.   Nasal bony septum is midline. Sphenoid temporal buttress is intact. Mildly displaced fracture of the roof of the left orbit. The orbital floor is intact. Globes are intact and not proptotic. No retro bulbar soft tissue hematoma. Mild left periorbital preseptal soft tissue swelling. Bilateral comminuted skull bone fractures involving bilateral temporal bones and parietal bones. Fractures are worse on the left side. Mild right holohemispheric subdural hematoma with mild midline shift. Bilateral frontal small hemorrhagic contusions. Left orbital roof mildly displaced fracture. No evidence for cervical fracture or subluxation. Critical findings reported to the ER physician at time of dictation. Ct Chest W Contrast    Result Date: 10/9/2020  EXAMINATION: CT OF THE CHEST WITH CONTRAST 10/8/2020 10:56 pm TECHNIQUE: CT of the chest was performed with the administration of intravenous contrast. Multiplanar reformatted images are provided for review. Dose modulation, iterative reconstruction, and/or weight based adjustment of the mA/kV was utilized to reduce the radiation dose to as low as reasonably achievable. COMPARISON: None. HISTORY: ORDERING SYSTEM PROVIDED HISTORY: trauma TECHNOLOGIST PROVIDED HISTORY: Reason for exam:->trauma What reading provider will be dictating this exam?->CRC FINDINGS: An endotracheal and enteric tubes are noted in place and are appropriately positioned. Mediastinum: Normal heart size. The great vessels are within normal limits. No pericardial effusion. No significantly enlarged lymph nodes. Lungs/pleura: Mild dependent congestion involving the bilateral posterior lungs. No pulmonary mass or augustin consolidation. No pleural effusion. Upper Abdomen: Within normal limits. Soft Tissues/Bones: Nondisplaced acute fractures involving the lateral left 5th through 7th ribs. Nondisplaced acute fractures of the posterior left 4th through 7th ribs.   Acute mildly displaced fractures of the posterior left 8th rib. Mildly displaced acute fracture of the left body of the scapula. Nondisplaced acute fractures involving the lateral left 5th through 7th ribs. Nondisplaced acute fractures of the posterior left 4th through 7th ribs. Acute mildly displaced fractures of the posterior left 8th rib. Mildly displaced acute fracture of the left body of the scapula. Ct Cervical Spine Wo Contrast    Result Date: 10/9/2020  EXAMINATION: CT OF THE HEAD and cervical spine WITHOUT CONTRAST; CT OF THE FACE WITHOUT CONTRAST  10/8/2020 9:56 pm TECHNIQUE: CT of the head was performed without the administration of intravenous contrast. Dose modulation, iterative reconstruction, and/or weight based adjustment of the mA/kV was utilized to reduce the radiation dose to as low as reasonably achievable.; CT of the face was performed without the administration of intravenous contrast. Multiplanar reformatted images are provided for review. Dose modulation, iterative reconstruction, and/or weight based adjustment of the mA/kV was utilized to reduce the radiation dose to as low as reasonably achievable.; CT of the cervical spine was performed without the administration of intravenous contrast. Multiplanar reformatted images are provided for review. Dose modulation, iterative reconstruction, and/or weight based adjustment of the mA/kV was utilized to reduce the radiation dose to as low as reasonably achievable. COMPARISON: None. HISTORY: ORDERING SYSTEM PROVIDED HISTORY: trauma TECHNOLOGIST PROVIDED HISTORY: Reason for exam:->trauma Has a \"code stroke\" or \"stroke alert\" been called? ->No What reading provider will be dictating this exam?->CRC FINDINGS: Head: There is mild right frontoparietal holohemispheric subdural hemorrhage, measuring up to 6 mm. Mild mass effect and minimal midline shift of approximately 4 mm.   Visualized skull demonstrates multiple mildly displaced fractures in the skull, involving left temporal bone, right temporal bone, bilateral parietal bones. Left temporoparietal bone skull fractures appear to be comminuted in multiple locations. There also small hemorrhagic contusions in bilateral frontal lobes. Small amount of subarachnoid hemorrhage in the right frontal lobe. Mild fluid layering in the sphenoid sinus. Fractures involving the left orbit as well. The mastoid air cells are clear. However, left temporal bone fracture does extend through the region of the left external auditory canal and extends to the middle ear. Cervical spine: Vertebral body heights are intact. Alignment is intact. Facets are normally aligned. The odontoid process is intact. No significant prevertebral soft tissue swelling. Facial bones: Mandible is intact. The zygomatic arches are intact. Nasal bones are intact. Nasal bony septum is midline. Sphenoid temporal buttress is intact. Mildly displaced fracture of the roof of the left orbit. The orbital floor is intact. Globes are intact and not proptotic. No retro bulbar soft tissue hematoma. Mild left periorbital preseptal soft tissue swelling. Bilateral comminuted skull bone fractures involving bilateral temporal bones and parietal bones. Fractures are worse on the left side. Mild right holohemispheric subdural hematoma with mild midline shift. Bilateral frontal small hemorrhagic contusions. Left orbital roof mildly displaced fracture. No evidence for cervical fracture or subluxation. Critical findings reported to the ER physician at time of dictation. Ct Thoracic Spine Wo Contrast    Result Date: 10/9/2020  EXAMINATION: CT OF THE THORACIC SPINE WITHOUT CONTRAST  10/8/2020 10:56 pm: TECHNIQUE: CT of the thoracic spine was performed without the administration of intravenous contrast. Multiplanar reformatted images are provided for review.  Dose modulation, iterative reconstruction, and/or weight based adjustment of the mA/kV was utilized to reduce the radiation dose HISTORY: Reason for exam:->intubated What reading provider will be dictating this exam?->CRC FINDINGS: Lines/tubes are appropriate. Heart size is normal.  There are no infiltrates or effusions. No acute process     Xr Chest Portable    Result Date: 10/9/2020  EXAMINATION: ONE XRAY VIEW OF THE CHEST 10/9/2020 12:42 am COMPARISON: 10/08/2020 at this 2248 hours. Liliana George HISTORY: ORDERING SYSTEM PROVIDED HISTORY: Line Placement TECHNOLOGIST PROVIDED HISTORY: Reason for exam:->Line Placement What reading provider will be dictating this exam?->CRC FINDINGS: Heart is not enlarged. Endotracheal tube and enteric tube are in place. Left IJ CVC is in place with tip in the proximal SVC. No pneumothorax. No pleural effusions. No pulmonary edema or pneumothorax. Endotracheal tube, enteric tube and left IJ CVC is in place with no pneumothorax. Xr Chest 1 View    Result Date: 10/8/2020  EXAMINATION: ONE XRAY VIEW OF THE CHEST 10/8/2020 9:52 pm COMPARISON: None. HISTORY: ORDERING SYSTEM PROVIDED HISTORY: trauma TECHNOLOGIST PROVIDED HISTORY: Reason for exam:->trauma What reading provider will be dictating this exam?->CRC FINDINGS: Endotracheal tube is present with distal tip approximately 6 cm above the andriy. Nasogastric tube courses below the level of the diaphragm with distal tip not included on this examination. No focal airspace opacity or pleural effusion. The heart is prominent related to portable technique. No pneumothorax. 1.  No acute process in the chest. 2.  Endotracheal tube is 6 cm above the andriy. 3.  Nasogastric tube courses below the level of the diaphragm. Cta Neck W Contrast    Result Date: 10/9/2020  EXAMINATION: CTA OF THE HEAD WITH CONTRAST; CTA OF THE NECK 10/9/2020 3:17 pm: TECHNIQUE: CTA of the head/brain was performed with the administration of intravenous contrast. Multiplanar reformatted images are provided for review. MIP images are provided for review.  Dose modulation, iterative of the salivary and thyroid glands. BONES: Partially visualized calvarial comminuted acute fractures are seen bilaterally involving the left temporal occipital bone in the right posterior temporal bone. Right temporal underlying acute subdural hemorrhage is identified measuring up to 5 mm in greatest thickness. Incidental finding of right-sided lung azygos lobe is seen. Unremarkable CTA of the neck. Bilateral calvarial comminuted acute fractures, as discussed above. Underlying partially visualized right temporal acute subdural hemorrhage measuring up to 5 mm in thickness. Please refer to CT head examination, same date, for full description of findings. Xr Chest Abdomen Ng Placement    Result Date: 10/8/2020  EXAMINATION: ONE SUPINE XRAY VIEW(S) OF THE ABDOMEN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

## 2020-10-17 ENCOUNTER — APPOINTMENT (OUTPATIENT)
Dept: GENERAL RADIOLOGY | Age: 30
DRG: 003 | End: 2020-10-17
Payer: COMMERCIAL

## 2020-10-17 LAB
AADO2: 166.7 MMHG
AADO2: 220.8 MMHG
ANION GAP SERPL CALCULATED.3IONS-SCNC: 7 MMOL/L (ref 7–16)
ANION GAP SERPL CALCULATED.3IONS-SCNC: 8 MMOL/L (ref 7–16)
B.E.: 2.4 MMOL/L (ref -3–3)
B.E.: 3.6 MMOL/L (ref -3–3)
BASOPHILS ABSOLUTE: 0.1 E9/L (ref 0–0.2)
BASOPHILS RELATIVE PERCENT: 1.1 % (ref 0–2)
BUN BLDV-MCNC: 15 MG/DL (ref 6–20)
BUN BLDV-MCNC: 19 MG/DL (ref 6–20)
CALCIUM IONIZED: 1.21 MMOL/L (ref 1.15–1.33)
CALCIUM SERPL-MCNC: 8.2 MG/DL (ref 8.6–10.2)
CALCIUM SERPL-MCNC: 8.4 MG/DL (ref 8.6–10.2)
CHLORIDE BLD-SCNC: 104 MMOL/L (ref 98–107)
CHLORIDE BLD-SCNC: 107 MMOL/L (ref 98–107)
CO2: 27 MMOL/L (ref 22–29)
CO2: 29 MMOL/L (ref 22–29)
COHB: 0.3 % (ref 0–1.5)
COHB: 0.4 % (ref 0–1.5)
CREAT SERPL-MCNC: 0.6 MG/DL (ref 0.7–1.2)
CREAT SERPL-MCNC: 0.6 MG/DL (ref 0.7–1.2)
CRITICAL: ABNORMAL
CRITICAL: ABNORMAL
DATE ANALYZED: ABNORMAL
DATE ANALYZED: ABNORMAL
DATE OF COLLECTION: ABNORMAL
DATE OF COLLECTION: ABNORMAL
EOSINOPHILS ABSOLUTE: 0.49 E9/L (ref 0.05–0.5)
EOSINOPHILS RELATIVE PERCENT: 5.4 % (ref 0–6)
FIO2: 40 %
FIO2: 50 %
GFR AFRICAN AMERICAN: >60
GFR AFRICAN AMERICAN: >60
GFR NON-AFRICAN AMERICAN: >60 ML/MIN/1.73
GFR NON-AFRICAN AMERICAN: >60 ML/MIN/1.73
GLUCOSE BLD-MCNC: 113 MG/DL (ref 74–99)
GLUCOSE BLD-MCNC: 138 MG/DL (ref 74–99)
HCO3: 26.7 MMOL/L (ref 22–26)
HCO3: 28.5 MMOL/L (ref 22–26)
HCT VFR BLD CALC: 30.3 % (ref 37–54)
HEMOGLOBIN: 9.8 G/DL (ref 12.5–16.5)
HHB: 6.4 % (ref 0–5)
HHB: 8.9 % (ref 0–5)
IMMATURE GRANULOCYTES #: 0.19 E9/L
IMMATURE GRANULOCYTES %: 2.1 % (ref 0–5)
LAB: ABNORMAL
LAB: ABNORMAL
LYMPHOCYTES ABSOLUTE: 1.1 E9/L (ref 1.5–4)
LYMPHOCYTES RELATIVE PERCENT: 12 % (ref 20–42)
Lab: ABNORMAL
Lab: ABNORMAL
MAGNESIUM: 2.5 MG/DL (ref 1.6–2.6)
MCH RBC QN AUTO: 30.6 PG (ref 26–35)
MCHC RBC AUTO-ENTMCNC: 32.3 % (ref 32–34.5)
MCV RBC AUTO: 94.7 FL (ref 80–99.9)
METHB: 0.1 % (ref 0–1.5)
METHB: 0.2 % (ref 0–1.5)
MODE: AC
MODE: AC
MONOCYTES ABSOLUTE: 0.77 E9/L (ref 0.1–0.95)
MONOCYTES RELATIVE PERCENT: 8.4 % (ref 2–12)
NEUTROPHILS ABSOLUTE: 6.49 E9/L (ref 1.8–7.3)
NEUTROPHILS RELATIVE PERCENT: 71 % (ref 43–80)
O2 SATURATION: 92.5 % (ref 92–98.5)
O2 SATURATION: 95 % (ref 92–98.5)
O2HB: 90.6 % (ref 94–97)
O2HB: 93.1 % (ref 94–97)
OPERATOR ID: 1632
OPERATOR ID: ABNORMAL
PATIENT TEMP: 37 C
PATIENT TEMP: 37 C
PCO2: 40.4 MMHG (ref 35–45)
PCO2: 44.3 MMHG (ref 35–45)
PDW BLD-RTO: 12.4 FL (ref 11.5–15)
PEEP/CPAP: 8 CMH2O
PEEP/CPAP: 8 CMH2O
PFO2: 1.47 MMHG/%
PFO2: 1.55 MMHG/%
PH BLOOD GAS: 7.43 (ref 7.35–7.45)
PH BLOOD GAS: 7.44 (ref 7.35–7.45)
PHOSPHORUS: 2.9 MG/DL (ref 2.5–4.5)
PLATELET # BLD: 253 E9/L (ref 130–450)
PMV BLD AUTO: 10 FL (ref 7–12)
PO2: 62 MMHG (ref 75–100)
PO2: 73.4 MMHG (ref 75–100)
POTASSIUM REFLEX MAGNESIUM: 3.6 MMOL/L (ref 3.5–5)
POTASSIUM SERPL-SCNC: 3.6 MMOL/L (ref 3.5–5)
RBC # BLD: 3.2 E12/L (ref 3.8–5.8)
RI(T): 2.69
RI(T): 3.01
RR MECHANICAL: 16 B/MIN
RR MECHANICAL: 16 B/MIN
SODIUM BLD-SCNC: 141 MMOL/L (ref 132–146)
SODIUM BLD-SCNC: 141 MMOL/L (ref 132–146)
SOURCE, BLOOD GAS: ABNORMAL
SOURCE, BLOOD GAS: ABNORMAL
THB: 10.8 G/DL (ref 11.5–16.5)
THB: 11 G/DL (ref 11.5–16.5)
TIME ANALYZED: 425
TIME ANALYZED: 820
VT MECHANICAL: 500 ML
VT MECHANICAL: 500 ML
WBC # BLD: 9.1 E9/L (ref 4.5–11.5)

## 2020-10-17 PROCEDURE — 2000000000 HC ICU R&B

## 2020-10-17 PROCEDURE — 83735 ASSAY OF MAGNESIUM: CPT

## 2020-10-17 PROCEDURE — 6360000002 HC RX W HCPCS: Performed by: SURGERY

## 2020-10-17 PROCEDURE — 6360000002 HC RX W HCPCS: Performed by: EMERGENCY MEDICINE

## 2020-10-17 PROCEDURE — 2580000003 HC RX 258: Performed by: SURGERY

## 2020-10-17 PROCEDURE — 2500000003 HC RX 250 WO HCPCS: Performed by: SURGERY

## 2020-10-17 PROCEDURE — 6370000000 HC RX 637 (ALT 250 FOR IP): Performed by: SURGERY

## 2020-10-17 PROCEDURE — 6370000000 HC RX 637 (ALT 250 FOR IP): Performed by: STUDENT IN AN ORGANIZED HEALTH CARE EDUCATION/TRAINING PROGRAM

## 2020-10-17 PROCEDURE — 6360000002 HC RX W HCPCS: Performed by: STUDENT IN AN ORGANIZED HEALTH CARE EDUCATION/TRAINING PROGRAM

## 2020-10-17 PROCEDURE — 80048 BASIC METABOLIC PNL TOTAL CA: CPT

## 2020-10-17 PROCEDURE — 71045 X-RAY EXAM CHEST 1 VIEW: CPT

## 2020-10-17 PROCEDURE — 99291 CRITICAL CARE FIRST HOUR: CPT | Performed by: SURGERY

## 2020-10-17 PROCEDURE — 82330 ASSAY OF CALCIUM: CPT

## 2020-10-17 PROCEDURE — 2580000003 HC RX 258: Performed by: STUDENT IN AN ORGANIZED HEALTH CARE EDUCATION/TRAINING PROGRAM

## 2020-10-17 PROCEDURE — 94640 AIRWAY INHALATION TREATMENT: CPT

## 2020-10-17 PROCEDURE — 94003 VENT MGMT INPAT SUBQ DAY: CPT

## 2020-10-17 PROCEDURE — 36415 COLL VENOUS BLD VENIPUNCTURE: CPT

## 2020-10-17 PROCEDURE — 85025 COMPLETE CBC W/AUTO DIFF WBC: CPT

## 2020-10-17 PROCEDURE — 84100 ASSAY OF PHOSPHORUS: CPT

## 2020-10-17 PROCEDURE — 82805 BLOOD GASES W/O2 SATURATION: CPT

## 2020-10-17 RX ORDER — FUROSEMIDE 10 MG/ML
10 INJECTION INTRAMUSCULAR; INTRAVENOUS ONCE
Status: COMPLETED | OUTPATIENT
Start: 2020-10-17 | End: 2020-10-17

## 2020-10-17 RX ADMIN — ACETAMINOPHEN ORAL SOLUTION 650 MG: 650 SOLUTION ORAL at 18:30

## 2020-10-17 RX ADMIN — MINERAL OIL AND PETROLATUM: 150; 830 OINTMENT OPHTHALMIC at 18:27

## 2020-10-17 RX ADMIN — CHLORHEXIDINE GLUCONATE 0.12% ORAL RINSE 15 ML: 1.2 LIQUID ORAL at 20:26

## 2020-10-17 RX ADMIN — MEPERIDINE HYDROCHLORIDE 50 MG: 25 INJECTION, SOLUTION INTRAMUSCULAR; INTRAVENOUS; SUBCUTANEOUS at 16:20

## 2020-10-17 RX ADMIN — ACETAMINOPHEN ORAL SOLUTION 650 MG: 650 SOLUTION ORAL at 21:41

## 2020-10-17 RX ADMIN — MINERAL OIL AND PETROLATUM: 150; 830 OINTMENT OPHTHALMIC at 02:08

## 2020-10-17 RX ADMIN — Medication 10 ML: at 09:48

## 2020-10-17 RX ADMIN — FAMOTIDINE 20 MG: 20 TABLET ORAL at 20:26

## 2020-10-17 RX ADMIN — POLYVINYL ALCOHOL 1 DROP: 14 SOLUTION/ DROPS OPHTHALMIC at 23:52

## 2020-10-17 RX ADMIN — BACITRACIN ZINC: 500 OINTMENT TOPICAL at 09:37

## 2020-10-17 RX ADMIN — OXYCODONE HYDROCHLORIDE 5 MG: 5 SOLUTION ORAL at 17:32

## 2020-10-17 RX ADMIN — FENTANYL CITRATE 75 MCG: 50 INJECTION, SOLUTION INTRAMUSCULAR; INTRAVENOUS at 07:52

## 2020-10-17 RX ADMIN — BACITRACIN ZINC: 500 OINTMENT TOPICAL at 20:26

## 2020-10-17 RX ADMIN — FENTANYL CITRATE 75 MCG: 50 INJECTION, SOLUTION INTRAMUSCULAR; INTRAVENOUS at 02:27

## 2020-10-17 RX ADMIN — FENTANYL CITRATE 75 MCG: 50 INJECTION, SOLUTION INTRAMUSCULAR; INTRAVENOUS at 20:22

## 2020-10-17 RX ADMIN — SODIUM CHLORIDE SOLN NEBU 3% 4 ML: 3 NEBU SOLN at 08:37

## 2020-10-17 RX ADMIN — POLYVINYL ALCOHOL 1 DROP: 14 SOLUTION/ DROPS OPHTHALMIC at 20:26

## 2020-10-17 RX ADMIN — FENTANYL CITRATE 75 MCG: 50 INJECTION, SOLUTION INTRAMUSCULAR; INTRAVENOUS at 15:08

## 2020-10-17 RX ADMIN — FUROSEMIDE 10 MG: 20 INJECTION, SOLUTION INTRAMUSCULAR; INTRAVENOUS at 12:54

## 2020-10-17 RX ADMIN — PROPOFOL INJECTABLE EMULSION 35 MCG/KG/MIN: 10 INJECTION, EMULSION INTRAVENOUS at 13:37

## 2020-10-17 RX ADMIN — POLYVINYL ALCOHOL 1 DROP: 14 SOLUTION/ DROPS OPHTHALMIC at 00:32

## 2020-10-17 RX ADMIN — BACITRACIN ZINC, POLYMYXIN B SULFATE, NEOMYCIN SULFATE: 400; 5000; 3.5 OINTMENT TOPICAL at 20:26

## 2020-10-17 RX ADMIN — HEPARIN SODIUM 5000 UNITS: 10000 INJECTION INTRAVENOUS; SUBCUTANEOUS at 21:41

## 2020-10-17 RX ADMIN — VANCOMYCIN HYDROCHLORIDE: 10 INJECTION, POWDER, LYOPHILIZED, FOR SOLUTION INTRAVENOUS at 18:24

## 2020-10-17 RX ADMIN — ENALAPRILAT 1.25 MG: 1.25 INJECTION INTRAVENOUS at 04:30

## 2020-10-17 RX ADMIN — VANCOMYCIN HYDROCHLORIDE: 10 INJECTION, POWDER, LYOPHILIZED, FOR SOLUTION INTRAVENOUS at 09:33

## 2020-10-17 RX ADMIN — FENTANYL CITRATE 75 MCG: 50 INJECTION, SOLUTION INTRAMUSCULAR; INTRAVENOUS at 18:33

## 2020-10-17 RX ADMIN — FUROSEMIDE 10 MG: 20 INJECTION, SOLUTION INTRAMUSCULAR; INTRAVENOUS at 20:26

## 2020-10-17 RX ADMIN — MEPERIDINE HYDROCHLORIDE 50 MG: 25 INJECTION, SOLUTION INTRAMUSCULAR; INTRAVENOUS; SUBCUTANEOUS at 04:50

## 2020-10-17 RX ADMIN — PROPOFOL INJECTABLE EMULSION 35 MCG/KG/MIN: 10 INJECTION, EMULSION INTRAVENOUS at 07:53

## 2020-10-17 RX ADMIN — MINERAL OIL AND PETROLATUM: 150; 830 OINTMENT OPHTHALMIC at 21:40

## 2020-10-17 RX ADMIN — SODIUM CHLORIDE 25 ML: 9 INJECTION, SOLUTION INTRAVENOUS at 13:40

## 2020-10-17 RX ADMIN — FENTANYL CITRATE 75 MCG: 50 INJECTION, SOLUTION INTRAMUSCULAR; INTRAVENOUS at 12:26

## 2020-10-17 RX ADMIN — POLYVINYL ALCOHOL 1 DROP: 14 SOLUTION/ DROPS OPHTHALMIC at 08:30

## 2020-10-17 RX ADMIN — OXYCODONE HYDROCHLORIDE 5 MG: 5 SOLUTION ORAL at 09:46

## 2020-10-17 RX ADMIN — PROPOFOL INJECTABLE EMULSION 40 MCG/KG/MIN: 10 INJECTION, EMULSION INTRAVENOUS at 21:32

## 2020-10-17 RX ADMIN — FENTANYL CITRATE 75 MCG: 50 INJECTION, SOLUTION INTRAMUSCULAR; INTRAVENOUS at 09:28

## 2020-10-17 RX ADMIN — MINERAL OIL AND PETROLATUM: 150; 830 OINTMENT OPHTHALMIC at 14:30

## 2020-10-17 RX ADMIN — SODIUM CHLORIDE SOLN NEBU 3% 4 ML: 3 NEBU SOLN at 02:00

## 2020-10-17 RX ADMIN — POLYVINYL ALCOHOL 1 DROP: 14 SOLUTION/ DROPS OPHTHALMIC at 04:10

## 2020-10-17 RX ADMIN — HEPARIN SODIUM 5000 UNITS: 10000 INJECTION INTRAVENOUS; SUBCUTANEOUS at 06:00

## 2020-10-17 RX ADMIN — FENTANYL CITRATE 75 MCG: 50 INJECTION, SOLUTION INTRAMUSCULAR; INTRAVENOUS at 11:00

## 2020-10-17 RX ADMIN — PIPERACILLIN AND TAZOBACTAM 3.38 G: 3; .375 INJECTION, POWDER, LYOPHILIZED, FOR SOLUTION INTRAVENOUS at 00:15

## 2020-10-17 RX ADMIN — ACETAMINOPHEN ORAL SOLUTION 650 MG: 650 SOLUTION ORAL at 02:02

## 2020-10-17 RX ADMIN — PROPOFOL INJECTABLE EMULSION 35 MCG/KG/MIN: 10 INJECTION, EMULSION INTRAVENOUS at 04:34

## 2020-10-17 RX ADMIN — MIDAZOLAM 2 MG: 1 INJECTION INTRAMUSCULAR; INTRAVENOUS at 14:54

## 2020-10-17 RX ADMIN — MEPERIDINE HYDROCHLORIDE 50 MG: 25 INJECTION, SOLUTION INTRAMUSCULAR; INTRAVENOUS; SUBCUTANEOUS at 21:04

## 2020-10-17 RX ADMIN — BACITRACIN ZINC, POLYMYXIN B SULFATE, NEOMYCIN SULFATE: 400; 5000; 3.5 OINTMENT TOPICAL at 09:37

## 2020-10-17 RX ADMIN — POTASSIUM BICARBONATE 40 MEQ: 782 TABLET, EFFERVESCENT ORAL at 12:55

## 2020-10-17 RX ADMIN — SENNOSIDES 5 ML: 8.8 LIQUID ORAL at 20:28

## 2020-10-17 RX ADMIN — FENTANYL CITRATE 75 MCG: 50 INJECTION, SOLUTION INTRAMUSCULAR; INTRAVENOUS at 00:44

## 2020-10-17 RX ADMIN — PIPERACILLIN AND TAZOBACTAM 3.38 G: 3; .375 INJECTION, POWDER, LYOPHILIZED, FOR SOLUTION INTRAVENOUS at 18:24

## 2020-10-17 RX ADMIN — BACITRACIN ZINC: 500 OINTMENT TOPICAL at 14:00

## 2020-10-17 RX ADMIN — SODIUM CHLORIDE SOLN NEBU 3% 4 ML: 3 NEBU SOLN at 19:51

## 2020-10-17 RX ADMIN — SODIUM CHLORIDE 25 ML: 9 INJECTION, SOLUTION INTRAVENOUS at 21:30

## 2020-10-17 RX ADMIN — FAMOTIDINE 20 MG: 20 TABLET ORAL at 09:38

## 2020-10-17 RX ADMIN — FENTANYL CITRATE 75 MCG: 50 INJECTION, SOLUTION INTRAMUSCULAR; INTRAVENOUS at 13:29

## 2020-10-17 RX ADMIN — PROPOFOL INJECTABLE EMULSION 40 MCG/KG/MIN: 10 INJECTION, EMULSION INTRAVENOUS at 17:30

## 2020-10-17 RX ADMIN — OXYCODONE HYDROCHLORIDE 5 MG: 5 SOLUTION ORAL at 23:51

## 2020-10-17 RX ADMIN — MINERAL OIL AND PETROLATUM: 150; 830 OINTMENT OPHTHALMIC at 09:47

## 2020-10-17 RX ADMIN — POLYVINYL ALCOHOL 1 DROP: 14 SOLUTION/ DROPS OPHTHALMIC at 16:15

## 2020-10-17 RX ADMIN — OXYCODONE HYDROCHLORIDE 5 MG: 5 SOLUTION ORAL at 04:29

## 2020-10-17 RX ADMIN — CHLORHEXIDINE GLUCONATE 0.12% ORAL RINSE 15 ML: 1.2 LIQUID ORAL at 09:37

## 2020-10-17 RX ADMIN — PIPERACILLIN AND TAZOBACTAM 3.38 G: 3; .375 INJECTION, POWDER, LYOPHILIZED, FOR SOLUTION INTRAVENOUS at 09:29

## 2020-10-17 RX ADMIN — VANCOMYCIN HYDROCHLORIDE 1250 MG: 1 INJECTION, POWDER, LYOPHILIZED, FOR SOLUTION INTRAVENOUS at 00:14

## 2020-10-17 RX ADMIN — ACETAMINOPHEN ORAL SOLUTION 650 MG: 650 SOLUTION ORAL at 14:57

## 2020-10-17 RX ADMIN — Medication 10 ML: at 20:27

## 2020-10-17 RX ADMIN — ACETAMINOPHEN ORAL SOLUTION 650 MG: 650 SOLUTION ORAL at 05:59

## 2020-10-17 RX ADMIN — ENALAPRILAT 1.25 MG: 1.25 INJECTION INTRAVENOUS at 13:22

## 2020-10-17 RX ADMIN — POLYVINYL ALCOHOL 1 DROP: 14 SOLUTION/ DROPS OPHTHALMIC at 12:30

## 2020-10-17 RX ADMIN — FENTANYL CITRATE 75 MCG: 50 INJECTION, SOLUTION INTRAMUSCULAR; INTRAVENOUS at 22:45

## 2020-10-17 RX ADMIN — HEPARIN SODIUM 5000 UNITS: 10000 INJECTION INTRAVENOUS; SUBCUTANEOUS at 14:30

## 2020-10-17 ASSESSMENT — PAIN SCALES - GENERAL
PAINLEVEL_OUTOF10: 7
PAINLEVEL_OUTOF10: 0
PAINLEVEL_OUTOF10: 0
PAINLEVEL_OUTOF10: 7
PAINLEVEL_OUTOF10: 7
PAINLEVEL_OUTOF10: 0
PAINLEVEL_OUTOF10: 7
PAINLEVEL_OUTOF10: 0
PAINLEVEL_OUTOF10: 7
PAINLEVEL_OUTOF10: 4
PAINLEVEL_OUTOF10: 6
PAINLEVEL_OUTOF10: 7
PAINLEVEL_OUTOF10: 0
PAINLEVEL_OUTOF10: 7
PAINLEVEL_OUTOF10: 4
PAINLEVEL_OUTOF10: 0
PAINLEVEL_OUTOF10: 7
PAINLEVEL_OUTOF10: 7

## 2020-10-17 ASSESSMENT — PULMONARY FUNCTION TESTS
PIF_VALUE: 21
PIF_VALUE: 20
PIF_VALUE: 20
PIF_VALUE: 21
PIF_VALUE: 23
PIF_VALUE: 14
PIF_VALUE: 15
PIF_VALUE: 22
PIF_VALUE: 20
PIF_VALUE: 11
PIF_VALUE: 24
PIF_VALUE: 19
PIF_VALUE: 17
PIF_VALUE: 21
PIF_VALUE: 21
PIF_VALUE: 15
PIF_VALUE: 14
PIF_VALUE: 32
PIF_VALUE: 17
PIF_VALUE: 12
PIF_VALUE: 17
PIF_VALUE: 19
PIF_VALUE: 22
PIF_VALUE: 12
PIF_VALUE: 22
PIF_VALUE: 19
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
## 2020-10-17 NOTE — PROGRESS NOTES
Scalp prepped with chloraprep and draped in sterile fashion. Ventric removed and u-stitch placed with 4-0 nylon. Held pressure and achieved good hemostasis. Patient tolerated procedure.     Electronically signed by Valdez Day MD on 10/17/2020 at 2:35 PM
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
## 2020-10-17 NOTE — PLAN OF CARE
Problem: Falls - Risk of:  Goal: Will remain free from falls  Description: Will remain free from falls  10/17/2020 1029 by Jonas Hodge RN  Outcome: Met This Shift     Problem: Falls - Risk of:  Goal: Absence of physical injury  Description: Absence of physical injury  10/17/2020 1029 by Jonas Hodge RN  Outcome: Met This Shift     Problem: Skin Integrity:  Goal: Will show no infection signs and symptoms  Description: Will show no infection signs and symptoms  10/17/2020 1029 by Jonas Hodge RN  Outcome: Met This Shift     Problem: Skin Integrity:  Goal: Absence of new skin breakdown  Description: Absence of new skin breakdown  10/17/2020 1029 by Jonas Hodge RN  Outcome: Met This Shift     Problem: Pain:  Goal: Pain level will decrease  Description: Pain level will decrease  10/17/2020 1029 by Jonas Hodge RN  Outcome: Met This Shift     Problem: Pain:  Goal: Control of acute pain  Description: Control of acute pain  10/17/2020 1029 by Jonas Hodge RN  Outcome: Met This Shift     Problem: Pain:  Goal: Control of chronic pain  Description: Control of chronic pain  10/17/2020 1029 by Jonas Hodge RN  Outcome: Met This Shift     Problem: Airway Clearance - Ineffective:  Goal: Ability to maintain a clear airway will improve  Description: Ability to maintain a clear airway will improve  10/17/2020 1029 by Jonas Hodge RN  Outcome: Met This Shift     Problem: Anxiety/Stress:  Goal: Level of anxiety will decrease  Description: Level of anxiety will decrease  10/17/2020 1029 by Jonas Hodge RN  Outcome: Met This Shift     Problem: Aspiration:  Goal: Absence of aspiration  Description: Absence of aspiration  10/17/2020 1029 by Jonas Hodge RN  Outcome: Met This Shift     Problem:  Bowel Function - Altered:  Goal: Bowel elimination is within specified parameters  Description: Bowel elimination is within specified parameters  10/17/2020 1029 by Jonas Hodge RN  Outcome: Met This Shift     Problem: Cardiac Output - Decreased:  Goal: Hemodynamic stability will improve  Description: Hemodynamic stability will improve  10/17/2020 1029 by Yoni Betancourt RN  Outcome: Met This Shift     Problem: Fluid Volume - Imbalance:  Goal: Absence of imbalanced fluid volume signs and symptoms  Description: Absence of imbalanced fluid volume signs and symptoms  10/17/2020 1029 by Yoni Betancourt RN  Outcome: Met This Shift     Problem: Gas Exchange - Impaired:  Goal: Levels of oxygenation will improve  Description: Levels of oxygenation will improve  10/17/2020 1029 by Yoni Betancourt RN  Outcome: Met This Shift     Problem: Mental Status - Impaired:  Goal: Mental status will be restored to baseline  Description: Mental status will be restored to baseline  Outcome: Met This Shift     Problem: Nutrition Deficit:  Goal: Ability to achieve adequate nutritional intake will improve  Description: Ability to achieve adequate nutritional intake will improve  Outcome: Met This Shift     Problem: Pain:  Goal: Pain level will decrease  Description: Pain level will decrease  10/17/2020 1029 by Yoni Betancourt RN  Outcome: Met This Shift     Problem: Pain:  Goal: Recognizes and communicates pain  Description: Recognizes and communicates pain  10/17/2020 1029 by Yoni Betancourt RN  Outcome: Met This Shift     Problem: Pain:  Goal: Control of acute pain  Description: Control of acute pain  10/17/2020 1029 by Yoni Betancourt RN  Outcome: Met This Shift     Problem: Skin Integrity - Impaired:  Goal: Will show no infection signs and symptoms  Description: Will show no infection signs and symptoms  10/17/2020 1029 by Yoni Betancourt RN  Outcome: Met This Shift     Problem: Sleep Pattern Disturbance:  Goal: Appears well-rested  Description: Appears well-rested  10/17/2020 1029 by Yoni Betancourt RN  Outcome: Met This Shift     Problem: Restraint Use - Nonviolent/Non-Self-Destructive Behavior:  Goal: Absence of restraint-related injury  Description: Absence of restraint-related injury  10/17/2020 1029 by Yoni Betancourt RN  Outcome: Met This Shift     Problem: Restraint Use - Nonviolent/Non-Self-Destructive Behavior:  Goal: Absence of restraint indications  Description: Absence of restraint indications  10/17/2020 1029 by Yoni Betancourt RN  Outcome: Not Met This Shift

## 2020-10-17 NOTE — PLAN OF CARE
Problem: Falls - Risk of:  Goal: Will remain free from falls  Description: Will remain free from falls  10/17/2020 1630 by Eleni Chen RN  Outcome: Met This Shift     Problem: Falls - Risk of:  Goal: Absence of physical injury  Description: Absence of physical injury  10/17/2020 1630 by Eleni Chen RN  Outcome: Met This Shift     Problem: Skin Integrity:  Goal: Will show no infection signs and symptoms  Description: Will show no infection signs and symptoms  10/17/2020 1630 by Eleni Chen RN  Outcome: Met This Shift     Problem: Skin Integrity:  Goal: Absence of new skin breakdown  Description: Absence of new skin breakdown  10/17/2020 1630 by Eleni Chen RN  Outcome: Met This Shift     Problem: Pain:  Goal: Pain level will decrease  Description: Pain level will decrease  10/17/2020 1630 by Eleni Chen RN  Outcome: Met This Shift     Problem: Pain:  Goal: Control of acute pain  Description: Control of acute pain  10/17/2020 1630 by Eleni Chen RN  Outcome: Met This Shift     Problem: Pain:  Goal: Control of chronic pain  Description: Control of chronic pain  10/17/2020 1630 by Eleni Chen RN  Outcome: Met This Shift     Problem: Airway Clearance - Ineffective:  Goal: Ability to maintain a clear airway will improve  Description: Ability to maintain a clear airway will improve  10/17/2020 1630 by Eleni Chen RN  Outcome: Met This Shift     Problem: Anxiety/Stress:  Goal: Level of anxiety will decrease  Description: Level of anxiety will decrease  10/17/2020 1630 by Eleni Chen RN  Outcome: Met This Shift     Problem: Aspiration:  Goal: Absence of aspiration  Description: Absence of aspiration  10/17/2020 1630 by Eleni Chen RN  Outcome: Met This Shift     Problem:  Bowel Function - Altered:  Goal: Bowel elimination is within specified parameters  Description: Bowel elimination is within specified parameters  10/17/2020 1630 by Eleni Chen RN  Outcome: Met This Shift     Problem: Cardiac Output - Decreased:  Goal: Hemodynamic stability will improve  Description: Hemodynamic stability will improve  10/17/2020 1630 by Palma Hyatt RN  Outcome: Met This Shift     Problem: Fluid Volume - Imbalance:  Goal: Absence of imbalanced fluid volume signs and symptoms  Description: Absence of imbalanced fluid volume signs and symptoms  10/17/2020 1630 by Palma Hyatt RN  Outcome: Met This Shift     Problem: Gas Exchange - Impaired:  Goal: Levels of oxygenation will improve  Description: Levels of oxygenation will improve  10/17/2020 1630 by Palma Hyatt RN  Outcome: Met This Shift     Problem: Mental Status - Impaired:  Goal: Mental status will be restored to baseline  Description: Mental status will be restored to baseline  10/17/2020 1630 by Palma Hyatt RN  Outcome: Met This Shift     Problem: Nutrition Deficit:  Goal: Ability to achieve adequate nutritional intake will improve  Description: Ability to achieve adequate nutritional intake will improve  10/17/2020 1630 by Palma Hyatt RN  Outcome: Met This Shift     Problem: Pain:  Goal: Pain level will decrease  Description: Pain level will decrease  10/17/2020 1630 by Palma Hyatt RN  Outcome: Met This Shift     Problem: Pain:  Goal: Recognizes and communicates pain  Description: Recognizes and communicates pain  10/17/2020 1630 by Palma Hyatt RN  Outcome: Met This Shift     Problem: Pain:  Goal: Control of acute pain  Description: Control of acute pain  10/17/2020 1630 by Palma Hyatt RN  Outcome: Met This Shift     Problem: Skin Integrity - Impaired:  Goal: Will show no infection signs and symptoms  Description: Will show no infection signs and symptoms  10/17/2020 1630 by Palma Hyatt RN  Outcome: Met This Shift     Problem: Sleep Pattern Disturbance:  Goal: Appears well-rested  Description: Appears well-rested  10/17/2020 1630 by Palma Hyatt RN  Outcome: Met This Shift     Problem: Restraint Use - Nonviolent/Non-Self-Destructive Behavior:  Goal: Absence of restraint-related injury  Description: Absence of restraint-related injury  10/17/2020 1630 by Palma Hyatt RN  Outcome: Met This Shift     Problem: Restraint Use - Nonviolent/Non-Self-Destructive Behavior:  Goal: Absence of restraint indications  Description: Absence of restraint indications  10/17/2020 1630 by Palma Hyatt RN  Outcome: Not Met This Shift

## 2020-10-17 NOTE — PROGRESS NOTES
process     Xr Hand Right (min 3 Views)    Result Date: 10/9/2020  EXAMINATION: THREE XRAY VIEWS OF THE LEFT HAND; THREE XRAY VIEWS OF THE RIGHT HAND 10/9/2020 7:01 am COMPARISON: None. HISTORY: ORDERING SYSTEM PROVIDED HISTORY: trauma TECHNOLOGIST PROVIDED HISTORY: Reason for exam:->trauma What reading provider will be dictating this exam?->CRC FINDINGS: Positioning of both hands does somewhat limit evaluation. There are no definite fractures or dislocations. Joint spaces are normal.     No acute process     Xr Knee Left (3 Views)    Result Date: 10/9/2020  EXAMINATION: THREE XRAY VIEWS OF THE LEFT KNEE 10/9/2020 7:03 am COMPARISON: None. HISTORY: ORDERING SYSTEM PROVIDED HISTORY: trauma TECHNOLOGIST PROVIDED HISTORY: Reason for exam:->trauma What reading provider will be dictating this exam?->CRC FINDINGS: There is no fracture dislocation. There is no joint effusion or degenerative change. No acute process     Xr Knee Right (3 Views)    Result Date: 10/9/2020  EXAMINATION: THREE XRAY VIEWS OF THE RIGHT KNEE 10/9/2020 8:06 am COMPARISON: None. HISTORY: ORDERING SYSTEM PROVIDED HISTORY: trauma TECHNOLOGIST PROVIDED HISTORY: Reason for exam:->trauma What reading provider will be dictating this exam?->CRC FINDINGS: There is no fracture dislocation. There is no joint space narrowing or effusion. No acute process     Ct Head Wo Contrast    Result Date: 10/9/2020  EXAMINATION: CT OF THE HEAD WITHOUT CONTRAST  10/9/2020 4:07 am TECHNIQUE: CT of the head was performed without the administration of intravenous contrast. Dose modulation, iterative reconstruction, and/or weight based adjustment of the mA/kV was utilized to reduce the radiation dose to as low as reasonably achievable. COMPARISON: CT head 10/08/2020 HISTORY: ORDERING SYSTEM PROVIDED HISTORY: evaluate head bleed TECHNOLOGIST PROVIDED HISTORY: Has a \"code stroke\" or \"stroke alert\" been called? ->No Reason for exam:->evaluate head bleed What reading provider will be dictating this exam?->CRC FINDINGS: BRAIN/VENTRICLES: Interval increase in size of right frontal parenchymal contusion, measuring 14 x 7 x 10 mm, previously 10 x 5 x 5 mm. Additional right frontal contusion, more inferiorly has also increased in size. Punctate contusions in the anterior-inferior frontal lobe along the gyrus rectus have also increased. Scattered bilateral hemispheric subarachnoid hemorrhage. Right convexity subdural hematoma measures up to 6 mm in thickness, not substantially changed. Trace left parietal subdural hematoma measuring 2 mm in thickness. 5 mm leftward midline shift, not substantially changed. No herniation. Patent basal cisterns. ORBITS: The visualized portion of the orbits demonstrate no acute abnormality. SINUSES: Nasopharyngeal opacities with partially imaged esophagogastric and endotracheal tubes. Scattered paranasal sinus opacities. SOFT TISSUES/SKULL:  Nondisplaced left temporal bone fracture extending to the otic capsule. Comminuted mildly displaced left parietal fracture. Nondisplaced right temporal bone fracture which is otic capsule sparing. Diffuse scalp swelling with posterior scalp contusions. Skin staples present. Mild interval increase in size of scattered parenchymal hematomas, mainly in the right frontal lobe, suspicious for diffuse axonal injury. No substantial change in acute right convexity subdural and left parietal subdural hematomas. Stable 5 mm leftward midline shift. Unchanged calvarial fractures, notable for a nondisplaced left temporal bone fracture possibly extending to the otic capsule. Recommend follow-up temporal bone CT.      Ct Head Wo Contrast    Result Date: 10/9/2020  EXAMINATION: CT OF THE HEAD WITHOUT CONTRAST  10/9/2020 12:11 pm TECHNIQUE: CT of the head was performed without the administration of intravenous contrast. Dose modulation, iterative reconstruction, and/or weight based adjustment of the mA/kV was utilized to reduce the radiation dose to as low as reasonably achievable. COMPARISON: 8 hours prior. HISTORY: ORDERING SYSTEM PROVIDED HISTORY: s/p ventric TECHNOLOGIST PROVIDED HISTORY: Reason for exam:->s/p ventric Has a \"code stroke\" or \"stroke alert\" been called? ->No What reading provider will be dictating this exam?->CRC FINDINGS: There has been interval placement of a right frontal approach ventriculostomy catheter terminating within the right lateral ventricle frontal horn abutting the septum pellucidum. There is split like configuration of the right lateral ventricle that may reflect over shunting. There is no temporal horn dilatation. There is diffuse cerebral edema with diffuse sulcal effacement. There is similar appearance of multiple foci of hemorrhagic contusions involving the right frontal lobe and to lesser extent left frontal lobe and right temporal lobe. The hemorrhagic contusions demonstrates mild surrounding edema. There is similar appearance of acute subarachnoid hemorrhage along the bilateral frontal convexities and right temporal convexity and to a lesser extent at the vertices. There is small volume of subdural hemorrhage along the right lateral tentorial leaflet. There is small volume subarachnoid hemorrhage within the interpeduncular cistern. There is new wedge-shaped region of low attenuation involving the left middle cerebellar peduncle and left cerebellar hemisphere, concerning for acute ischemia. There is no significant midline shift. There are bilateral parietal fractures, comminuted on the left, extending into the left temporal bone including the mastoid segment. Please refer to concurrently performed CT temporal bone imaging report. There is additional depressed fracture of the left superior orbital wall. There is diffuse subgaleal soft tissue swelling. Interval placement of right ventriculostomy catheter terminating within the right lateral ventricle frontal horn.   Interval development of slit-like appearance of the right lateral ventricle. Suspect acute ischemia involving the left middle cerebellar peduncle and left cerebellar hemisphere. Similar appearance of intracranial hemorrhage in hemorrhagic contusions as above. Findings discussed with Dr. Socorro Shukla at 12:53 p.m. on December 9, 2020. Ct Head Wo Contrast    Result Date: 10/9/2020  EXAMINATION: CT OF THE HEAD and cervical spine WITHOUT CONTRAST; CT OF THE FACE WITHOUT CONTRAST  10/8/2020 9:56 pm TECHNIQUE: CT of the head was performed without the administration of intravenous contrast. Dose modulation, iterative reconstruction, and/or weight based adjustment of the mA/kV was utilized to reduce the radiation dose to as low as reasonably achievable.; CT of the face was performed without the administration of intravenous contrast. Multiplanar reformatted images are provided for review. Dose modulation, iterative reconstruction, and/or weight based adjustment of the mA/kV was utilized to reduce the radiation dose to as low as reasonably achievable.; CT of the cervical spine was performed without the administration of intravenous contrast. Multiplanar reformatted images are provided for review. Dose modulation, iterative reconstruction, and/or weight based adjustment of the mA/kV was utilized to reduce the radiation dose to as low as reasonably achievable. COMPARISON: None. HISTORY: ORDERING SYSTEM PROVIDED HISTORY: trauma TECHNOLOGIST PROVIDED HISTORY: Reason for exam:->trauma Has a \"code stroke\" or \"stroke alert\" been called? ->No What reading provider will be dictating this exam?->CRC FINDINGS: Head: There is mild right frontoparietal holohemispheric subdural hemorrhage, measuring up to 6 mm. Mild mass effect and minimal midline shift of approximately 4 mm. Visualized skull demonstrates multiple mildly displaced fractures in the skull, involving left temporal bone, right temporal bone, bilateral parietal bones.   Left temporoparietal bone skull fractures appear to be comminuted in multiple locations. There also small hemorrhagic contusions in bilateral frontal lobes. Small amount of subarachnoid hemorrhage in the right frontal lobe. Mild fluid layering in the sphenoid sinus. Fractures involving the left orbit as well. The mastoid air cells are clear. However, left temporal bone fracture does extend through the region of the left external auditory canal and extends to the middle ear. Cervical spine: Vertebral body heights are intact. Alignment is intact. Facets are normally aligned. The odontoid process is intact. No significant prevertebral soft tissue swelling. Facial bones: Mandible is intact. The zygomatic arches are intact. Nasal bones are intact. Nasal bony septum is midline. Sphenoid temporal buttress is intact. Mildly displaced fracture of the roof of the left orbit. The orbital floor is intact. Globes are intact and not proptotic. No retro bulbar soft tissue hematoma. Mild left periorbital preseptal soft tissue swelling. Bilateral comminuted skull bone fractures involving bilateral temporal bones and parietal bones. Fractures are worse on the left side. Mild right holohemispheric subdural hematoma with mild midline shift. Bilateral frontal small hemorrhagic contusions. Left orbital roof mildly displaced fracture. No evidence for cervical fracture or subluxation. Critical findings reported to the ER physician at time of dictation. Ct Iac Posterior Fossa Wo Contrast    Result Date: 10/10/2020  EXAMINATION: CT OF THE INTERNAL AUDITORY CANAL WITHOUT CONTRAST 10/9/2020 12:11 pm TECHNIQUE: CT of the internal auditory canal was performed without contrast was performed without the administration of intravenous contrast. Multiplanar reformatted images are provided for review.  Dose modulation, iterative reconstruction, and/or weight based adjustment of the mA/kV was utilized to reduce the radiation dose to as low as reasonably achievable. COMPARISON: None HISTORY: ORDERING SYSTEM PROVIDED HISTORY: TRAUMA TECHNOLOGIST PROVIDED HISTORY: Reason for exam:->trauma What reading provider will be dictating this exam?->CRC FINDINGS: RIGHT TEMPORAL BONE:  The right external auditory canal is normal in appearance. There is no right temporal bone fracture identified. There is a small volume of fluid within the right mastoid air cells. The right middle ear is clear. The ossicles are normally aligned. The tegmen tympani is intact. The scutum is intact. There is no osseous dehiscence. The cochlea, vestibule and semicircular canals are normal in appearance. LEFT TEMPORAL BONE: There is a longitudinal fracture of the mastoid segment of the left temporal bone. There is incudomalleolar subluxation involving the head of the malleus and body of the incus. The fracture does not extend into the otic capsule. Hemorrhage is present within the left middle ear and mastoid air cells. A comminuted fracture of the squamous portion of the left temporal bone is noted. The cochlea, vestibule and semicircular canals are normal.     Longitudinal fracture of the mastoid portion of the left temporal bone with associated incudomalleolar subluxation involving the head of the malleus and body of the incus. The fracture does not extend into the otic capsule. Small volume of fluid within the right mastoid air cells but no acute traumatic injury of the mastoid portion of the right temporal bone evident.      Ct Facial Bones Wo Contrast    Result Date: 10/9/2020  EXAMINATION: CT OF THE HEAD and cervical spine WITHOUT CONTRAST; CT OF THE FACE WITHOUT CONTRAST  10/8/2020 9:56 pm TECHNIQUE: CT of the head was performed without the administration of intravenous contrast. Dose modulation, iterative reconstruction, and/or weight based adjustment of the mA/kV was utilized to reduce the radiation dose to as low as reasonably achievable.; CT of the face was performed without the administration of intravenous contrast. Multiplanar reformatted images are provided for review. Dose modulation, iterative reconstruction, and/or weight based adjustment of the mA/kV was utilized to reduce the radiation dose to as low as reasonably achievable.; CT of the cervical spine was performed without the administration of intravenous contrast. Multiplanar reformatted images are provided for review. Dose modulation, iterative reconstruction, and/or weight based adjustment of the mA/kV was utilized to reduce the radiation dose to as low as reasonably achievable. COMPARISON: None. HISTORY: ORDERING SYSTEM PROVIDED HISTORY: trauma TECHNOLOGIST PROVIDED HISTORY: Reason for exam:->trauma Has a \"code stroke\" or \"stroke alert\" been called? ->No What reading provider will be dictating this exam?->CRC FINDINGS: Head: There is mild right frontoparietal holohemispheric subdural hemorrhage, measuring up to 6 mm. Mild mass effect and minimal midline shift of approximately 4 mm. Visualized skull demonstrates multiple mildly displaced fractures in the skull, involving left temporal bone, right temporal bone, bilateral parietal bones. Left temporoparietal bone skull fractures appear to be comminuted in multiple locations. There also small hemorrhagic contusions in bilateral frontal lobes. Small amount of subarachnoid hemorrhage in the right frontal lobe. Mild fluid layering in the sphenoid sinus. Fractures involving the left orbit as well. The mastoid air cells are clear. However, left temporal bone fracture does extend through the region of the left external auditory canal and extends to the middle ear. Cervical spine: Vertebral body heights are intact. Alignment is intact. Facets are normally aligned. The odontoid process is intact. No significant prevertebral soft tissue swelling. Facial bones: Mandible is intact. The zygomatic arches are intact. Nasal bones are intact.   Nasal bony posterior left 8th rib. Mildly displaced acute fracture of the left body of the scapula. Nondisplaced acute fractures involving the lateral left 5th through 7th ribs. Nondisplaced acute fractures of the posterior left 4th through 7th ribs. Acute mildly displaced fractures of the posterior left 8th rib. Mildly displaced acute fracture of the left body of the scapula. Ct Cervical Spine Wo Contrast    Result Date: 10/9/2020  EXAMINATION: CT OF THE HEAD and cervical spine WITHOUT CONTRAST; CT OF THE FACE WITHOUT CONTRAST  10/8/2020 9:56 pm TECHNIQUE: CT of the head was performed without the administration of intravenous contrast. Dose modulation, iterative reconstruction, and/or weight based adjustment of the mA/kV was utilized to reduce the radiation dose to as low as reasonably achievable.; CT of the face was performed without the administration of intravenous contrast. Multiplanar reformatted images are provided for review. Dose modulation, iterative reconstruction, and/or weight based adjustment of the mA/kV was utilized to reduce the radiation dose to as low as reasonably achievable.; CT of the cervical spine was performed without the administration of intravenous contrast. Multiplanar reformatted images are provided for review. Dose modulation, iterative reconstruction, and/or weight based adjustment of the mA/kV was utilized to reduce the radiation dose to as low as reasonably achievable. COMPARISON: None. HISTORY: ORDERING SYSTEM PROVIDED HISTORY: trauma TECHNOLOGIST PROVIDED HISTORY: Reason for exam:->trauma Has a \"code stroke\" or \"stroke alert\" been called? ->No What reading provider will be dictating this exam?->CRC FINDINGS: Head: There is mild right frontoparietal holohemispheric subdural hemorrhage, measuring up to 6 mm. Mild mass effect and minimal midline shift of approximately 4 mm.   Visualized skull demonstrates multiple mildly displaced fractures in the skull, involving left temporal bone, to as low as reasonably achievable. COMPARISON: Same day lumbar spine CT; same day CT chest, abdomen and pelvis HISTORY: ORDERING SYSTEM PROVIDED HISTORY: trauma TECHNOLOGIST PROVIDED HISTORY: Reason for exam:->trauma What reading provider will be dictating this exam?->CRC FINDINGS: BONES/ALIGNMENT: There is normal alignment of the spine. The vertebral body heights are maintained. No osseous destructive lesion is seen. DEGENERATIVE CHANGES: Mild spondylosis without significant central canal narrowing. Mild right foraminal narrowing at T11-T12. SOFT TISSUES: No paraspinal mass is seen. No acute thoracic spine abnormality. Ct Lumbar Spine Wo Contrast    Result Date: 10/9/2020  EXAMINATION: CT OF THE LUMBAR SPINE WITHOUT CONTRAST  10/8/2020 TECHNIQUE: CT of the lumbar spine was performed without the administration of intravenous contrast. Multiplanar reformatted images are provided for review. Dose modulation, iterative reconstruction, and/or weight based adjustment of the mA/kV was utilized to reduce the radiation dose to as low as reasonably achievable. COMPARISON: None HISTORY: ORDERING SYSTEM PROVIDED HISTORY: trauma TECHNOLOGIST PROVIDED HISTORY: Reason for exam:->trauma What reading provider will be dictating this exam?->CRC FINDINGS: BONES/ALIGNMENT: Vertebral body heights are maintained. No compression deformity. L5 pars defects with grade 1 anterolisthesis of L5 on S1. DEGENERATIVE CHANGES: Moderate disc desiccation at L5-S1. No significant central canal stenosis. Moderate-to-severe foraminal stenosis at the listhesis level. SOFT TISSUES/RETROPERITONEUM: No paraspinal mass is seen. No acute lumbar spine abnormality. L5 pars defects with grade 1 anterolisthesis of L5 on S1 and moderate-to-severe foraminal stenosis.      Ct Abdomen Pelvis W Iv Contrast Additional Contrast? None    Result Date: 10/9/2020  EXAMINATION: CT OF THE ABDOMEN AND PELVIS WITH CONTRAST 10/8/2020 10:56 pm TECHNIQUE: CT of the abdomen and pelvis was performed with the administration of intravenous contrast. Multiplanar reformatted images are provided for review. Dose modulation, iterative reconstruction, and/or weight based adjustment of the mA/kV was utilized to reduce the radiation dose to as low as reasonably achievable. COMPARISON: None. HISTORY: ORDERING SYSTEM PROVIDED HISTORY: trauma TECHNOLOGIST PROVIDED HISTORY: Additional Contrast?->None Reason for exam:->trauma What reading provider will be dictating this exam?->CRC FINDINGS: Lower Chest: Described in detail on separate report of CT of the chest. Organs: The liver, spleen, pancreas, and bilateral adrenal glands are within normal limits. No radiopaque gallstones. No CT evidence of acute cholecystitis. No intra or extrahepatic ductal dilatation. The bilateral kidneys are within normal limits. No renal cysts or masses are noted. No hydronephrosis or hydroureter. GI/Bowel: The small and large bowel demonstrate normal caliber and appearance. A normal-appearing appendix is identified. Pelvis: A soft tissue structure is noted in the distal right inguinal canal which could represent a deeply retracted testicle versus an undescended testicle. Recommend clinical correlation. The urinary bladder is nearly decompressed with a Bentley catheter in place. Peritoneum/Retroperitoneum: No free fluid or free air. Bones/Soft Tissues: Multiple rib fractures, as described in report of CT of the chest.  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 A soft tissue structure is noted in the distal right inguinal canal presumably representing the right testicle and could represent a deeply retracted testicle versus an undescended testicle. Recommend clinical correlation.      Xr Chest Portable    Result Date: 10/9/2020  EXAMINATION: ONE XRAY VIEW OF THE CHEST 10/9/2020 6:59 am COMPARISON: 10/09/2020 HISTORY: ORDERING SYSTEM PROVIDED HISTORY: intubated TECHNOLOGIST PROVIDED HISTORY: Reason for exam:->intubated What reading provider will be dictating this exam?->CRC FINDINGS: Lines/tubes are appropriate. Heart size is normal.  There are no infiltrates or effusions. No acute process     Xr Chest Portable    Result Date: 10/9/2020  EXAMINATION: ONE XRAY VIEW OF THE CHEST 10/9/2020 12:42 am COMPARISON: 10/08/2020 at this 2248 hours. Lavell Pham HISTORY: ORDERING SYSTEM PROVIDED HISTORY: Line Placement TECHNOLOGIST PROVIDED HISTORY: Reason for exam:->Line Placement What reading provider will be dictating this exam?->CRC FINDINGS: Heart is not enlarged. Endotracheal tube and enteric tube are in place. Left IJ CVC is in place with tip in the proximal SVC. No pneumothorax. No pleural effusions. No pulmonary edema or pneumothorax. Endotracheal tube, enteric tube and left IJ CVC is in place with no pneumothorax. Xr Chest 1 View    Result Date: 10/8/2020  EXAMINATION: ONE XRAY VIEW OF THE CHEST 10/8/2020 9:52 pm COMPARISON: None. HISTORY: ORDERING SYSTEM PROVIDED HISTORY: trauma TECHNOLOGIST PROVIDED HISTORY: Reason for exam:->trauma What reading provider will be dictating this exam?->CRC FINDINGS: Endotracheal tube is present with distal tip approximately 6 cm above the andriy. Nasogastric tube courses below the level of the diaphragm with distal tip not included on this examination. No focal airspace opacity or pleural effusion. The heart is prominent related to portable technique. No pneumothorax. 1.  No acute process in the chest. 2.  Endotracheal tube is 6 cm above the andriy. 3.  Nasogastric tube courses below the level of the diaphragm. Cta Neck W Contrast    Result Date: 10/9/2020  EXAMINATION: CTA OF THE HEAD WITH CONTRAST; CTA OF THE NECK 10/9/2020 3:17 pm: TECHNIQUE: CTA of the head/brain was performed with the administration of intravenous contrast. Multiplanar reformatted images are provided for review. MIP images are provided for review.  Dose modulation, iterative fractures of the posterior skull extending through the left temporal bone. .  A right frontal approach endoventricular device is present with re-expansion of the right lateral ventricle. There are intraparenchymal hematomas within the right frontal and right temporal lobes as well as a small amount of subdural blood over the right cerebral convexity. There is 3.7 mm of right-to-left midline shift. Re-expansion of the right lateral ventricle since the prior exam obtained at 12:35 PM. Otherwise, stable appearance of the brain and skull. No acute arterial injury visualized within the head or neck. Incidentally noted patchy and nodular infiltrates within the right lung, worrisome for pneumonia. Cta Neck W Contrast    Result Date: 10/9/2020  EXAMINATION: CTA OF THE NECK 10/8/2020 10:56 pm TECHNIQUE: CTA of the neck was performed with the administration of intravenous contrast. Multiplanar reformatted images are provided for review. MIP images are provided for review. Stenosis of the internal carotid arteries measured using NASCET criteria. Dose modulation, iterative reconstruction, and/or weight based adjustment of the mA/kV was utilized to reduce the radiation dose to as low as reasonably achievable. COMPARISON: None. HISTORY: ORDERING SYSTEM PROVIDED HISTORY: trauma TECHNOLOGIST PROVIDED HISTORY: Reason for exam:->trauma Has a \"code stroke\" or \"stroke alert\" been called? ->No What reading provider will be dictating this exam?->CRC FINDINGS: AORTIC ARCH/ARCH VESSELS: No dissection or arterial injury. No significant stenosis of the brachiocephalic or subclavian arteries. CAROTID ARTERIES: No dissection, arterial injury, or hemodynamically significant stenosis by NASCET criteria. VERTEBRAL ARTERIES: No dissection, arterial injury, or significant stenosis. SOFT TISSUES: The lung apices are clear. No cervical or superior mediastinal lymphadenopathy. The larynx and pharynx are unremarkable.   No acute abnormality of the salivary and thyroid glands. BONES: Partially visualized calvarial comminuted acute fractures are seen bilaterally involving the left temporal occipital bone in the right posterior temporal bone. Right temporal underlying acute subdural hemorrhage is identified measuring up to 5 mm in greatest thickness. Incidental finding of right-sided lung azygos lobe is seen. Unremarkable CTA of the neck. Bilateral calvarial comminuted acute fractures, as discussed above. Underlying partially visualized right temporal acute subdural hemorrhage measuring up to 5 mm in thickness. Please refer to CT head examination, same date, for full description of findings. Xr Chest Abdomen Ng Placement    Result Date: 10/8/2020

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
## 2020-10-17 NOTE — PLAN OF CARE
Problem: Falls - Risk of:  Goal: Will remain free from falls  Description: Will remain free from falls  10/16/2020 2137 by Francis Carlson RN  Outcome: Met This Shift  10/16/2020 1233 by Nhi Goode RN  Outcome: Met This Shift  Goal: Absence of physical injury  Description: Absence of physical injury  10/16/2020 2137 by Francis Carlson RN  Outcome: Met This Shift  10/16/2020 1233 by Nhi Goode RN  Outcome: Not Met This Shift     Problem: Skin Integrity:  Goal: Absence of new skin breakdown  Description: Absence of new skin breakdown  10/16/2020 2137 by Francis Carlson RN  Outcome: Met This Shift  10/16/2020 1233 by Nhi Goode RN  Outcome: Met This Shift     Problem: Pain:  Goal: Pain level will decrease  Description: Pain level will decrease  10/16/2020 2137 by Francis Carlson RN  Outcome: Met This Shift  10/16/2020 1233 by Nhi Goode RN  Outcome: Met This Shift  Goal: Control of acute pain  Description: Control of acute pain  10/16/2020 2137 by Francis Carlson RN  Outcome: Met This Shift  10/16/2020 1233 by Nhi Goode RN  Outcome: Met This Shift  Goal: Control of chronic pain  Description: Control of chronic pain  10/16/2020 2137 by Francis Carlson RN  Outcome: Met This Shift  10/16/2020 1233 by Nhi Goode RN  Outcome: Met This Shift     Problem: Aspiration:  Goal: Absence of aspiration  Description: Absence of aspiration  10/16/2020 1233 by Nhi Goode RN  Outcome: Met This Shift     Problem:  Bowel Function - Altered:  Goal: Bowel elimination is within specified parameters  Description: Bowel elimination is within specified parameters  Outcome: Met This Shift     Problem: Pain:  Goal: Pain level will decrease  Description: Pain level will decrease  10/16/2020 2137 by Francis Carlson RN  Outcome: Met This Shift  10/16/2020 1233 by Nhi Goode RN  Outcome: Met This Shift     Problem: Restraint Use -

## 2020-10-18 ENCOUNTER — APPOINTMENT (OUTPATIENT)
Dept: GENERAL RADIOLOGY | Age: 30
DRG: 003 | End: 2020-10-18
Payer: COMMERCIAL

## 2020-10-18 LAB
ANION GAP SERPL CALCULATED.3IONS-SCNC: 11 MMOL/L (ref 7–16)
ANION GAP SERPL CALCULATED.3IONS-SCNC: 8 MMOL/L (ref 7–16)
BASOPHILS ABSOLUTE: 0.07 E9/L (ref 0–0.2)
BASOPHILS RELATIVE PERCENT: 0.7 % (ref 0–2)
BUN BLDV-MCNC: 14 MG/DL (ref 6–20)
BUN BLDV-MCNC: 15 MG/DL (ref 6–20)
CALCIUM IONIZED: 1.22 MMOL/L (ref 1.15–1.33)
CALCIUM SERPL-MCNC: 8.3 MG/DL (ref 8.6–10.2)
CALCIUM SERPL-MCNC: 8.6 MG/DL (ref 8.6–10.2)
CHLORIDE BLD-SCNC: 104 MMOL/L (ref 98–107)
CHLORIDE BLD-SCNC: 105 MMOL/L (ref 98–107)
CO2: 28 MMOL/L (ref 22–29)
CO2: 30 MMOL/L (ref 22–29)
CREAT SERPL-MCNC: 0.6 MG/DL (ref 0.7–1.2)
CREAT SERPL-MCNC: 0.7 MG/DL (ref 0.7–1.2)
EOSINOPHILS ABSOLUTE: 0.52 E9/L (ref 0.05–0.5)
EOSINOPHILS RELATIVE PERCENT: 5.1 % (ref 0–6)
GFR AFRICAN AMERICAN: >60
GFR AFRICAN AMERICAN: >60
GFR NON-AFRICAN AMERICAN: >60 ML/MIN/1.73
GFR NON-AFRICAN AMERICAN: >60 ML/MIN/1.73
GLUCOSE BLD-MCNC: 109 MG/DL (ref 74–99)
GLUCOSE BLD-MCNC: 110 MG/DL (ref 74–99)
HCT VFR BLD CALC: 31.4 % (ref 37–54)
HEMOGLOBIN: 10.2 G/DL (ref 12.5–16.5)
IMMATURE GRANULOCYTES #: 0.35 E9/L
IMMATURE GRANULOCYTES %: 3.4 % (ref 0–5)
LYMPHOCYTES ABSOLUTE: 0.95 E9/L (ref 1.5–4)
LYMPHOCYTES RELATIVE PERCENT: 9.3 % (ref 20–42)
MAGNESIUM: 2.5 MG/DL (ref 1.6–2.6)
MCH RBC QN AUTO: 31 PG (ref 26–35)
MCHC RBC AUTO-ENTMCNC: 32.5 % (ref 32–34.5)
MCV RBC AUTO: 95.4 FL (ref 80–99.9)
MONOCYTES ABSOLUTE: 0.7 E9/L (ref 0.1–0.95)
MONOCYTES RELATIVE PERCENT: 6.8 % (ref 2–12)
NEUTROPHILS ABSOLUTE: 7.65 E9/L (ref 1.8–7.3)
NEUTROPHILS RELATIVE PERCENT: 74.7 % (ref 43–80)
PDW BLD-RTO: 12.4 FL (ref 11.5–15)
PHOSPHORUS: 3 MG/DL (ref 2.5–4.5)
PLATELET # BLD: 301 E9/L (ref 130–450)
PMV BLD AUTO: 10.2 FL (ref 7–12)
POTASSIUM SERPL-SCNC: 3.6 MMOL/L (ref 3.5–5)
POTASSIUM SERPL-SCNC: 3.9 MMOL/L (ref 3.5–5)
RBC # BLD: 3.29 E12/L (ref 3.8–5.8)
SODIUM BLD-SCNC: 143 MMOL/L (ref 132–146)
SODIUM BLD-SCNC: 143 MMOL/L (ref 132–146)
VANCOMYCIN TROUGH: 11.2 MCG/ML (ref 5–16)
WBC # BLD: 10.2 E9/L (ref 4.5–11.5)

## 2020-10-18 PROCEDURE — 80048 BASIC METABOLIC PNL TOTAL CA: CPT

## 2020-10-18 PROCEDURE — 6360000002 HC RX W HCPCS: Performed by: SURGERY

## 2020-10-18 PROCEDURE — 71045 X-RAY EXAM CHEST 1 VIEW: CPT

## 2020-10-18 PROCEDURE — 99291 CRITICAL CARE FIRST HOUR: CPT | Performed by: SURGERY

## 2020-10-18 PROCEDURE — 6360000002 HC RX W HCPCS: Performed by: STUDENT IN AN ORGANIZED HEALTH CARE EDUCATION/TRAINING PROGRAM

## 2020-10-18 PROCEDURE — 84100 ASSAY OF PHOSPHORUS: CPT

## 2020-10-18 PROCEDURE — 2580000003 HC RX 258: Performed by: STUDENT IN AN ORGANIZED HEALTH CARE EDUCATION/TRAINING PROGRAM

## 2020-10-18 PROCEDURE — 80202 ASSAY OF VANCOMYCIN: CPT

## 2020-10-18 PROCEDURE — 6370000000 HC RX 637 (ALT 250 FOR IP): Performed by: STUDENT IN AN ORGANIZED HEALTH CARE EDUCATION/TRAINING PROGRAM

## 2020-10-18 PROCEDURE — 2580000003 HC RX 258: Performed by: SURGERY

## 2020-10-18 PROCEDURE — 94003 VENT MGMT INPAT SUBQ DAY: CPT

## 2020-10-18 PROCEDURE — 83735 ASSAY OF MAGNESIUM: CPT

## 2020-10-18 PROCEDURE — 6370000000 HC RX 637 (ALT 250 FOR IP): Performed by: SURGERY

## 2020-10-18 PROCEDURE — 2500000003 HC RX 250 WO HCPCS: Performed by: SURGERY

## 2020-10-18 PROCEDURE — 94640 AIRWAY INHALATION TREATMENT: CPT

## 2020-10-18 PROCEDURE — 85025 COMPLETE CBC W/AUTO DIFF WBC: CPT

## 2020-10-18 PROCEDURE — 36415 COLL VENOUS BLD VENIPUNCTURE: CPT

## 2020-10-18 PROCEDURE — 82330 ASSAY OF CALCIUM: CPT

## 2020-10-18 PROCEDURE — 37799 UNLISTED PX VASCULAR SURGERY: CPT

## 2020-10-18 PROCEDURE — 2000000000 HC ICU R&B

## 2020-10-18 RX ORDER — FUROSEMIDE 10 MG/ML
10 INJECTION INTRAMUSCULAR; INTRAVENOUS ONCE
Status: COMPLETED | OUTPATIENT
Start: 2020-10-18 | End: 2020-10-18

## 2020-10-18 RX ORDER — POTASSIUM CHLORIDE 29.8 MG/ML
20 INJECTION INTRAVENOUS ONCE
Status: COMPLETED | OUTPATIENT
Start: 2020-10-18 | End: 2020-10-18

## 2020-10-18 RX ORDER — HYDROXYZINE HYDROCHLORIDE 25 MG/1
25 TABLET, FILM COATED ORAL 3 TIMES DAILY PRN
COMMUNITY

## 2020-10-18 RX ORDER — POTASSIUM CHLORIDE 29.8 MG/ML
40 INJECTION INTRAVENOUS ONCE
Status: COMPLETED | OUTPATIENT
Start: 2020-10-18 | End: 2020-10-18

## 2020-10-18 RX ORDER — OMEGA-3 FATTY ACIDS CAP DELAYED RELEASE 1000 MG 1000 MG
3000 CAPSULE DELAYED RELEASE ORAL 2 TIMES DAILY
COMMUNITY

## 2020-10-18 RX ADMIN — POTASSIUM CHLORIDE 40 MEQ: 400 INJECTION, SOLUTION INTRAVENOUS at 10:44

## 2020-10-18 RX ADMIN — BACITRACIN ZINC: 500 OINTMENT TOPICAL at 08:53

## 2020-10-18 RX ADMIN — PROPOFOL INJECTABLE EMULSION 40 MCG/KG/MIN: 10 INJECTION, EMULSION INTRAVENOUS at 06:05

## 2020-10-18 RX ADMIN — FENTANYL CITRATE 75 MCG: 50 INJECTION, SOLUTION INTRAMUSCULAR; INTRAVENOUS at 23:05

## 2020-10-18 RX ADMIN — FENTANYL CITRATE 75 MCG: 50 INJECTION, SOLUTION INTRAMUSCULAR; INTRAVENOUS at 20:09

## 2020-10-18 RX ADMIN — POLYVINYL ALCOHOL 1 DROP: 14 SOLUTION/ DROPS OPHTHALMIC at 08:15

## 2020-10-18 RX ADMIN — MINERAL OIL AND PETROLATUM: 150; 830 OINTMENT OPHTHALMIC at 14:17

## 2020-10-18 RX ADMIN — POTASSIUM BICARBONATE 40 MEQ: 782 TABLET, EFFERVESCENT ORAL at 18:46

## 2020-10-18 RX ADMIN — MINERAL OIL AND PETROLATUM: 150; 830 OINTMENT OPHTHALMIC at 17:45

## 2020-10-18 RX ADMIN — OXYCODONE HYDROCHLORIDE 5 MG: 5 SOLUTION ORAL at 18:15

## 2020-10-18 RX ADMIN — FUROSEMIDE 10 MG: 20 INJECTION, SOLUTION INTRAMUSCULAR; INTRAVENOUS at 08:55

## 2020-10-18 RX ADMIN — HEPARIN SODIUM 5000 UNITS: 10000 INJECTION INTRAVENOUS; SUBCUTANEOUS at 06:05

## 2020-10-18 RX ADMIN — Medication 10 ML: at 21:12

## 2020-10-18 RX ADMIN — SODIUM CHLORIDE SOLN NEBU 3% 4 ML: 3 NEBU SOLN at 19:37

## 2020-10-18 RX ADMIN — SENNOSIDES 5 ML: 8.8 LIQUID ORAL at 21:11

## 2020-10-18 RX ADMIN — SODIUM CHLORIDE 25 ML: 9 INJECTION, SOLUTION INTRAVENOUS at 06:00

## 2020-10-18 RX ADMIN — FENTANYL CITRATE 75 MCG: 50 INJECTION, SOLUTION INTRAMUSCULAR; INTRAVENOUS at 08:46

## 2020-10-18 RX ADMIN — FAMOTIDINE 20 MG: 20 TABLET ORAL at 10:00

## 2020-10-18 RX ADMIN — MINERAL OIL AND PETROLATUM: 150; 830 OINTMENT OPHTHALMIC at 21:09

## 2020-10-18 RX ADMIN — VANCOMYCIN HYDROCHLORIDE: 10 INJECTION, POWDER, LYOPHILIZED, FOR SOLUTION INTRAVENOUS at 11:42

## 2020-10-18 RX ADMIN — ENALAPRILAT 1.25 MG: 1.25 INJECTION INTRAVENOUS at 17:00

## 2020-10-18 RX ADMIN — MIDAZOLAM 2 MG: 1 INJECTION INTRAMUSCULAR; INTRAVENOUS at 02:35

## 2020-10-18 RX ADMIN — ACETAMINOPHEN ORAL SOLUTION 650 MG: 650 SOLUTION ORAL at 06:05

## 2020-10-18 RX ADMIN — ACETAMINOPHEN ORAL SOLUTION 650 MG: 650 SOLUTION ORAL at 17:44

## 2020-10-18 RX ADMIN — PIPERACILLIN AND TAZOBACTAM 3.38 G: 3; .375 INJECTION, POWDER, LYOPHILIZED, FOR SOLUTION INTRAVENOUS at 01:26

## 2020-10-18 RX ADMIN — BACITRACIN ZINC, POLYMYXIN B SULFATE, NEOMYCIN SULFATE: 400; 5000; 3.5 OINTMENT TOPICAL at 21:09

## 2020-10-18 RX ADMIN — MINERAL OIL AND PETROLATUM: 150; 830 OINTMENT OPHTHALMIC at 10:19

## 2020-10-18 RX ADMIN — MEPERIDINE HYDROCHLORIDE 50 MG: 25 INJECTION, SOLUTION INTRAMUSCULAR; INTRAVENOUS; SUBCUTANEOUS at 11:07

## 2020-10-18 RX ADMIN — FUROSEMIDE 10 MG: 20 INJECTION, SOLUTION INTRAMUSCULAR; INTRAVENOUS at 18:38

## 2020-10-18 RX ADMIN — SODIUM CHLORIDE 25 ML: 9 INJECTION, SOLUTION INTRAVENOUS at 21:18

## 2020-10-18 RX ADMIN — BACITRACIN ZINC: 500 OINTMENT TOPICAL at 21:09

## 2020-10-18 RX ADMIN — VANCOMYCIN HYDROCHLORIDE: 10 INJECTION, POWDER, LYOPHILIZED, FOR SOLUTION INTRAVENOUS at 01:26

## 2020-10-18 RX ADMIN — MINERAL OIL AND PETROLATUM: 150; 830 OINTMENT OPHTHALMIC at 02:05

## 2020-10-18 RX ADMIN — ACETAMINOPHEN ORAL SOLUTION 650 MG: 650 SOLUTION ORAL at 09:52

## 2020-10-18 RX ADMIN — MIDAZOLAM 2 MG: 1 INJECTION INTRAMUSCULAR; INTRAVENOUS at 09:47

## 2020-10-18 RX ADMIN — MEPERIDINE HYDROCHLORIDE 50 MG: 25 INJECTION, SOLUTION INTRAMUSCULAR; INTRAVENOUS; SUBCUTANEOUS at 21:12

## 2020-10-18 RX ADMIN — POLYVINYL ALCOHOL 1 DROP: 14 SOLUTION/ DROPS OPHTHALMIC at 20:09

## 2020-10-18 RX ADMIN — FENTANYL CITRATE 75 MCG: 50 INJECTION, SOLUTION INTRAMUSCULAR; INTRAVENOUS at 17:44

## 2020-10-18 RX ADMIN — CHLORHEXIDINE GLUCONATE 0.12% ORAL RINSE 15 ML: 1.2 LIQUID ORAL at 21:10

## 2020-10-18 RX ADMIN — HEPARIN SODIUM 5000 UNITS: 10000 INJECTION INTRAVENOUS; SUBCUTANEOUS at 14:27

## 2020-10-18 RX ADMIN — FAMOTIDINE 20 MG: 20 TABLET ORAL at 21:09

## 2020-10-18 RX ADMIN — PROPOFOL INJECTABLE EMULSION 35 MCG/KG/MIN: 10 INJECTION, EMULSION INTRAVENOUS at 21:17

## 2020-10-18 RX ADMIN — MIDAZOLAM 2 MG: 1 INJECTION INTRAMUSCULAR; INTRAVENOUS at 16:27

## 2020-10-18 RX ADMIN — FENTANYL CITRATE 75 MCG: 50 INJECTION, SOLUTION INTRAMUSCULAR; INTRAVENOUS at 15:07

## 2020-10-18 RX ADMIN — ACETAMINOPHEN ORAL SOLUTION 650 MG: 650 SOLUTION ORAL at 02:04

## 2020-10-18 RX ADMIN — SODIUM CHLORIDE 25 ML: 9 INJECTION, SOLUTION INTRAVENOUS at 13:30

## 2020-10-18 RX ADMIN — ACETAMINOPHEN ORAL SOLUTION 650 MG: 650 SOLUTION ORAL at 21:10

## 2020-10-18 RX ADMIN — BACITRACIN ZINC: 500 OINTMENT TOPICAL at 14:27

## 2020-10-18 RX ADMIN — PROPOFOL INJECTABLE EMULSION 35 MCG/KG/MIN: 10 INJECTION, EMULSION INTRAVENOUS at 16:27

## 2020-10-18 RX ADMIN — MINERAL OIL AND PETROLATUM: 150; 830 OINTMENT OPHTHALMIC at 06:01

## 2020-10-18 RX ADMIN — VANCOMYCIN HYDROCHLORIDE: 10 INJECTION, POWDER, LYOPHILIZED, FOR SOLUTION INTRAVENOUS at 20:09

## 2020-10-18 RX ADMIN — POLYVINYL ALCOHOL 1 DROP: 14 SOLUTION/ DROPS OPHTHALMIC at 16:10

## 2020-10-18 RX ADMIN — PROPOFOL INJECTABLE EMULSION 35 MCG/KG/MIN: 10 INJECTION, EMULSION INTRAVENOUS at 10:54

## 2020-10-18 RX ADMIN — POLYVINYL ALCOHOL 1 DROP: 14 SOLUTION/ DROPS OPHTHALMIC at 04:00

## 2020-10-18 RX ADMIN — POTASSIUM CHLORIDE 20 MEQ: 400 INJECTION, SOLUTION INTRAVENOUS at 18:37

## 2020-10-18 RX ADMIN — SODIUM CHLORIDE SOLN NEBU 3% 4 ML: 3 NEBU SOLN at 10:18

## 2020-10-18 RX ADMIN — PROPOFOL INJECTABLE EMULSION 40 MCG/KG/MIN: 10 INJECTION, EMULSION INTRAVENOUS at 02:02

## 2020-10-18 RX ADMIN — SODIUM CHLORIDE SOLN NEBU 3% 4 ML: 3 NEBU SOLN at 02:46

## 2020-10-18 RX ADMIN — CHLORHEXIDINE GLUCONATE 0.12% ORAL RINSE 15 ML: 1.2 LIQUID ORAL at 08:54

## 2020-10-18 RX ADMIN — HEPARIN SODIUM 5000 UNITS: 10000 INJECTION INTRAVENOUS; SUBCUTANEOUS at 21:09

## 2020-10-18 RX ADMIN — PIPERACILLIN AND TAZOBACTAM 3.38 G: 3; .375 INJECTION, POWDER, LYOPHILIZED, FOR SOLUTION INTRAVENOUS at 18:00

## 2020-10-18 RX ADMIN — Medication 10 ML: at 08:56

## 2020-10-18 RX ADMIN — BACITRACIN ZINC, POLYMYXIN B SULFATE, NEOMYCIN SULFATE: 400; 5000; 3.5 OINTMENT TOPICAL at 08:53

## 2020-10-18 RX ADMIN — SODIUM CHLORIDE SOLN NEBU 3% 4 ML: 3 NEBU SOLN at 13:30

## 2020-10-18 RX ADMIN — OXYCODONE HYDROCHLORIDE 5 MG: 5 SOLUTION ORAL at 06:04

## 2020-10-18 RX ADMIN — POLYVINYL ALCOHOL 1 DROP: 14 SOLUTION/ DROPS OPHTHALMIC at 11:44

## 2020-10-18 RX ADMIN — PIPERACILLIN AND TAZOBACTAM 3.38 G: 3; .375 INJECTION, POWDER, LYOPHILIZED, FOR SOLUTION INTRAVENOUS at 09:29

## 2020-10-18 RX ADMIN — OXYCODONE HYDROCHLORIDE 5 MG: 5 SOLUTION ORAL at 11:46

## 2020-10-18 RX ADMIN — FENTANYL CITRATE 75 MCG: 50 INJECTION, SOLUTION INTRAMUSCULAR; INTRAVENOUS at 04:01

## 2020-10-18 ASSESSMENT — PULMONARY FUNCTION TESTS
PIF_VALUE: 13
PIF_VALUE: 21
PIF_VALUE: 16
PIF_VALUE: 12
PIF_VALUE: 20
PIF_VALUE: 12
PIF_VALUE: 20
PIF_VALUE: 16
PIF_VALUE: 50
PIF_VALUE: 17
PIF_VALUE: 20
PIF_VALUE: 23
PIF_VALUE: 19
PIF_VALUE: 12
PIF_VALUE: 18
PIF_VALUE: 17
PIF_VALUE: 18
PIF_VALUE: 16
PIF_VALUE: 22
PIF_VALUE: 17
PIF_VALUE: 20
PIF_VALUE: 18
PIF_VALUE: 19
PIF_VALUE: 11
PIF_VALUE: 14
PIF_VALUE: 19
PIF_VALUE: 20

## 2020-10-18 ASSESSMENT — PAIN SCALES - GENERAL
PAINLEVEL_OUTOF10: 0
PAINLEVEL_OUTOF10: 7
PAINLEVEL_OUTOF10: 5
PAINLEVEL_OUTOF10: 0
PAINLEVEL_OUTOF10: 3
PAINLEVEL_OUTOF10: 7
PAINLEVEL_OUTOF10: 7
PAINLEVEL_OUTOF10: 4
PAINLEVEL_OUTOF10: 0
PAINLEVEL_OUTOF10: 2
PAINLEVEL_OUTOF10: 7
PAINLEVEL_OUTOF10: 6
PAINLEVEL_OUTOF10: 7
PAINLEVEL_OUTOF10: 7
PAINLEVEL_OUTOF10: 3

## 2020-10-18 NOTE — PLAN OF CARE
adequate nutritional intake will improve  Description: Ability to achieve adequate nutritional intake will improve  10/17/2020 1630 by Ross Germain RN  Outcome: Met This Shift  10/17/2020 1029 by Ross Germain RN  Outcome: Met This Shift     Problem: Pain:  Goal: Pain level will decrease  Description: Pain level will decrease  10/17/2020 1630 by Ross Germain RN  Outcome: Met This Shift  10/17/2020 1029 by Ross Germain RN  Outcome: Met This Shift  Goal: Recognizes and communicates pain  Description: Recognizes and communicates pain  10/17/2020 1630 by Ross Germain RN  Outcome: Met This Shift  10/17/2020 1029 by Ross Germain RN  Outcome: Met This Shift  Goal: Control of acute pain  Description: Control of acute pain  10/17/2020 1630 by Ross Germain RN  Outcome: Met This Shift  10/17/2020 1029 by Ross Germain RN  Outcome: Met This Shift     Problem: Skin Integrity - Impaired:  Goal: Will show no infection signs and symptoms  Description: Will show no infection signs and symptoms  10/17/2020 1630 by Ross Germain RN  Outcome: Met This Shift  10/17/2020 1029 by Ross Germain RN  Outcome: Met This Shift     Problem: Sleep Pattern Disturbance:  Goal: Appears well-rested  Description: Appears well-rested  10/17/2020 1630 by Ross Germain RN  Outcome: Met This Shift  10/17/2020 1029 by Ross Germain RN  Outcome: Met This Shift     Problem: Restraint Use - Nonviolent/Non-Self-Destructive Behavior:  Goal: Absence of restraint-related injury  Description: Absence of restraint-related injury  10/17/2020 2113 by Tej Linares RN  Outcome: Met This Shift  10/17/2020 1630 by Ross Germain RN  Outcome: Met This Shift  10/17/2020 1029 by Ross Germain RN  Outcome: Met This Shift     Problem: Restraint Use - Nonviolent/Non-Self-Destructive Behavior:  Goal: Absence of restraint indications  Description: Absence of restraint indications  10/17/2020 2113 by Leonardo Alvarado Jonatan Wei RN  Outcome: Not Met This Shift  10/17/2020 1630 by Elein Chen RN  Outcome: Not Met This Shift  10/17/2020 1029 by Eleni Chen RN  Outcome: Not Met This Shift

## 2020-10-18 NOTE — FLOWSHEET NOTE
Patient will reach at lines and tubes needing to be redirected much of time. Attempting to provide calm environment and reorientation, but patient still assessed to be a risk of harm to self. Family aware for need of restraint. Will continue to monitor patient and assess for earliest removal capability.
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
## 2020-10-18 NOTE — PLAN OF CARE
Problem: Falls - Risk of:  Goal: Will remain free from falls  Description: Will remain free from falls  10/18/2020 1638 by Christoph Ohara RN  Outcome: Met This Shift  10/18/2020 1637 by Christoph Ohara RN  Outcome: Met This Shift  Goal: Absence of physical injury  Description: Absence of physical injury  10/18/2020 1638 by Christoph Ohara RN  Outcome: Met This Shift  10/18/2020 1637 by Christoph Ohara RN  Outcome: Met This Shift     Problem: Skin Integrity:  Goal: Will show no infection signs and symptoms  Description: Will show no infection signs and symptoms  10/18/2020 1638 by Christoph Ohara RN  Outcome: Met This Shift  10/18/2020 1637 by Christoph Ohara RN  Outcome: Met This Shift  Goal: Absence of new skin breakdown  Description: Absence of new skin breakdown  10/18/2020 1638 by Christoph Ohara RN  Outcome: Met This Shift  10/18/2020 1637 by Christoph Ohara RN  Outcome: Met This Shift     Problem: Pain:  Goal: Pain level will decrease  Description: Pain level will decrease  10/18/2020 1638 by Christoph Ohara RN  Outcome: Met This Shift  10/18/2020 1637 by Christoph Ohara RN  Outcome: Met This Shift  Goal: Control of acute pain  Description: Control of acute pain  10/18/2020 1638 by Christoph Ohara RN  Outcome: Met This Shift  10/18/2020 1637 by Christoph Ohara RN  Outcome: Met This Shift  Goal: Control of chronic pain  Description: Control of chronic pain  10/18/2020 1638 by Christoph Ohara RN  Outcome: Met This Shift  10/18/2020 1637 by Christoph Ohara RN  Outcome: Met This Shift     Problem: Airway Clearance - Ineffective:  Goal: Ability to maintain a clear airway will improve  Description: Ability to maintain a clear airway will improve  10/18/2020 1638 by Christoph Ohara RN  Outcome: Met This Shift  10/18/2020 1637 by Christoph Ohara RN  Outcome: Met This Shift     Problem: Anxiety/Stress:  Goal: Level of anxiety will decrease  Description: Level of anxiety will decrease  10/18/2020 1638 by Donna Baptiste RN  Outcome: Met This Shift  10/18/2020 1637 by Donna Baptiste RN  Outcome: Met This Shift     Problem: Aspiration:  Goal: Absence of aspiration  Description: Absence of aspiration  10/18/2020 1638 by Donna Baptiste RN  Outcome: Met This Shift  10/18/2020 1637 by Donna Baptiste RN  Outcome: Met This Shift     Problem:  Bowel Function - Altered:  Goal: Bowel elimination is within specified parameters  Description: Bowel elimination is within specified parameters  10/18/2020 1638 by Donna Baptiste RN  Outcome: Met This Shift  10/18/2020 1637 by Donna Baptiste RN  Outcome: Met This Shift     Problem: Cardiac Output - Decreased:  Goal: Hemodynamic stability will improve  Description: Hemodynamic stability will improve  10/18/2020 1638 by Donna Baptiste RN  Outcome: Met This Shift  10/18/2020 1637 by Donna Baptiste RN  Outcome: Met This Shift     Problem: Fluid Volume - Imbalance:  Goal: Absence of imbalanced fluid volume signs and symptoms  Description: Absence of imbalanced fluid volume signs and symptoms  10/18/2020 1638 by Donna Baptiste RN  Outcome: Met This Shift  10/18/2020 1637 by Donna Baptiste RN  Outcome: Met This Shift     Problem: Gas Exchange - Impaired:  Goal: Levels of oxygenation will improve  Description: Levels of oxygenation will improve  10/18/2020 1638 by Donna Baptiste RN  Outcome: Met This Shift  10/18/2020 1637 by Donna Baptiste RN  Outcome: Met This Shift     Problem: Mental Status - Impaired:  Goal: Mental status will be restored to baseline  Description: Mental status will be restored to baseline  10/18/2020 1638 by Donna Baptiste RN  Outcome: Met This Shift  10/18/2020 1637 by Donna Baptiste RN  Outcome: Met This Shift     Problem: Nutrition Deficit:  Goal: Ability to achieve adequate nutritional intake will improve  Description: Ability to achieve adequate nutritional intake will improve  10/18/2020 1638 by Zulema Betancourt RN  Outcome: Met This Shift  10/18/2020 1637 by Zulema Betancourt RN  Outcome: Met This Shift     Problem: Pain:  Goal: Pain level will decrease  Description: Pain level will decrease  10/18/2020 1638 by Zulema Betancourt RN  Outcome: Met This Shift  10/18/2020 1637 by Zulema Betancourt RN  Outcome: Met This Shift  Goal: Recognizes and communicates pain  Description: Recognizes and communicates pain  10/18/2020 1638 by Zulema Betancourt RN  Outcome: Met This Shift  10/18/2020 1637 by Zulema Betancourt RN  Outcome: Met This Shift  Goal: Control of acute pain  Description: Control of acute pain  10/18/2020 1638 by Zulema Betancourt RN  Outcome: Met This Shift  10/18/2020 1637 by Zulema Betancourt RN  Outcome: Met This Shift     Problem: Skin Integrity - Impaired:  Goal: Will show no infection signs and symptoms  Description: Will show no infection signs and symptoms  10/18/2020 1638 by Zulema Betancourt RN  Outcome: Met This Shift  10/18/2020 1637 by Zulema Betancourt RN  Outcome: Met This Shift     Problem: Sleep Pattern Disturbance:  Goal: Appears well-rested  Description: Appears well-rested  10/18/2020 1638 by Zulema Betancourt RN  Outcome: Met This Shift  10/18/2020 1637 by Zulema Betancourt RN  Outcome: Met This Shift     Problem: Restraint Use - Nonviolent/Non-Self-Destructive Behavior:  Goal: Absence of restraint-related injury  Description: Absence of restraint-related injury  10/18/2020 1638 by Zulema Betancourt RN  Outcome: Met This Shift  10/18/2020 1637 by Zulema Betancourt RN  Outcome: Met This Shift

## 2020-10-18 NOTE — PLAN OF CARE
Problem: Falls - Risk of:  Goal: Will remain free from falls  Description: Will remain free from falls  Outcome: Met This Shift  Goal: Absence of physical injury  Description: Absence of physical injury  Outcome: Met This Shift     Problem: Skin Integrity:  Goal: Will show no infection signs and symptoms  Description: Will show no infection signs and symptoms  Outcome: Met This Shift  Goal: Absence of new skin breakdown  Description: Absence of new skin breakdown  Outcome: Met This Shift     Problem: Pain:  Goal: Pain level will decrease  Description: Pain level will decrease  Outcome: Met This Shift  Goal: Control of acute pain  Description: Control of acute pain  Outcome: Met This Shift  Goal: Control of chronic pain  Description: Control of chronic pain  Outcome: Met This Shift     Problem: Airway Clearance - Ineffective:  Goal: Ability to maintain a clear airway will improve  Description: Ability to maintain a clear airway will improve  Outcome: Met This Shift     Problem: Anxiety/Stress:  Goal: Level of anxiety will decrease  Description: Level of anxiety will decrease  Outcome: Met This Shift     Problem: Aspiration:  Goal: Absence of aspiration  Description: Absence of aspiration  Outcome: Met This Shift     Problem:  Bowel Function - Altered:  Goal: Bowel elimination is within specified parameters  Description: Bowel elimination is within specified parameters  Outcome: Met This Shift     Problem: Cardiac Output - Decreased:  Goal: Hemodynamic stability will improve  Description: Hemodynamic stability will improve  Outcome: Met This Shift     Problem: Fluid Volume - Imbalance:  Goal: Absence of imbalanced fluid volume signs and symptoms  Description: Absence of imbalanced fluid volume signs and symptoms  Outcome: Met This Shift     Problem: Gas Exchange - Impaired:  Goal: Levels of oxygenation will improve  Description: Levels of oxygenation will improve  Outcome: Met This Shift     Problem: Mental Status - Impaired:  Goal: Mental status will be restored to baseline  Description: Mental status will be restored to baseline  Outcome: Met This Shift     Problem: Nutrition Deficit:  Goal: Ability to achieve adequate nutritional intake will improve  Description: Ability to achieve adequate nutritional intake will improve  Outcome: Met This Shift     Problem: Pain:  Goal: Pain level will decrease  Description: Pain level will decrease  Outcome: Met This Shift  Goal: Recognizes and communicates pain  Description: Recognizes and communicates pain  Outcome: Met This Shift  Goal: Control of acute pain  Description: Control of acute pain  Outcome: Met This Shift     Problem: Skin Integrity - Impaired:  Goal: Will show no infection signs and symptoms  Description: Will show no infection signs and symptoms  Outcome: Met This Shift     Problem: Sleep Pattern Disturbance:  Goal: Appears well-rested  Description: Appears well-rested  Outcome: Met This Shift     Problem: Restraint Use - Nonviolent/Non-Self-Destructive Behavior:  Goal: Absence of restraint-related injury  Description: Absence of restraint-related injury  Outcome: Met This Shift

## 2020-10-18 NOTE — PLAN OF CARE
Problem: Falls - Risk of:  Goal: Will remain free from falls  Description: Will remain free from falls  10/17/2020 2113 by Amanda Benjamin RN  Outcome: Met This Shift  10/17/2020 1630 by Cosme Otero RN  Outcome: Met This Shift  Goal: Absence of physical injury  Description: Absence of physical injury  10/17/2020 2113 by Amanda Benjamin RN  Outcome: Met This Shift  10/17/2020 1630 by Cosme Otero RN  Outcome: Met This Shift     Problem: Skin Integrity:  Goal: Will show no infection signs and symptoms  Description: Will show no infection signs and symptoms  10/17/2020 1630 by Cosme Otero RN  Outcome: Met This Shift  Goal: Absence of new skin breakdown  Description: Absence of new skin breakdown  10/17/2020 1630 by Cosme Otero RN  Outcome: Met This Shift     Problem: Pain:  Goal: Pain level will decrease  Description: Pain level will decrease  10/17/2020 1630 by Cosme Otero RN  Outcome: Met This Shift  Goal: Control of acute pain  Description: Control of acute pain  10/17/2020 1630 by Cosme Otero RN  Outcome: Met This Shift  Goal: Control of chronic pain  Description: Control of chronic pain  10/17/2020 1630 by Cosme Otero RN  Outcome: Met This Shift     Problem: Airway Clearance - Ineffective:  Goal: Ability to maintain a clear airway will improve  Description: Ability to maintain a clear airway will improve  10/17/2020 1630 by Cosme Otero RN  Outcome: Met This Shift     Problem: Anxiety/Stress:  Goal: Level of anxiety will decrease  Description: Level of anxiety will decrease  10/17/2020 1630 by Cosme Otero RN  Outcome: Met This Shift     Problem: Aspiration:  Goal: Absence of aspiration  Description: Absence of aspiration  10/17/2020 1630 by Cosme Otero RN  Outcome: Met This Shift     Problem:  Bowel Function - Altered:  Goal: Bowel elimination is within specified parameters  Description: Bowel elimination is within specified parameters  10/17/2020 1630 by Rosie Gupta RN  Outcome: Met This Shift     Problem: Cardiac Output - Decreased:  Goal: Hemodynamic stability will improve  Description: Hemodynamic stability will improve  10/17/2020 1630 by Rosie Gupta RN  Outcome: Met This Shift     Problem: Fluid Volume - Imbalance:  Goal: Absence of imbalanced fluid volume signs and symptoms  Description: Absence of imbalanced fluid volume signs and symptoms  10/17/2020 1630 by Rosie Gupta RN  Outcome: Met This Shift     Problem: Gas Exchange - Impaired:  Goal: Levels of oxygenation will improve  Description: Levels of oxygenation will improve  10/17/2020 1630 by Rosie Gupta RN  Outcome: Met This Shift     Problem: Mental Status - Impaired:  Goal: Mental status will be restored to baseline  Description: Mental status will be restored to baseline  10/17/2020 1630 by Rosie Gupta RN  Outcome: Met This Shift     Problem: Nutrition Deficit:  Goal: Ability to achieve adequate nutritional intake will improve  Description: Ability to achieve adequate nutritional intake will improve  10/17/2020 1630 by Rosie Gupta RN  Outcome: Met This Shift     Problem: Pain:  Goal: Pain level will decrease  Description: Pain level will decrease  10/17/2020 1630 by Rosie Gupta RN  Outcome: Met This Shift  Goal: Recognizes and communicates pain  Description: Recognizes and communicates pain  10/17/2020 1630 by Rosie Gupta RN  Outcome: Met This Shift  Goal: Control of acute pain  Description: Control of acute pain  10/17/2020 1630 by Rosie Gupta RN  Outcome: Met This Shift     Problem: Skin Integrity - Impaired:  Goal: Will show no infection signs and symptoms  Description: Will show no infection signs and symptoms  10/17/2020 1630 by Rosie Gupta RN  Outcome: Met This Shift     Problem: Sleep Pattern Disturbance:  Goal: Appears well-rested  Description: Appears well-rested  10/17/2020 1630 by Rosie Gupta RN  Outcome: Met This Shift     Problem: Restraint Use - Nonviolent/Non-Self-Destructive Behavior:  Goal: Absence of restraint-related injury  Description: Absence of restraint-related injury  10/18/2020 0120 by Nancy Gregory RN  Outcome: Met This Shift  10/17/2020 2113 by Nancy Gregory RN  Outcome: Met This Shift  10/17/2020 1630 by Nadege Argueta RN  Outcome: Met This Shift     Problem: Restraint Use - Nonviolent/Non-Self-Destructive Behavior:  Goal: Absence of restraint indications  Description: Absence of restraint indications  10/18/2020 0120 by Nancy Gregory RN  Outcome: Not Met This Shift  10/17/2020 2113 by Nancy Gregory RN  Outcome: Not Met This Shift  10/17/2020 1630 by Nadege Argueta RN  Outcome: Not Met This Shift
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
## 2020-10-19 ENCOUNTER — APPOINTMENT (OUTPATIENT)
Dept: GENERAL RADIOLOGY | Age: 30
DRG: 003 | End: 2020-10-19
Payer: COMMERCIAL

## 2020-10-19 LAB
AADO2: 102.3 MMHG
ANION GAP SERPL CALCULATED.3IONS-SCNC: 11 MMOL/L (ref 7–16)
B.E.: 4.4 MMOL/L (ref -3–3)
BASOPHILS ABSOLUTE: 0.08 E9/L (ref 0–0.2)
BASOPHILS RELATIVE PERCENT: 0.7 % (ref 0–2)
BUN BLDV-MCNC: 17 MG/DL (ref 6–20)
CALCIUM IONIZED: 1.26 MMOL/L (ref 1.15–1.33)
CALCIUM SERPL-MCNC: 8.2 MG/DL (ref 8.6–10.2)
CHLORIDE BLD-SCNC: 105 MMOL/L (ref 98–107)
CO2: 27 MMOL/L (ref 22–29)
COHB: 0.3 % (ref 0–1.5)
CREAT SERPL-MCNC: 0.7 MG/DL (ref 0.7–1.2)
CRITICAL: ABNORMAL
DATE ANALYZED: ABNORMAL
DATE OF COLLECTION: ABNORMAL
EOSINOPHILS ABSOLUTE: 0.51 E9/L (ref 0.05–0.5)
EOSINOPHILS RELATIVE PERCENT: 4.5 % (ref 0–6)
FIO2: 40 %
GFR AFRICAN AMERICAN: >60
GFR NON-AFRICAN AMERICAN: >60 ML/MIN/1.73
GLUCOSE BLD-MCNC: 115 MG/DL (ref 74–99)
HCO3: 28.9 MMOL/L (ref 22–26)
HCT VFR BLD CALC: 30.7 % (ref 37–54)
HEMOGLOBIN: 9.9 G/DL (ref 12.5–16.5)
HHB: 1.9 % (ref 0–5)
IMMATURE GRANULOCYTES #: 0.42 E9/L
IMMATURE GRANULOCYTES %: 3.7 % (ref 0–5)
LAB: ABNORMAL
LYMPHOCYTES ABSOLUTE: 1.21 E9/L (ref 1.5–4)
LYMPHOCYTES RELATIVE PERCENT: 10.8 % (ref 20–42)
Lab: ABNORMAL
MAGNESIUM: 2.3 MG/DL (ref 1.6–2.6)
MCH RBC QN AUTO: 30.3 PG (ref 26–35)
MCHC RBC AUTO-ENTMCNC: 32.2 % (ref 32–34.5)
MCV RBC AUTO: 93.9 FL (ref 80–99.9)
METHB: 0.3 % (ref 0–1.5)
MODE: AC
MONOCYTES ABSOLUTE: 0.78 E9/L (ref 0.1–0.95)
MONOCYTES RELATIVE PERCENT: 6.9 % (ref 2–12)
NEUTROPHILS ABSOLUTE: 8.25 E9/L (ref 1.8–7.3)
NEUTROPHILS RELATIVE PERCENT: 73.4 % (ref 43–80)
O2 CONTENT: 14.9 ML/DL
O2 SATURATION: 98.1 % (ref 92–98.5)
O2HB: 97.5 % (ref 94–97)
OPERATOR ID: ABNORMAL
PATIENT TEMP: 37 C
PCO2: 42.5 MMHG (ref 35–45)
PDW BLD-RTO: 12.6 FL (ref 11.5–15)
PEEP/CPAP: 8 CMH2O
PFO2: 3.1 MMHG/%
PH BLOOD GAS: 7.45 (ref 7.35–7.45)
PHOSPHORUS: 3.4 MG/DL (ref 2.5–4.5)
PLATELET # BLD: 311 E9/L (ref 130–450)
PMV BLD AUTO: 9.9 FL (ref 7–12)
PO2: 124 MMHG (ref 75–100)
POTASSIUM SERPL-SCNC: 3.8 MMOL/L (ref 3.5–5)
RBC # BLD: 3.27 E12/L (ref 3.8–5.8)
RI(T): 83 %
RR MECHANICAL: 16 B/MIN
SODIUM BLD-SCNC: 143 MMOL/L (ref 132–146)
SOURCE, BLOOD GAS: ABNORMAL
THB: 10.7 G/DL (ref 11.5–16.5)
TIME ANALYZED: 437
VANCOMYCIN TROUGH: 8.7 MCG/ML (ref 5–16)
VT MECHANICAL: 500 ML
WBC # BLD: 11.3 E9/L (ref 4.5–11.5)

## 2020-10-19 PROCEDURE — 80202 ASSAY OF VANCOMYCIN: CPT

## 2020-10-19 PROCEDURE — 84100 ASSAY OF PHOSPHORUS: CPT

## 2020-10-19 PROCEDURE — 2500000003 HC RX 250 WO HCPCS: Performed by: SURGERY

## 2020-10-19 PROCEDURE — 2000000000 HC ICU R&B

## 2020-10-19 PROCEDURE — 2580000003 HC RX 258

## 2020-10-19 PROCEDURE — 37799 UNLISTED PX VASCULAR SURGERY: CPT

## 2020-10-19 PROCEDURE — 2580000003 HC RX 258: Performed by: STUDENT IN AN ORGANIZED HEALTH CARE EDUCATION/TRAINING PROGRAM

## 2020-10-19 PROCEDURE — 6360000002 HC RX W HCPCS: Performed by: SURGERY

## 2020-10-19 PROCEDURE — 2580000003 HC RX 258: Performed by: SURGERY

## 2020-10-19 PROCEDURE — 94003 VENT MGMT INPAT SUBQ DAY: CPT

## 2020-10-19 PROCEDURE — 85025 COMPLETE CBC W/AUTO DIFF WBC: CPT

## 2020-10-19 PROCEDURE — 6360000002 HC RX W HCPCS: Performed by: STUDENT IN AN ORGANIZED HEALTH CARE EDUCATION/TRAINING PROGRAM

## 2020-10-19 PROCEDURE — 82330 ASSAY OF CALCIUM: CPT

## 2020-10-19 PROCEDURE — 6360000002 HC RX W HCPCS

## 2020-10-19 PROCEDURE — 99291 CRITICAL CARE FIRST HOUR: CPT | Performed by: SURGERY

## 2020-10-19 PROCEDURE — 82805 BLOOD GASES W/O2 SATURATION: CPT

## 2020-10-19 PROCEDURE — 6370000000 HC RX 637 (ALT 250 FOR IP): Performed by: STUDENT IN AN ORGANIZED HEALTH CARE EDUCATION/TRAINING PROGRAM

## 2020-10-19 PROCEDURE — 94640 AIRWAY INHALATION TREATMENT: CPT

## 2020-10-19 PROCEDURE — 71045 X-RAY EXAM CHEST 1 VIEW: CPT

## 2020-10-19 PROCEDURE — 36415 COLL VENOUS BLD VENIPUNCTURE: CPT

## 2020-10-19 PROCEDURE — 6370000000 HC RX 637 (ALT 250 FOR IP): Performed by: SURGERY

## 2020-10-19 PROCEDURE — 80048 BASIC METABOLIC PNL TOTAL CA: CPT

## 2020-10-19 PROCEDURE — 83735 ASSAY OF MAGNESIUM: CPT

## 2020-10-19 RX ORDER — FUROSEMIDE 10 MG/ML
10 INJECTION INTRAMUSCULAR; INTRAVENOUS ONCE
Status: COMPLETED | OUTPATIENT
Start: 2020-10-19 | End: 2020-10-19

## 2020-10-19 RX ORDER — POTASSIUM CHLORIDE 29.8 MG/ML
20 INJECTION INTRAVENOUS ONCE
Status: COMPLETED | OUTPATIENT
Start: 2020-10-19 | End: 2020-10-19

## 2020-10-19 RX ORDER — BUSPIRONE HYDROCHLORIDE 10 MG/1
30 TABLET ORAL 3 TIMES DAILY
Status: DISCONTINUED | OUTPATIENT
Start: 2020-10-19 | End: 2020-10-21

## 2020-10-19 RX ADMIN — Medication 10 ML: at 12:28

## 2020-10-19 RX ADMIN — SODIUM CHLORIDE 25 ML: 9 INJECTION, SOLUTION INTRAVENOUS at 21:04

## 2020-10-19 RX ADMIN — FENTANYL CITRATE 75 MCG: 50 INJECTION, SOLUTION INTRAMUSCULAR; INTRAVENOUS at 03:22

## 2020-10-19 RX ADMIN — BACITRACIN ZINC, POLYMYXIN B SULFATE, NEOMYCIN SULFATE: 400; 5000; 3.5 OINTMENT TOPICAL at 20:10

## 2020-10-19 RX ADMIN — SODIUM CHLORIDE 25 ML: 9 INJECTION, SOLUTION INTRAVENOUS at 13:42

## 2020-10-19 RX ADMIN — PIPERACILLIN AND TAZOBACTAM 3.38 G: 3; .375 INJECTION, POWDER, LYOPHILIZED, FOR SOLUTION INTRAVENOUS at 18:00

## 2020-10-19 RX ADMIN — MEPERIDINE HYDROCHLORIDE 50 MG: 25 INJECTION, SOLUTION INTRAMUSCULAR; INTRAVENOUS; SUBCUTANEOUS at 16:59

## 2020-10-19 RX ADMIN — PIPERACILLIN AND TAZOBACTAM 3.38 G: 3; .375 INJECTION, POWDER, LYOPHILIZED, FOR SOLUTION INTRAVENOUS at 10:00

## 2020-10-19 RX ADMIN — PROPOFOL INJECTABLE EMULSION 35 MCG/KG/MIN: 10 INJECTION, EMULSION INTRAVENOUS at 01:59

## 2020-10-19 RX ADMIN — ACETAMINOPHEN ORAL SOLUTION 650 MG: 650 SOLUTION ORAL at 06:41

## 2020-10-19 RX ADMIN — SODIUM CHLORIDE SOLN NEBU 3% 4 ML: 3 NEBU SOLN at 07:56

## 2020-10-19 RX ADMIN — HEPARIN SODIUM 5000 UNITS: 10000 INJECTION INTRAVENOUS; SUBCUTANEOUS at 21:09

## 2020-10-19 RX ADMIN — MINERAL OIL AND PETROLATUM: 150; 830 OINTMENT OPHTHALMIC at 18:06

## 2020-10-19 RX ADMIN — SENNOSIDES 5 ML: 8.8 LIQUID ORAL at 20:10

## 2020-10-19 RX ADMIN — PROPOFOL INJECTABLE EMULSION 15 MCG/KG/MIN: 10 INJECTION, EMULSION INTRAVENOUS at 16:44

## 2020-10-19 RX ADMIN — BACITRACIN ZINC: 500 OINTMENT TOPICAL at 20:11

## 2020-10-19 RX ADMIN — CHLORHEXIDINE GLUCONATE 0.12% ORAL RINSE 15 ML: 1.2 LIQUID ORAL at 20:21

## 2020-10-19 RX ADMIN — Medication 10 ML: at 13:40

## 2020-10-19 RX ADMIN — OXYCODONE HYDROCHLORIDE 5 MG: 5 SOLUTION ORAL at 06:40

## 2020-10-19 RX ADMIN — MINERAL OIL AND PETROLATUM: 150; 830 OINTMENT OPHTHALMIC at 22:39

## 2020-10-19 RX ADMIN — VANCOMYCIN HYDROCHLORIDE: 10 INJECTION, POWDER, LYOPHILIZED, FOR SOLUTION INTRAVENOUS at 03:11

## 2020-10-19 RX ADMIN — SODIUM CHLORIDE SOLN NEBU 3% 4 ML: 3 NEBU SOLN at 01:34

## 2020-10-19 RX ADMIN — ACETAMINOPHEN ORAL SOLUTION 650 MG: 650 SOLUTION ORAL at 22:18

## 2020-10-19 RX ADMIN — ACETAMINOPHEN ORAL SOLUTION 650 MG: 650 SOLUTION ORAL at 14:04

## 2020-10-19 RX ADMIN — Medication 10 ML: at 08:14

## 2020-10-19 RX ADMIN — BACITRACIN ZINC: 500 OINTMENT TOPICAL at 14:05

## 2020-10-19 RX ADMIN — OXYCODONE HYDROCHLORIDE 5 MG: 5 SOLUTION ORAL at 12:28

## 2020-10-19 RX ADMIN — HEPARIN SODIUM 5000 UNITS: 10000 INJECTION INTRAVENOUS; SUBCUTANEOUS at 06:39

## 2020-10-19 RX ADMIN — MINERAL OIL AND PETROLATUM: 150; 830 OINTMENT OPHTHALMIC at 10:22

## 2020-10-19 RX ADMIN — POLYVINYL ALCOHOL 1 DROP: 14 SOLUTION/ DROPS OPHTHALMIC at 16:23

## 2020-10-19 RX ADMIN — Medication 10 ML: at 20:11

## 2020-10-19 RX ADMIN — BACITRACIN ZINC: 500 OINTMENT TOPICAL at 08:14

## 2020-10-19 RX ADMIN — FUROSEMIDE 10 MG: 20 INJECTION, SOLUTION INTRAMUSCULAR; INTRAVENOUS at 08:13

## 2020-10-19 RX ADMIN — ACETAMINOPHEN ORAL SOLUTION 650 MG: 650 SOLUTION ORAL at 10:22

## 2020-10-19 RX ADMIN — SODIUM CHLORIDE 25 ML: 9 INJECTION, SOLUTION INTRAVENOUS at 06:06

## 2020-10-19 RX ADMIN — NYSTATIN 500000 UNITS: 100000 SUSPENSION ORAL at 23:43

## 2020-10-19 RX ADMIN — POLYVINYL ALCOHOL 1 DROP: 14 SOLUTION/ DROPS OPHTHALMIC at 03:11

## 2020-10-19 RX ADMIN — MINERAL OIL AND PETROLATUM: 150; 830 OINTMENT OPHTHALMIC at 06:40

## 2020-10-19 RX ADMIN — CHLORHEXIDINE GLUCONATE 0.12% ORAL RINSE 15 ML: 1.2 LIQUID ORAL at 08:15

## 2020-10-19 RX ADMIN — OXYCODONE HYDROCHLORIDE 5 MG: 5 SOLUTION ORAL at 18:06

## 2020-10-19 RX ADMIN — SODIUM CHLORIDE SOLN NEBU 3% 4 ML: 3 NEBU SOLN at 12:58

## 2020-10-19 RX ADMIN — MINERAL OIL AND PETROLATUM: 150; 830 OINTMENT OPHTHALMIC at 02:02

## 2020-10-19 RX ADMIN — BUSPIRONE HYDROCHLORIDE 30 MG: 10 TABLET ORAL at 08:15

## 2020-10-19 RX ADMIN — BUSPIRONE HYDROCHLORIDE 30 MG: 10 TABLET ORAL at 14:05

## 2020-10-19 RX ADMIN — POLYVINYL ALCOHOL 1 DROP: 14 SOLUTION/ DROPS OPHTHALMIC at 21:04

## 2020-10-19 RX ADMIN — POLYVINYL ALCOHOL 1 DROP: 14 SOLUTION/ DROPS OPHTHALMIC at 08:13

## 2020-10-19 RX ADMIN — SODIUM CHLORIDE SOLN NEBU 3% 4 ML: 3 NEBU SOLN at 19:31

## 2020-10-19 RX ADMIN — MEPERIDINE HYDROCHLORIDE 50 MG: 25 INJECTION, SOLUTION INTRAMUSCULAR; INTRAVENOUS; SUBCUTANEOUS at 21:11

## 2020-10-19 RX ADMIN — ACETAMINOPHEN ORAL SOLUTION 650 MG: 650 SOLUTION ORAL at 02:02

## 2020-10-19 RX ADMIN — PIPERACILLIN AND TAZOBACTAM 3.38 G: 3; .375 INJECTION, POWDER, LYOPHILIZED, FOR SOLUTION INTRAVENOUS at 02:00

## 2020-10-19 RX ADMIN — ENALAPRILAT 1.25 MG: 1.25 INJECTION INTRAVENOUS at 20:10

## 2020-10-19 RX ADMIN — FAMOTIDINE 20 MG: 20 TABLET ORAL at 20:10

## 2020-10-19 RX ADMIN — MEPERIDINE HYDROCHLORIDE 50 MG: 25 INJECTION, SOLUTION INTRAMUSCULAR; INTRAVENOUS; SUBCUTANEOUS at 11:09

## 2020-10-19 RX ADMIN — POTASSIUM CHLORIDE 20 MEQ: 400 INJECTION, SOLUTION INTRAVENOUS at 06:40

## 2020-10-19 RX ADMIN — BUSPIRONE HYDROCHLORIDE 30 MG: 10 TABLET ORAL at 20:10

## 2020-10-19 RX ADMIN — VANCOMYCIN HYDROCHLORIDE 2000 MG: 10 INJECTION, POWDER, LYOPHILIZED, FOR SOLUTION INTRAVENOUS at 14:04

## 2020-10-19 RX ADMIN — OXYCODONE HYDROCHLORIDE 5 MG: 5 SOLUTION ORAL at 00:08

## 2020-10-19 RX ADMIN — POLYVINYL ALCOHOL 1 DROP: 14 SOLUTION/ DROPS OPHTHALMIC at 22:18

## 2020-10-19 RX ADMIN — POLYVINYL ALCOHOL 1 DROP: 14 SOLUTION/ DROPS OPHTHALMIC at 00:10

## 2020-10-19 RX ADMIN — FAMOTIDINE 20 MG: 20 TABLET ORAL at 08:13

## 2020-10-19 RX ADMIN — VANCOMYCIN HYDROCHLORIDE 2000 MG: 10 INJECTION, POWDER, LYOPHILIZED, FOR SOLUTION INTRAVENOUS at 22:18

## 2020-10-19 RX ADMIN — FENTANYL CITRATE 75 MCG: 50 INJECTION, SOLUTION INTRAMUSCULAR; INTRAVENOUS at 13:40

## 2020-10-19 RX ADMIN — POLYVINYL ALCOHOL 1 DROP: 14 SOLUTION/ DROPS OPHTHALMIC at 12:28

## 2020-10-19 RX ADMIN — HEPARIN SODIUM 5000 UNITS: 10000 INJECTION INTRAVENOUS; SUBCUTANEOUS at 14:04

## 2020-10-19 RX ADMIN — MINERAL OIL AND PETROLATUM: 150; 830 OINTMENT OPHTHALMIC at 14:07

## 2020-10-19 RX ADMIN — ACETAMINOPHEN ORAL SOLUTION 650 MG: 650 SOLUTION ORAL at 18:06

## 2020-10-19 RX ADMIN — POTASSIUM BICARBONATE 40 MEQ: 782 TABLET, EFFERVESCENT ORAL at 08:13

## 2020-10-19 RX ADMIN — OXYCODONE HYDROCHLORIDE 5 MG: 5 SOLUTION ORAL at 23:43

## 2020-10-19 RX ADMIN — BACITRACIN ZINC, POLYMYXIN B SULFATE, NEOMYCIN SULFATE: 400; 5000; 3.5 OINTMENT TOPICAL at 08:14

## 2020-10-19 RX ADMIN — FENTANYL CITRATE 75 MCG: 50 INJECTION, SOLUTION INTRAMUSCULAR; INTRAVENOUS at 19:30

## 2020-10-19 ASSESSMENT — PAIN SCALES - GENERAL
PAINLEVEL_OUTOF10: 3
PAINLEVEL_OUTOF10: 0
PAINLEVEL_OUTOF10: 4
PAINLEVEL_OUTOF10: 0
PAINLEVEL_OUTOF10: 2
PAINLEVEL_OUTOF10: 0
PAINLEVEL_OUTOF10: 4
PAINLEVEL_OUTOF10: 4
PAINLEVEL_OUTOF10: 0
PAINLEVEL_OUTOF10: 8
PAINLEVEL_OUTOF10: 2
PAINLEVEL_OUTOF10: 7
PAINLEVEL_OUTOF10: 8
PAINLEVEL_OUTOF10: 0
PAINLEVEL_OUTOF10: 0
PAINLEVEL_OUTOF10: 2

## 2020-10-19 ASSESSMENT — PULMONARY FUNCTION TESTS
PIF_VALUE: 24
PIF_VALUE: 23
PIF_VALUE: 23
PIF_VALUE: 24
PIF_VALUE: 24
PIF_VALUE: 23
PIF_VALUE: 23
PIF_VALUE: 20
PIF_VALUE: 19
PIF_VALUE: 24
PIF_VALUE: 23
PIF_VALUE: 24
PIF_VALUE: 23
PIF_VALUE: 19
PIF_VALUE: 24
PIF_VALUE: 23
PIF_VALUE: 21
PIF_VALUE: 24
PIF_VALUE: 21
PIF_VALUE: 23
PIF_VALUE: 23
PIF_VALUE: 18
PIF_VALUE: 23
PIF_VALUE: 24
PIF_VALUE: 23
PIF_VALUE: 18

## 2020-10-19 NOTE — CARE COORDINATION
10/19/2020 - Remains in SICU on ventilator to trach, has peg tube. TF continued via peg tube. IV propofol infusion. ICP monitor removed on Friday. Received message from Chapincito Long at [facility]  - will initiate precert for pt to day for [facility]. Pt father in room and updated on pt condition. SW/CM will continue to follow.
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
## 2020-10-19 NOTE — PLAN OF CARE
Problem: Restraint Use - Nonviolent/Non-Self-Destructive Behavior:  Goal: Absence of restraint indications  10/19/2020 1655 by Dinorah Joel, RN  Outcome: Not Met This Shift     Problem: Restraint Use - Nonviolent/Non-Self-Destructive Behavior:  Goal: Absence of restraint-related injury  10/19/2020 1655 by Dinorah Joel, RN  Outcome: Met This Shift   Plan of care discussed with patient/family. Patient/family incorporated into plan of care. Liseth Pires

## 2020-10-19 NOTE — PLAN OF CARE
Problem: Falls - Risk of:  Goal: Will remain free from falls  Description: Will remain free from falls  10/18/2020 2154 by Gillian Grad  Outcome: Met This Shift  10/18/2020 1638 by Benjie Truong RN  Outcome: Met This Shift  10/18/2020 1637 by Benjie Truong RN  Outcome: Met This Shift  Goal: Absence of physical injury  Description: Absence of physical injury  10/18/2020 2154 by Gillian Grad  Outcome: Met This Shift  10/18/2020 1638 by Benjie Truong RN  Outcome: Met This Shift  10/18/2020 1637 by Benjie Truong RN  Outcome: Met This Shift     Problem: Skin Integrity:  Goal: Will show no infection signs and symptoms  Description: Will show no infection signs and symptoms  10/18/2020 2154 by Gillian Grad  Outcome: Met This Shift  10/18/2020 1638 by Benjie Truong RN  Outcome: Met This Shift  10/18/2020 1637 by Benjie Truong RN  Outcome: Met This Shift  Goal: Absence of new skin breakdown  Description: Absence of new skin breakdown  10/18/2020 2154 by Gillian Grad  Outcome: Met This Shift  10/18/2020 1638 by Benjie Truong RN  Outcome: Met This Shift  10/18/2020 1637 by Benjie Truong RN  Outcome: Met This Shift     Problem: Pain:  Description: Pain management should include both nonpharmacologic and pharmacologic interventions.   Goal: Pain level will decrease  Description: Pain level will decrease  10/18/2020 2154 by Gillian Grad  Outcome: Ongoing  10/18/2020 1638 by Benjie Truong RN  Outcome: Met This Shift  10/18/2020 1637 by Benjie Truong RN  Outcome: Met This Shift  Goal: Control of acute pain  Description: Control of acute pain  10/18/2020 2154 by Gillian Grad  Outcome: Met This Shift  10/18/2020 1638 by Benjie Truong RN  Outcome: Met This Shift  10/18/2020 1637 by Benjie Truong RN  Outcome: Met This Shift  Goal: Control of chronic pain  Description: Control of chronic pain  10/18/2020 2154 by Gillian Grad  Outcome: Met This Shift  10/18/2020 1638 by Christoph Ohara RN  Outcome: Met This Shift  10/18/2020 1637 by Christoph Ohara RN  Outcome: Met This Shift     Problem: Airway Clearance - Ineffective:  Goal: Ability to maintain a clear airway will improve  Description: Ability to maintain a clear airway will improve  10/18/2020 2154 by Josh Yañez  Outcome: Met This Shift  10/18/2020 1638 by Christoph Ohara RN  Outcome: Met This Shift  10/18/2020 1637 by Christoph Ohara RN  Outcome: Met This Shift     Problem: Aspiration:  Goal: Absence of aspiration  Description: Absence of aspiration  10/18/2020 2154 by Josh Yañez  Outcome: Met This Shift  10/18/2020 1638 by Christoph Ohara RN  Outcome: Met This Shift  10/18/2020 1637 by Christoph Ohara RN  Outcome: Met This Shift     Problem: Cardiac Output - Decreased:  Goal: Hemodynamic stability will improve  Description: Hemodynamic stability will improve  10/18/2020 2154 by Josh Yañez  Outcome: Met This Shift  10/18/2020 1638 by Christoph Ohara RN  Outcome: Met This Shift  10/18/2020 1637 by Christoph Ohara RN  Outcome: Met This Shift     Problem: Mental Status - Impaired:  Goal: Mental status will be restored to baseline  Description: Mental status will be restored to baseline  10/18/2020 2154 by Josh Yañez  Outcome: Not Met This Shift  10/18/2020 1638 by Christoph Ohara RN  Outcome: Met This Shift  10/18/2020 1637 by Christoph Ohara RN  Outcome: Met This Shift     Problem: Pain:  Description: Pain management should include both nonpharmacologic and pharmacologic interventions.   Goal: Pain level will decrease  Description: Pain level will decrease  10/18/2020 2154 by Josh Yañez  Outcome: Ongoing  10/18/2020 1638 by Christoph Ohara RN  Outcome: Met This Shift  10/18/2020 1637 by Christoph Ohara RN  Outcome: Met This Shift  Goal: Recognizes and communicates pain  Description: Recognizes and communicates pain  10/18/2020 1638 by Christoph Ohara RN  Outcome: Met This
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
## 2020-10-19 NOTE — PROGRESS NOTES
Neurosurg progress note  VITALS:  BP (!) 154/66   Pulse (!) 48   Temp 99.1 °F (37.3 °C)   Resp 16   Ht 6' 2\" (1.88 m)   Wt 272 lb 0.8 oz (123.4 kg)   SpO2 100%   BMI 34.93 kg/m²   24HR INTAKE/OUTPUT:    Intake/Output Summary (Last 24 hours) at 10/19/2020 1352  Last data filed at 10/19/2020 1300  Gross per 24 hour   Intake 5609.31 ml   Output 7765 ml   Net -2155.69 ml     Xr Pelvis (1-2 Views)    Result Date: 10/8/2020  EXAMINATION: ONE XRAY VIEW OF THE PELVIS 10/8/2020 9:52 pm COMPARISON: None. HISTORY: ORDERING SYSTEM PROVIDED HISTORY: trauma TECHNOLOGIST PROVIDED HISTORY: Reason for exam:->trauma What reading provider will be dictating this exam?->CRC FINDINGS: Left ischial tuberosity is not included on this examination. Entire left hip is not included on this examination. No fracture or dislocation involving pelvis or visualized hips. No diastasis involving sacroiliac joints or symphysis pubis. No fracture or dislocation involving visualized pelvis or hips. Xr Elbow Right (min 3 Views)    Result Date: 10/9/2020  EXAMINATION: THREE XRAY VIEWS OF THE RIGHT ELBOW 10/9/2020 7:00 am COMPARISON: None. HISTORY: ORDERING SYSTEM PROVIDED HISTORY: trauma, Skull fx TECHNOLOGIST PROVIDED HISTORY: Reason for exam:->trauma, Skull fx What reading provider will be dictating this exam?->CRC FINDINGS: There is no fracture dislocation. There is no elbow joint effusion. There are tiny degenerative spurs. No acute process     Xr Hand Left (min 3 Views)    Result Date: 10/9/2020  EXAMINATION: THREE XRAY VIEWS OF THE LEFT HAND; THREE XRAY VIEWS OF THE RIGHT HAND 10/9/2020 7:01 am COMPARISON: None. HISTORY: ORDERING SYSTEM PROVIDED HISTORY: trauma TECHNOLOGIST PROVIDED HISTORY: Reason for exam:->trauma What reading provider will be dictating this exam?->CRC FINDINGS: Positioning of both hands does somewhat limit evaluation. There are no definite fractures or dislocations.   Joint spaces are normal.     No acute process     Xr Hand Right (min 3 Views)    Result Date: 10/9/2020  EXAMINATION: THREE XRAY VIEWS OF THE LEFT HAND; THREE XRAY VIEWS OF THE RIGHT HAND 10/9/2020 7:01 am COMPARISON: None. HISTORY: ORDERING SYSTEM PROVIDED HISTORY: trauma TECHNOLOGIST PROVIDED HISTORY: Reason for exam:->trauma What reading provider will be dictating this exam?->CRC FINDINGS: Positioning of both hands does somewhat limit evaluation. There are no definite fractures or dislocations. Joint spaces are normal.     No acute process     Xr Knee Left (3 Views)    Result Date: 10/9/2020  EXAMINATION: THREE XRAY VIEWS OF THE LEFT KNEE 10/9/2020 7:03 am COMPARISON: None. HISTORY: ORDERING SYSTEM PROVIDED HISTORY: trauma TECHNOLOGIST PROVIDED HISTORY: Reason for exam:->trauma What reading provider will be dictating this exam?->CRC FINDINGS: There is no fracture dislocation. There is no joint effusion or degenerative change. No acute process     Xr Knee Right (3 Views)    Result Date: 10/9/2020  EXAMINATION: THREE XRAY VIEWS OF THE RIGHT KNEE 10/9/2020 8:06 am COMPARISON: None. HISTORY: ORDERING SYSTEM PROVIDED HISTORY: trauma TECHNOLOGIST PROVIDED HISTORY: Reason for exam:->trauma What reading provider will be dictating this exam?->CRC FINDINGS: There is no fracture dislocation. There is no joint space narrowing or effusion. No acute process     Ct Head Wo Contrast    Result Date: 10/9/2020  EXAMINATION: CT OF THE HEAD WITHOUT CONTRAST  10/9/2020 4:07 am TECHNIQUE: CT of the head was performed without the administration of intravenous contrast. Dose modulation, iterative reconstruction, and/or weight based adjustment of the mA/kV was utilized to reduce the radiation dose to as low as reasonably achievable. COMPARISON: CT head 10/08/2020 HISTORY: ORDERING SYSTEM PROVIDED HISTORY: evaluate head bleed TECHNOLOGIST PROVIDED HISTORY: Has a \"code stroke\" or \"stroke alert\" been called? ->No Reason for exam:->evaluate head bleed What reading provider will be dictating this exam?->CRC FINDINGS: BRAIN/VENTRICLES: Interval increase in size of right frontal parenchymal contusion, measuring 14 x 7 x 10 mm, previously 10 x 5 x 5 mm. Additional right frontal contusion, more inferiorly has also increased in size. Punctate contusions in the anterior-inferior frontal lobe along the gyrus rectus have also increased. Scattered bilateral hemispheric subarachnoid hemorrhage. Right convexity subdural hematoma measures up to 6 mm in thickness, not substantially changed. Trace left parietal subdural hematoma measuring 2 mm in thickness. 5 mm leftward midline shift, not substantially changed. No herniation. Patent basal cisterns. ORBITS: The visualized portion of the orbits demonstrate no acute abnormality. SINUSES: Nasopharyngeal opacities with partially imaged esophagogastric and endotracheal tubes. Scattered paranasal sinus opacities. SOFT TISSUES/SKULL:  Nondisplaced left temporal bone fracture extending to the otic capsule. Comminuted mildly displaced left parietal fracture. Nondisplaced right temporal bone fracture which is otic capsule sparing. Diffuse scalp swelling with posterior scalp contusions. Skin staples present. Mild interval increase in size of scattered parenchymal hematomas, mainly in the right frontal lobe, suspicious for diffuse axonal injury. No substantial change in acute right convexity subdural and left parietal subdural hematomas. Stable 5 mm leftward midline shift. Unchanged calvarial fractures, notable for a nondisplaced left temporal bone fracture possibly extending to the otic capsule. Recommend follow-up temporal bone CT.      Ct Head Wo Contrast    Result Date: 10/9/2020  EXAMINATION: CT OF THE HEAD WITHOUT CONTRAST  10/9/2020 12:11 pm TECHNIQUE: CT of the head was performed without the administration of intravenous contrast. Dose modulation, iterative reconstruction, and/or weight based adjustment of the mA/kV was utilized to reduce the radiation dose to as low as reasonably achievable. COMPARISON: 8 hours prior. HISTORY: ORDERING SYSTEM PROVIDED HISTORY: s/p ventric TECHNOLOGIST PROVIDED HISTORY: Reason for exam:->s/p ventric Has a \"code stroke\" or \"stroke alert\" been called? ->No What reading provider will be dictating this exam?->CRC FINDINGS: There has been interval placement of a right frontal approach ventriculostomy catheter terminating within the right lateral ventricle frontal horn abutting the septum pellucidum. There is split like configuration of the right lateral ventricle that may reflect over shunting. There is no temporal horn dilatation. There is diffuse cerebral edema with diffuse sulcal effacement. There is similar appearance of multiple foci of hemorrhagic contusions involving the right frontal lobe and to lesser extent left frontal lobe and right temporal lobe. The hemorrhagic contusions demonstrates mild surrounding edema. There is similar appearance of acute subarachnoid hemorrhage along the bilateral frontal convexities and right temporal convexity and to a lesser extent at the vertices. There is small volume of subdural hemorrhage along the right lateral tentorial leaflet. There is small volume subarachnoid hemorrhage within the interpeduncular cistern. There is new wedge-shaped region of low attenuation involving the left middle cerebellar peduncle and left cerebellar hemisphere, concerning for acute ischemia. There is no significant midline shift. There are bilateral parietal fractures, comminuted on the left, extending into the left temporal bone including the mastoid segment. Please refer to concurrently performed CT temporal bone imaging report. There is additional depressed fracture of the left superior orbital wall. There is diffuse subgaleal soft tissue swelling. Interval placement of right ventriculostomy catheter terminating within the right lateral ventricle frontal horn.   Interval development of slit-like appearance of the right lateral ventricle. Suspect acute ischemia involving the left middle cerebellar peduncle and left cerebellar hemisphere. Similar appearance of intracranial hemorrhage in hemorrhagic contusions as above. Findings discussed with Dr. Lul Rhoades at 12:53 p.m. on December 9, 2020. Ct Head Wo Contrast    Result Date: 10/9/2020  EXAMINATION: CT OF THE HEAD and cervical spine WITHOUT CONTRAST; CT OF THE FACE WITHOUT CONTRAST  10/8/2020 9:56 pm TECHNIQUE: CT of the head was performed without the administration of intravenous contrast. Dose modulation, iterative reconstruction, and/or weight based adjustment of the mA/kV was utilized to reduce the radiation dose to as low as reasonably achievable.; CT of the face was performed without the administration of intravenous contrast. Multiplanar reformatted images are provided for review. Dose modulation, iterative reconstruction, and/or weight based adjustment of the mA/kV was utilized to reduce the radiation dose to as low as reasonably achievable.; CT of the cervical spine was performed without the administration of intravenous contrast. Multiplanar reformatted images are provided for review. Dose modulation, iterative reconstruction, and/or weight based adjustment of the mA/kV was utilized to reduce the radiation dose to as low as reasonably achievable. COMPARISON: None. HISTORY: ORDERING SYSTEM PROVIDED HISTORY: trauma TECHNOLOGIST PROVIDED HISTORY: Reason for exam:->trauma Has a \"code stroke\" or \"stroke alert\" been called? ->No What reading provider will be dictating this exam?->CRC FINDINGS: Head: There is mild right frontoparietal holohemispheric subdural hemorrhage, measuring up to 6 mm. Mild mass effect and minimal midline shift of approximately 4 mm. Visualized skull demonstrates multiple mildly displaced fractures in the skull, involving left temporal bone, right temporal bone, bilateral parietal bones.   Left temporoparietal bone skull fractures appear to be comminuted in multiple locations. There also small hemorrhagic contusions in bilateral frontal lobes. Small amount of subarachnoid hemorrhage in the right frontal lobe. Mild fluid layering in the sphenoid sinus. Fractures involving the left orbit as well. The mastoid air cells are clear. However, left temporal bone fracture does extend through the region of the left external auditory canal and extends to the middle ear. Cervical spine: Vertebral body heights are intact. Alignment is intact. Facets are normally aligned. The odontoid process is intact. No significant prevertebral soft tissue swelling. Facial bones: Mandible is intact. The zygomatic arches are intact. Nasal bones are intact. Nasal bony septum is midline. Sphenoid temporal buttress is intact. Mildly displaced fracture of the roof of the left orbit. The orbital floor is intact. Globes are intact and not proptotic. No retro bulbar soft tissue hematoma. Mild left periorbital preseptal soft tissue swelling. Bilateral comminuted skull bone fractures involving bilateral temporal bones and parietal bones. Fractures are worse on the left side. Mild right holohemispheric subdural hematoma with mild midline shift. Bilateral frontal small hemorrhagic contusions. Left orbital roof mildly displaced fracture. No evidence for cervical fracture or subluxation. Critical findings reported to the ER physician at time of dictation. Ct Iac Posterior Fossa Wo Contrast    Result Date: 10/10/2020  EXAMINATION: CT OF THE INTERNAL AUDITORY CANAL WITHOUT CONTRAST 10/9/2020 12:11 pm TECHNIQUE: CT of the internal auditory canal was performed without contrast was performed without the administration of intravenous contrast. Multiplanar reformatted images are provided for review.  Dose modulation, iterative reconstruction, and/or weight based adjustment of the mA/kV was utilized to reduce the radiation dose was performed without the administration of intravenous contrast. Multiplanar reformatted images are provided for review. Dose modulation, iterative reconstruction, and/or weight based adjustment of the mA/kV was utilized to reduce the radiation dose to as low as reasonably achievable.; CT of the cervical spine was performed without the administration of intravenous contrast. Multiplanar reformatted images are provided for review. Dose modulation, iterative reconstruction, and/or weight based adjustment of the mA/kV was utilized to reduce the radiation dose to as low as reasonably achievable. COMPARISON: None. HISTORY: ORDERING SYSTEM PROVIDED HISTORY: trauma TECHNOLOGIST PROVIDED HISTORY: Reason for exam:->trauma Has a \"code stroke\" or \"stroke alert\" been called? ->No What reading provider will be dictating this exam?->CRC FINDINGS: Head: There is mild right frontoparietal holohemispheric subdural hemorrhage, measuring up to 6 mm. Mild mass effect and minimal midline shift of approximately 4 mm. Visualized skull demonstrates multiple mildly displaced fractures in the skull, involving left temporal bone, right temporal bone, bilateral parietal bones. Left temporoparietal bone skull fractures appear to be comminuted in multiple locations. There also small hemorrhagic contusions in bilateral frontal lobes. Small amount of subarachnoid hemorrhage in the right frontal lobe. Mild fluid layering in the sphenoid sinus. Fractures involving the left orbit as well. The mastoid air cells are clear. However, left temporal bone fracture does extend through the region of the left external auditory canal and extends to the middle ear. Cervical spine: Vertebral body heights are intact. Alignment is intact. Facets are normally aligned. The odontoid process is intact. No significant prevertebral soft tissue swelling. Facial bones: Mandible is intact. The zygomatic arches are intact. Nasal bones are intact.   Nasal bony posterior left 8th rib. Mildly displaced acute fracture of the left body of the scapula. Nondisplaced acute fractures involving the lateral left 5th through 7th ribs. Nondisplaced acute fractures of the posterior left 4th through 7th ribs. Acute mildly displaced fractures of the posterior left 8th rib. Mildly displaced acute fracture of the left body of the scapula. Ct Cervical Spine Wo Contrast    Result Date: 10/9/2020  EXAMINATION: CT OF THE HEAD and cervical spine WITHOUT CONTRAST; CT OF THE FACE WITHOUT CONTRAST  10/8/2020 9:56 pm TECHNIQUE: CT of the head was performed without the administration of intravenous contrast. Dose modulation, iterative reconstruction, and/or weight based adjustment of the mA/kV was utilized to reduce the radiation dose to as low as reasonably achievable.; CT of the face was performed without the administration of intravenous contrast. Multiplanar reformatted images are provided for review. Dose modulation, iterative reconstruction, and/or weight based adjustment of the mA/kV was utilized to reduce the radiation dose to as low as reasonably achievable.; CT of the cervical spine was performed without the administration of intravenous contrast. Multiplanar reformatted images are provided for review. Dose modulation, iterative reconstruction, and/or weight based adjustment of the mA/kV was utilized to reduce the radiation dose to as low as reasonably achievable. COMPARISON: None. HISTORY: ORDERING SYSTEM PROVIDED HISTORY: trauma TECHNOLOGIST PROVIDED HISTORY: Reason for exam:->trauma Has a \"code stroke\" or \"stroke alert\" been called? ->No What reading provider will be dictating this exam?->CRC FINDINGS: Head: There is mild right frontoparietal holohemispheric subdural hemorrhage, measuring up to 6 mm. Mild mass effect and minimal midline shift of approximately 4 mm.   Visualized skull demonstrates multiple mildly displaced fractures in the skull, involving left temporal bone, right temporal bone, bilateral parietal bones. Left temporoparietal bone skull fractures appear to be comminuted in multiple locations. There also small hemorrhagic contusions in bilateral frontal lobes. Small amount of subarachnoid hemorrhage in the right frontal lobe. Mild fluid layering in the sphenoid sinus. Fractures involving the left orbit as well. The mastoid air cells are clear. However, left temporal bone fracture does extend through the region of the left external auditory canal and extends to the middle ear. Cervical spine: Vertebral body heights are intact. Alignment is intact. Facets are normally aligned. The odontoid process is intact. No significant prevertebral soft tissue swelling. Facial bones: Mandible is intact. The zygomatic arches are intact. Nasal bones are intact. Nasal bony septum is midline. Sphenoid temporal buttress is intact. Mildly displaced fracture of the roof of the left orbit. The orbital floor is intact. Globes are intact and not proptotic. No retro bulbar soft tissue hematoma. Mild left periorbital preseptal soft tissue swelling. Bilateral comminuted skull bone fractures involving bilateral temporal bones and parietal bones. Fractures are worse on the left side. Mild right holohemispheric subdural hematoma with mild midline shift. Bilateral frontal small hemorrhagic contusions. Left orbital roof mildly displaced fracture. No evidence for cervical fracture or subluxation. Critical findings reported to the ER physician at time of dictation. Ct Thoracic Spine Wo Contrast    Result Date: 10/9/2020  EXAMINATION: CT OF THE THORACIC SPINE WITHOUT CONTRAST  10/8/2020 10:56 pm: TECHNIQUE: CT of the thoracic spine was performed without the administration of intravenous contrast. Multiplanar reformatted images are provided for review.  Dose modulation, iterative reconstruction, and/or weight based adjustment of the mA/kV was utilized to reduce the radiation dose the abdomen and pelvis was performed with the administration of intravenous contrast. Multiplanar reformatted images are provided for review. Dose modulation, iterative reconstruction, and/or weight based adjustment of the mA/kV was utilized to reduce the radiation dose to as low as reasonably achievable. COMPARISON: None. HISTORY: ORDERING SYSTEM PROVIDED HISTORY: trauma TECHNOLOGIST PROVIDED HISTORY: Additional Contrast?->None Reason for exam:->trauma What reading provider will be dictating this exam?->CRC FINDINGS: Lower Chest: Described in detail on separate report of CT of the chest. Organs: The liver, spleen, pancreas, and bilateral adrenal glands are within normal limits. No radiopaque gallstones. No CT evidence of acute cholecystitis. No intra or extrahepatic ductal dilatation. The bilateral kidneys are within normal limits. No renal cysts or masses are noted. No hydronephrosis or hydroureter. GI/Bowel: The small and large bowel demonstrate normal caliber and appearance. A normal-appearing appendix is identified. Pelvis: A soft tissue structure is noted in the distal right inguinal canal which could represent a deeply retracted testicle versus an undescended testicle. Recommend clinical correlation. The urinary bladder is nearly decompressed with a Bentley catheter in place. Peritoneum/Retroperitoneum: No free fluid or free air. Bones/Soft Tissues: Multiple rib fractures, as described in report of CT of the chest.  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 A soft tissue structure is noted in the distal right inguinal canal presumably representing the right testicle and could represent a deeply retracted testicle versus an undescended testicle. Recommend clinical correlation.      Xr Chest Portable    Result Date: 10/9/2020  EXAMINATION: ONE XRAY VIEW OF THE CHEST 10/9/2020 6:59 am COMPARISON: 10/09/2020 HISTORY: ORDERING SYSTEM PROVIDED HISTORY: intubated TECHNOLOGIST PROVIDED HISTORY: Reason for exam:->intubated What reading provider will be dictating this exam?->CRC FINDINGS: Lines/tubes are appropriate. Heart size is normal.  There are no infiltrates or effusions. No acute process     Xr Chest Portable    Result Date: 10/9/2020  EXAMINATION: ONE XRAY VIEW OF THE CHEST 10/9/2020 12:42 am COMPARISON: 10/08/2020 at this 2248 hours. Marcy Dye HISTORY: ORDERING SYSTEM PROVIDED HISTORY: Line Placement TECHNOLOGIST PROVIDED HISTORY: Reason for exam:->Line Placement What reading provider will be dictating this exam?->CRC FINDINGS: Heart is not enlarged. Endotracheal tube and enteric tube are in place. Left IJ CVC is in place with tip in the proximal SVC. No pneumothorax. No pleural effusions. No pulmonary edema or pneumothorax. Endotracheal tube, enteric tube and left IJ CVC is in place with no pneumothorax. Xr Chest 1 View    Result Date: 10/8/2020  EXAMINATION: ONE XRAY VIEW OF THE CHEST 10/8/2020 9:52 pm COMPARISON: None. HISTORY: ORDERING SYSTEM PROVIDED HISTORY: trauma TECHNOLOGIST PROVIDED HISTORY: Reason for exam:->trauma What reading provider will be dictating this exam?->CRC FINDINGS: Endotracheal tube is present with distal tip approximately 6 cm above the andriy. Nasogastric tube courses below the level of the diaphragm with distal tip not included on this examination. No focal airspace opacity or pleural effusion. The heart is prominent related to portable technique. No pneumothorax. 1.  No acute process in the chest. 2.  Endotracheal tube is 6 cm above the andriy. 3.  Nasogastric tube courses below the level of the diaphragm. Cta Neck W Contrast    Result Date: 10/9/2020  EXAMINATION: CTA OF THE HEAD WITH CONTRAST; CTA OF THE NECK 10/9/2020 3:17 pm: TECHNIQUE: CTA of the head/brain was performed with the administration of intravenous contrast. Multiplanar reformatted images are provided for review. MIP images are provided for review.  Dose modulation, iterative fractures of the posterior skull extending through the left temporal bone. .  A right frontal approach endoventricular device is present with re-expansion of the right lateral ventricle. There are intraparenchymal hematomas within the right frontal and right temporal lobes as well as a small amount of subdural blood over the right cerebral convexity. There is 3.7 mm of right-to-left midline shift. Re-expansion of the right lateral ventricle since the prior exam obtained at 12:35 PM. Otherwise, stable appearance of the brain and skull. No acute arterial injury visualized within the head or neck. Incidentally noted patchy and nodular infiltrates within the right lung, worrisome for pneumonia. Cta Neck W Contrast    Result Date: 10/9/2020  EXAMINATION: CTA OF THE NECK 10/8/2020 10:56 pm TECHNIQUE: CTA of the neck was performed with the administration of intravenous contrast. Multiplanar reformatted images are provided for review. MIP images are provided for review. Stenosis of the internal carotid arteries measured using NASCET criteria. Dose modulation, iterative reconstruction, and/or weight based adjustment of the mA/kV was utilized to reduce the radiation dose to as low as reasonably achievable. COMPARISON: None. HISTORY: ORDERING SYSTEM PROVIDED HISTORY: trauma TECHNOLOGIST PROVIDED HISTORY: Reason for exam:->trauma Has a \"code stroke\" or \"stroke alert\" been called? ->No What reading provider will be dictating this exam?->CRC FINDINGS: AORTIC ARCH/ARCH VESSELS: No dissection or arterial injury. No significant stenosis of the brachiocephalic or subclavian arteries. CAROTID ARTERIES: No dissection, arterial injury, or hemodynamically significant stenosis by NASCET criteria. VERTEBRAL ARTERIES: No dissection, arterial injury, or significant stenosis. SOFT TISSUES: The lung apices are clear. No cervical or superior mediastinal lymphadenopathy. The larynx and pharynx are unremarkable.   No acute abnormality of the salivary and thyroid glands. BONES: Partially visualized calvarial comminuted acute fractures are seen bilaterally involving the left temporal occipital bone in the right posterior temporal bone. Right temporal underlying acute subdural hemorrhage is identified measuring up to 5 mm in greatest thickness. Incidental finding of right-sided lung azygos lobe is seen. Unremarkable CTA of the neck. Bilateral calvarial comminuted acute fractures, as discussed above. Underlying partially visualized right temporal acute subdural hemorrhage measuring up to 5 mm in thickness. Please refer to CT head examination, same date, for full description of findings. Xr Chest Abdomen Ng Placement    Result Date: 10/8/2020  EXAMINATION: ONE SUPINE XRAY VIEW(S)

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
## 2020-10-20 ENCOUNTER — APPOINTMENT (OUTPATIENT)
Dept: GENERAL RADIOLOGY | Age: 30
DRG: 003 | End: 2020-10-20
Payer: COMMERCIAL

## 2020-10-20 LAB
AADO2: 145.7 MMHG
AADO2: 146.2 MMHG
ANION GAP SERPL CALCULATED.3IONS-SCNC: 9 MMOL/L (ref 7–16)
B.E.: 3 MMOL/L (ref -3–3)
B.E.: 4.6 MMOL/L (ref -3–3)
BASOPHILS ABSOLUTE: 0.09 E9/L (ref 0–0.2)
BASOPHILS RELATIVE PERCENT: 0.8 % (ref 0–2)
BLOOD CULTURE, ROUTINE: NORMAL
BUN BLDV-MCNC: 18 MG/DL (ref 6–20)
CALCIUM IONIZED: 1.26 MMOL/L (ref 1.15–1.33)
CALCIUM SERPL-MCNC: 8.4 MG/DL (ref 8.6–10.2)
CHLORIDE BLD-SCNC: 105 MMOL/L (ref 98–107)
CO2: 28 MMOL/L (ref 22–29)
COHB: 0.2 % (ref 0–1.5)
COHB: 0.3 % (ref 0–1.5)
CREAT SERPL-MCNC: 0.6 MG/DL (ref 0.7–1.2)
CRITICAL: ABNORMAL
CRITICAL: ABNORMAL
CULTURE, BLOOD 2: NORMAL
DATE ANALYZED: ABNORMAL
DATE ANALYZED: ABNORMAL
DATE OF COLLECTION: ABNORMAL
DATE OF COLLECTION: ABNORMAL
EOSINOPHILS ABSOLUTE: 0.49 E9/L (ref 0.05–0.5)
EOSINOPHILS RELATIVE PERCENT: 4.5 % (ref 0–6)
FIO2: 40 %
FIO2: 40 %
GFR AFRICAN AMERICAN: >60
GFR NON-AFRICAN AMERICAN: >60 ML/MIN/1.73
GLUCOSE BLD-MCNC: 121 MG/DL (ref 74–99)
HCO3: 27.3 MMOL/L (ref 22–26)
HCO3: 28.7 MMOL/L (ref 22–26)
HCT VFR BLD CALC: 30.2 % (ref 37–54)
HEMOGLOBIN: 9.8 G/DL (ref 12.5–16.5)
HHB: 4.6 % (ref 0–5)
HHB: 4.8 % (ref 0–5)
IMMATURE GRANULOCYTES #: 0.31 E9/L
IMMATURE GRANULOCYTES %: 2.8 % (ref 0–5)
LAB: ABNORMAL
LAB: ABNORMAL
LYMPHOCYTES ABSOLUTE: 1.3 E9/L (ref 1.5–4)
LYMPHOCYTES RELATIVE PERCENT: 11.9 % (ref 20–42)
Lab: ABNORMAL
Lab: ABNORMAL
MAGNESIUM: 2.3 MG/DL (ref 1.6–2.6)
MCH RBC QN AUTO: 30.1 PG (ref 26–35)
MCHC RBC AUTO-ENTMCNC: 32.5 % (ref 32–34.5)
MCV RBC AUTO: 92.6 FL (ref 80–99.9)
METHB: 0.1 % (ref 0–1.5)
METHB: 0.2 % (ref 0–1.5)
MODE: ABNORMAL
MODE: AC
MONOCYTES ABSOLUTE: 0.83 E9/L (ref 0.1–0.95)
MONOCYTES RELATIVE PERCENT: 7.6 % (ref 2–12)
NEUTROPHILS ABSOLUTE: 7.91 E9/L (ref 1.8–7.3)
NEUTROPHILS RELATIVE PERCENT: 72.4 % (ref 43–80)
O2 SATURATION: 96.5 % (ref 92–98.5)
O2 SATURATION: 96.6 % (ref 92–98.5)
O2HB: 94.8 % (ref 94–97)
O2HB: 95 % (ref 94–97)
OPERATOR ID: ABNORMAL
OPERATOR ID: ABNORMAL
PATIENT TEMP: 37 C
PATIENT TEMP: 37 C
PCO2: 40.4 MMHG (ref 35–45)
PCO2: 41 MMHG (ref 35–45)
PDW BLD-RTO: 12.6 FL (ref 11.5–15)
PEEP/CPAP: 5 CMH2O
PEEP/CPAP: 8 CMH2O
PFO2: 2.06 MMHG/%
PFO2: 2.06 MMHG/%
PH BLOOD GAS: 7.45 (ref 7.35–7.45)
PH BLOOD GAS: 7.46 (ref 7.35–7.45)
PHOSPHORUS: 3.2 MG/DL (ref 2.5–4.5)
PLATELET # BLD: 312 E9/L (ref 130–450)
PMV BLD AUTO: 9.8 FL (ref 7–12)
PO2: 82.4 MMHG (ref 75–100)
PO2: 82.5 MMHG (ref 75–100)
POTASSIUM SERPL-SCNC: 3.7 MMOL/L (ref 3.5–5)
PS: 8 CMH20
RBC # BLD: 3.26 E12/L (ref 3.8–5.8)
RI(T): 1.77
RI(T): 1.77
RR MECHANICAL: 16 B/MIN
SODIUM BLD-SCNC: 142 MMOL/L (ref 132–146)
SOURCE, BLOOD GAS: ABNORMAL
SOURCE, BLOOD GAS: ABNORMAL
THB: 11 G/DL (ref 11.5–16.5)
THB: 11.4 G/DL (ref 11.5–16.5)
TIME ANALYZED: 1216
TIME ANALYZED: 413
VANCOMYCIN TROUGH: 11.4 MCG/ML (ref 5–16)
VT MECHANICAL: 500 ML
WBC # BLD: 10.9 E9/L (ref 4.5–11.5)

## 2020-10-20 PROCEDURE — 85025 COMPLETE CBC W/AUTO DIFF WBC: CPT

## 2020-10-20 PROCEDURE — 6370000000 HC RX 637 (ALT 250 FOR IP): Performed by: STUDENT IN AN ORGANIZED HEALTH CARE EDUCATION/TRAINING PROGRAM

## 2020-10-20 PROCEDURE — 6360000002 HC RX W HCPCS: Performed by: STUDENT IN AN ORGANIZED HEALTH CARE EDUCATION/TRAINING PROGRAM

## 2020-10-20 PROCEDURE — 6360000002 HC RX W HCPCS

## 2020-10-20 PROCEDURE — 6370000000 HC RX 637 (ALT 250 FOR IP): Performed by: SURGERY

## 2020-10-20 PROCEDURE — 37799 UNLISTED PX VASCULAR SURGERY: CPT

## 2020-10-20 PROCEDURE — 2580000003 HC RX 258: Performed by: SURGERY

## 2020-10-20 PROCEDURE — 71045 X-RAY EXAM CHEST 1 VIEW: CPT

## 2020-10-20 PROCEDURE — 84100 ASSAY OF PHOSPHORUS: CPT

## 2020-10-20 PROCEDURE — 80048 BASIC METABOLIC PNL TOTAL CA: CPT

## 2020-10-20 PROCEDURE — 2500000003 HC RX 250 WO HCPCS: Performed by: SURGERY

## 2020-10-20 PROCEDURE — 83735 ASSAY OF MAGNESIUM: CPT

## 2020-10-20 PROCEDURE — 82805 BLOOD GASES W/O2 SATURATION: CPT

## 2020-10-20 PROCEDURE — 80202 ASSAY OF VANCOMYCIN: CPT

## 2020-10-20 PROCEDURE — 6360000002 HC RX W HCPCS: Performed by: SURGERY

## 2020-10-20 PROCEDURE — 2580000003 HC RX 258

## 2020-10-20 PROCEDURE — 94003 VENT MGMT INPAT SUBQ DAY: CPT

## 2020-10-20 PROCEDURE — 94644 CONT INHLJ TX 1ST HOUR: CPT

## 2020-10-20 PROCEDURE — 36415 COLL VENOUS BLD VENIPUNCTURE: CPT

## 2020-10-20 PROCEDURE — 2000000000 HC ICU R&B

## 2020-10-20 PROCEDURE — 94640 AIRWAY INHALATION TREATMENT: CPT

## 2020-10-20 PROCEDURE — 99291 CRITICAL CARE FIRST HOUR: CPT | Performed by: SURGERY

## 2020-10-20 PROCEDURE — 2580000003 HC RX 258: Performed by: STUDENT IN AN ORGANIZED HEALTH CARE EDUCATION/TRAINING PROGRAM

## 2020-10-20 PROCEDURE — 82330 ASSAY OF CALCIUM: CPT

## 2020-10-20 RX ORDER — FENTANYL CITRATE 50 UG/ML
100 INJECTION, SOLUTION INTRAMUSCULAR; INTRAVENOUS
Status: DISCONTINUED | OUTPATIENT
Start: 2020-10-20 | End: 2020-10-20

## 2020-10-20 RX ORDER — FENTANYL CITRATE 50 UG/ML
INJECTION, SOLUTION INTRAMUSCULAR; INTRAVENOUS
Status: COMPLETED
Start: 2020-10-20 | End: 2020-10-20

## 2020-10-20 RX ORDER — FENTANYL CITRATE 50 UG/ML
50 INJECTION, SOLUTION INTRAMUSCULAR; INTRAVENOUS
Status: DISCONTINUED | OUTPATIENT
Start: 2020-10-20 | End: 2020-10-21

## 2020-10-20 RX ADMIN — Medication 10 ML: at 21:08

## 2020-10-20 RX ADMIN — MINERAL OIL AND PETROLATUM: 150; 830 OINTMENT OPHTHALMIC at 09:49

## 2020-10-20 RX ADMIN — OXYCODONE HYDROCHLORIDE 5 MG: 5 SOLUTION ORAL at 12:14

## 2020-10-20 RX ADMIN — Medication 10 ML: at 08:33

## 2020-10-20 RX ADMIN — MINERAL OIL AND PETROLATUM: 150; 830 OINTMENT OPHTHALMIC at 14:26

## 2020-10-20 RX ADMIN — FENTANYL CITRATE 50 MCG: 50 INJECTION, SOLUTION INTRAMUSCULAR; INTRAVENOUS at 17:21

## 2020-10-20 RX ADMIN — POLYVINYL ALCOHOL 1 DROP: 14 SOLUTION/ DROPS OPHTHALMIC at 16:30

## 2020-10-20 RX ADMIN — HEPARIN SODIUM 5000 UNITS: 10000 INJECTION INTRAVENOUS; SUBCUTANEOUS at 05:21

## 2020-10-20 RX ADMIN — MINERAL OIL AND PETROLATUM: 150; 830 OINTMENT OPHTHALMIC at 06:00

## 2020-10-20 RX ADMIN — NYSTATIN 500000 UNITS: 100000 SUSPENSION ORAL at 21:08

## 2020-10-20 RX ADMIN — FENTANYL CITRATE 100 MCG: 50 INJECTION, SOLUTION INTRAMUSCULAR; INTRAVENOUS at 11:56

## 2020-10-20 RX ADMIN — Medication 10 ML: at 08:18

## 2020-10-20 RX ADMIN — POTASSIUM BICARBONATE 40 MEQ: 782 TABLET, EFFERVESCENT ORAL at 08:17

## 2020-10-20 RX ADMIN — BACITRACIN ZINC: 500 OINTMENT TOPICAL at 14:25

## 2020-10-20 RX ADMIN — MINERAL OIL AND PETROLATUM: 150; 830 OINTMENT OPHTHALMIC at 05:26

## 2020-10-20 RX ADMIN — FENTANYL CITRATE 100 MCG: 50 INJECTION, SOLUTION INTRAMUSCULAR; INTRAVENOUS at 13:20

## 2020-10-20 RX ADMIN — FAMOTIDINE 20 MG: 20 TABLET ORAL at 08:18

## 2020-10-20 RX ADMIN — SODIUM CHLORIDE 25 ML: 9 INJECTION, SOLUTION INTRAVENOUS at 21:30

## 2020-10-20 RX ADMIN — VANCOMYCIN HYDROCHLORIDE 2000 MG: 10 INJECTION, POWDER, LYOPHILIZED, FOR SOLUTION INTRAVENOUS at 05:20

## 2020-10-20 RX ADMIN — SENNOSIDES 5 ML: 8.8 LIQUID ORAL at 21:08

## 2020-10-20 RX ADMIN — SODIUM CHLORIDE SOLN NEBU 3% 4 ML: 3 NEBU SOLN at 21:11

## 2020-10-20 RX ADMIN — ENALAPRILAT 1.25 MG: 1.25 INJECTION INTRAVENOUS at 01:43

## 2020-10-20 RX ADMIN — FENTANYL CITRATE 100 MCG: 50 INJECTION, SOLUTION INTRAMUSCULAR; INTRAVENOUS at 10:20

## 2020-10-20 RX ADMIN — NYSTATIN 500000 UNITS: 100000 SUSPENSION ORAL at 14:00

## 2020-10-20 RX ADMIN — ACETAMINOPHEN ORAL SOLUTION 650 MG: 650 SOLUTION ORAL at 02:18

## 2020-10-20 RX ADMIN — MINERAL OIL AND PETROLATUM: 150; 830 OINTMENT OPHTHALMIC at 22:26

## 2020-10-20 RX ADMIN — Medication 10 ML: at 13:24

## 2020-10-20 RX ADMIN — SODIUM CHLORIDE SOLN NEBU 3% 4 ML: 3 NEBU SOLN at 07:59

## 2020-10-20 RX ADMIN — VANCOMYCIN HYDROCHLORIDE 2000 MG: 10 INJECTION, POWDER, LYOPHILIZED, FOR SOLUTION INTRAVENOUS at 22:25

## 2020-10-20 RX ADMIN — POLYVINYL ALCOHOL 1 DROP: 14 SOLUTION/ DROPS OPHTHALMIC at 03:29

## 2020-10-20 RX ADMIN — PROPOFOL INJECTABLE EMULSION 19.94 MCG/KG/MIN: 10 INJECTION, EMULSION INTRAVENOUS at 05:24

## 2020-10-20 RX ADMIN — MINERAL OIL AND PETROLATUM: 150; 830 OINTMENT OPHTHALMIC at 18:08

## 2020-10-20 RX ADMIN — ACETAMINOPHEN ORAL SOLUTION 650 MG: 650 SOLUTION ORAL at 18:08

## 2020-10-20 RX ADMIN — ACETAMINOPHEN ORAL SOLUTION 650 MG: 650 SOLUTION ORAL at 14:25

## 2020-10-20 RX ADMIN — FENTANYL CITRATE 100 MCG: 50 INJECTION, SOLUTION INTRAMUSCULAR; INTRAVENOUS at 08:33

## 2020-10-20 RX ADMIN — BUSPIRONE HYDROCHLORIDE 30 MG: 10 TABLET ORAL at 22:36

## 2020-10-20 RX ADMIN — BACITRACIN ZINC, POLYMYXIN B SULFATE, NEOMYCIN SULFATE: 400; 5000; 3.5 OINTMENT TOPICAL at 21:08

## 2020-10-20 RX ADMIN — ACETAMINOPHEN ORAL SOLUTION 650 MG: 650 SOLUTION ORAL at 06:57

## 2020-10-20 RX ADMIN — Medication 10 ML: at 11:55

## 2020-10-20 RX ADMIN — FENTANYL CITRATE 50 MCG: 50 INJECTION, SOLUTION INTRAMUSCULAR; INTRAVENOUS at 20:56

## 2020-10-20 RX ADMIN — PIPERACILLIN AND TAZOBACTAM 3.38 G: 3; .375 INJECTION, POWDER, LYOPHILIZED, FOR SOLUTION INTRAVENOUS at 17:34

## 2020-10-20 RX ADMIN — VANCOMYCIN HYDROCHLORIDE 2000 MG: 10 INJECTION, POWDER, LYOPHILIZED, FOR SOLUTION INTRAVENOUS at 14:15

## 2020-10-20 RX ADMIN — CHLORHEXIDINE GLUCONATE 0.12% ORAL RINSE 15 ML: 1.2 LIQUID ORAL at 08:17

## 2020-10-20 RX ADMIN — BACITRACIN ZINC, POLYMYXIN B SULFATE, NEOMYCIN SULFATE: 400; 5000; 3.5 OINTMENT TOPICAL at 08:18

## 2020-10-20 RX ADMIN — BACITRACIN ZINC: 500 OINTMENT TOPICAL at 08:18

## 2020-10-20 RX ADMIN — POLYVINYL ALCOHOL 1 DROP: 14 SOLUTION/ DROPS OPHTHALMIC at 12:15

## 2020-10-20 RX ADMIN — NYSTATIN 500000 UNITS: 100000 SUSPENSION ORAL at 17:21

## 2020-10-20 RX ADMIN — Medication 10 ML: at 17:21

## 2020-10-20 RX ADMIN — PIPERACILLIN AND TAZOBACTAM 3.38 G: 3; .375 INJECTION, POWDER, LYOPHILIZED, FOR SOLUTION INTRAVENOUS at 01:35

## 2020-10-20 RX ADMIN — FENTANYL CITRATE 50 MCG: 50 INJECTION, SOLUTION INTRAMUSCULAR; INTRAVENOUS at 19:55

## 2020-10-20 RX ADMIN — FENTANYL CITRATE 50 MCG: 50 INJECTION, SOLUTION INTRAMUSCULAR; INTRAVENOUS at 18:31

## 2020-10-20 RX ADMIN — FAMOTIDINE 20 MG: 20 TABLET ORAL at 21:09

## 2020-10-20 RX ADMIN — CHLORHEXIDINE GLUCONATE 0.12% ORAL RINSE 15 ML: 1.2 LIQUID ORAL at 21:08

## 2020-10-20 RX ADMIN — PROPOFOL INJECTABLE EMULSION 20 MCG/KG/MIN: 10 INJECTION, EMULSION INTRAVENOUS at 00:37

## 2020-10-20 RX ADMIN — FENTANYL CITRATE 50 MCG: 50 INJECTION, SOLUTION INTRAMUSCULAR; INTRAVENOUS at 21:56

## 2020-10-20 RX ADMIN — FENTANYL CITRATE 75 MCG: 50 INJECTION, SOLUTION INTRAMUSCULAR; INTRAVENOUS at 00:54

## 2020-10-20 RX ADMIN — SODIUM CHLORIDE SOLN NEBU 3% 4 ML: 3 NEBU SOLN at 02:00

## 2020-10-20 RX ADMIN — OXYCODONE HYDROCHLORIDE 5 MG: 5 SOLUTION ORAL at 17:34

## 2020-10-20 RX ADMIN — HEPARIN SODIUM 5000 UNITS: 10000 INJECTION INTRAVENOUS; SUBCUTANEOUS at 14:26

## 2020-10-20 RX ADMIN — POLYVINYL ALCOHOL 1 DROP: 14 SOLUTION/ DROPS OPHTHALMIC at 08:17

## 2020-10-20 RX ADMIN — SODIUM CHLORIDE 25 ML: 9 INJECTION, SOLUTION INTRAVENOUS at 14:28

## 2020-10-20 RX ADMIN — FENTANYL CITRATE 75 MCG: 50 INJECTION, SOLUTION INTRAMUSCULAR; INTRAVENOUS at 02:03

## 2020-10-20 RX ADMIN — BUSPIRONE HYDROCHLORIDE 30 MG: 10 TABLET ORAL at 08:18

## 2020-10-20 RX ADMIN — HEPARIN SODIUM 5000 UNITS: 10000 INJECTION INTRAVENOUS; SUBCUTANEOUS at 22:26

## 2020-10-20 RX ADMIN — ACETAMINOPHEN ORAL SOLUTION 650 MG: 650 SOLUTION ORAL at 22:26

## 2020-10-20 RX ADMIN — SODIUM CHLORIDE 25 ML: 9 INJECTION, SOLUTION INTRAVENOUS at 05:16

## 2020-10-20 RX ADMIN — BUSPIRONE HYDROCHLORIDE 30 MG: 10 TABLET ORAL at 14:25

## 2020-10-20 RX ADMIN — FENTANYL CITRATE 75 MCG: 50 INJECTION, SOLUTION INTRAMUSCULAR; INTRAVENOUS at 05:56

## 2020-10-20 RX ADMIN — BACITRACIN ZINC: 500 OINTMENT TOPICAL at 21:08

## 2020-10-20 RX ADMIN — OXYCODONE HYDROCHLORIDE 5 MG: 5 SOLUTION ORAL at 05:20

## 2020-10-20 RX ADMIN — Medication 10 ML: at 15:37

## 2020-10-20 RX ADMIN — Medication 10 ML: at 10:20

## 2020-10-20 RX ADMIN — PIPERACILLIN AND TAZOBACTAM 3.38 G: 3; .375 INJECTION, POWDER, LYOPHILIZED, FOR SOLUTION INTRAVENOUS at 09:48

## 2020-10-20 RX ADMIN — POLYVINYL ALCOHOL 1 DROP: 14 SOLUTION/ DROPS OPHTHALMIC at 20:30

## 2020-10-20 RX ADMIN — ACETAMINOPHEN ORAL SOLUTION 650 MG: 650 SOLUTION ORAL at 10:23

## 2020-10-20 RX ADMIN — SODIUM CHLORIDE SOLN NEBU 3% 4 ML: 3 NEBU SOLN at 14:00

## 2020-10-20 RX ADMIN — NYSTATIN 500000 UNITS: 100000 SUSPENSION ORAL at 08:18

## 2020-10-20 RX ADMIN — FENTANYL CITRATE 50 MCG: 50 INJECTION, SOLUTION INTRAMUSCULAR; INTRAVENOUS at 22:57

## 2020-10-20 RX ADMIN — MEPERIDINE HYDROCHLORIDE 50 MG: 25 INJECTION, SOLUTION INTRAMUSCULAR; INTRAVENOUS; SUBCUTANEOUS at 03:28

## 2020-10-20 RX ADMIN — MINERAL OIL AND PETROLATUM: 150; 830 OINTMENT OPHTHALMIC at 02:19

## 2020-10-20 ASSESSMENT — PULMONARY FUNCTION TESTS
PIF_VALUE: 13
PIF_VALUE: 16
PIF_VALUE: 13
PIF_VALUE: 13
PIF_VALUE: 23
PIF_VALUE: 23
PIF_VALUE: 16
PIF_VALUE: 23
PIF_VALUE: 13
PIF_VALUE: 18
PIF_VALUE: 14
PIF_VALUE: 24
PIF_VALUE: 22
PIF_VALUE: 14
PIF_VALUE: 19
PIF_VALUE: 24
PIF_VALUE: 14
PIF_VALUE: 19
PIF_VALUE: 14
PIF_VALUE: 23
PIF_VALUE: 13
PIF_VALUE: 13
PIF_VALUE: 14
PIF_VALUE: 14
PIF_VALUE: 23
PIF_VALUE: 13
PIF_VALUE: 17
PIF_VALUE: 14
PIF_VALUE: 19
PIF_VALUE: 14
PIF_VALUE: 13
PIF_VALUE: 14

## 2020-10-20 ASSESSMENT — PAIN SCALES - GENERAL
PAINLEVEL_OUTOF10: 5
PAINLEVEL_OUTOF10: 2
PAINLEVEL_OUTOF10: 8
PAINLEVEL_OUTOF10: 3
PAINLEVEL_OUTOF10: 2
PAINLEVEL_OUTOF10: 3
PAINLEVEL_OUTOF10: 2
PAINLEVEL_OUTOF10: 0
PAINLEVEL_OUTOF10: 0
PAINLEVEL_OUTOF10: 4
PAINLEVEL_OUTOF10: 4
PAINLEVEL_OUTOF10: 5
PAINLEVEL_OUTOF10: 7
PAINLEVEL_OUTOF10: 3
PAINLEVEL_OUTOF10: 3
PAINLEVEL_OUTOF10: 4
PAINLEVEL_OUTOF10: 5
PAINLEVEL_OUTOF10: 3
PAINLEVEL_OUTOF10: 5
PAINLEVEL_OUTOF10: 5
PAINLEVEL_OUTOF10: 8
PAINLEVEL_OUTOF10: 2
PAINLEVEL_OUTOF10: 0
PAINLEVEL_OUTOF10: 8
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
## 2020-10-20 NOTE — PLAN OF CARE
Problem: Restraint Use - Nonviolent/Non-Self-Destructive Behavior:  Goal: Absence of restraint-related injury  Description: Absence of restraint-related injury  10/20/2020 1943 by Breonna Starks  Outcome: Met This Shift     Problem: Restraint Use - Nonviolent/Non-Self-Destructive Behavior:  Goal: Absence of restraint indications  Description: Absence of restraint indications  10/20/2020 1943 by Breonna Starks  Outcome: Not Met This Shift

## 2020-10-20 NOTE — PLAN OF CARE
Problem: Restraint Use - Nonviolent/Non-Self-Destructive Behavior:  Goal: Absence of restraint-related injury  10/20/2020 1649 by Dinorah Joel, RN  Outcome: Met This Shift     Problem: Restraint Use - Nonviolent/Non-Self-Destructive Behavior:  Goal: Absence of restraint indications  10/20/2020 1649 by Dinorah Joel, RN  Outcome: Not Met This Shift   Plan of care discussed with patient/family. Patient/family incorporated into plan of care. Liseth Pires
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
## 2020-10-20 NOTE — PLAN OF CARE
Problem: Falls - Risk of:  Goal: Will remain free from falls  Description: Will remain free from falls  Outcome: Met This Shift  Goal: Absence of physical injury  Description: Absence of physical injury  Outcome: Met This Shift     Problem: Skin Integrity:  Goal: Will show no infection signs and symptoms  Description: Will show no infection signs and symptoms  Outcome: Met This Shift  Goal: Absence of new skin breakdown  Description: Absence of new skin breakdown  Outcome: Met This Shift     Problem: Pain:  Goal: Pain level will decrease  Description: Pain level will decrease  Outcome: Met This Shift     Problem: Airway Clearance - Ineffective:  Goal: Ability to maintain a clear airway will improve  Description: Ability to maintain a clear airway will improve  Outcome: Met This Shift     Problem: Anxiety/Stress:  Goal: Level of anxiety will decrease  Description: Level of anxiety will decrease  Outcome: Met This Shift     Problem: Aspiration:  Goal: Absence of aspiration  Description: Absence of aspiration  Outcome: Met This Shift     Problem:  Bowel Function - Altered:  Goal: Bowel elimination is within specified parameters  Description: Bowel elimination is within specified parameters  Outcome: Met This Shift     Problem: Pain:  Goal: Pain level will decrease  Description: Pain level will decrease  Outcome: Met This Shift  Goal: Recognizes and communicates pain  Description: Recognizes and communicates pain  Outcome: Met This Shift  Goal: Control of acute pain  Description: Control of acute pain  Outcome: Met This Shift     Problem: Restraint Use - Nonviolent/Non-Self-Destructive Behavior:  Goal: Absence of restraint-related injury  Description: Absence of restraint-related injury  10/20/2020 0508 by Edgar Shannon RN  Outcome: Met This Shift  10/19/2020 1655 by Jaime Webster RN  Outcome: Met This Shift

## 2020-10-20 NOTE — PLAN OF CARE
Problem: Restraint Use - Nonviolent/Non-Self-Destructive Behavior:  Goal: Absence of restraint indications  10/20/2020 1000 by Shannon Ceja RN  Outcome: Not Met This Shift     Problem: Restraint Use - Nonviolent/Non-Self-Destructive Behavior:  Goal: Absence of restraint-related injury  10/20/2020 1000 by Shannon Ceja RN  Outcome: Met This Shift   Plan of care discussed with patient/family. Patient/family incorporated into plan of care. Carlene Wilkinson

## 2020-10-20 NOTE — PROGRESS NOTES
Pharmacy Consultation Note  (Antibiotic Dosing and Monitoring)    Initial consult date: 10/15/2020  Consulting physician: Dr. Deonna Moreno   Drug(s): Vancomycin   Indication: Empiric antibiotics for HAP    Ht Readings from Last 1 Encounters:   10/15/20 6' 2\" (1.88 m)     Wt Readings from Last 1 Encounters:   10/19/20 272 lb 0.8 oz (123.4 kg)       Age/  Gender Actual BW IBW DW  Allergy Information   30 y.o. male 115.8 kg 82.2 kg 95.6 kg  Patient has no known allergies. Date  WBC BUN/CR Drug/Dose Time   Given Level(s)   (Time) Comments   10/15/20  Day #1 9.6 22/0.8 Vancomycin 1250 mg IV q8h 1726     10/16  #2 8.8 23/0.6 Vancomycin 1250 mg IV q8h 0014  0944  1604 Trough @ 2315 = 9.1 mcg/mL    10/17  #3 9.1 19/0.6 Vancomycin 1250 mg IV q8h    Vancomycin 1500 mg IV q8h 0014    0933  1824     10/18  #4 10.2 15/0.7 Vancomycin 1500 mg IV q8h    Vancomycin 1750 mg IV q8h 0126    1142  2009 Trough @ 9951 = 11.2 mcg/mL    10/19  #5 11.3 17/0.7 Vancomycin 1750 mg IV q8h      Vancomycin 2000 mg IV q8h 0311    1404  2218 Trough @ 1145 =   8.7 mcg/mL    10/20  #6 10.9 18/0.6 Vancomycin 2000 mg IV q8h 0520                         Estimated Creatinine Clearance: 249 mL/min (A) (based on SCr of 0.6 mg/dL (L)). Intake/Output Summary (Last 24 hours) at 10/20/2020 0831  Last data filed at 10/20/2020 0700  Gross per 24 hour   Intake 2995 ml   Output 4761 ml   Net -1766 ml     Urine output over the last 24 hours: 1.6 mL/kg/hr (4861 mL total)    Diuretics ordered in the last 24 hours: Furosemide 10 mg IV x1 dose yesterday (4141);  No Furosemide ordered for today    Temp max: Temp (24hrs), Av.9 °F (37.2 °C), Min:98.6 °F (37 °C), Max:99.1 °F (37.3 °C)      Cultures:  available culture and sensitivity results were reviewed in EPIC  10/8 MRSA Screen - No MRSA colonization  10/12 Respiratory cx (sputum suctioned) - Moderate growth Candida sp; OPF reduced  10/14 Urine cx - No growth final  10/14 Respiratory cx (sputum

## 2020-10-21 ENCOUNTER — APPOINTMENT (OUTPATIENT)
Dept: GENERAL RADIOLOGY | Age: 30
DRG: 003 | End: 2020-10-21
Payer: COMMERCIAL

## 2020-10-21 LAB
AADO2: 104.8 MMHG
ANION GAP SERPL CALCULATED.3IONS-SCNC: 10 MMOL/L (ref 7–16)
B.E.: 4.2 MMOL/L (ref -3–3)
BASOPHILS ABSOLUTE: 0.1 E9/L (ref 0–0.2)
BASOPHILS RELATIVE PERCENT: 0.8 % (ref 0–2)
BUN BLDV-MCNC: 16 MG/DL (ref 6–20)
CALCIUM IONIZED: 1.24 MMOL/L (ref 1.15–1.33)
CALCIUM SERPL-MCNC: 8.5 MG/DL (ref 8.6–10.2)
CHLORIDE BLD-SCNC: 102 MMOL/L (ref 98–107)
CO2: 27 MMOL/L (ref 22–29)
COHB: 0.2 % (ref 0–1.5)
CREAT SERPL-MCNC: 0.6 MG/DL (ref 0.7–1.2)
CRITICAL: ABNORMAL
DATE ANALYZED: ABNORMAL
DATE OF COLLECTION: ABNORMAL
EOSINOPHILS ABSOLUTE: 0.63 E9/L (ref 0.05–0.5)
EOSINOPHILS RELATIVE PERCENT: 5.2 % (ref 0–6)
FIO2: 40 %
GFR AFRICAN AMERICAN: >60
GFR NON-AFRICAN AMERICAN: >60 ML/MIN/1.73
GLUCOSE BLD-MCNC: 124 MG/DL (ref 74–99)
HCO3: 29.8 MMOL/L (ref 22–26)
HCT VFR BLD CALC: 33 % (ref 37–54)
HEMOGLOBIN: 10.7 G/DL (ref 12.5–16.5)
HHB: 2.5 % (ref 0–5)
IMMATURE GRANULOCYTES #: 0.32 E9/L
IMMATURE GRANULOCYTES %: 2.7 % (ref 0–5)
LAB: ABNORMAL
LYMPHOCYTES ABSOLUTE: 1.34 E9/L (ref 1.5–4)
LYMPHOCYTES RELATIVE PERCENT: 11.2 % (ref 20–42)
Lab: ABNORMAL
MAGNESIUM: 2.3 MG/DL (ref 1.6–2.6)
MCH RBC QN AUTO: 30.1 PG (ref 26–35)
MCHC RBC AUTO-ENTMCNC: 32.4 % (ref 32–34.5)
MCV RBC AUTO: 92.7 FL (ref 80–99.9)
METHB: 0.2 % (ref 0–1.5)
MODE: ABNORMAL
MONOCYTES ABSOLUTE: 0.93 E9/L (ref 0.1–0.95)
MONOCYTES RELATIVE PERCENT: 7.7 % (ref 2–12)
NEUTROPHILS ABSOLUTE: 8.69 E9/L (ref 1.8–7.3)
NEUTROPHILS RELATIVE PERCENT: 72.4 % (ref 43–80)
O2 SATURATION: 97.5 % (ref 92–98.5)
O2HB: 97.1 % (ref 94–97)
OPERATOR ID: 1394
PATIENT TEMP: 37 C
PCO2: 49.1 MMHG (ref 35–45)
PDW BLD-RTO: 12.7 FL (ref 11.5–15)
PEEP/CPAP: 5 CMH2O
PFO2: 2.85 MMHG/%
PH BLOOD GAS: 7.4 (ref 7.35–7.45)
PHOSPHORUS: 3.1 MG/DL (ref 2.5–4.5)
PLATELET # BLD: 332 E9/L (ref 130–450)
PMV BLD AUTO: 9.4 FL (ref 7–12)
PO2: 113.9 MMHG (ref 75–100)
POTASSIUM SERPL-SCNC: 3.4 MMOL/L (ref 3.5–5)
PS: 8 CMH20
RBC # BLD: 3.56 E12/L (ref 3.8–5.8)
RI(T): 0.92
SODIUM BLD-SCNC: 139 MMOL/L (ref 132–146)
SOURCE, BLOOD GAS: ABNORMAL
THB: 11.7 G/DL (ref 11.5–16.5)
TIME ANALYZED: 433
WBC # BLD: 12 E9/L (ref 4.5–11.5)

## 2020-10-21 PROCEDURE — 83735 ASSAY OF MAGNESIUM: CPT

## 2020-10-21 PROCEDURE — 2580000003 HC RX 258

## 2020-10-21 PROCEDURE — 2700000000 HC OXYGEN THERAPY PER DAY

## 2020-10-21 PROCEDURE — 6360000002 HC RX W HCPCS

## 2020-10-21 PROCEDURE — 94003 VENT MGMT INPAT SUBQ DAY: CPT

## 2020-10-21 PROCEDURE — 6370000000 HC RX 637 (ALT 250 FOR IP): Performed by: SURGERY

## 2020-10-21 PROCEDURE — 6360000002 HC RX W HCPCS: Performed by: STUDENT IN AN ORGANIZED HEALTH CARE EDUCATION/TRAINING PROGRAM

## 2020-10-21 PROCEDURE — 85025 COMPLETE CBC W/AUTO DIFF WBC: CPT

## 2020-10-21 PROCEDURE — 2580000003 HC RX 258: Performed by: STUDENT IN AN ORGANIZED HEALTH CARE EDUCATION/TRAINING PROGRAM

## 2020-10-21 PROCEDURE — 36415 COLL VENOUS BLD VENIPUNCTURE: CPT

## 2020-10-21 PROCEDURE — 82330 ASSAY OF CALCIUM: CPT

## 2020-10-21 PROCEDURE — 94640 AIRWAY INHALATION TREATMENT: CPT

## 2020-10-21 PROCEDURE — 84100 ASSAY OF PHOSPHORUS: CPT

## 2020-10-21 PROCEDURE — 99291 CRITICAL CARE FIRST HOUR: CPT | Performed by: SURGERY

## 2020-10-21 PROCEDURE — 71045 X-RAY EXAM CHEST 1 VIEW: CPT

## 2020-10-21 PROCEDURE — 2000000000 HC ICU R&B

## 2020-10-21 PROCEDURE — 6370000000 HC RX 637 (ALT 250 FOR IP): Performed by: STUDENT IN AN ORGANIZED HEALTH CARE EDUCATION/TRAINING PROGRAM

## 2020-10-21 PROCEDURE — 82805 BLOOD GASES W/O2 SATURATION: CPT

## 2020-10-21 PROCEDURE — 2580000003 HC RX 258: Performed by: SURGERY

## 2020-10-21 PROCEDURE — 80048 BASIC METABOLIC PNL TOTAL CA: CPT

## 2020-10-21 RX ORDER — POTASSIUM CHLORIDE 29.8 MG/ML
40 INJECTION INTRAVENOUS ONCE
Status: COMPLETED | OUTPATIENT
Start: 2020-10-21 | End: 2020-10-21

## 2020-10-21 RX ORDER — BUSPIRONE HYDROCHLORIDE 10 MG/1
10 TABLET ORAL 2 TIMES DAILY
Status: COMPLETED | OUTPATIENT
Start: 2020-10-23 | End: 2020-10-24

## 2020-10-21 RX ORDER — FENTANYL CITRATE 50 UG/ML
INJECTION, SOLUTION INTRAMUSCULAR; INTRAVENOUS
Status: DISPENSED
Start: 2020-10-21 | End: 2020-10-21

## 2020-10-21 RX ORDER — CLONIDINE HYDROCHLORIDE 0.1 MG/1
0.1 TABLET ORAL 3 TIMES DAILY
Status: DISCONTINUED | OUTPATIENT
Start: 2020-10-21 | End: 2020-10-23

## 2020-10-21 RX ORDER — POTASSIUM BICARBONATE 25 MEQ/1
50 TABLET, EFFERVESCENT ORAL 2 TIMES DAILY
Status: DISCONTINUED | OUTPATIENT
Start: 2020-10-21 | End: 2020-10-21

## 2020-10-21 RX ORDER — FENTANYL CITRATE 50 UG/ML
50 INJECTION, SOLUTION INTRAMUSCULAR; INTRAVENOUS ONCE
Status: COMPLETED | OUTPATIENT
Start: 2020-10-21 | End: 2020-10-21

## 2020-10-21 RX ORDER — FENTANYL CITRATE 50 UG/ML
50 INJECTION, SOLUTION INTRAMUSCULAR; INTRAVENOUS
Status: DISCONTINUED | OUTPATIENT
Start: 2020-10-21 | End: 2020-10-21

## 2020-10-21 RX ORDER — FENTANYL CITRATE 50 UG/ML
100 INJECTION, SOLUTION INTRAMUSCULAR; INTRAVENOUS
Status: DISCONTINUED | OUTPATIENT
Start: 2020-10-21 | End: 2020-10-23

## 2020-10-21 RX ADMIN — MINERAL OIL AND PETROLATUM: 150; 830 OINTMENT OPHTHALMIC at 21:58

## 2020-10-21 RX ADMIN — SODIUM CHLORIDE 25 ML: 9 INJECTION, SOLUTION INTRAVENOUS at 13:30

## 2020-10-21 RX ADMIN — FENTANYL CITRATE 100 MCG: 50 INJECTION, SOLUTION INTRAMUSCULAR; INTRAVENOUS at 19:35

## 2020-10-21 RX ADMIN — FENTANYL CITRATE 100 MCG: 50 INJECTION, SOLUTION INTRAMUSCULAR; INTRAVENOUS at 16:20

## 2020-10-21 RX ADMIN — BACITRACIN ZINC: 500 OINTMENT TOPICAL at 15:03

## 2020-10-21 RX ADMIN — FENTANYL CITRATE 50 MCG: 50 INJECTION, SOLUTION INTRAMUSCULAR; INTRAVENOUS at 04:20

## 2020-10-21 RX ADMIN — BACITRACIN ZINC, POLYMYXIN B SULFATE, NEOMYCIN SULFATE: 400; 5000; 3.5 OINTMENT TOPICAL at 20:34

## 2020-10-21 RX ADMIN — MINERAL OIL AND PETROLATUM: 150; 830 OINTMENT OPHTHALMIC at 09:33

## 2020-10-21 RX ADMIN — HEPARIN SODIUM 5000 UNITS: 10000 INJECTION INTRAVENOUS; SUBCUTANEOUS at 14:30

## 2020-10-21 RX ADMIN — FENTANYL CITRATE 50 MCG: 50 INJECTION, SOLUTION INTRAMUSCULAR; INTRAVENOUS at 08:38

## 2020-10-21 RX ADMIN — FENTANYL CITRATE 50 MCG: 50 INJECTION, SOLUTION INTRAMUSCULAR; INTRAVENOUS at 07:24

## 2020-10-21 RX ADMIN — PIPERACILLIN AND TAZOBACTAM 3.38 G: 3; .375 INJECTION, POWDER, LYOPHILIZED, FOR SOLUTION INTRAVENOUS at 17:42

## 2020-10-21 RX ADMIN — VANCOMYCIN HYDROCHLORIDE 2000 MG: 10 INJECTION, POWDER, LYOPHILIZED, FOR SOLUTION INTRAVENOUS at 05:30

## 2020-10-21 RX ADMIN — MINERAL OIL AND PETROLATUM: 150; 830 OINTMENT OPHTHALMIC at 02:09

## 2020-10-21 RX ADMIN — SODIUM CHLORIDE SOLN NEBU 3% 4 ML: 3 NEBU SOLN at 17:30

## 2020-10-21 RX ADMIN — BACITRACIN ZINC, POLYMYXIN B SULFATE, NEOMYCIN SULFATE: 400; 5000; 3.5 OINTMENT TOPICAL at 09:32

## 2020-10-21 RX ADMIN — POLYVINYL ALCOHOL 1 DROP: 14 SOLUTION/ DROPS OPHTHALMIC at 16:48

## 2020-10-21 RX ADMIN — OXYCODONE HYDROCHLORIDE 5 MG: 5 SOLUTION ORAL at 12:01

## 2020-10-21 RX ADMIN — FENTANYL CITRATE 50 MCG: 50 INJECTION, SOLUTION INTRAMUSCULAR; INTRAVENOUS at 01:15

## 2020-10-21 RX ADMIN — POLYVINYL ALCOHOL 1 DROP: 14 SOLUTION/ DROPS OPHTHALMIC at 20:34

## 2020-10-21 RX ADMIN — SENNOSIDES 5 ML: 8.8 LIQUID ORAL at 20:57

## 2020-10-21 RX ADMIN — Medication 10 ML: at 20:35

## 2020-10-21 RX ADMIN — ACETAMINOPHEN ORAL SOLUTION 650 MG: 650 SOLUTION ORAL at 09:31

## 2020-10-21 RX ADMIN — HEPARIN SODIUM 5000 UNITS: 10000 INJECTION INTRAVENOUS; SUBCUTANEOUS at 21:54

## 2020-10-21 RX ADMIN — CLONIDINE HYDROCHLORIDE 0.1 MG: 0.1 TABLET ORAL at 11:21

## 2020-10-21 RX ADMIN — FENTANYL CITRATE 100 MCG: 50 INJECTION, SOLUTION INTRAMUSCULAR; INTRAVENOUS at 14:16

## 2020-10-21 RX ADMIN — CLONIDINE HYDROCHLORIDE 0.1 MG: 0.1 TABLET ORAL at 20:33

## 2020-10-21 RX ADMIN — FENTANYL CITRATE 100 MCG: 50 INJECTION, SOLUTION INTRAMUSCULAR; INTRAVENOUS at 22:52

## 2020-10-21 RX ADMIN — OXYCODONE HYDROCHLORIDE 5 MG: 5 SOLUTION ORAL at 00:07

## 2020-10-21 RX ADMIN — NYSTATIN 500000 UNITS: 100000 SUSPENSION ORAL at 13:30

## 2020-10-21 RX ADMIN — FENTANYL CITRATE 100 MCG: 50 INJECTION, SOLUTION INTRAMUSCULAR; INTRAVENOUS at 11:22

## 2020-10-21 RX ADMIN — POLYVINYL ALCOHOL 1 DROP: 14 SOLUTION/ DROPS OPHTHALMIC at 00:08

## 2020-10-21 RX ADMIN — PIPERACILLIN AND TAZOBACTAM 3.38 G: 3; .375 INJECTION, POWDER, LYOPHILIZED, FOR SOLUTION INTRAVENOUS at 09:26

## 2020-10-21 RX ADMIN — VANCOMYCIN HYDROCHLORIDE 2000 MG: 10 INJECTION, POWDER, LYOPHILIZED, FOR SOLUTION INTRAVENOUS at 21:58

## 2020-10-21 RX ADMIN — ACETAMINOPHEN ORAL SOLUTION 650 MG: 650 SOLUTION ORAL at 17:55

## 2020-10-21 RX ADMIN — ACETAMINOPHEN ORAL SOLUTION 650 MG: 650 SOLUTION ORAL at 21:57

## 2020-10-21 RX ADMIN — PIPERACILLIN AND TAZOBACTAM 3.38 G: 3; .375 INJECTION, POWDER, LYOPHILIZED, FOR SOLUTION INTRAVENOUS at 01:16

## 2020-10-21 RX ADMIN — MINERAL OIL AND PETROLATUM: 150; 830 OINTMENT OPHTHALMIC at 05:31

## 2020-10-21 RX ADMIN — BACITRACIN ZINC: 500 OINTMENT TOPICAL at 09:31

## 2020-10-21 RX ADMIN — NYSTATIN 500000 UNITS: 100000 SUSPENSION ORAL at 17:09

## 2020-10-21 RX ADMIN — VANCOMYCIN HYDROCHLORIDE 2000 MG: 10 INJECTION, POWDER, LYOPHILIZED, FOR SOLUTION INTRAVENOUS at 14:30

## 2020-10-21 RX ADMIN — FENTANYL CITRATE 100 MCG: 50 INJECTION, SOLUTION INTRAMUSCULAR; INTRAVENOUS at 10:19

## 2020-10-21 RX ADMIN — FENTANYL CITRATE 100 MCG: 50 INJECTION, SOLUTION INTRAMUSCULAR; INTRAVENOUS at 18:10

## 2020-10-21 RX ADMIN — FAMOTIDINE 20 MG: 20 TABLET ORAL at 09:00

## 2020-10-21 RX ADMIN — POTASSIUM CHLORIDE 40 MEQ: 400 INJECTION, SOLUTION INTRAVENOUS at 05:46

## 2020-10-21 RX ADMIN — CHLORHEXIDINE GLUCONATE 0.12% ORAL RINSE 15 ML: 1.2 LIQUID ORAL at 09:00

## 2020-10-21 RX ADMIN — SODIUM CHLORIDE SOLN NEBU 3% 4 ML: 3 NEBU SOLN at 09:23

## 2020-10-21 RX ADMIN — FAMOTIDINE 20 MG: 20 TABLET ORAL at 20:33

## 2020-10-21 RX ADMIN — OXYCODONE HYDROCHLORIDE 5 MG: 5 SOLUTION ORAL at 05:31

## 2020-10-21 RX ADMIN — POLYVINYL ALCOHOL 1 DROP: 14 SOLUTION/ DROPS OPHTHALMIC at 03:35

## 2020-10-21 RX ADMIN — SODIUM CHLORIDE SOLN NEBU 3% 4 ML: 3 NEBU SOLN at 00:46

## 2020-10-21 RX ADMIN — FENTANYL CITRATE 100 MCG: 50 INJECTION, SOLUTION INTRAMUSCULAR; INTRAVENOUS at 13:03

## 2020-10-21 RX ADMIN — Medication 10 ML: at 09:31

## 2020-10-21 RX ADMIN — FENTANYL CITRATE 50 MCG: 50 INJECTION, SOLUTION INTRAMUSCULAR; INTRAVENOUS at 02:17

## 2020-10-21 RX ADMIN — POLYVINYL ALCOHOL 1 DROP: 14 SOLUTION/ DROPS OPHTHALMIC at 08:47

## 2020-10-21 RX ADMIN — NYSTATIN 500000 UNITS: 100000 SUSPENSION ORAL at 09:00

## 2020-10-21 RX ADMIN — CHLORHEXIDINE GLUCONATE 0.12% ORAL RINSE 15 ML: 1.2 LIQUID ORAL at 20:32

## 2020-10-21 RX ADMIN — SODIUM CHLORIDE SOLN NEBU 3% 4 ML: 3 NEBU SOLN at 20:25

## 2020-10-21 RX ADMIN — POLYVINYL ALCOHOL 1 DROP: 14 SOLUTION/ DROPS OPHTHALMIC at 12:02

## 2020-10-21 RX ADMIN — MINERAL OIL AND PETROLATUM: 150; 830 OINTMENT OPHTHALMIC at 15:03

## 2020-10-21 RX ADMIN — BUSPIRONE HYDROCHLORIDE 15 MG: 10 TABLET ORAL at 09:01

## 2020-10-21 RX ADMIN — NYSTATIN 500000 UNITS: 100000 SUSPENSION ORAL at 20:33

## 2020-10-21 RX ADMIN — BUSPIRONE HYDROCHLORIDE 15 MG: 10 TABLET ORAL at 20:33

## 2020-10-21 RX ADMIN — MINERAL OIL AND PETROLATUM: 150; 830 OINTMENT OPHTHALMIC at 17:56

## 2020-10-21 RX ADMIN — CLONIDINE HYDROCHLORIDE 0.1 MG: 0.1 TABLET ORAL at 14:56

## 2020-10-21 RX ADMIN — BACITRACIN ZINC: 500 OINTMENT TOPICAL at 20:35

## 2020-10-21 RX ADMIN — FENTANYL CITRATE 100 MCG: 50 INJECTION, SOLUTION INTRAMUSCULAR; INTRAVENOUS at 20:42

## 2020-10-21 RX ADMIN — HEPARIN SODIUM 5000 UNITS: 10000 INJECTION INTRAVENOUS; SUBCUTANEOUS at 05:30

## 2020-10-21 RX ADMIN — ACETAMINOPHEN ORAL SOLUTION 650 MG: 650 SOLUTION ORAL at 03:32

## 2020-10-21 RX ADMIN — ACETAMINOPHEN ORAL SOLUTION 650 MG: 650 SOLUTION ORAL at 14:30

## 2020-10-21 RX ADMIN — OXYCODONE HYDROCHLORIDE 5 MG: 5 SOLUTION ORAL at 17:55

## 2020-10-21 RX ADMIN — FENTANYL CITRATE 50 MCG: 50 INJECTION, SOLUTION INTRAMUSCULAR; INTRAVENOUS at 09:20

## 2020-10-21 ASSESSMENT — PAIN SCALES - GENERAL
PAINLEVEL_OUTOF10: 6
PAINLEVEL_OUTOF10: 0
PAINLEVEL_OUTOF10: 5
PAINLEVEL_OUTOF10: 4
PAINLEVEL_OUTOF10: 5
PAINLEVEL_OUTOF10: 3
PAINLEVEL_OUTOF10: 5
PAINLEVEL_OUTOF10: 5
PAINLEVEL_OUTOF10: 0
PAINLEVEL_OUTOF10: 6
PAINLEVEL_OUTOF10: 5
PAINLEVEL_OUTOF10: 6
PAINLEVEL_OUTOF10: 6
PAINLEVEL_OUTOF10: 0
PAINLEVEL_OUTOF10: 6
PAINLEVEL_OUTOF10: 0
PAINLEVEL_OUTOF10: 6
PAINLEVEL_OUTOF10: 6
PAINLEVEL_OUTOF10: 3
PAINLEVEL_OUTOF10: 0
PAINLEVEL_OUTOF10: 6
PAINLEVEL_OUTOF10: 6

## 2020-10-21 ASSESSMENT — PULMONARY FUNCTION TESTS
PIF_VALUE: 14
PIF_VALUE: 13
PIF_VALUE: 14
PIF_VALUE: 14
PIF_VALUE: 13
PIF_VALUE: 14
PIF_VALUE: 13
PIF_VALUE: 14
PIF_VALUE: 14

## 2020-10-21 NOTE — PLAN OF CARE
Problem: Inadequate oral food/beverage intake (NI-2.1)  Goal: Food and/or Nutrient Delivery  Description: Individualized approach for food/nutrient provision.   10/21/2020 1122 by Riky Salgado MS, OLIVIA, LD  Outcome: Met This Shift  10/21/2020 1122 by Riky Salgado MS, OLIVIA, LD  Outcome: Met This Shift
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
## 2020-10-21 NOTE — PLAN OF CARE
Problem: Falls - Risk of:  Goal: Will remain free from falls  Description: Will remain free from falls  Outcome: Met This Shift  Goal: Absence of physical injury  Description: Absence of physical injury  Outcome: Met This Shift     Problem: Inadequate oral food/beverage intake (NI-2.1)  Goal: Food and/or Nutrient Delivery  Description: Individualized approach for food/nutrient provision.   10/21/2020 1122 by Eddie Silva, MS, RD, LD  Outcome: Met This Shift     Problem: Restraint Use - Nonviolent/Non-Self-Destructive Behavior:  Goal: Absence of restraint-related injury  Description: Absence of restraint-related injury  10/21/2020 1736 by Kristy Pink RN  Outcome: Met This Shift  10/21/2020 1008 by Kristy Pink RN  Outcome: Met This Shift  10/21/2020 0802 by Mark Lebron  Outcome: Met This Shift     Problem: Restraint Use - Nonviolent/Non-Self-Destructive Behavior:  Goal: Absence of restraint indications  Description: Absence of restraint indications  10/21/2020 1736 by Kristy Pink RN  Outcome: Not Met This Shift  10/21/2020 1008 by Kristy Pink RN  Outcome: Not Met This Shift  10/21/2020 0802 by Mark Lebron  Outcome: Not Met This Shift
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
## 2020-10-21 NOTE — PLAN OF CARE
Problem: Restraint Use - Nonviolent/Non-Self-Destructive Behavior:  Goal: Absence of restraint-related injury  Description: Absence of restraint-related injury  10/20/2020 2323 by Breonna Starks  Outcome: Met This Shift     Problem: Restraint Use - Nonviolent/Non-Self-Destructive Behavior:  Goal: Absence of restraint indications  Description: Absence of restraint indications  10/20/2020 2323 by Breonna Starks  Outcome: Not Met This Shift

## 2020-10-21 NOTE — PROGRESS NOTES
process     Xr Hand Right (min 3 Views)    Result Date: 10/9/2020  EXAMINATION: THREE XRAY VIEWS OF THE LEFT HAND; THREE XRAY VIEWS OF THE RIGHT HAND 10/9/2020 7:01 am COMPARISON: None. HISTORY: ORDERING SYSTEM PROVIDED HISTORY: trauma TECHNOLOGIST PROVIDED HISTORY: Reason for exam:->trauma What reading provider will be dictating this exam?->CRC FINDINGS: Positioning of both hands does somewhat limit evaluation. There are no definite fractures or dislocations. Joint spaces are normal.     No acute process     Xr Knee Left (3 Views)    Result Date: 10/9/2020  EXAMINATION: THREE XRAY VIEWS OF THE LEFT KNEE 10/9/2020 7:03 am COMPARISON: None. HISTORY: ORDERING SYSTEM PROVIDED HISTORY: trauma TECHNOLOGIST PROVIDED HISTORY: Reason for exam:->trauma What reading provider will be dictating this exam?->CRC FINDINGS: There is no fracture dislocation. There is no joint effusion or degenerative change. No acute process     Xr Knee Right (3 Views)    Result Date: 10/9/2020  EXAMINATION: THREE XRAY VIEWS OF THE RIGHT KNEE 10/9/2020 8:06 am COMPARISON: None. HISTORY: ORDERING SYSTEM PROVIDED HISTORY: trauma TECHNOLOGIST PROVIDED HISTORY: Reason for exam:->trauma What reading provider will be dictating this exam?->CRC FINDINGS: There is no fracture dislocation. There is no joint space narrowing or effusion. No acute process     Ct Head Wo Contrast    Result Date: 10/9/2020  EXAMINATION: CT OF THE HEAD WITHOUT CONTRAST  10/9/2020 4:07 am TECHNIQUE: CT of the head was performed without the administration of intravenous contrast. Dose modulation, iterative reconstruction, and/or weight based adjustment of the mA/kV was utilized to reduce the radiation dose to as low as reasonably achievable. COMPARISON: CT head 10/08/2020 HISTORY: ORDERING SYSTEM PROVIDED HISTORY: evaluate head bleed TECHNOLOGIST PROVIDED HISTORY: Has a \"code stroke\" or \"stroke alert\" been called? ->No Reason for exam:->evaluate head bleed What reading development of slit-like appearance of the right lateral ventricle. Suspect acute ischemia involving the left middle cerebellar peduncle and left cerebellar hemisphere. Similar appearance of intracranial hemorrhage in hemorrhagic contusions as above. Findings discussed with Dr. Ellie Sesay at 12:53 p.m. on December 9, 2020. Ct Head Wo Contrast    Result Date: 10/9/2020  EXAMINATION: CT OF THE HEAD and cervical spine WITHOUT CONTRAST; CT OF THE FACE WITHOUT CONTRAST  10/8/2020 9:56 pm TECHNIQUE: CT of the head was performed without the administration of intravenous contrast. Dose modulation, iterative reconstruction, and/or weight based adjustment of the mA/kV was utilized to reduce the radiation dose to as low as reasonably achievable.; CT of the face was performed without the administration of intravenous contrast. Multiplanar reformatted images are provided for review. Dose modulation, iterative reconstruction, and/or weight based adjustment of the mA/kV was utilized to reduce the radiation dose to as low as reasonably achievable.; CT of the cervical spine was performed without the administration of intravenous contrast. Multiplanar reformatted images are provided for review. Dose modulation, iterative reconstruction, and/or weight based adjustment of the mA/kV was utilized to reduce the radiation dose to as low as reasonably achievable. COMPARISON: None. HISTORY: ORDERING SYSTEM PROVIDED HISTORY: trauma TECHNOLOGIST PROVIDED HISTORY: Reason for exam:->trauma Has a \"code stroke\" or \"stroke alert\" been called? ->No What reading provider will be dictating this exam?->CRC FINDINGS: Head: There is mild right frontoparietal holohemispheric subdural hemorrhage, measuring up to 6 mm. Mild mass effect and minimal midline shift of approximately 4 mm. Visualized skull demonstrates multiple mildly displaced fractures in the skull, involving left temporal bone, right temporal bone, bilateral parietal bones.   Left temporoparietal bone skull fractures appear to be comminuted in multiple locations. There also small hemorrhagic contusions in bilateral frontal lobes. Small amount of subarachnoid hemorrhage in the right frontal lobe. Mild fluid layering in the sphenoid sinus. Fractures involving the left orbit as well. The mastoid air cells are clear. However, left temporal bone fracture does extend through the region of the left external auditory canal and extends to the middle ear. Cervical spine: Vertebral body heights are intact. Alignment is intact. Facets are normally aligned. The odontoid process is intact. No significant prevertebral soft tissue swelling. Facial bones: Mandible is intact. The zygomatic arches are intact. Nasal bones are intact. Nasal bony septum is midline. Sphenoid temporal buttress is intact. Mildly displaced fracture of the roof of the left orbit. The orbital floor is intact. Globes are intact and not proptotic. No retro bulbar soft tissue hematoma. Mild left periorbital preseptal soft tissue swelling. Bilateral comminuted skull bone fractures involving bilateral temporal bones and parietal bones. Fractures are worse on the left side. Mild right holohemispheric subdural hematoma with mild midline shift. Bilateral frontal small hemorrhagic contusions. Left orbital roof mildly displaced fracture. No evidence for cervical fracture or subluxation. Critical findings reported to the ER physician at time of dictation. Ct Iac Posterior Fossa Wo Contrast    Result Date: 10/10/2020  EXAMINATION: CT OF THE INTERNAL AUDITORY CANAL WITHOUT CONTRAST 10/9/2020 12:11 pm TECHNIQUE: CT of the internal auditory canal was performed without contrast was performed without the administration of intravenous contrast. Multiplanar reformatted images are provided for review.  Dose modulation, iterative reconstruction, and/or weight based adjustment of the mA/kV was utilized to reduce the radiation dose was performed without the administration of intravenous contrast. Multiplanar reformatted images are provided for review. Dose modulation, iterative reconstruction, and/or weight based adjustment of the mA/kV was utilized to reduce the radiation dose to as low as reasonably achievable.; CT of the cervical spine was performed without the administration of intravenous contrast. Multiplanar reformatted images are provided for review. Dose modulation, iterative reconstruction, and/or weight based adjustment of the mA/kV was utilized to reduce the radiation dose to as low as reasonably achievable. COMPARISON: None. HISTORY: ORDERING SYSTEM PROVIDED HISTORY: trauma TECHNOLOGIST PROVIDED HISTORY: Reason for exam:->trauma Has a \"code stroke\" or \"stroke alert\" been called? ->No What reading provider will be dictating this exam?->CRC FINDINGS: Head: There is mild right frontoparietal holohemispheric subdural hemorrhage, measuring up to 6 mm. Mild mass effect and minimal midline shift of approximately 4 mm. Visualized skull demonstrates multiple mildly displaced fractures in the skull, involving left temporal bone, right temporal bone, bilateral parietal bones. Left temporoparietal bone skull fractures appear to be comminuted in multiple locations. There also small hemorrhagic contusions in bilateral frontal lobes. Small amount of subarachnoid hemorrhage in the right frontal lobe. Mild fluid layering in the sphenoid sinus. Fractures involving the left orbit as well. The mastoid air cells are clear. However, left temporal bone fracture does extend through the region of the left external auditory canal and extends to the middle ear. Cervical spine: Vertebral body heights are intact. Alignment is intact. Facets are normally aligned. The odontoid process is intact. No significant prevertebral soft tissue swelling. Facial bones: Mandible is intact. The zygomatic arches are intact. Nasal bones are intact.   Nasal bony posterior left 8th rib. Mildly displaced acute fracture of the left body of the scapula. Nondisplaced acute fractures involving the lateral left 5th through 7th ribs. Nondisplaced acute fractures of the posterior left 4th through 7th ribs. Acute mildly displaced fractures of the posterior left 8th rib. Mildly displaced acute fracture of the left body of the scapula. Ct Cervical Spine Wo Contrast    Result Date: 10/9/2020  EXAMINATION: CT OF THE HEAD and cervical spine WITHOUT CONTRAST; CT OF THE FACE WITHOUT CONTRAST  10/8/2020 9:56 pm TECHNIQUE: CT of the head was performed without the administration of intravenous contrast. Dose modulation, iterative reconstruction, and/or weight based adjustment of the mA/kV was utilized to reduce the radiation dose to as low as reasonably achievable.; CT of the face was performed without the administration of intravenous contrast. Multiplanar reformatted images are provided for review. Dose modulation, iterative reconstruction, and/or weight based adjustment of the mA/kV was utilized to reduce the radiation dose to as low as reasonably achievable.; CT of the cervical spine was performed without the administration of intravenous contrast. Multiplanar reformatted images are provided for review. Dose modulation, iterative reconstruction, and/or weight based adjustment of the mA/kV was utilized to reduce the radiation dose to as low as reasonably achievable. COMPARISON: None. HISTORY: ORDERING SYSTEM PROVIDED HISTORY: trauma TECHNOLOGIST PROVIDED HISTORY: Reason for exam:->trauma Has a \"code stroke\" or \"stroke alert\" been called? ->No What reading provider will be dictating this exam?->CRC FINDINGS: Head: There is mild right frontoparietal holohemispheric subdural hemorrhage, measuring up to 6 mm. Mild mass effect and minimal midline shift of approximately 4 mm.   Visualized skull demonstrates multiple mildly displaced fractures in the skull, involving left temporal bone, right temporal bone, bilateral parietal bones. Left temporoparietal bone skull fractures appear to be comminuted in multiple locations. There also small hemorrhagic contusions in bilateral frontal lobes. Small amount of subarachnoid hemorrhage in the right frontal lobe. Mild fluid layering in the sphenoid sinus. Fractures involving the left orbit as well. The mastoid air cells are clear. However, left temporal bone fracture does extend through the region of the left external auditory canal and extends to the middle ear. Cervical spine: Vertebral body heights are intact. Alignment is intact. Facets are normally aligned. The odontoid process is intact. No significant prevertebral soft tissue swelling. Facial bones: Mandible is intact. The zygomatic arches are intact. Nasal bones are intact. Nasal bony septum is midline. Sphenoid temporal buttress is intact. Mildly displaced fracture of the roof of the left orbit. The orbital floor is intact. Globes are intact and not proptotic. No retro bulbar soft tissue hematoma. Mild left periorbital preseptal soft tissue swelling. Bilateral comminuted skull bone fractures involving bilateral temporal bones and parietal bones. Fractures are worse on the left side. Mild right holohemispheric subdural hematoma with mild midline shift. Bilateral frontal small hemorrhagic contusions. Left orbital roof mildly displaced fracture. No evidence for cervical fracture or subluxation. Critical findings reported to the ER physician at time of dictation. Ct Thoracic Spine Wo Contrast    Result Date: 10/9/2020  EXAMINATION: CT OF THE THORACIC SPINE WITHOUT CONTRAST  10/8/2020 10:56 pm: TECHNIQUE: CT of the thoracic spine was performed without the administration of intravenous contrast. Multiplanar reformatted images are provided for review.  Dose modulation, iterative reconstruction, and/or weight based adjustment of the mA/kV was utilized to reduce the radiation dose to as low as reasonably achievable. COMPARISON: Same day lumbar spine CT; same day CT chest, abdomen and pelvis HISTORY: ORDERING SYSTEM PROVIDED HISTORY: trauma TECHNOLOGIST PROVIDED HISTORY: Reason for exam:->trauma What reading provider will be dictating this exam?->CRC FINDINGS: BONES/ALIGNMENT: There is normal alignment of the spine. The vertebral body heights are maintained. No osseous destructive lesion is seen. DEGENERATIVE CHANGES: Mild spondylosis without significant central canal narrowing. Mild right foraminal narrowing at T11-T12. SOFT TISSUES: No paraspinal mass is seen. No acute thoracic spine abnormality. Ct Lumbar Spine Wo Contrast    Result Date: 10/9/2020  EXAMINATION: CT OF THE LUMBAR SPINE WITHOUT CONTRAST  10/8/2020 TECHNIQUE: CT of the lumbar spine was performed without the administration of intravenous contrast. Multiplanar reformatted images are provided for review. Dose modulation, iterative reconstruction, and/or weight based adjustment of the mA/kV was utilized to reduce the radiation dose to as low as reasonably achievable. COMPARISON: None HISTORY: ORDERING SYSTEM PROVIDED HISTORY: trauma TECHNOLOGIST PROVIDED HISTORY: Reason for exam:->trauma What reading provider will be dictating this exam?->CRC FINDINGS: BONES/ALIGNMENT: Vertebral body heights are maintained. No compression deformity. L5 pars defects with grade 1 anterolisthesis of L5 on S1. DEGENERATIVE CHANGES: Moderate disc desiccation at L5-S1. No significant central canal stenosis. Moderate-to-severe foraminal stenosis at the listhesis level. SOFT TISSUES/RETROPERITONEUM: No paraspinal mass is seen. No acute lumbar spine abnormality. L5 pars defects with grade 1 anterolisthesis of L5 on S1 and moderate-to-severe foraminal stenosis.      Ct Abdomen Pelvis W Iv Contrast Additional Contrast? None    Result Date: 10/9/2020  EXAMINATION: CT OF THE ABDOMEN AND PELVIS WITH CONTRAST 10/8/2020 10:56 pm TECHNIQUE: CT of HISTORY: Reason for exam:->intubated What reading provider will be dictating this exam?->CRC FINDINGS: Lines/tubes are appropriate. Heart size is normal.  There are no infiltrates or effusions. No acute process     Xr Chest Portable    Result Date: 10/9/2020  EXAMINATION: ONE XRAY VIEW OF THE CHEST 10/9/2020 12:42 am COMPARISON: 10/08/2020 at this 2248 hours. Kayleen Vergara HISTORY: ORDERING SYSTEM PROVIDED HISTORY: Line Placement TECHNOLOGIST PROVIDED HISTORY: Reason for exam:->Line Placement What reading provider will be dictating this exam?->CRC FINDINGS: Heart is not enlarged. Endotracheal tube and enteric tube are in place. Left IJ CVC is in place with tip in the proximal SVC. No pneumothorax. No pleural effusions. No pulmonary edema or pneumothorax. Endotracheal tube, enteric tube and left IJ CVC is in place with no pneumothorax. Xr Chest 1 View    Result Date: 10/8/2020  EXAMINATION: ONE XRAY VIEW OF THE CHEST 10/8/2020 9:52 pm COMPARISON: None. HISTORY: ORDERING SYSTEM PROVIDED HISTORY: trauma TECHNOLOGIST PROVIDED HISTORY: Reason for exam:->trauma What reading provider will be dictating this exam?->CRC FINDINGS: Endotracheal tube is present with distal tip approximately 6 cm above the andriy. Nasogastric tube courses below the level of the diaphragm with distal tip not included on this examination. No focal airspace opacity or pleural effusion. The heart is prominent related to portable technique. No pneumothorax. 1.  No acute process in the chest. 2.  Endotracheal tube is 6 cm above the andriy. 3.  Nasogastric tube courses below the level of the diaphragm. Cta Neck W Contrast    Result Date: 10/9/2020  EXAMINATION: CTA OF THE HEAD WITH CONTRAST; CTA OF THE NECK 10/9/2020 3:17 pm: TECHNIQUE: CTA of the head/brain was performed with the administration of intravenous contrast. Multiplanar reformatted images are provided for review. MIP images are provided for review.  Dose modulation, iterative reconstruction, and/or weight based adjustment of the mA/kV was utilized to reduce the radiation dose to as low as reasonably achievable.; CTA of the neck was performed with the administration of intravenous contrast. Multiplanar reformatted images are provided for review. MIP images are provided for review. Stenosis of the internal carotid arteries measured using NASCET criteria. Dose modulation, iterative reconstruction, and/or weight based adjustment of the mA/kV was utilized to reduce the radiation dose to as low as reasonably achievable. COMPARISON: CT head from 12/30 5 p.m. HISTORY: ORDERING SYSTEM PROVIDED HISTORY: ischemic TECHNOLOGIST PROVIDED HISTORY: Reason for exam:->ischemic Has a \"code stroke\" or \"stroke alert\" been called? ->No What reading provider will be dictating this exam?->CRC; ORDERING SYSTEM PROVIDED HISTORY: trauma TECHNOLOGIST PROVIDED HISTORY: Reason for exam:->trauma Has a \"code stroke\" or \"stroke alert\" been called? ->No What reading provider will be dictating this exam?->CRC FINDINGS: CTA NECK: AORTIC ARCH/ARCH VESSELS: No dissection or arterial injury. No significant stenosis of the brachiocephalic or subclavian arteries. CAROTID ARTERIES: No dissection, arterial injury, or hemodynamically significant stenosis by NASCET criteria. VERTEBRAL ARTERIES: No dissection, arterial injury, or significant stenosis. SOFT TISSUES: There are patchy and nodular infiltrates within the right lung. BONES: See below. CTA HEAD: ANTERIOR CIRCULATION: No significant stenosis of the intracranial internal carotid, anterior cerebral, or middle cerebral arteries. No aneurysm. Bilateral posterior communicating arteries are present. POSTERIOR CIRCULATION: No significant stenosis of the vertebral, basilar, or posterior cerebral arteries. No aneurysm. OTHER: No dural venous sinus thrombosis on this non-dedicated study.  BRAIN: There is diffuse scalp soft tissue thickening with redemonstration of a comminuted fractures of the posterior skull extending through the left temporal bone. .  A right frontal approach endoventricular device is present with re-expansion of the right lateral ventricle. There are intraparenchymal hematomas within the right frontal and right temporal lobes as well as a small amount of subdural blood over the right cerebral convexity. There is 3.7 mm of right-to-left midline shift. Re-expansion of the right lateral ventricle since the prior exam obtained at 12:35 PM. Otherwise, stable appearance of the brain and skull. No acute arterial injury visualized within the head or neck. Incidentally noted patchy and nodular infiltrates within the right lung, worrisome for pneumonia. Cta Neck W Contrast    Result Date: 10/9/2020  EXAMINATION: CTA OF THE NECK 10/8/2020 10:56 pm TECHNIQUE: CTA of the neck was performed with the administration of intravenous contrast. Multiplanar reformatted images are provided for review. MIP images are provided for review. Stenosis of the internal carotid arteries measured using NASCET criteria. Dose modulation, iterative reconstruction, and/or weight based adjustment of the mA/kV was utilized to reduce the radiation dose to as low as reasonably achievable. COMPARISON: None. HISTORY: ORDERING SYSTEM PROVIDED HISTORY: trauma TECHNOLOGIST PROVIDED HISTORY: Reason for exam:->trauma Has a \"code stroke\" or \"stroke alert\" been called? ->No What reading provider will be dictating this exam?->CRC FINDINGS: AORTIC ARCH/ARCH VESSELS: No dissection or arterial injury. No significant stenosis of the brachiocephalic or subclavian arteries. CAROTID ARTERIES: No dissection, arterial injury, or hemodynamically significant stenosis by NASCET criteria. VERTEBRAL ARTERIES: No dissection, arterial injury, or significant stenosis. SOFT TISSUES: The lung apices are clear. No cervical or superior mediastinal lymphadenopathy. The larynx and pharynx are unremarkable.   No acute abnormality of the salivary and thyroid glands. BONES: Partially visualized calvarial comminuted acute fractures are seen bilaterally involving the left temporal occipital bone in the right posterior temporal bone. Right temporal underlying acute subdural hemorrhage is identified measuring up to 5 mm in greatest thickness. Incidental finding of right-sided lung azygos lobe is seen. Unremarkable CTA of the neck. Bilateral calvarial comminuted acute fractures, as discussed above. Underlying partially visualized right temporal acute subdural hemorrhage measuring up to 5 mm in thickness. Please refer to CT head examination, same date, for full description of findings. Xr Chest Abdomen Ng Placement    Result Date: 10/8/2020  EXAMINATION: ONE SUPINE XRAY VIEW(S) OF THE ABDOMEN 10/8/2020 9:52 pm COMPARISON: None. HISTORY: ORDERING SYSTEM PROVIDED HISTORY: trauma, og placement TECHNOLOGIST PROVIDED HISTORY: Reason for exam:->trauma, og placement What reading provider will be dictating this exam?->CRC FINDINGS: Nasogastric tube courses below the level of the diaphragm with distal tip in the expected location of the stomach. There is a distended gas-filled stomach. Satisfactory position of nasogastric tube. Cta Head W Contrast    Result Date: 10/9/2020  EXAMINATION: CTA OF THE HEAD WITH CONTRAST; CTA OF THE NECK 10/9/2020 3:17 pm: TECHNIQUE: CTA of the head/brain was performed with the administration of intravenous contrast. Multiplanar reformatted images are provided for review. MIP images are provided for review. Dose modulation, iterative reconstruction, and/or weight based adjustment of the mA/kV was utilized to reduce the radiation dose to as low as reasonably achievable.; CTA of the neck was performed with the administration of intravenous contrast. Multiplanar reformatted images are provided for review. MIP images are provided for review.  Stenosis of the internal carotid arteries measured using NASCET criteria. Dose modulation, iterative reconstruction, and/or weight based adjustment of the mA/kV was utilized to reduce the radiation dose to as low as reasonably achievable. COMPARISON: CT head from 12/30 5 p.m. HISTORY: ORDERING SYSTEM PROVIDED HISTORY: ischemic TECHNOLOGIST PROVIDED HISTORY: Reason for exam:->ischemic Has a \"code stroke\" or \"stroke alert\" been called? ->No What reading provider will be dictating this exam?->CRC; ORDERING SYSTEM PROVIDED HISTORY: trauma TECHNOLOGIST PROVIDED HISTORY: Reason for exam:->trauma Has a \"code stroke\" or \"stroke alert\" been called? ->No What reading provider will be dictating this exam?->CRC FINDINGS: CTA NECK: AORTIC ARCH/ARCH VESSELS: No dissection or arterial injury. No significant stenosis of the brachiocephalic or subclavian arteries. CAROTID ARTERIES: No dissection, arterial injury, or hemodynamically significant stenosis by NASCET criteria. VERTEBRAL ARTERIES: No dissection, arterial injury, or significant stenosis. SOFT TISSUES: There are patchy and nodular infiltrates within the right lung. BONES: See below. CTA HEAD: ANTERIOR CIRCULATION: No significant stenosis of the intracranial internal carotid, anterior cerebral, or middle cerebral arteries. No aneurysm. Bilateral posterior communicating arteries are present. POSTERIOR CIRCULATION: No significant stenosis of the vertebral, basilar, or posterior cerebral arteries. No aneurysm. OTHER: No dural venous sinus thrombosis on this non-dedicated study. BRAIN: There is diffuse scalp soft tissue thickening with redemonstration of a comminuted fractures of the posterior skull extending through the left temporal bone. .  A right frontal approach endoventricular device is present with re-expansion of the right lateral ventricle. There are intraparenchymal hematomas within the right frontal and right temporal lobes as well as a small amount of subdural blood over the right cerebral convexity.   There is 3.7 mm of of multiple ribs of left side    Subdural hematoma (HCC)    Closed fracture of temporal bone (HCC)    Orbital roof closed fracture with intracranial injury ([facility] [address]    Closed fracture of left scapula    Contusion of left lung     Plan:Continue current care  Lele Starks M.D.
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
## 2020-10-21 NOTE — FLOWSHEET NOTE
Patient will reach at lines and tubes, and kick legs over the side rails needing to be redirected much of time. Attempting to provide calm environment and reorientation, but patient still assessed to be a risk of harm to self. Family aware for need of restraints. Will continue to monitor patient and assess for earliest removal capability.

## 2020-10-21 NOTE — CARE COORDINATION
10/21/2020 - Pt remains on ventilator to trach. On fio2 40% and PS 8, PEEP 5. Tube feeding continued. Pt very restless, swinging legs off of bed. Pt wife, Richmond Vega, in room. Informed Richmond Gary that I received a message that [employer] had denied by insurance. Per Roxie from [email or web address], he will have a letter typed up for an expedited appeal to try to overturn denial. Expert eavl letter signed by Dr Deepika Rogers and given back to Roxie from [email or web address]. Also spoke with Richmond Vega about [facility] list and for her to review it and make some choices. Richmond Vega tearful, very frustrated by everything going on. Psychosocial/emotional support given to her. SW/CM will continue to follow.

## 2020-10-21 NOTE — FLOWSHEET NOTE
Patient will reach at lines and tubes and kick legs over the side rails needing to be redirected much of time. Attempting to provide calm environment and reorientation, but patient still assessed to be a risk of harm to self. Family aware for need of restraints. Will continue to monitor patient and assess for earliest removal capability.

## 2020-10-22 ENCOUNTER — APPOINTMENT (OUTPATIENT)
Dept: GENERAL RADIOLOGY | Age: 30
DRG: 003 | End: 2020-10-22
Payer: COMMERCIAL

## 2020-10-22 LAB
ANION GAP SERPL CALCULATED.3IONS-SCNC: 12 MMOL/L (ref 7–16)
B.E.: 4.5 MMOL/L (ref -3–3)
BASOPHILS ABSOLUTE: 0.1 E9/L (ref 0–0.2)
BASOPHILS RELATIVE PERCENT: 1 % (ref 0–2)
BUN BLDV-MCNC: 17 MG/DL (ref 6–20)
CALCIUM IONIZED: 1.27 MMOL/L (ref 1.15–1.33)
CALCIUM SERPL-MCNC: 8.7 MG/DL (ref 8.6–10.2)
CHLORIDE BLD-SCNC: 101 MMOL/L (ref 98–107)
CO2: 25 MMOL/L (ref 22–29)
COHB: 0.7 % (ref 0–1.5)
CREAT SERPL-MCNC: 0.7 MG/DL (ref 0.7–1.2)
CRITICAL: ABNORMAL
DATE ANALYZED: ABNORMAL
DATE OF COLLECTION: ABNORMAL
EOSINOPHILS ABSOLUTE: 0.62 E9/L (ref 0.05–0.5)
EOSINOPHILS RELATIVE PERCENT: 6.1 % (ref 0–6)
GFR AFRICAN AMERICAN: >60
GFR NON-AFRICAN AMERICAN: >60 ML/MIN/1.73
GLUCOSE BLD-MCNC: 111 MG/DL (ref 74–99)
HCO3: 29.9 MMOL/L (ref 22–26)
HCT VFR BLD CALC: 34.6 % (ref 37–54)
HEMOGLOBIN: 11.2 G/DL (ref 12.5–16.5)
HHB: 6.2 % (ref 0–5)
IMMATURE GRANULOCYTES #: 0.28 E9/L
IMMATURE GRANULOCYTES %: 2.8 % (ref 0–5)
LAB: ABNORMAL
LYMPHOCYTES ABSOLUTE: 1.47 E9/L (ref 1.5–4)
LYMPHOCYTES RELATIVE PERCENT: 14.4 % (ref 20–42)
Lab: ABNORMAL
MAGNESIUM: 2.4 MG/DL (ref 1.6–2.6)
MCH RBC QN AUTO: 30.4 PG (ref 26–35)
MCHC RBC AUTO-ENTMCNC: 32.4 % (ref 32–34.5)
MCV RBC AUTO: 93.8 FL (ref 80–99.9)
METHB: 0.3 % (ref 0–1.5)
MODE: ABNORMAL
MONOCYTES ABSOLUTE: 0.78 E9/L (ref 0.1–0.95)
MONOCYTES RELATIVE PERCENT: 7.7 % (ref 2–12)
NEUTROPHILS ABSOLUTE: 6.93 E9/L (ref 1.8–7.3)
NEUTROPHILS RELATIVE PERCENT: 68 % (ref 43–80)
O2 SATURATION: 93.7 % (ref 92–98.5)
O2HB: 92.8 % (ref 94–97)
OPERATOR ID: 1632
PATIENT TEMP: 37 C
PCO2: 48.3 MMHG (ref 35–45)
PDW BLD-RTO: 12.9 FL (ref 11.5–15)
PH BLOOD GAS: 7.41 (ref 7.35–7.45)
PHOSPHORUS: 3.3 MG/DL (ref 2.5–4.5)
PLATELET # BLD: 368 E9/L (ref 130–450)
PMV BLD AUTO: 9.7 FL (ref 7–12)
PO2: 72.3 MMHG (ref 75–100)
POTASSIUM SERPL-SCNC: 3.5 MMOL/L (ref 3.5–5)
RBC # BLD: 3.69 E12/L (ref 3.8–5.8)
SODIUM BLD-SCNC: 138 MMOL/L (ref 132–146)
SOURCE, BLOOD GAS: ABNORMAL
THB: 12 G/DL (ref 11.5–16.5)
TIME ANALYZED: 630
WBC # BLD: 10.2 E9/L (ref 4.5–11.5)

## 2020-10-22 PROCEDURE — 2580000003 HC RX 258: Performed by: SURGERY

## 2020-10-22 PROCEDURE — 99291 CRITICAL CARE FIRST HOUR: CPT | Performed by: SURGERY

## 2020-10-22 PROCEDURE — 6370000000 HC RX 637 (ALT 250 FOR IP): Performed by: SURGERY

## 2020-10-22 PROCEDURE — 80048 BASIC METABOLIC PNL TOTAL CA: CPT

## 2020-10-22 PROCEDURE — 85025 COMPLETE CBC W/AUTO DIFF WBC: CPT

## 2020-10-22 PROCEDURE — 2000000000 HC ICU R&B

## 2020-10-22 PROCEDURE — 6370000000 HC RX 637 (ALT 250 FOR IP): Performed by: STUDENT IN AN ORGANIZED HEALTH CARE EDUCATION/TRAINING PROGRAM

## 2020-10-22 PROCEDURE — 82330 ASSAY OF CALCIUM: CPT

## 2020-10-22 PROCEDURE — 2700000000 HC OXYGEN THERAPY PER DAY

## 2020-10-22 PROCEDURE — 71045 X-RAY EXAM CHEST 1 VIEW: CPT

## 2020-10-22 PROCEDURE — 6360000002 HC RX W HCPCS: Performed by: STUDENT IN AN ORGANIZED HEALTH CARE EDUCATION/TRAINING PROGRAM

## 2020-10-22 PROCEDURE — 83735 ASSAY OF MAGNESIUM: CPT

## 2020-10-22 PROCEDURE — 2580000003 HC RX 258: Performed by: STUDENT IN AN ORGANIZED HEALTH CARE EDUCATION/TRAINING PROGRAM

## 2020-10-22 PROCEDURE — 82805 BLOOD GASES W/O2 SATURATION: CPT

## 2020-10-22 PROCEDURE — 94640 AIRWAY INHALATION TREATMENT: CPT

## 2020-10-22 PROCEDURE — 84100 ASSAY OF PHOSPHORUS: CPT

## 2020-10-22 RX ORDER — OXYCODONE HCL 5 MG/5 ML
5 SOLUTION, ORAL ORAL EVERY 4 HOURS PRN
Status: DISCONTINUED | OUTPATIENT
Start: 2020-10-22 | End: 2020-10-23

## 2020-10-22 RX ADMIN — POLYVINYL ALCOHOL 1 DROP: 14 SOLUTION/ DROPS OPHTHALMIC at 16:41

## 2020-10-22 RX ADMIN — Medication 10 ML: at 09:16

## 2020-10-22 RX ADMIN — ACETAMINOPHEN ORAL SOLUTION 650 MG: 650 SOLUTION ORAL at 01:36

## 2020-10-22 RX ADMIN — BACITRACIN ZINC: 500 OINTMENT TOPICAL at 08:56

## 2020-10-22 RX ADMIN — ACETAMINOPHEN ORAL SOLUTION 650 MG: 650 SOLUTION ORAL at 17:59

## 2020-10-22 RX ADMIN — FENTANYL CITRATE 100 MCG: 50 INJECTION, SOLUTION INTRAMUSCULAR; INTRAVENOUS at 17:59

## 2020-10-22 RX ADMIN — CHLORHEXIDINE GLUCONATE 0.12% ORAL RINSE 15 ML: 1.2 LIQUID ORAL at 20:13

## 2020-10-22 RX ADMIN — SODIUM CHLORIDE SOLN NEBU 3% 4 ML: 3 NEBU SOLN at 02:05

## 2020-10-22 RX ADMIN — POLYVINYL ALCOHOL 1 DROP: 14 SOLUTION/ DROPS OPHTHALMIC at 00:09

## 2020-10-22 RX ADMIN — ACETAMINOPHEN ORAL SOLUTION 650 MG: 650 SOLUTION ORAL at 09:15

## 2020-10-22 RX ADMIN — CLONIDINE HYDROCHLORIDE 0.1 MG: 0.1 TABLET ORAL at 13:48

## 2020-10-22 RX ADMIN — OXYCODONE HYDROCHLORIDE 5 MG: 5 SOLUTION ORAL at 11:15

## 2020-10-22 RX ADMIN — BACITRACIN ZINC, POLYMYXIN B SULFATE, NEOMYCIN SULFATE: 400; 5000; 3.5 OINTMENT TOPICAL at 08:56

## 2020-10-22 RX ADMIN — MINERAL OIL AND PETROLATUM: 150; 830 OINTMENT OPHTHALMIC at 17:59

## 2020-10-22 RX ADMIN — FENTANYL CITRATE 100 MCG: 50 INJECTION, SOLUTION INTRAMUSCULAR; INTRAVENOUS at 02:38

## 2020-10-22 RX ADMIN — SODIUM CHLORIDE 25 ML: 9 INJECTION, SOLUTION INTRAVENOUS at 20:30

## 2020-10-22 RX ADMIN — POLYVINYL ALCOHOL 1 DROP: 14 SOLUTION/ DROPS OPHTHALMIC at 23:41

## 2020-10-22 RX ADMIN — CHLORHEXIDINE GLUCONATE 0.12% ORAL RINSE 15 ML: 1.2 LIQUID ORAL at 09:13

## 2020-10-22 RX ADMIN — HEPARIN SODIUM 5000 UNITS: 10000 INJECTION INTRAVENOUS; SUBCUTANEOUS at 13:49

## 2020-10-22 RX ADMIN — MINERAL OIL AND PETROLATUM: 150; 830 OINTMENT OPHTHALMIC at 21:42

## 2020-10-22 RX ADMIN — OXYCODONE HYDROCHLORIDE 5 MG: 5 SOLUTION ORAL at 06:06

## 2020-10-22 RX ADMIN — ACETAMINOPHEN ORAL SOLUTION 650 MG: 650 SOLUTION ORAL at 06:06

## 2020-10-22 RX ADMIN — BUSPIRONE HYDROCHLORIDE 15 MG: 10 TABLET ORAL at 20:13

## 2020-10-22 RX ADMIN — BACITRACIN ZINC, POLYMYXIN B SULFATE, NEOMYCIN SULFATE: 400; 5000; 3.5 OINTMENT TOPICAL at 20:13

## 2020-10-22 RX ADMIN — BACITRACIN ZINC: 500 OINTMENT TOPICAL at 13:48

## 2020-10-22 RX ADMIN — PIPERACILLIN AND TAZOBACTAM 3.38 G: 3; .375 INJECTION, POWDER, LYOPHILIZED, FOR SOLUTION INTRAVENOUS at 01:21

## 2020-10-22 RX ADMIN — CLONIDINE HYDROCHLORIDE 0.1 MG: 0.1 TABLET ORAL at 20:13

## 2020-10-22 RX ADMIN — FAMOTIDINE 20 MG: 20 TABLET ORAL at 20:13

## 2020-10-22 RX ADMIN — POLYVINYL ALCOHOL 1 DROP: 14 SOLUTION/ DROPS OPHTHALMIC at 08:00

## 2020-10-22 RX ADMIN — OXYCODONE HYDROCHLORIDE 5 MG: 5 SOLUTION ORAL at 00:09

## 2020-10-22 RX ADMIN — HEPARIN SODIUM 5000 UNITS: 10000 INJECTION INTRAVENOUS; SUBCUTANEOUS at 21:30

## 2020-10-22 RX ADMIN — OXYCODONE HYDROCHLORIDE 5 MG: 5 SOLUTION ORAL at 16:41

## 2020-10-22 RX ADMIN — Medication 10 ML: at 20:13

## 2020-10-22 RX ADMIN — BUSPIRONE HYDROCHLORIDE 15 MG: 10 TABLET ORAL at 09:15

## 2020-10-22 RX ADMIN — MINERAL OIL AND PETROLATUM: 150; 830 OINTMENT OPHTHALMIC at 13:48

## 2020-10-22 RX ADMIN — ACETAMINOPHEN ORAL SOLUTION 650 MG: 650 SOLUTION ORAL at 21:30

## 2020-10-22 RX ADMIN — NYSTATIN 500000 UNITS: 100000 SUSPENSION ORAL at 08:57

## 2020-10-22 RX ADMIN — SODIUM CHLORIDE SOLN NEBU 3% 4 ML: 3 NEBU SOLN at 19:35

## 2020-10-22 RX ADMIN — HEPARIN SODIUM 5000 UNITS: 10000 INJECTION INTRAVENOUS; SUBCUTANEOUS at 06:01

## 2020-10-22 RX ADMIN — FAMOTIDINE 20 MG: 20 TABLET ORAL at 08:55

## 2020-10-22 RX ADMIN — SODIUM CHLORIDE SOLN NEBU 3% 4 ML: 3 NEBU SOLN at 14:54

## 2020-10-22 RX ADMIN — VANCOMYCIN HYDROCHLORIDE 2000 MG: 10 INJECTION, POWDER, LYOPHILIZED, FOR SOLUTION INTRAVENOUS at 06:07

## 2020-10-22 RX ADMIN — FENTANYL CITRATE 100 MCG: 50 INJECTION, SOLUTION INTRAMUSCULAR; INTRAVENOUS at 00:22

## 2020-10-22 RX ADMIN — FENTANYL CITRATE 100 MCG: 50 INJECTION, SOLUTION INTRAMUSCULAR; INTRAVENOUS at 04:28

## 2020-10-22 RX ADMIN — SENNOSIDES 5 ML: 8.8 LIQUID ORAL at 20:29

## 2020-10-22 RX ADMIN — SODIUM CHLORIDE SOLN NEBU 3% 4 ML: 3 NEBU SOLN at 08:46

## 2020-10-22 RX ADMIN — CLONIDINE HYDROCHLORIDE 0.1 MG: 0.1 TABLET ORAL at 08:55

## 2020-10-22 RX ADMIN — FENTANYL CITRATE 100 MCG: 50 INJECTION, SOLUTION INTRAMUSCULAR; INTRAVENOUS at 06:02

## 2020-10-22 RX ADMIN — BACITRACIN ZINC: 500 OINTMENT TOPICAL at 20:13

## 2020-10-22 RX ADMIN — POLYVINYL ALCOHOL 1 DROP: 14 SOLUTION/ DROPS OPHTHALMIC at 12:02

## 2020-10-22 RX ADMIN — MINERAL OIL AND PETROLATUM: 150; 830 OINTMENT OPHTHALMIC at 01:21

## 2020-10-22 RX ADMIN — FENTANYL CITRATE 100 MCG: 50 INJECTION, SOLUTION INTRAMUSCULAR; INTRAVENOUS at 23:24

## 2020-10-22 RX ADMIN — NYSTATIN 500000 UNITS: 100000 SUSPENSION ORAL at 16:46

## 2020-10-22 RX ADMIN — ACETAMINOPHEN ORAL SOLUTION 650 MG: 650 SOLUTION ORAL at 13:30

## 2020-10-22 RX ADMIN — MINERAL OIL AND PETROLATUM: 150; 830 OINTMENT OPHTHALMIC at 08:56

## 2020-10-22 RX ADMIN — OXYCODONE HYDROCHLORIDE 5 MG: 5 SOLUTION ORAL at 21:30

## 2020-10-22 RX ADMIN — FENTANYL CITRATE 100 MCG: 50 INJECTION, SOLUTION INTRAMUSCULAR; INTRAVENOUS at 09:37

## 2020-10-22 RX ADMIN — POLYVINYL ALCOHOL 1 DROP: 14 SOLUTION/ DROPS OPHTHALMIC at 20:12

## 2020-10-22 RX ADMIN — FENTANYL CITRATE 100 MCG: 50 INJECTION, SOLUTION INTRAMUSCULAR; INTRAVENOUS at 19:01

## 2020-10-22 RX ADMIN — NYSTATIN 500000 UNITS: 100000 SUSPENSION ORAL at 13:49

## 2020-10-22 RX ADMIN — NYSTATIN 500000 UNITS: 100000 SUSPENSION ORAL at 20:28

## 2020-10-22 RX ADMIN — POLYVINYL ALCOHOL 1 DROP: 14 SOLUTION/ DROPS OPHTHALMIC at 04:27

## 2020-10-22 ASSESSMENT — PAIN SCALES - GENERAL
PAINLEVEL_OUTOF10: 0
PAINLEVEL_OUTOF10: 0
PAINLEVEL_OUTOF10: 7
PAINLEVEL_OUTOF10: 4
PAINLEVEL_OUTOF10: 7
PAINLEVEL_OUTOF10: 1
PAINLEVEL_OUTOF10: 5
PAINLEVEL_OUTOF10: 5
PAINLEVEL_OUTOF10: 0
PAINLEVEL_OUTOF10: 5
PAINLEVEL_OUTOF10: 5
PAINLEVEL_OUTOF10: 4
PAINLEVEL_OUTOF10: 5
PAINLEVEL_OUTOF10: 4
PAINLEVEL_OUTOF10: 0
PAINLEVEL_OUTOF10: 7

## 2020-10-22 NOTE — CARE COORDINATION
10/22/2020 - Remains in SICU. Currently on 30% fio2 via trach mask. [employer] [facility] following - awaiting notification from Ralph Valentine at [address] [place] [address] if denial was overturned. Placed call to pt wife Noemi Del Angel to inquire if she was able to review the [facility] list for referrals. She chose #[facility] and #2 [facility]. Noemi Del Angel reports  goes to the [facility] and was wondering if the [facility] would want to have him transferred to a [facility] facility for rehab. Placed a call to [facility] Dumont and spoke with [facility]. He reports pt is highly service connected. Cannot tell if pt ihas travel benefits. He took CM information and [facility] will call back tomorrow. Referrals for [facility] and [facility] sent to [employer]. Will await their reponses. SW/CM will follow. The Plan for Transition of Care is related to the following treatment goals: discharge planning when medically stable    The Patient and/or patient representative Jacobo gudino was provided with a choice of provider and agrees   with the discharge plan. [x] Yes [] No    Freedom of choice list was provided with basic dialogue that supports the patient's individualized plan of care/goals, treatment preferences and shares the quality data associated with the providers.  [x] Yes [] No

## 2020-10-22 NOTE — PLAN OF CARE
Problem: Falls - Risk of:  Goal: Will remain free from falls  Description: Will remain free from falls  Outcome: Met This Shift  Goal: Absence of physical injury  Description: Absence of physical injury  Outcome: Met This Shift     Problem: Skin Integrity:  Goal: Will show no infection signs and symptoms  Description: Will show no infection signs and symptoms  Outcome: Met This Shift  Goal: Absence of new skin breakdown  Description: Absence of new skin breakdown  Outcome: Met This Shift     Problem: Pain:  Goal: Pain level will decrease  Description: Pain level will decrease  Outcome: Met This Shift  Goal: Control of acute pain  Description: Control of acute pain  Outcome: Met This Shift     Problem: Airway Clearance - Ineffective:  Goal: Ability to maintain a clear airway will improve  Description: Ability to maintain a clear airway will improve  Outcome: Met This Shift     Problem: Anxiety/Stress:  Goal: Level of anxiety will decrease  Description: Level of anxiety will decrease  Outcome: Met This Shift     Problem: Cardiac Output - Decreased:  Goal: Hemodynamic stability will improve  Description: Hemodynamic stability will improve  Outcome: Met This Shift     Problem: Fluid Volume - Imbalance:  Goal: Absence of imbalanced fluid volume signs and symptoms  Description: Absence of imbalanced fluid volume signs and symptoms  Outcome: Met This Shift     Problem: Gas Exchange - Impaired:  Goal: Levels of oxygenation will improve  Description: Levels of oxygenation will improve  Outcome: Met This Shift     Problem: Pain:  Goal: Pain level will decrease  Description: Pain level will decrease  Outcome: Met This Shift     Problem: Restraint Use - Nonviolent/Non-Self-Destructive Behavior:  Goal: Absence of restraint-related injury  Description: Absence of restraint-related injury  Outcome: Met This Shift     Problem: Restraint Use - Nonviolent/Non-Self-Destructive Behavior:  Goal: Absence of restraint indications  Description: Absence of restraint indications  Outcome: Not Met This Shift

## 2020-10-22 NOTE — FLOWSHEET NOTE
Patient  Moving arms and legs randomly and with force pulling on iv lines/peter . Using feet to tug peter and putting legs over rails.    4 point restraints  To continue for patient safety

## 2020-10-22 NOTE — PROGRESS NOTES
Neurosurg progress note  VITALS:  BP (!) 147/66   Pulse 56   Temp 97.2 °F (36.2 °C)   Resp 24   Ht 6' 2\" (1.88 m)   Wt 272 lb 0.8 oz (123.4 kg)   SpO2 95%   BMI 34.93 kg/m²   24HR INTAKE/OUTPUT:    Intake/Output Summary (Last 24 hours) at 10/22/2020 1016  Last data filed at 10/22/2020 1000  Gross per 24 hour   Intake 3482 ml   Output 4440 ml   Net -958 ml     Xr Pelvis (1-2 Views)    Result Date: 10/8/2020  EXAMINATION: ONE XRAY VIEW OF THE PELVIS 10/8/2020 9:52 pm COMPARISON: None. HISTORY: ORDERING SYSTEM PROVIDED HISTORY: trauma TECHNOLOGIST PROVIDED HISTORY: Reason for exam:->trauma What reading provider will be dictating this exam?->CRC FINDINGS: Left ischial tuberosity is not included on this examination. Entire left hip is not included on this examination. No fracture or dislocation involving pelvis or visualized hips. No diastasis involving sacroiliac joints or symphysis pubis. No fracture or dislocation involving visualized pelvis or hips. Xr Elbow Right (min 3 Views)    Result Date: 10/9/2020  EXAMINATION: THREE XRAY VIEWS OF THE RIGHT ELBOW 10/9/2020 7:00 am COMPARISON: None. HISTORY: ORDERING SYSTEM PROVIDED HISTORY: trauma, Skull fx TECHNOLOGIST PROVIDED HISTORY: Reason for exam:->trauma, Skull fx What reading provider will be dictating this exam?->CRC FINDINGS: There is no fracture dislocation. There is no elbow joint effusion. There are tiny degenerative spurs. No acute process     Xr Hand Left (min 3 Views)    Result Date: 10/9/2020  EXAMINATION: THREE XRAY VIEWS OF THE LEFT HAND; THREE XRAY VIEWS OF THE RIGHT HAND 10/9/2020 7:01 am COMPARISON: None. HISTORY: ORDERING SYSTEM PROVIDED HISTORY: trauma TECHNOLOGIST PROVIDED HISTORY: Reason for exam:->trauma What reading provider will be dictating this exam?->CRC FINDINGS: Positioning of both hands does somewhat limit evaluation. There are no definite fractures or dislocations.   Joint spaces are normal.     No acute process Xr Hand Right (min 3 Views)    Result Date: 10/9/2020  EXAMINATION: THREE XRAY VIEWS OF THE LEFT HAND; THREE XRAY VIEWS OF THE RIGHT HAND 10/9/2020 7:01 am COMPARISON: None. HISTORY: ORDERING SYSTEM PROVIDED HISTORY: trauma TECHNOLOGIST PROVIDED HISTORY: Reason for exam:->trauma What reading provider will be dictating this exam?->CRC FINDINGS: Positioning of both hands does somewhat limit evaluation. There are no definite fractures or dislocations. Joint spaces are normal.     No acute process     Xr Knee Left (3 Views)    Result Date: 10/9/2020  EXAMINATION: THREE XRAY VIEWS OF THE LEFT KNEE 10/9/2020 7:03 am COMPARISON: None. HISTORY: ORDERING SYSTEM PROVIDED HISTORY: trauma TECHNOLOGIST PROVIDED HISTORY: Reason for exam:->trauma What reading provider will be dictating this exam?->CRC FINDINGS: There is no fracture dislocation. There is no joint effusion or degenerative change. No acute process     Xr Knee Right (3 Views)    Result Date: 10/9/2020  EXAMINATION: THREE XRAY VIEWS OF THE RIGHT KNEE 10/9/2020 8:06 am COMPARISON: None. HISTORY: ORDERING SYSTEM PROVIDED HISTORY: trauma TECHNOLOGIST PROVIDED HISTORY: Reason for exam:->trauma What reading provider will be dictating this exam?->CRC FINDINGS: There is no fracture dislocation. There is no joint space narrowing or effusion. No acute process     Ct Head Wo Contrast    Result Date: 10/9/2020  EXAMINATION: CT OF THE HEAD WITHOUT CONTRAST  10/9/2020 4:07 am TECHNIQUE: CT of the head was performed without the administration of intravenous contrast. Dose modulation, iterative reconstruction, and/or weight based adjustment of the mA/kV was utilized to reduce the radiation dose to as low as reasonably achievable. COMPARISON: CT head 10/08/2020 HISTORY: ORDERING SYSTEM PROVIDED HISTORY: evaluate head bleed TECHNOLOGIST PROVIDED HISTORY: Has a \"code stroke\" or \"stroke alert\" been called? ->No Reason for exam:->evaluate head bleed What reading provider will be dictating this exam?->CRC FINDINGS: BRAIN/VENTRICLES: Interval increase in size of right frontal parenchymal contusion, measuring 14 x 7 x 10 mm, previously 10 x 5 x 5 mm. Additional right frontal contusion, more inferiorly has also increased in size. Punctate contusions in the anterior-inferior frontal lobe along the gyrus rectus have also increased. Scattered bilateral hemispheric subarachnoid hemorrhage. Right convexity subdural hematoma measures up to 6 mm in thickness, not substantially changed. Trace left parietal subdural hematoma measuring 2 mm in thickness. 5 mm leftward midline shift, not substantially changed. No herniation. Patent basal cisterns. ORBITS: The visualized portion of the orbits demonstrate no acute abnormality. SINUSES: Nasopharyngeal opacities with partially imaged esophagogastric and endotracheal tubes. Scattered paranasal sinus opacities. SOFT TISSUES/SKULL:  Nondisplaced left temporal bone fracture extending to the otic capsule. Comminuted mildly displaced left parietal fracture. Nondisplaced right temporal bone fracture which is otic capsule sparing. Diffuse scalp swelling with posterior scalp contusions. Skin staples present. Mild interval increase in size of scattered parenchymal hematomas, mainly in the right frontal lobe, suspicious for diffuse axonal injury. No substantial change in acute right convexity subdural and left parietal subdural hematomas. Stable 5 mm leftward midline shift. Unchanged calvarial fractures, notable for a nondisplaced left temporal bone fracture possibly extending to the otic capsule. Recommend follow-up temporal bone CT.      Ct Head Wo Contrast    Result Date: 10/9/2020  EXAMINATION: CT OF THE HEAD WITHOUT CONTRAST  10/9/2020 12:11 pm TECHNIQUE: CT of the head was performed without the administration of intravenous contrast. Dose modulation, iterative reconstruction, and/or weight based adjustment of the mA/kV was utilized to reduce the radiation dose to as low as reasonably achievable. COMPARISON: 8 hours prior. HISTORY: ORDERING SYSTEM PROVIDED HISTORY: s/p ventric TECHNOLOGIST PROVIDED HISTORY: Reason for exam:->s/p ventric Has a \"code stroke\" or \"stroke alert\" been called? ->No What reading provider will be dictating this exam?->CRC FINDINGS: There has been interval placement of a right frontal approach ventriculostomy catheter terminating within the right lateral ventricle frontal horn abutting the septum pellucidum. There is split like configuration of the right lateral ventricle that may reflect over shunting. There is no temporal horn dilatation. There is diffuse cerebral edema with diffuse sulcal effacement. There is similar appearance of multiple foci of hemorrhagic contusions involving the right frontal lobe and to lesser extent left frontal lobe and right temporal lobe. The hemorrhagic contusions demonstrates mild surrounding edema. There is similar appearance of acute subarachnoid hemorrhage along the bilateral frontal convexities and right temporal convexity and to a lesser extent at the vertices. There is small volume of subdural hemorrhage along the right lateral tentorial leaflet. There is small volume subarachnoid hemorrhage within the interpeduncular cistern. There is new wedge-shaped region of low attenuation involving the left middle cerebellar peduncle and left cerebellar hemisphere, concerning for acute ischemia. There is no significant midline shift. There are bilateral parietal fractures, comminuted on the left, extending into the left temporal bone including the mastoid segment. Please refer to concurrently performed CT temporal bone imaging report. There is additional depressed fracture of the left superior orbital wall. There is diffuse subgaleal soft tissue swelling. Interval placement of right ventriculostomy catheter terminating within the right lateral ventricle frontal horn.   Interval development of slit-like appearance of the right lateral ventricle. Suspect acute ischemia involving the left middle cerebellar peduncle and left cerebellar hemisphere. Similar appearance of intracranial hemorrhage in hemorrhagic contusions as above. Findings discussed with Dr. Lul Rhoades at 12:53 p.m. on December 9, 2020. Ct Head Wo Contrast    Result Date: 10/9/2020  EXAMINATION: CT OF THE HEAD and cervical spine WITHOUT CONTRAST; CT OF THE FACE WITHOUT CONTRAST  10/8/2020 9:56 pm TECHNIQUE: CT of the head was performed without the administration of intravenous contrast. Dose modulation, iterative reconstruction, and/or weight based adjustment of the mA/kV was utilized to reduce the radiation dose to as low as reasonably achievable.; CT of the face was performed without the administration of intravenous contrast. Multiplanar reformatted images are provided for review. Dose modulation, iterative reconstruction, and/or weight based adjustment of the mA/kV was utilized to reduce the radiation dose to as low as reasonably achievable.; CT of the cervical spine was performed without the administration of intravenous contrast. Multiplanar reformatted images are provided for review. Dose modulation, iterative reconstruction, and/or weight based adjustment of the mA/kV was utilized to reduce the radiation dose to as low as reasonably achievable. COMPARISON: None. HISTORY: ORDERING SYSTEM PROVIDED HISTORY: trauma TECHNOLOGIST PROVIDED HISTORY: Reason for exam:->trauma Has a \"code stroke\" or \"stroke alert\" been called? ->No What reading provider will be dictating this exam?->CRC FINDINGS: Head: There is mild right frontoparietal holohemispheric subdural hemorrhage, measuring up to 6 mm. Mild mass effect and minimal midline shift of approximately 4 mm. Visualized skull demonstrates multiple mildly displaced fractures in the skull, involving left temporal bone, right temporal bone, bilateral parietal bones.   Left temporoparietal bone skull fractures appear to be comminuted in multiple locations. There also small hemorrhagic contusions in bilateral frontal lobes. Small amount of subarachnoid hemorrhage in the right frontal lobe. Mild fluid layering in the sphenoid sinus. Fractures involving the left orbit as well. The mastoid air cells are clear. However, left temporal bone fracture does extend through the region of the left external auditory canal and extends to the middle ear. Cervical spine: Vertebral body heights are intact. Alignment is intact. Facets are normally aligned. The odontoid process is intact. No significant prevertebral soft tissue swelling. Facial bones: Mandible is intact. The zygomatic arches are intact. Nasal bones are intact. Nasal bony septum is midline. Sphenoid temporal buttress is intact. Mildly displaced fracture of the roof of the left orbit. The orbital floor is intact. Globes are intact and not proptotic. No retro bulbar soft tissue hematoma. Mild left periorbital preseptal soft tissue swelling. Bilateral comminuted skull bone fractures involving bilateral temporal bones and parietal bones. Fractures are worse on the left side. Mild right holohemispheric subdural hematoma with mild midline shift. Bilateral frontal small hemorrhagic contusions. Left orbital roof mildly displaced fracture. No evidence for cervical fracture or subluxation. Critical findings reported to the ER physician at time of dictation. Ct Iac Posterior Fossa Wo Contrast    Result Date: 10/10/2020  EXAMINATION: CT OF THE INTERNAL AUDITORY CANAL WITHOUT CONTRAST 10/9/2020 12:11 pm TECHNIQUE: CT of the internal auditory canal was performed without contrast was performed without the administration of intravenous contrast. Multiplanar reformatted images are provided for review.  Dose modulation, iterative reconstruction, and/or weight based adjustment of the mA/kV was utilized to reduce the radiation dose to as low as reasonably achievable. COMPARISON: None HISTORY: ORDERING SYSTEM PROVIDED HISTORY: TRAUMA TECHNOLOGIST PROVIDED HISTORY: Reason for exam:->trauma What reading provider will be dictating this exam?->CRC FINDINGS: RIGHT TEMPORAL BONE:  The right external auditory canal is normal in appearance. There is no right temporal bone fracture identified. There is a small volume of fluid within the right mastoid air cells. The right middle ear is clear. The ossicles are normally aligned. The tegmen tympani is intact. The scutum is intact. There is no osseous dehiscence. The cochlea, vestibule and semicircular canals are normal in appearance. LEFT TEMPORAL BONE: There is a longitudinal fracture of the mastoid segment of the left temporal bone. There is incudomalleolar subluxation involving the head of the malleus and body of the incus. The fracture does not extend into the otic capsule. Hemorrhage is present within the left middle ear and mastoid air cells. A comminuted fracture of the squamous portion of the left temporal bone is noted. The cochlea, vestibule and semicircular canals are normal.     Longitudinal fracture of the mastoid portion of the left temporal bone with associated incudomalleolar subluxation involving the head of the malleus and body of the incus. The fracture does not extend into the otic capsule. Small volume of fluid within the right mastoid air cells but no acute traumatic injury of the mastoid portion of the right temporal bone evident.      Ct Facial Bones Wo Contrast    Result Date: 10/9/2020  EXAMINATION: CT OF THE HEAD and cervical spine WITHOUT CONTRAST; CT OF THE FACE WITHOUT CONTRAST  10/8/2020 9:56 pm TECHNIQUE: CT of the head was performed without the administration of intravenous contrast. Dose modulation, iterative reconstruction, and/or weight based adjustment of the mA/kV was utilized to reduce the radiation dose to as low as reasonably achievable.; CT of the face was performed midline. Sphenoid temporal buttress is intact. Mildly displaced fracture of the roof of the left orbit. The orbital floor is intact. Globes are intact and not proptotic. No retro bulbar soft tissue hematoma. Mild left periorbital preseptal soft tissue swelling. Bilateral comminuted skull bone fractures involving bilateral temporal bones and parietal bones. Fractures are worse on the left side. Mild right holohemispheric subdural hematoma with mild midline shift. Bilateral frontal small hemorrhagic contusions. Left orbital roof mildly displaced fracture. No evidence for cervical fracture or subluxation. Critical findings reported to the ER physician at time of dictation. Ct Chest W Contrast    Result Date: 10/9/2020  EXAMINATION: CT OF THE CHEST WITH CONTRAST 10/8/2020 10:56 pm TECHNIQUE: CT of the chest was performed with the administration of intravenous contrast. Multiplanar reformatted images are provided for review. Dose modulation, iterative reconstruction, and/or weight based adjustment of the mA/kV was utilized to reduce the radiation dose to as low as reasonably achievable. COMPARISON: None. HISTORY: ORDERING SYSTEM PROVIDED HISTORY: trauma TECHNOLOGIST PROVIDED HISTORY: Reason for exam:->trauma What reading provider will be dictating this exam?->CRC FINDINGS: An endotracheal and enteric tubes are noted in place and are appropriately positioned. Mediastinum: Normal heart size. The great vessels are within normal limits. No pericardial effusion. No significantly enlarged lymph nodes. Lungs/pleura: Mild dependent congestion involving the bilateral posterior lungs. No pulmonary mass or augustin consolidation. No pleural effusion. Upper Abdomen: Within normal limits. Soft Tissues/Bones: Nondisplaced acute fractures involving the lateral left 5th through 7th ribs. Nondisplaced acute fractures of the posterior left 4th through 7th ribs.   Acute mildly displaced fractures of the posterior left 8th rib.  Mildly displaced acute fracture of the left body of the scapula. Nondisplaced acute fractures involving the lateral left 5th through 7th ribs. Nondisplaced acute fractures of the posterior left 4th through 7th ribs. Acute mildly displaced fractures of the posterior left 8th rib. Mildly displaced acute fracture of the left body of the scapula. Ct Cervical Spine Wo Contrast    Result Date: 10/9/2020  EXAMINATION: CT OF THE HEAD and cervical spine WITHOUT CONTRAST; CT OF THE FACE WITHOUT CONTRAST  10/8/2020 9:56 pm TECHNIQUE: CT of the head was performed without the administration of intravenous contrast. Dose modulation, iterative reconstruction, and/or weight based adjustment of the mA/kV was utilized to reduce the radiation dose to as low as reasonably achievable.; CT of the face was performed without the administration of intravenous contrast. Multiplanar reformatted images are provided for review. Dose modulation, iterative reconstruction, and/or weight based adjustment of the mA/kV was utilized to reduce the radiation dose to as low as reasonably achievable.; CT of the cervical spine was performed without the administration of intravenous contrast. Multiplanar reformatted images are provided for review. Dose modulation, iterative reconstruction, and/or weight based adjustment of the mA/kV was utilized to reduce the radiation dose to as low as reasonably achievable. COMPARISON: None. HISTORY: ORDERING SYSTEM PROVIDED HISTORY: trauma TECHNOLOGIST PROVIDED HISTORY: Reason for exam:->trauma Has a \"code stroke\" or \"stroke alert\" been called? ->No What reading provider will be dictating this exam?->CRC FINDINGS: Head: There is mild right frontoparietal holohemispheric subdural hemorrhage, measuring up to 6 mm. Mild mass effect and minimal midline shift of approximately 4 mm.   Visualized skull demonstrates multiple mildly displaced fractures in the skull, involving left temporal bone, right temporal bone, reasonably achievable. COMPARISON: Same day lumbar spine CT; same day CT chest, abdomen and pelvis HISTORY: ORDERING SYSTEM PROVIDED HISTORY: trauma TECHNOLOGIST PROVIDED HISTORY: Reason for exam:->trauma What reading provider will be dictating this exam?->CRC FINDINGS: BONES/ALIGNMENT: There is normal alignment of the spine. The vertebral body heights are maintained. No osseous destructive lesion is seen. DEGENERATIVE CHANGES: Mild spondylosis without significant central canal narrowing. Mild right foraminal narrowing at T11-T12. SOFT TISSUES: No paraspinal mass is seen. No acute thoracic spine abnormality. Ct Lumbar Spine Wo Contrast    Result Date: 10/9/2020  EXAMINATION: CT OF THE LUMBAR SPINE WITHOUT CONTRAST  10/8/2020 TECHNIQUE: CT of the lumbar spine was performed without the administration of intravenous contrast. Multiplanar reformatted images are provided for review. Dose modulation, iterative reconstruction, and/or weight based adjustment of the mA/kV was utilized to reduce the radiation dose to as low as reasonably achievable. COMPARISON: None HISTORY: ORDERING SYSTEM PROVIDED HISTORY: trauma TECHNOLOGIST PROVIDED HISTORY: Reason for exam:->trauma What reading provider will be dictating this exam?->CRC FINDINGS: BONES/ALIGNMENT: Vertebral body heights are maintained. No compression deformity. L5 pars defects with grade 1 anterolisthesis of L5 on S1. DEGENERATIVE CHANGES: Moderate disc desiccation at L5-S1. No significant central canal stenosis. Moderate-to-severe foraminal stenosis at the listhesis level. SOFT TISSUES/RETROPERITONEUM: No paraspinal mass is seen. No acute lumbar spine abnormality. L5 pars defects with grade 1 anterolisthesis of L5 on S1 and moderate-to-severe foraminal stenosis.      Ct Abdomen Pelvis W Iv Contrast Additional Contrast? None    Result Date: 10/9/2020  EXAMINATION: CT OF THE ABDOMEN AND PELVIS WITH CONTRAST 10/8/2020 10:56 pm TECHNIQUE: CT of the abdomen and pelvis was performed with the administration of intravenous contrast. Multiplanar reformatted images are provided for review. Dose modulation, iterative reconstruction, and/or weight based adjustment of the mA/kV was utilized to reduce the radiation dose to as low as reasonably achievable. COMPARISON: None. HISTORY: ORDERING SYSTEM PROVIDED HISTORY: trauma TECHNOLOGIST PROVIDED HISTORY: Additional Contrast?->None Reason for exam:->trauma What reading provider will be dictating this exam?->CRC FINDINGS: Lower Chest: Described in detail on separate report of CT of the chest. Organs: The liver, spleen, pancreas, and bilateral adrenal glands are within normal limits. No radiopaque gallstones. No CT evidence of acute cholecystitis. No intra or extrahepatic ductal dilatation. The bilateral kidneys are within normal limits. No renal cysts or masses are noted. No hydronephrosis or hydroureter. GI/Bowel: The small and large bowel demonstrate normal caliber and appearance. A normal-appearing appendix is identified. Pelvis: A soft tissue structure is noted in the distal right inguinal canal which could represent a deeply retracted testicle versus an undescended testicle. Recommend clinical correlation. The urinary bladder is nearly decompressed with a Bentley catheter in place. Peritoneum/Retroperitoneum: No free fluid or free air. Bones/Soft Tissues: Multiple rib fractures, as described in report of CT of the chest.  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 A soft tissue structure is noted in the distal right inguinal canal presumably representing the right testicle and could represent a deeply retracted testicle versus an undescended testicle. Recommend clinical correlation.      Xr Chest Portable    Result Date: 10/9/2020  EXAMINATION: ONE XRAY VIEW OF THE CHEST 10/9/2020 6:59 am COMPARISON: 10/09/2020 HISTORY: ORDERING SYSTEM PROVIDED HISTORY: intubated TECHNOLOGIST PROVIDED HISTORY: Reason for exam:->intubated What reading provider will be dictating this exam?->CRC FINDINGS: Lines/tubes are appropriate. Heart size is normal.  There are no infiltrates or effusions. No acute process     Xr Chest Portable    Result Date: 10/9/2020  EXAMINATION: ONE XRAY VIEW OF THE CHEST 10/9/2020 12:42 am COMPARISON: 10/08/2020 at this 2248 hours. [facility] Billing HISTORY: ORDERING SYSTEM PROVIDED HISTORY: Line Placement TECHNOLOGIST PROVIDED HISTORY: Reason for exam:->Line Placement What reading provider will be dictating this exam?->CRC FINDINGS: Heart is not enlarged. Endotracheal tube and enteric tube are in place. Left IJ CVC is in place with tip in the proximal SVC. No pneumothorax. No pleural effusions. No pulmonary edema or pneumothorax. Endotracheal tube, enteric tube and left IJ CVC is in place with no pneumothorax. Xr Chest 1 View    Result Date: 10/8/2020  EXAMINATION: ONE XRAY VIEW OF THE CHEST 10/8/2020 9:52 pm COMPARISON: None. HISTORY: ORDERING SYSTEM PROVIDED HISTORY: trauma TECHNOLOGIST PROVIDED HISTORY: Reason for exam:->trauma What reading provider will be dictating this exam?->CRC FINDINGS: Endotracheal tube is present with distal tip approximately 6 cm above the andriy. Nasogastric tube courses below the level of the diaphragm with distal tip not included on this examination. No focal airspace opacity or pleural effusion. The heart is prominent related to portable technique. No pneumothorax. 1.  No acute process in the chest. 2.  Endotracheal tube is 6 cm above the andriy. 3.  Nasogastric tube courses below the level of the diaphragm. Cta Neck W Contrast    Result Date: 10/9/2020  EXAMINATION: CTA OF THE HEAD WITH CONTRAST; CTA OF THE NECK 10/9/2020 3:17 pm: TECHNIQUE: CTA of the head/brain was performed with the administration of intravenous contrast. Multiplanar reformatted images are provided for review. MIP images are provided for review.  Dose modulation, iterative reconstruction, and/or weight based adjustment of the mA/kV was utilized to reduce the radiation dose to as low as reasonably achievable.; CTA of the neck was performed with the administration of intravenous contrast. Multiplanar reformatted images are provided for review. MIP images are provided for review. Stenosis of the internal carotid arteries measured using NASCET criteria. Dose modulation, iterative reconstruction, and/or weight based adjustment of the mA/kV was utilized to reduce the radiation dose to as low as reasonably achievable. COMPARISON: CT head from 12/30 5 p.m. HISTORY: ORDERING SYSTEM PROVIDED HISTORY: ischemic TECHNOLOGIST PROVIDED HISTORY: Reason for exam:->ischemic Has a \"code stroke\" or \"stroke alert\" been called? ->No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

## 2020-10-22 NOTE — PROGRESS NOTES
Pharmacy Consultation Note  (Antibiotic Dosing and Monitoring)    Initial consult date: 10/15/2020  Consulting physician: Dr. Alon Cruz   Drug(s): Vancomycin   Indication: Empiric antibiotics for HAP    Vancomycin therapy completed. Will sign off.     Frida West PharmD, BCPS, BCCCP  10/22/2020  7:55 AM  Pager: [phone]

## 2020-10-22 NOTE — PROGRESS NOTES
[facility]  SURGICAL INTENSIVE CARE UNIT (SICU)  ATTENDING PHYSICIAN CRITICAL CARE PROGRESS NOTE     I have examined the patient, reviewed the record, and discussed the case with the APN/ resident. Please refer to the APN/ resident's note. I agree with the assessment and plan. I have reviewed all relevant labs and imaging data. The following summarizes my clinical findings and independent assessment. CC:  Critical care management after [place] Course/Overnight Events:  10/8 intubated in trauma bay  10/9 ventriculostomy placement  10/10: Arterial line placed today for hemodynamic monitoring. Goals of sodium 150 with ICP <20 CPP~60. Otherwise continued on hypertonic saline and keppra with serial neuro checks  10/11: Arterial line had to be replaced, goal CVP around or greater than 60, meeting goal, ICPs less than 20, still on 3%, sodiums around 145, remains sedated in intubated; will start scheduled oxycodone in order to wean fluid to minimize cerebral edema  10/12: Patient had L sided motor deficits this AM and stat CT was ordered. No new findings. Remains sedated and intubated.  On scheduled rajinder weaning fluid to minizmize cerebral edema.   10/13:  Propranolol started; 3% stopped  10/14:  Lasix  10/15:  Trach/PEG; Abx, zoll, dc propranolol, panculture  10/16:  buspar added for shivering  10/17--nothing new overnight  10/18--ventric removed yesterday; diuresed yesterday; Glendia Soto still in place  10/19--good response to diuresis; Zoll placed on standby this AM--will assess temp  10/20--no issues overnight; Zoll re-started yesterday due to increase in pt temp  10/21--Zoll out this AM  10/22--casandra trach mask    trached  Eyes open  Intermittently follows commands  Pupils: 4 mm reactive bilaterally  Heart: Regular  Lungs: Fairly clear bilaterally  Abdomen: Soft; bowel sounds active; nondistended  Skin: Warm/dry  Extremities: Distal pulses readily detectable    Patient Active Problem List

## 2020-10-22 NOTE — PLAN OF CARE
Problem: Falls - Risk of:  Goal: Will remain free from falls  Description: Will remain free from falls  10/21/2020 2208 by Shayan French RN  Outcome: Met This Shift  10/21/2020 1736 by Peterson Jean RN  Outcome: Met This Shift  Goal: Absence of physical injury  Description: Absence of physical injury  10/21/2020 2208 by Shayan French RN  Outcome: Met This Shift  10/21/2020 1736 by Peterson eJan RN  Outcome: Met This Shift
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
## 2020-10-23 ENCOUNTER — APPOINTMENT (OUTPATIENT)
Dept: GENERAL RADIOLOGY | Age: 30
DRG: 003 | End: 2020-10-23
Payer: COMMERCIAL

## 2020-10-23 LAB
ANION GAP SERPL CALCULATED.3IONS-SCNC: 10 MMOL/L (ref 7–16)
BASOPHILS ABSOLUTE: 0.13 E9/L (ref 0–0.2)
BASOPHILS RELATIVE PERCENT: 1.3 % (ref 0–2)
BUN BLDV-MCNC: 20 MG/DL (ref 6–20)
CALCIUM IONIZED: 1.28 MMOL/L (ref 1.15–1.33)
CALCIUM SERPL-MCNC: 8.8 MG/DL (ref 8.6–10.2)
CHLORIDE BLD-SCNC: 102 MMOL/L (ref 98–107)
CO2: 27 MMOL/L (ref 22–29)
CREAT SERPL-MCNC: 0.7 MG/DL (ref 0.7–1.2)
EOSINOPHILS ABSOLUTE: 0.71 E9/L (ref 0.05–0.5)
EOSINOPHILS RELATIVE PERCENT: 7 % (ref 0–6)
GFR AFRICAN AMERICAN: >60
GFR NON-AFRICAN AMERICAN: >60 ML/MIN/1.73
GLUCOSE BLD-MCNC: 108 MG/DL (ref 74–99)
HCT VFR BLD CALC: 35.2 % (ref 37–54)
HEMOGLOBIN: 11.5 G/DL (ref 12.5–16.5)
IMMATURE GRANULOCYTES #: 0.27 E9/L
IMMATURE GRANULOCYTES %: 2.7 % (ref 0–5)
LYMPHOCYTES ABSOLUTE: 1.71 E9/L (ref 1.5–4)
LYMPHOCYTES RELATIVE PERCENT: 16.9 % (ref 20–42)
MAGNESIUM: 2.5 MG/DL (ref 1.6–2.6)
MCH RBC QN AUTO: 30.3 PG (ref 26–35)
MCHC RBC AUTO-ENTMCNC: 32.7 % (ref 32–34.5)
MCV RBC AUTO: 92.9 FL (ref 80–99.9)
MONOCYTES ABSOLUTE: 0.85 E9/L (ref 0.1–0.95)
MONOCYTES RELATIVE PERCENT: 8.4 % (ref 2–12)
NEUTROPHILS ABSOLUTE: 6.45 E9/L (ref 1.8–7.3)
NEUTROPHILS RELATIVE PERCENT: 63.7 % (ref 43–80)
PDW BLD-RTO: 13.2 FL (ref 11.5–15)
PHOSPHORUS: 3.6 MG/DL (ref 2.5–4.5)
PLATELET # BLD: 411 E9/L (ref 130–450)
PMV BLD AUTO: 9.4 FL (ref 7–12)
POTASSIUM SERPL-SCNC: 3.5 MMOL/L (ref 3.5–5)
RBC # BLD: 3.79 E12/L (ref 3.8–5.8)
SARS-COV-2, NAAT: NOT DETECTED
SODIUM BLD-SCNC: 139 MMOL/L (ref 132–146)
WBC # BLD: 10.1 E9/L (ref 4.5–11.5)

## 2020-10-23 PROCEDURE — 6370000000 HC RX 637 (ALT 250 FOR IP): Performed by: STUDENT IN AN ORGANIZED HEALTH CARE EDUCATION/TRAINING PROGRAM

## 2020-10-23 PROCEDURE — 6370000000 HC RX 637 (ALT 250 FOR IP): Performed by: SURGERY

## 2020-10-23 PROCEDURE — 6360000002 HC RX W HCPCS: Performed by: STUDENT IN AN ORGANIZED HEALTH CARE EDUCATION/TRAINING PROGRAM

## 2020-10-23 PROCEDURE — 80048 BASIC METABOLIC PNL TOTAL CA: CPT

## 2020-10-23 PROCEDURE — 71045 X-RAY EXAM CHEST 1 VIEW: CPT

## 2020-10-23 PROCEDURE — 84100 ASSAY OF PHOSPHORUS: CPT

## 2020-10-23 PROCEDURE — 99291 CRITICAL CARE FIRST HOUR: CPT | Performed by: SURGERY

## 2020-10-23 PROCEDURE — 2000000000 HC ICU R&B

## 2020-10-23 PROCEDURE — 83735 ASSAY OF MAGNESIUM: CPT

## 2020-10-23 PROCEDURE — 2580000003 HC RX 258: Performed by: SURGERY

## 2020-10-23 PROCEDURE — 2580000003 HC RX 258: Performed by: STUDENT IN AN ORGANIZED HEALTH CARE EDUCATION/TRAINING PROGRAM

## 2020-10-23 PROCEDURE — U0002 COVID-19 LAB TEST NON-CDC: HCPCS

## 2020-10-23 PROCEDURE — 2700000000 HC OXYGEN THERAPY PER DAY

## 2020-10-23 PROCEDURE — 85025 COMPLETE CBC W/AUTO DIFF WBC: CPT

## 2020-10-23 PROCEDURE — 94640 AIRWAY INHALATION TREATMENT: CPT

## 2020-10-23 PROCEDURE — 82330 ASSAY OF CALCIUM: CPT

## 2020-10-23 RX ORDER — CLONIDINE HYDROCHLORIDE 0.2 MG/1
0.2 TABLET ORAL 3 TIMES DAILY
Status: DISCONTINUED | OUTPATIENT
Start: 2020-10-23 | End: 2020-10-24

## 2020-10-23 RX ORDER — OXYCODONE HCL 5 MG/5 ML
5 SOLUTION, ORAL ORAL EVERY 4 HOURS PRN
Status: DISCONTINUED | OUTPATIENT
Start: 2020-10-23 | End: 2020-10-23

## 2020-10-23 RX ORDER — OXYCODONE HCL 5 MG/5 ML
5 SOLUTION, ORAL ORAL EVERY 4 HOURS PRN
Status: DISCONTINUED | OUTPATIENT
Start: 2020-10-23 | End: 2020-10-24

## 2020-10-23 RX ORDER — LABETALOL HYDROCHLORIDE 5 MG/ML
10 INJECTION, SOLUTION INTRAVENOUS EVERY 30 MIN PRN
Status: DISCONTINUED | OUTPATIENT
Start: 2020-10-23 | End: 2020-11-01

## 2020-10-23 RX ORDER — HYDRALAZINE HYDROCHLORIDE 20 MG/ML
10 INJECTION INTRAMUSCULAR; INTRAVENOUS EVERY 30 MIN PRN
Status: DISCONTINUED | OUTPATIENT
Start: 2020-10-23 | End: 2020-11-01

## 2020-10-23 RX ORDER — OXYCODONE HCL 5 MG/5 ML
10 SOLUTION, ORAL ORAL EVERY 4 HOURS PRN
Status: DISCONTINUED | OUTPATIENT
Start: 2020-10-23 | End: 2020-10-24

## 2020-10-23 RX ADMIN — ACETAMINOPHEN ORAL SOLUTION 650 MG: 650 SOLUTION ORAL at 14:49

## 2020-10-23 RX ADMIN — CLONIDINE HYDROCHLORIDE 0.2 MG: 0.2 TABLET ORAL at 08:00

## 2020-10-23 RX ADMIN — POLYVINYL ALCOHOL 1 DROP: 14 SOLUTION/ DROPS OPHTHALMIC at 07:54

## 2020-10-23 RX ADMIN — SODIUM CHLORIDE SOLN NEBU 3% 4 ML: 3 NEBU SOLN at 08:39

## 2020-10-23 RX ADMIN — MINERAL OIL AND PETROLATUM: 150; 830 OINTMENT OPHTHALMIC at 10:26

## 2020-10-23 RX ADMIN — SODIUM CHLORIDE SOLN NEBU 3% 4 ML: 3 NEBU SOLN at 01:30

## 2020-10-23 RX ADMIN — ACETAMINOPHEN ORAL SOLUTION 650 MG: 650 SOLUTION ORAL at 22:06

## 2020-10-23 RX ADMIN — POLYVINYL ALCOHOL 1 DROP: 14 SOLUTION/ DROPS OPHTHALMIC at 23:38

## 2020-10-23 RX ADMIN — BACITRACIN ZINC: 500 OINTMENT TOPICAL at 19:51

## 2020-10-23 RX ADMIN — ACETAMINOPHEN ORAL SOLUTION 650 MG: 650 SOLUTION ORAL at 18:35

## 2020-10-23 RX ADMIN — POLYVINYL ALCOHOL 1 DROP: 14 SOLUTION/ DROPS OPHTHALMIC at 19:47

## 2020-10-23 RX ADMIN — CLONIDINE HYDROCHLORIDE 0.2 MG: 0.2 TABLET ORAL at 14:48

## 2020-10-23 RX ADMIN — BUSPIRONE HYDROCHLORIDE 10 MG: 10 TABLET ORAL at 19:44

## 2020-10-23 RX ADMIN — BUSPIRONE HYDROCHLORIDE 10 MG: 10 TABLET ORAL at 08:00

## 2020-10-23 RX ADMIN — MINERAL OIL AND PETROLATUM: 150; 830 OINTMENT OPHTHALMIC at 17:33

## 2020-10-23 RX ADMIN — POTASSIUM BICARBONATE 40 MEQ: 782 TABLET, EFFERVESCENT ORAL at 06:33

## 2020-10-23 RX ADMIN — CHLORHEXIDINE GLUCONATE 0.12% ORAL RINSE 15 ML: 1.2 LIQUID ORAL at 08:08

## 2020-10-23 RX ADMIN — POLYVINYL ALCOHOL 1 DROP: 14 SOLUTION/ DROPS OPHTHALMIC at 17:03

## 2020-10-23 RX ADMIN — CLONIDINE HYDROCHLORIDE 0.2 MG: 0.2 TABLET ORAL at 19:43

## 2020-10-23 RX ADMIN — MINERAL OIL AND PETROLATUM: 150; 830 OINTMENT OPHTHALMIC at 22:06

## 2020-10-23 RX ADMIN — FENTANYL CITRATE 100 MCG: 50 INJECTION, SOLUTION INTRAMUSCULAR; INTRAVENOUS at 04:05

## 2020-10-23 RX ADMIN — HEPARIN SODIUM 5000 UNITS: 10000 INJECTION INTRAVENOUS; SUBCUTANEOUS at 06:30

## 2020-10-23 RX ADMIN — OXYCODONE HYDROCHLORIDE 10 MG: 5 SOLUTION ORAL at 16:01

## 2020-10-23 RX ADMIN — OXYCODONE HYDROCHLORIDE 10 MG: 5 SOLUTION ORAL at 07:49

## 2020-10-23 RX ADMIN — OXYCODONE HYDROCHLORIDE 5 MG: 5 SOLUTION ORAL at 02:13

## 2020-10-23 RX ADMIN — NYSTATIN 500000 UNITS: 100000 SUSPENSION ORAL at 19:43

## 2020-10-23 RX ADMIN — NYSTATIN 500000 UNITS: 100000 SUSPENSION ORAL at 08:07

## 2020-10-23 RX ADMIN — Medication 10 ML: at 19:47

## 2020-10-23 RX ADMIN — BACITRACIN ZINC, POLYMYXIN B SULFATE, NEOMYCIN SULFATE: 400; 5000; 3.5 OINTMENT TOPICAL at 19:43

## 2020-10-23 RX ADMIN — FENTANYL CITRATE 100 MCG: 50 INJECTION, SOLUTION INTRAMUSCULAR; INTRAVENOUS at 00:41

## 2020-10-23 RX ADMIN — Medication 10 ML: at 08:09

## 2020-10-23 RX ADMIN — OXYCODONE HYDROCHLORIDE 10 MG: 5 SOLUTION ORAL at 20:01

## 2020-10-23 RX ADMIN — FENTANYL CITRATE 100 MCG: 50 INJECTION, SOLUTION INTRAMUSCULAR; INTRAVENOUS at 05:30

## 2020-10-23 RX ADMIN — BACITRACIN ZINC: 500 OINTMENT TOPICAL at 08:08

## 2020-10-23 RX ADMIN — BACITRACIN ZINC, POLYMYXIN B SULFATE, NEOMYCIN SULFATE: 400; 5000; 3.5 OINTMENT TOPICAL at 08:08

## 2020-10-23 RX ADMIN — SODIUM CHLORIDE 25 ML: 9 INJECTION, SOLUTION INTRAVENOUS at 05:32

## 2020-10-23 RX ADMIN — NYSTATIN 500000 UNITS: 100000 SUSPENSION ORAL at 17:33

## 2020-10-23 RX ADMIN — FAMOTIDINE 20 MG: 20 TABLET ORAL at 08:00

## 2020-10-23 RX ADMIN — BACITRACIN ZINC: 500 OINTMENT TOPICAL at 14:30

## 2020-10-23 RX ADMIN — POLYVINYL ALCOHOL 1 DROP: 14 SOLUTION/ DROPS OPHTHALMIC at 04:03

## 2020-10-23 RX ADMIN — FENTANYL CITRATE 100 MCG: 50 INJECTION, SOLUTION INTRAMUSCULAR; INTRAVENOUS at 02:43

## 2020-10-23 RX ADMIN — ACETAMINOPHEN ORAL SOLUTION 650 MG: 650 SOLUTION ORAL at 10:26

## 2020-10-23 RX ADMIN — ACETAMINOPHEN ORAL SOLUTION 650 MG: 650 SOLUTION ORAL at 02:12

## 2020-10-23 RX ADMIN — NYSTATIN 500000 UNITS: 100000 SUSPENSION ORAL at 12:56

## 2020-10-23 RX ADMIN — SODIUM CHLORIDE SOLN NEBU 3% 4 ML: 3 NEBU SOLN at 17:11

## 2020-10-23 RX ADMIN — CHLORHEXIDINE GLUCONATE 0.12% ORAL RINSE 15 ML: 1.2 LIQUID ORAL at 19:47

## 2020-10-23 RX ADMIN — FAMOTIDINE 20 MG: 20 TABLET ORAL at 19:43

## 2020-10-23 RX ADMIN — MINERAL OIL AND PETROLATUM: 150; 830 OINTMENT OPHTHALMIC at 14:49

## 2020-10-23 RX ADMIN — OXYCODONE HYDROCHLORIDE 10 MG: 5 SOLUTION ORAL at 11:56

## 2020-10-23 RX ADMIN — SENNOSIDES 5 ML: 8.8 LIQUID ORAL at 19:42

## 2020-10-23 RX ADMIN — ACETAMINOPHEN ORAL SOLUTION 650 MG: 650 SOLUTION ORAL at 06:32

## 2020-10-23 RX ADMIN — SODIUM CHLORIDE SOLN NEBU 3% 4 ML: 3 NEBU SOLN at 21:55

## 2020-10-23 RX ADMIN — HEPARIN SODIUM 5000 UNITS: 10000 INJECTION INTRAVENOUS; SUBCUTANEOUS at 14:48

## 2020-10-23 RX ADMIN — POLYVINYL ALCOHOL 1 DROP: 14 SOLUTION/ DROPS OPHTHALMIC at 12:11

## 2020-10-23 RX ADMIN — MINERAL OIL AND PETROLATUM: 150; 830 OINTMENT OPHTHALMIC at 06:32

## 2020-10-23 RX ADMIN — MINERAL OIL AND PETROLATUM: 150; 830 OINTMENT OPHTHALMIC at 02:12

## 2020-10-23 RX ADMIN — HEPARIN SODIUM 5000 UNITS: 10000 INJECTION INTRAVENOUS; SUBCUTANEOUS at 22:06

## 2020-10-23 ASSESSMENT — PAIN SCALES - GENERAL
PAINLEVEL_OUTOF10: 1
PAINLEVEL_OUTOF10: 6
PAINLEVEL_OUTOF10: 4
PAINLEVEL_OUTOF10: 5
PAINLEVEL_OUTOF10: 0
PAINLEVEL_OUTOF10: 4
PAINLEVEL_OUTOF10: 4
PAINLEVEL_OUTOF10: 6
PAINLEVEL_OUTOF10: 4
PAINLEVEL_OUTOF10: 5
PAINLEVEL_OUTOF10: 0
PAINLEVEL_OUTOF10: 0
PAINLEVEL_OUTOF10: 4
PAINLEVEL_OUTOF10: 0
PAINLEVEL_OUTOF10: 0
PAINLEVEL_OUTOF10: 4

## 2020-10-23 NOTE — PROGRESS NOTES
Physical Therapy  Physical Therapy Attempt    Name: Shirley Ag  : 1990  MRN: [id number]      Date of Service: 10/23/2020  Chart reviewed. Spoke with RN who reported pt does not follow commands and therefore is not appropriate for PT at this time. Will re-attempt as able.     Gomez Maddox, PT, DPT  [id number]

## 2020-10-23 NOTE — PROGRESS NOTES
OCCUPATIONAL THERAPY    Date:10/23/2020  Patient Name: Ellyn Ohara  MRN: [id number]  : 1990  Room: [address] [place] [address]-A              Chart reviewed. Per RN, pt not following commands at this time. Will re-attempt at later time. Thank you for consult.     William Gan, OTR/L [id number]

## 2020-10-23 NOTE — PLAN OF CARE
Problem: Falls - Risk of:  Goal: Will remain free from falls  Description: Will remain free from falls  Outcome: Met This Shift  Goal: Absence of physical injury  Description: Absence of physical injury  Outcome: Met This Shift     Problem: Skin Integrity:  Goal: Will show no infection signs and symptoms  Description: Will show no infection signs and symptoms  Outcome: Met This Shift  Goal: Absence of new skin breakdown  Description: Absence of new skin breakdown  Outcome: Met This Shift     Problem: Pain:  Goal: Pain level will decrease  Description: Pain level will decrease  Outcome: Met This Shift     Problem: Gas Exchange - Impaired:  Goal: Levels of oxygenation will improve  Description: Levels of oxygenation will improve  Outcome: Met This Shift     Problem: Pain:  Goal: Pain level will decrease  Description: Pain level will decrease  Outcome: Met This Shift     Problem: Restraint Use - Nonviolent/Non-Self-Destructive Behavior:  Goal: Absence of restraint-related injury  Description: Absence of restraint-related injury  10/23/2020 1828 by Peterson Jean RN  Outcome: Met This Shift  10/23/2020 0829 by Peterson Jean RN  Outcome: Met This Shift     Problem: Restraint Use - Nonviolent/Non-Self-Destructive Behavior:  Goal: Absence of restraint indications  Description: Absence of restraint indications  10/23/2020 1828 by Peterson Jean RN  Outcome: Not Met This Shift  10/23/2020 0829 by Peterson Jean RN  Outcome: Not Met This Shift
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
## 2020-10-23 NOTE — PROGRESS NOTES
Neurosurg progress note  VITALS:  /62   Pulse (!) 49   Temp 98 °F (36.7 °C) (Axillary)   Resp 17   Ht 6' 2\" (1.88 m)   Wt 241 lb 2.9 oz (109.4 kg)   SpO2 94%   BMI 30.97 kg/m²   24HR INTAKE/OUTPUT:    Intake/Output Summary (Last 24 hours) at 10/23/2020 1053  Last data filed at 10/23/2020 1000  Gross per 24 hour   Intake 2187 ml   Output 3800 ml   Net -1613 ml     Xr Pelvis (1-2 Views)    Result Date: 10/8/2020  EXAMINATION: ONE XRAY VIEW OF THE PELVIS 10/8/2020 9:52 pm COMPARISON: None. HISTORY: ORDERING SYSTEM PROVIDED HISTORY: trauma TECHNOLOGIST PROVIDED HISTORY: Reason for exam:->trauma What reading provider will be dictating this exam?->CRC FINDINGS: Left ischial tuberosity is not included on this examination. Entire left hip is not included on this examination. No fracture or dislocation involving pelvis or visualized hips. No diastasis involving sacroiliac joints or symphysis pubis. No fracture or dislocation involving visualized pelvis or hips. Xr Elbow Right (min 3 Views)    Result Date: 10/9/2020  EXAMINATION: THREE XRAY VIEWS OF THE RIGHT ELBOW 10/9/2020 7:00 am COMPARISON: None. HISTORY: ORDERING SYSTEM PROVIDED HISTORY: trauma, Skull fx TECHNOLOGIST PROVIDED HISTORY: Reason for exam:->trauma, Skull fx What reading provider will be dictating this exam?->CRC FINDINGS: There is no fracture dislocation. There is no elbow joint effusion. There are tiny degenerative spurs. No acute process     Xr Hand Left (min 3 Views)    Result Date: 10/9/2020  EXAMINATION: THREE XRAY VIEWS OF THE LEFT HAND; THREE XRAY VIEWS OF THE RIGHT HAND 10/9/2020 7:01 am COMPARISON: None. HISTORY: ORDERING SYSTEM PROVIDED HISTORY: trauma TECHNOLOGIST PROVIDED HISTORY: Reason for exam:->trauma What reading provider will be dictating this exam?->CRC FINDINGS: Positioning of both hands does somewhat limit evaluation. There are no definite fractures or dislocations.   Joint spaces are normal.     No acute provider will be dictating this exam?->CRC FINDINGS: BRAIN/VENTRICLES: Interval increase in size of right frontal parenchymal contusion, measuring 14 x 7 x 10 mm, previously 10 x 5 x 5 mm. Additional right frontal contusion, more inferiorly has also increased in size. Punctate contusions in the anterior-inferior frontal lobe along the gyrus rectus have also increased. Scattered bilateral hemispheric subarachnoid hemorrhage. Right convexity subdural hematoma measures up to 6 mm in thickness, not substantially changed. Trace left parietal subdural hematoma measuring 2 mm in thickness. 5 mm leftward midline shift, not substantially changed. No herniation. Patent basal cisterns. ORBITS: The visualized portion of the orbits demonstrate no acute abnormality. SINUSES: Nasopharyngeal opacities with partially imaged esophagogastric and endotracheal tubes. Scattered paranasal sinus opacities. SOFT TISSUES/SKULL:  Nondisplaced left temporal bone fracture extending to the otic capsule. Comminuted mildly displaced left parietal fracture. Nondisplaced right temporal bone fracture which is otic capsule sparing. Diffuse scalp swelling with posterior scalp contusions. Skin staples present. Mild interval increase in size of scattered parenchymal hematomas, mainly in the right frontal lobe, suspicious for diffuse axonal injury. No substantial change in acute right convexity subdural and left parietal subdural hematomas. Stable 5 mm leftward midline shift. Unchanged calvarial fractures, notable for a nondisplaced left temporal bone fracture possibly extending to the otic capsule. Recommend follow-up temporal bone CT.      Ct Head Wo Contrast    Result Date: 10/9/2020  EXAMINATION: CT OF THE HEAD WITHOUT CONTRAST  10/9/2020 12:11 pm TECHNIQUE: CT of the head was performed without the administration of intravenous contrast. Dose modulation, iterative reconstruction, and/or weight based adjustment of the mA/kV was utilized to reduce the radiation dose to as low as reasonably achievable. COMPARISON: 8 hours prior. HISTORY: ORDERING SYSTEM PROVIDED HISTORY: s/p ventric TECHNOLOGIST PROVIDED HISTORY: Reason for exam:->s/p ventric Has a \"code stroke\" or \"stroke alert\" been called? ->No What reading provider will be dictating this exam?->CRC FINDINGS: There has been interval placement of a right frontal approach ventriculostomy catheter terminating within the right lateral ventricle frontal horn abutting the septum pellucidum. There is split like configuration of the right lateral ventricle that may reflect over shunting. There is no temporal horn dilatation. There is diffuse cerebral edema with diffuse sulcal effacement. There is similar appearance of multiple foci of hemorrhagic contusions involving the right frontal lobe and to lesser extent left frontal lobe and right temporal lobe. The hemorrhagic contusions demonstrates mild surrounding edema. There is similar appearance of acute subarachnoid hemorrhage along the bilateral frontal convexities and right temporal convexity and to a lesser extent at the vertices. There is small volume of subdural hemorrhage along the right lateral tentorial leaflet. There is small volume subarachnoid hemorrhage within the interpeduncular cistern. There is new wedge-shaped region of low attenuation involving the left middle cerebellar peduncle and left cerebellar hemisphere, concerning for acute ischemia. There is no significant midline shift. There are bilateral parietal fractures, comminuted on the left, extending into the left temporal bone including the mastoid segment. Please refer to concurrently performed CT temporal bone imaging report. There is additional depressed fracture of the left superior orbital wall. There is diffuse subgaleal soft tissue swelling. Interval placement of right ventriculostomy catheter terminating within the right lateral ventricle frontal horn.   Interval development of slit-like appearance of the right lateral ventricle. Suspect acute ischemia involving the left middle cerebellar peduncle and left cerebellar hemisphere. Similar appearance of intracranial hemorrhage in hemorrhagic contusions as above. Findings discussed with Dr. Deim Vigil at 12:53 p.m. on December 9, 2020. Ct Head Wo Contrast    Result Date: 10/9/2020  EXAMINATION: CT OF THE HEAD and cervical spine WITHOUT CONTRAST; CT OF THE FACE WITHOUT CONTRAST  10/8/2020 9:56 pm TECHNIQUE: CT of the head was performed without the administration of intravenous contrast. Dose modulation, iterative reconstruction, and/or weight based adjustment of the mA/kV was utilized to reduce the radiation dose to as low as reasonably achievable.; CT of the face was performed without the administration of intravenous contrast. Multiplanar reformatted images are provided for review. Dose modulation, iterative reconstruction, and/or weight based adjustment of the mA/kV was utilized to reduce the radiation dose to as low as reasonably achievable.; CT of the cervical spine was performed without the administration of intravenous contrast. Multiplanar reformatted images are provided for review. Dose modulation, iterative reconstruction, and/or weight based adjustment of the mA/kV was utilized to reduce the radiation dose to as low as reasonably achievable. COMPARISON: None. HISTORY: ORDERING SYSTEM PROVIDED HISTORY: trauma TECHNOLOGIST PROVIDED HISTORY: Reason for exam:->trauma Has a \"code stroke\" or \"stroke alert\" been called? ->No What reading provider will be dictating this exam?->CRC FINDINGS: Head: There is mild right frontoparietal holohemispheric subdural hemorrhage, measuring up to 6 mm. Mild mass effect and minimal midline shift of approximately 4 mm. Visualized skull demonstrates multiple mildly displaced fractures in the skull, involving left temporal bone, right temporal bone, bilateral parietal bones.   Left temporoparietal bone skull fractures appear to be comminuted in multiple locations. There also small hemorrhagic contusions in bilateral frontal lobes. Small amount of subarachnoid hemorrhage in the right frontal lobe. Mild fluid layering in the sphenoid sinus. Fractures involving the left orbit as well. The mastoid air cells are clear. However, left temporal bone fracture does extend through the region of the left external auditory canal and extends to the middle ear. Cervical spine: Vertebral body heights are intact. Alignment is intact. Facets are normally aligned. The odontoid process is intact. No significant prevertebral soft tissue swelling. Facial bones: Mandible is intact. The zygomatic arches are intact. Nasal bones are intact. Nasal bony septum is midline. Sphenoid temporal buttress is intact. Mildly displaced fracture of the roof of the left orbit. The orbital floor is intact. Globes are intact and not proptotic. No retro bulbar soft tissue hematoma. Mild left periorbital preseptal soft tissue swelling. Bilateral comminuted skull bone fractures involving bilateral temporal bones and parietal bones. Fractures are worse on the left side. Mild right holohemispheric subdural hematoma with mild midline shift. Bilateral frontal small hemorrhagic contusions. Left orbital roof mildly displaced fracture. No evidence for cervical fracture or subluxation. Critical findings reported to the ER physician at time of dictation. Ct Iac Posterior Fossa Wo Contrast    Result Date: 10/10/2020  EXAMINATION: CT OF THE INTERNAL AUDITORY CANAL WITHOUT CONTRAST 10/9/2020 12:11 pm TECHNIQUE: CT of the internal auditory canal was performed without contrast was performed without the administration of intravenous contrast. Multiplanar reformatted images are provided for review.  Dose modulation, iterative reconstruction, and/or weight based adjustment of the mA/kV was utilized to reduce the radiation dose to as low as reasonably achievable. COMPARISON: None HISTORY: ORDERING SYSTEM PROVIDED HISTORY: TRAUMA TECHNOLOGIST PROVIDED HISTORY: Reason for exam:->trauma What reading provider will be dictating this exam?->CRC FINDINGS: RIGHT TEMPORAL BONE:  The right external auditory canal is normal in appearance. There is no right temporal bone fracture identified. There is a small volume of fluid within the right mastoid air cells. The right middle ear is clear. The ossicles are normally aligned. The tegmen tympani is intact. The scutum is intact. There is no osseous dehiscence. The cochlea, vestibule and semicircular canals are normal in appearance. LEFT TEMPORAL BONE: There is a longitudinal fracture of the mastoid segment of the left temporal bone. There is incudomalleolar subluxation involving the head of the malleus and body of the incus. The fracture does not extend into the otic capsule. Hemorrhage is present within the left middle ear and mastoid air cells. A comminuted fracture of the squamous portion of the left temporal bone is noted. The cochlea, vestibule and semicircular canals are normal.     Longitudinal fracture of the mastoid portion of the left temporal bone with associated incudomalleolar subluxation involving the head of the malleus and body of the incus. The fracture does not extend into the otic capsule. Small volume of fluid within the right mastoid air cells but no acute traumatic injury of the mastoid portion of the right temporal bone evident.      Ct Facial Bones Wo Contrast    Result Date: 10/9/2020  EXAMINATION: CT OF THE HEAD and cervical spine WITHOUT CONTRAST; CT OF THE FACE WITHOUT CONTRAST  10/8/2020 9:56 pm TECHNIQUE: CT of the head was performed without the administration of intravenous contrast. Dose modulation, iterative reconstruction, and/or weight based adjustment of the mA/kV was utilized to reduce the radiation dose to as low as reasonably achievable.; CT of the face was performed without the administration of intravenous contrast. Multiplanar reformatted images are provided for review. Dose modulation, iterative reconstruction, and/or weight based adjustment of the mA/kV was utilized to reduce the radiation dose to as low as reasonably achievable.; CT of the cervical spine was performed without the administration of intravenous contrast. Multiplanar reformatted images are provided for review. Dose modulation, iterative reconstruction, and/or weight based adjustment of the mA/kV was utilized to reduce the radiation dose to as low as reasonably achievable. COMPARISON: None. HISTORY: ORDERING SYSTEM PROVIDED HISTORY: trauma TECHNOLOGIST PROVIDED HISTORY: Reason for exam:->trauma Has a \"code stroke\" or \"stroke alert\" been called? ->No What reading provider will be dictating this exam?->CRC FINDINGS: Head: There is mild right frontoparietal holohemispheric subdural hemorrhage, measuring up to 6 mm. Mild mass effect and minimal midline shift of approximately 4 mm. Visualized skull demonstrates multiple mildly displaced fractures in the skull, involving left temporal bone, right temporal bone, bilateral parietal bones. Left temporoparietal bone skull fractures appear to be comminuted in multiple locations. There also small hemorrhagic contusions in bilateral frontal lobes. Small amount of subarachnoid hemorrhage in the right frontal lobe. Mild fluid layering in the sphenoid sinus. Fractures involving the left orbit as well. The mastoid air cells are clear. However, left temporal bone fracture does extend through the region of the left external auditory canal and extends to the middle ear. Cervical spine: Vertebral body heights are intact. Alignment is intact. Facets are normally aligned. The odontoid process is intact. No significant prevertebral soft tissue swelling. Facial bones: Mandible is intact. The zygomatic arches are intact. Nasal bones are intact.   Nasal bony septum is midline. Sphenoid temporal buttress is intact. Mildly displaced fracture of the roof of the left orbit. The orbital floor is intact. Globes are intact and not proptotic. No retro bulbar soft tissue hematoma. Mild left periorbital preseptal soft tissue swelling. Bilateral comminuted skull bone fractures involving bilateral temporal bones and parietal bones. Fractures are worse on the left side. Mild right holohemispheric subdural hematoma with mild midline shift. Bilateral frontal small hemorrhagic contusions. Left orbital roof mildly displaced fracture. No evidence for cervical fracture or subluxation. Critical findings reported to the ER physician at time of dictation. Ct Chest W Contrast    Result Date: 10/9/2020  EXAMINATION: CT OF THE CHEST WITH CONTRAST 10/8/2020 10:56 pm TECHNIQUE: CT of the chest was performed with the administration of intravenous contrast. Multiplanar reformatted images are provided for review. Dose modulation, iterative reconstruction, and/or weight based adjustment of the mA/kV was utilized to reduce the radiation dose to as low as reasonably achievable. COMPARISON: None. HISTORY: ORDERING SYSTEM PROVIDED HISTORY: trauma TECHNOLOGIST PROVIDED HISTORY: Reason for exam:->trauma What reading provider will be dictating this exam?->CRC FINDINGS: An endotracheal and enteric tubes are noted in place and are appropriately positioned. Mediastinum: Normal heart size. The great vessels are within normal limits. No pericardial effusion. No significantly enlarged lymph nodes. Lungs/pleura: Mild dependent congestion involving the bilateral posterior lungs. No pulmonary mass or augustin consolidation. No pleural effusion. Upper Abdomen: Within normal limits. Soft Tissues/Bones: Nondisplaced acute fractures involving the lateral left 5th through 7th ribs. Nondisplaced acute fractures of the posterior left 4th through 7th ribs.   Acute mildly displaced fractures of the posterior left 8th rib. Mildly displaced acute fracture of the left body of the scapula. Nondisplaced acute fractures involving the lateral left 5th through 7th ribs. Nondisplaced acute fractures of the posterior left 4th through 7th ribs. Acute mildly displaced fractures of the posterior left 8th rib. Mildly displaced acute fracture of the left body of the scapula. Ct Cervical Spine Wo Contrast    Result Date: 10/9/2020  EXAMINATION: CT OF THE HEAD and cervical spine WITHOUT CONTRAST; CT OF THE FACE WITHOUT CONTRAST  10/8/2020 9:56 pm TECHNIQUE: CT of the head was performed without the administration of intravenous contrast. Dose modulation, iterative reconstruction, and/or weight based adjustment of the mA/kV was utilized to reduce the radiation dose to as low as reasonably achievable.; CT of the face was performed without the administration of intravenous contrast. Multiplanar reformatted images are provided for review. Dose modulation, iterative reconstruction, and/or weight based adjustment of the mA/kV was utilized to reduce the radiation dose to as low as reasonably achievable.; CT of the cervical spine was performed without the administration of intravenous contrast. Multiplanar reformatted images are provided for review. Dose modulation, iterative reconstruction, and/or weight based adjustment of the mA/kV was utilized to reduce the radiation dose to as low as reasonably achievable. COMPARISON: None. HISTORY: ORDERING SYSTEM PROVIDED HISTORY: trauma TECHNOLOGIST PROVIDED HISTORY: Reason for exam:->trauma Has a \"code stroke\" or \"stroke alert\" been called? ->No What reading provider will be dictating this exam?->CRC FINDINGS: Head: There is mild right frontoparietal holohemispheric subdural hemorrhage, measuring up to 6 mm. Mild mass effect and minimal midline shift of approximately 4 mm.   Visualized skull demonstrates multiple mildly displaced fractures in the skull, involving left temporal bone, right temporal bone, bilateral parietal bones. Left temporoparietal bone skull fractures appear to be comminuted in multiple locations. There also small hemorrhagic contusions in bilateral frontal lobes. Small amount of subarachnoid hemorrhage in the right frontal lobe. Mild fluid layering in the sphenoid sinus. Fractures involving the left orbit as well. The mastoid air cells are clear. However, left temporal bone fracture does extend through the region of the left external auditory canal and extends to the middle ear. Cervical spine: Vertebral body heights are intact. Alignment is intact. Facets are normally aligned. The odontoid process is intact. No significant prevertebral soft tissue swelling. Facial bones: Mandible is intact. The zygomatic arches are intact. Nasal bones are intact. Nasal bony septum is midline. Sphenoid temporal buttress is intact. Mildly displaced fracture of the roof of the left orbit. The orbital floor is intact. Globes are intact and not proptotic. No retro bulbar soft tissue hematoma. Mild left periorbital preseptal soft tissue swelling. Bilateral comminuted skull bone fractures involving bilateral temporal bones and parietal bones. Fractures are worse on the left side. Mild right holohemispheric subdural hematoma with mild midline shift. Bilateral frontal small hemorrhagic contusions. Left orbital roof mildly displaced fracture. No evidence for cervical fracture or subluxation. Critical findings reported to the ER physician at time of dictation. Ct Thoracic Spine Wo Contrast    Result Date: 10/9/2020  EXAMINATION: CT OF THE THORACIC SPINE WITHOUT CONTRAST  10/8/2020 10:56 pm: TECHNIQUE: CT of the thoracic spine was performed without the administration of intravenous contrast. Multiplanar reformatted images are provided for review.  Dose modulation, iterative reconstruction, and/or weight based adjustment of the mA/kV was utilized to reduce the radiation dose to as low as reasonably achievable. COMPARISON: Same day lumbar spine CT; same day CT chest, abdomen and pelvis HISTORY: ORDERING SYSTEM PROVIDED HISTORY: trauma TECHNOLOGIST PROVIDED HISTORY: Reason for exam:->trauma What reading provider will be dictating this exam?->CRC FINDINGS: BONES/ALIGNMENT: There is normal alignment of the spine. The vertebral body heights are maintained. No osseous destructive lesion is seen. DEGENERATIVE CHANGES: Mild spondylosis without significant central canal narrowing. Mild right foraminal narrowing at T11-T12. SOFT TISSUES: No paraspinal mass is seen. No acute thoracic spine abnormality. Ct Lumbar Spine Wo Contrast    Result Date: 10/9/2020  EXAMINATION: CT OF THE LUMBAR SPINE WITHOUT CONTRAST  10/8/2020 TECHNIQUE: CT of the lumbar spine was performed without the administration of intravenous contrast. Multiplanar reformatted images are provided for review. Dose modulation, iterative reconstruction, and/or weight based adjustment of the mA/kV was utilized to reduce the radiation dose to as low as reasonably achievable. COMPARISON: None HISTORY: ORDERING SYSTEM PROVIDED HISTORY: trauma TECHNOLOGIST PROVIDED HISTORY: Reason for exam:->trauma What reading provider will be dictating this exam?->CRC FINDINGS: BONES/ALIGNMENT: Vertebral body heights are maintained. No compression deformity. L5 pars defects with grade 1 anterolisthesis of L5 on S1. DEGENERATIVE CHANGES: Moderate disc desiccation at L5-S1. No significant central canal stenosis. Moderate-to-severe foraminal stenosis at the listhesis level. SOFT TISSUES/RETROPERITONEUM: No paraspinal mass is seen. No acute lumbar spine abnormality. L5 pars defects with grade 1 anterolisthesis of L5 on S1 and moderate-to-severe foraminal stenosis.      Ct Abdomen Pelvis W Iv Contrast Additional Contrast? None    Result Date: 10/9/2020  EXAMINATION: CT OF THE ABDOMEN AND PELVIS WITH CONTRAST 10/8/2020 10:56 pm TECHNIQUE: CT of the abdomen and pelvis was performed with the administration of intravenous contrast. Multiplanar reformatted images are provided for review. Dose modulation, iterative reconstruction, and/or weight based adjustment of the mA/kV was utilized to reduce the radiation dose to as low as reasonably achievable. COMPARISON: None. HISTORY: ORDERING SYSTEM PROVIDED HISTORY: trauma TECHNOLOGIST PROVIDED HISTORY: Additional Contrast?->None Reason for exam:->trauma What reading provider will be dictating this exam?->CRC FINDINGS: Lower Chest: Described in detail on separate report of CT of the chest. Organs: The liver, spleen, pancreas, and bilateral adrenal glands are within normal limits. No radiopaque gallstones. No CT evidence of acute cholecystitis. No intra or extrahepatic ductal dilatation. The bilateral kidneys are within normal limits. No renal cysts or masses are noted. No hydronephrosis or hydroureter. GI/Bowel: The small and large bowel demonstrate normal caliber and appearance. A normal-appearing appendix is identified. Pelvis: A soft tissue structure is noted in the distal right inguinal canal which could represent a deeply retracted testicle versus an undescended testicle. Recommend clinical correlation. The urinary bladder is nearly decompressed with a Bentley catheter in place. Peritoneum/Retroperitoneum: No free fluid or free air. Bones/Soft Tissues: Multiple rib fractures, as described in report of CT of the chest.  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 A soft tissue structure is noted in the distal right inguinal canal presumably representing the right testicle and could represent a deeply retracted testicle versus an undescended testicle. Recommend clinical correlation.      Xr Chest Portable    Result Date: 10/9/2020  EXAMINATION: ONE XRAY VIEW OF THE CHEST 10/9/2020 6:59 am COMPARISON: 10/09/2020 HISTORY: ORDERING SYSTEM PROVIDED HISTORY: intubated TECHNOLOGIST PROVIDED HISTORY: Reason for exam:->intubated What reading provider will be dictating this exam?->CRC FINDINGS: Lines/tubes are appropriate. Heart size is normal.  There are no infiltrates or effusions. No acute process     Xr Chest Portable    Result Date: 10/9/2020  EXAMINATION: ONE XRAY VIEW OF THE CHEST 10/9/2020 12:42 am COMPARISON: 10/08/2020 at this 2248 hours. Randi Bolton HISTORY: ORDERING SYSTEM PROVIDED HISTORY: Line Placement TECHNOLOGIST PROVIDED HISTORY: Reason for exam:->Line Placement What reading provider will be dictating this exam?->CRC FINDINGS: Heart is not enlarged. Endotracheal tube and enteric tube are in place. Left IJ CVC is in place with tip in the proximal SVC. No pneumothorax. No pleural effusions. No pulmonary edema or pneumothorax. Endotracheal tube,

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
## 2020-10-23 NOTE — FLOWSHEET NOTE
Called OR to return ICP monitor that is no longer needed by patient. [employer] ICP monitor # [id number] placed outside of surgery door A with patient optio-label on it.

## 2020-10-23 NOTE — PLAN OF CARE
Problem: Falls - Risk of:  Goal: Will remain free from falls  Description: Will remain free from falls  10/22/2020 1813 by Lucio Henry RN  Outcome: Met This Shift  Goal: Absence of physical injury  Description: Absence of physical injury  10/22/2020 1813 by Lucio Henry RN  Outcome: Met This Shift     Problem: Skin Integrity:  Goal: Will show no infection signs and symptoms  Description: Will show no infection signs and symptoms  10/22/2020 1813 by Lucio Henry RN  Outcome: Met This Shift  Goal: Absence of new skin breakdown  Description: Absence of new skin breakdown  10/22/2020 1813 by Lucio Henry RN  Outcome: Met This Shift     Problem: Pain:  Goal: Pain level will decrease  Description: Pain level will decrease  10/22/2020 1813 by Lucio Henry RN  Outcome: Met This Shift  Goal: Control of acute pain  Description: Control of acute pain  10/22/2020 1813 by Lucio Henry RN  Outcome: Met This Shift     Problem: Airway Clearance - Ineffective:  Goal: Ability to maintain a clear airway will improve  Description: Ability to maintain a clear airway will improve  10/23/2020 0035 by Wynona Lesch  Outcome: Met This Shift  10/22/2020 1813 by Lucio Henry RN  Outcome: Met This Shift     Problem: Anxiety/Stress:  Goal: Level of anxiety will decrease  Description: Level of anxiety will decrease  10/23/2020 0035 by Wynona Lesch  Outcome: Met This Shift  10/22/2020 1813 by Lucio Henry RN  Outcome: Met This Shift     Problem: Cardiac Output - Decreased:  Goal: Hemodynamic stability will improve  Description: Hemodynamic stability will improve  10/22/2020 1813 by Lucio Henry RN  Outcome: Met This Shift     Problem: Fluid Volume - Imbalance:  Goal: Absence of imbalanced fluid volume signs and symptoms  Description: Absence of imbalanced fluid volume signs and symptoms  10/22/2020 1813 by Lucio Henry RN  Outcome: Met This Shift     Problem: Gas Exchange - Impaired:  Goal: Levels of oxygenation will improve  Description: Levels of oxygenation will improve  10/22/2020 1813 by Mali Haque RN  Outcome: Met This Shift     Problem: Pain:  Goal: Pain level will decrease  Description: Pain level will decrease  10/22/2020 1813 by Mali Haque RN  Outcome: Met This Shift     Problem: Restraint Use - Nonviolent/Non-Self-Destructive Behavior:  Goal: Absence of restraint-related injury  Description: Absence of restraint-related injury  10/23/2020 0035 by Juan Dumont  Outcome: Met This Shift  10/22/2020 1813 by Mali Haque RN  Outcome: Met This Shift     Problem: Restraint Use - Nonviolent/Non-Self-Destructive Behavior:  Goal: Absence of restraint indications  Description: Absence of restraint indications  10/23/2020 0035 by Juan Dumont  Outcome: Not Met This Shift  10/22/2020 1813 by Mali Haque RN  Outcome: Not Met This Shift
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
## 2020-10-23 NOTE — FLOWSHEET NOTE
.Patient will reach at lines and tubes and kick legs over the side rails needing to be redirected much of time and at risk of harming self. Attempting to provide calm environment and reorientation, but patient still assessed to be a risk of harm to self. Family aware for need of restraints. Will continue to monitor patient and assess for earliest removal capability.

## 2020-10-24 ENCOUNTER — APPOINTMENT (OUTPATIENT)
Dept: GENERAL RADIOLOGY | Age: 30
DRG: 003 | End: 2020-10-24
Payer: COMMERCIAL

## 2020-10-24 LAB
ANION GAP SERPL CALCULATED.3IONS-SCNC: 13 MMOL/L (ref 7–16)
BASOPHILS ABSOLUTE: 0.15 E9/L (ref 0–0.2)
BASOPHILS RELATIVE PERCENT: 1.5 % (ref 0–2)
BUN BLDV-MCNC: 24 MG/DL (ref 6–20)
CALCIUM IONIZED: 1.31 MMOL/L (ref 1.15–1.33)
CALCIUM SERPL-MCNC: 9.2 MG/DL (ref 8.6–10.2)
CHLORIDE BLD-SCNC: 99 MMOL/L (ref 98–107)
CO2: 27 MMOL/L (ref 22–29)
CREAT SERPL-MCNC: 0.7 MG/DL (ref 0.7–1.2)
EOSINOPHILS ABSOLUTE: 0.65 E9/L (ref 0.05–0.5)
EOSINOPHILS RELATIVE PERCENT: 6.3 % (ref 0–6)
GFR AFRICAN AMERICAN: >60
GFR NON-AFRICAN AMERICAN: >60 ML/MIN/1.73
GLUCOSE BLD-MCNC: 100 MG/DL (ref 74–99)
HCT VFR BLD CALC: 39.6 % (ref 37–54)
HEMOGLOBIN: 12.8 G/DL (ref 12.5–16.5)
IMMATURE GRANULOCYTES #: 0.19 E9/L
IMMATURE GRANULOCYTES %: 1.8 % (ref 0–5)
LYMPHOCYTES ABSOLUTE: 1.99 E9/L (ref 1.5–4)
LYMPHOCYTES RELATIVE PERCENT: 19.3 % (ref 20–42)
MAGNESIUM: 2.5 MG/DL (ref 1.6–2.6)
MCH RBC QN AUTO: 30.2 PG (ref 26–35)
MCHC RBC AUTO-ENTMCNC: 32.3 % (ref 32–34.5)
MCV RBC AUTO: 93.4 FL (ref 80–99.9)
MONOCYTES ABSOLUTE: 0.81 E9/L (ref 0.1–0.95)
MONOCYTES RELATIVE PERCENT: 7.9 % (ref 2–12)
NEUTROPHILS ABSOLUTE: 6.52 E9/L (ref 1.8–7.3)
NEUTROPHILS RELATIVE PERCENT: 63.2 % (ref 43–80)
PDW BLD-RTO: 13.3 FL (ref 11.5–15)
PHOSPHORUS: 3.7 MG/DL (ref 2.5–4.5)
PLATELET # BLD: 436 E9/L (ref 130–450)
PMV BLD AUTO: 9.4 FL (ref 7–12)
POTASSIUM SERPL-SCNC: 4.5 MMOL/L (ref 3.5–5)
RBC # BLD: 4.24 E12/L (ref 3.8–5.8)
SODIUM BLD-SCNC: 139 MMOL/L (ref 132–146)
WBC # BLD: 10.3 E9/L (ref 4.5–11.5)

## 2020-10-24 PROCEDURE — 6360000002 HC RX W HCPCS: Performed by: SURGERY

## 2020-10-24 PROCEDURE — 94640 AIRWAY INHALATION TREATMENT: CPT

## 2020-10-24 PROCEDURE — 6370000000 HC RX 637 (ALT 250 FOR IP): Performed by: SURGERY

## 2020-10-24 PROCEDURE — 85025 COMPLETE CBC W/AUTO DIFF WBC: CPT

## 2020-10-24 PROCEDURE — 2000000000 HC ICU R&B

## 2020-10-24 PROCEDURE — 71045 X-RAY EXAM CHEST 1 VIEW: CPT

## 2020-10-24 PROCEDURE — 83735 ASSAY OF MAGNESIUM: CPT

## 2020-10-24 PROCEDURE — 36415 COLL VENOUS BLD VENIPUNCTURE: CPT

## 2020-10-24 PROCEDURE — 99291 CRITICAL CARE FIRST HOUR: CPT | Performed by: SURGERY

## 2020-10-24 PROCEDURE — 82330 ASSAY OF CALCIUM: CPT

## 2020-10-24 PROCEDURE — 2700000000 HC OXYGEN THERAPY PER DAY

## 2020-10-24 PROCEDURE — 2580000003 HC RX 258: Performed by: SURGERY

## 2020-10-24 PROCEDURE — 84100 ASSAY OF PHOSPHORUS: CPT

## 2020-10-24 PROCEDURE — 6370000000 HC RX 637 (ALT 250 FOR IP): Performed by: STUDENT IN AN ORGANIZED HEALTH CARE EDUCATION/TRAINING PROGRAM

## 2020-10-24 PROCEDURE — 80048 BASIC METABOLIC PNL TOTAL CA: CPT

## 2020-10-24 PROCEDURE — 6360000002 HC RX W HCPCS: Performed by: STUDENT IN AN ORGANIZED HEALTH CARE EDUCATION/TRAINING PROGRAM

## 2020-10-24 RX ORDER — OXYCODONE HCL 5 MG/5 ML
5 SOLUTION, ORAL ORAL EVERY 4 HOURS PRN
Status: DISCONTINUED | OUTPATIENT
Start: 2020-10-24 | End: 2020-10-27

## 2020-10-24 RX ORDER — ACETAMINOPHEN 160 MG/5ML
650 SOLUTION ORAL EVERY 6 HOURS PRN
Status: DISCONTINUED | OUTPATIENT
Start: 2020-10-24 | End: 2020-11-04 | Stop reason: HOSPADM

## 2020-10-24 RX ORDER — CLONIDINE HYDROCHLORIDE 0.1 MG/1
0.1 TABLET ORAL ONCE
Status: COMPLETED | OUTPATIENT
Start: 2020-10-24 | End: 2020-10-24

## 2020-10-24 RX ADMIN — CLONIDINE HYDROCHLORIDE 0.3 MG: 0.2 TABLET ORAL at 21:00

## 2020-10-24 RX ADMIN — MINERAL OIL AND PETROLATUM: 150; 830 OINTMENT OPHTHALMIC at 02:43

## 2020-10-24 RX ADMIN — CLONIDINE HYDROCHLORIDE 0.2 MG: 0.2 TABLET ORAL at 13:40

## 2020-10-24 RX ADMIN — ACETAMINOPHEN ORAL SOLUTION 650 MG: 650 SOLUTION ORAL at 21:01

## 2020-10-24 RX ADMIN — FAMOTIDINE 20 MG: 20 TABLET ORAL at 21:00

## 2020-10-24 RX ADMIN — HEPARIN SODIUM 5000 UNITS: 10000 INJECTION INTRAVENOUS; SUBCUTANEOUS at 21:00

## 2020-10-24 RX ADMIN — OXYCODONE HYDROCHLORIDE 5 MG: 5 SOLUTION ORAL at 16:35

## 2020-10-24 RX ADMIN — SODIUM CHLORIDE SOLN NEBU 3% 4 ML: 3 NEBU SOLN at 09:53

## 2020-10-24 RX ADMIN — NYSTATIN 500000 UNITS: 100000 SUSPENSION ORAL at 17:02

## 2020-10-24 RX ADMIN — BUSPIRONE HYDROCHLORIDE 10 MG: 10 TABLET ORAL at 08:16

## 2020-10-24 RX ADMIN — SODIUM CHLORIDE SOLN NEBU 3% 4 ML: 3 NEBU SOLN at 20:33

## 2020-10-24 RX ADMIN — ONDANSETRON 4 MG: 2 INJECTION INTRAMUSCULAR; INTRAVENOUS at 18:57

## 2020-10-24 RX ADMIN — OXYCODONE HYDROCHLORIDE 10 MG: 5 SOLUTION ORAL at 00:30

## 2020-10-24 RX ADMIN — POLYVINYL ALCOHOL 1 DROP: 14 SOLUTION/ DROPS OPHTHALMIC at 08:13

## 2020-10-24 RX ADMIN — BACITRACIN ZINC: 500 OINTMENT TOPICAL at 08:13

## 2020-10-24 RX ADMIN — HEPARIN SODIUM 5000 UNITS: 10000 INJECTION INTRAVENOUS; SUBCUTANEOUS at 13:40

## 2020-10-24 RX ADMIN — BACITRACIN ZINC, POLYMYXIN B SULFATE, NEOMYCIN SULFATE: 400; 5000; 3.5 OINTMENT TOPICAL at 08:15

## 2020-10-24 RX ADMIN — BACITRACIN ZINC: 500 OINTMENT TOPICAL at 21:00

## 2020-10-24 RX ADMIN — HEPARIN SODIUM 5000 UNITS: 10000 INJECTION INTRAVENOUS; SUBCUTANEOUS at 05:51

## 2020-10-24 RX ADMIN — OXYCODONE HYDROCHLORIDE 10 MG: 5 SOLUTION ORAL at 07:36

## 2020-10-24 RX ADMIN — CLONIDINE HYDROCHLORIDE 0.2 MG: 0.2 TABLET ORAL at 08:14

## 2020-10-24 RX ADMIN — FAMOTIDINE 20 MG: 20 TABLET ORAL at 08:14

## 2020-10-24 RX ADMIN — SENNOSIDES 5 ML: 8.8 LIQUID ORAL at 21:02

## 2020-10-24 RX ADMIN — NYSTATIN 500000 UNITS: 100000 SUSPENSION ORAL at 12:00

## 2020-10-24 RX ADMIN — ACETAMINOPHEN ORAL SOLUTION 650 MG: 650 SOLUTION ORAL at 02:43

## 2020-10-24 RX ADMIN — NYSTATIN 500000 UNITS: 100000 SUSPENSION ORAL at 21:00

## 2020-10-24 RX ADMIN — SODIUM CHLORIDE SOLN NEBU 3% 4 ML: 3 NEBU SOLN at 15:42

## 2020-10-24 RX ADMIN — OXYCODONE HYDROCHLORIDE 5 MG: 5 SOLUTION ORAL at 12:00

## 2020-10-24 RX ADMIN — CHLORHEXIDINE GLUCONATE 0.12% ORAL RINSE 15 ML: 1.2 LIQUID ORAL at 08:14

## 2020-10-24 RX ADMIN — Medication 10 ML: at 21:01

## 2020-10-24 RX ADMIN — SODIUM CHLORIDE SOLN NEBU 3% 4 ML: 3 NEBU SOLN at 03:57

## 2020-10-24 RX ADMIN — BUSPIRONE HYDROCHLORIDE 10 MG: 10 TABLET ORAL at 23:57

## 2020-10-24 RX ADMIN — MINERAL OIL AND PETROLATUM: 150; 830 OINTMENT OPHTHALMIC at 06:03

## 2020-10-24 RX ADMIN — CLONIDINE HYDROCHLORIDE 0.1 MG: 0.1 TABLET ORAL at 17:53

## 2020-10-24 RX ADMIN — NYSTATIN 500000 UNITS: 100000 SUSPENSION ORAL at 08:14

## 2020-10-24 RX ADMIN — POLYVINYL ALCOHOL 1 DROP: 14 SOLUTION/ DROPS OPHTHALMIC at 04:20

## 2020-10-24 RX ADMIN — Medication 10 ML: at 08:14

## 2020-10-24 ASSESSMENT — PAIN SCALES - GENERAL
PAINLEVEL_OUTOF10: 3
PAINLEVEL_OUTOF10: 3
PAINLEVEL_OUTOF10: 7
PAINLEVEL_OUTOF10: 3
PAINLEVEL_OUTOF10: 5
PAINLEVEL_OUTOF10: 7

## 2020-10-24 NOTE — PROGRESS NOTES
ENT FACIAL NERVE ASSESSMENT   Patient on trach mask, fentanyl prn. Pt with minimal cooperation to follow commands. He has HB score 3-4 on left, incomplete eye closure at rest. Right HB score 2. Artifical tear eye drops to left eye to prevent dryness. Will reassess on outpatient basis once overall medical condition improves.

## 2020-10-24 NOTE — PROGRESS NOTES
Neurosurg progress note  VITALS:  BP (!) 131/59   Pulse 53   Temp 98.2 °F (36.8 °C) (Infrared)   Resp 16   Ht 6' 2\" (1.88 m)   Wt 244 lb 7.8 oz (110.9 kg)   SpO2 92%   BMI 31.39 kg/m²   24HR INTAKE/OUTPUT:    Intake/Output Summary (Last 24 hours) at 10/24/2020 0950  Last data filed at 10/24/2020 0224  Gross per 24 hour   Intake 1979 ml   Output 2775 ml   Net -796 ml     Xr Pelvis (1-2 Views)    Result Date: 10/8/2020  EXAMINATION: ONE XRAY VIEW OF THE PELVIS 10/8/2020 9:52 pm COMPARISON: None. HISTORY: ORDERING SYSTEM PROVIDED HISTORY: trauma TECHNOLOGIST PROVIDED HISTORY: Reason for exam:->trauma What reading provider will be dictating this exam?->CRC FINDINGS: Left ischial tuberosity is not included on this examination. Entire left hip is not included on this examination. No fracture or dislocation involving pelvis or visualized hips. No diastasis involving sacroiliac joints or symphysis pubis. No fracture or dislocation involving visualized pelvis or hips. Xr Elbow Right (min 3 Views)    Result Date: 10/9/2020  EXAMINATION: THREE XRAY VIEWS OF THE RIGHT ELBOW 10/9/2020 7:00 am COMPARISON: None. HISTORY: ORDERING SYSTEM PROVIDED HISTORY: trauma, Skull fx TECHNOLOGIST PROVIDED HISTORY: Reason for exam:->trauma, Skull fx What reading provider will be dictating this exam?->CRC FINDINGS: There is no fracture dislocation. There is no elbow joint effusion. There are tiny degenerative spurs. No acute process     Xr Hand Left (min 3 Views)    Result Date: 10/9/2020  EXAMINATION: THREE XRAY VIEWS OF THE LEFT HAND; THREE XRAY VIEWS OF THE RIGHT HAND 10/9/2020 7:01 am COMPARISON: None. HISTORY: ORDERING SYSTEM PROVIDED HISTORY: trauma TECHNOLOGIST PROVIDED HISTORY: Reason for exam:->trauma What reading provider will be dictating this exam?->CRC FINDINGS: Positioning of both hands does somewhat limit evaluation. There are no definite fractures or dislocations.   Joint spaces are normal.     No acute process     Xr Hand Right (min 3 Views)    Result Date: 10/9/2020  EXAMINATION: THREE XRAY VIEWS OF THE LEFT HAND; THREE XRAY VIEWS OF THE RIGHT HAND 10/9/2020 7:01 am COMPARISON: None. HISTORY: ORDERING SYSTEM PROVIDED HISTORY: trauma TECHNOLOGIST PROVIDED HISTORY: Reason for exam:->trauma What reading provider will be dictating this exam?->CRC FINDINGS: Positioning of both hands does somewhat limit evaluation. There are no definite fractures or dislocations. Joint spaces are normal.     No acute process     Xr Knee Left (3 Views)    Result Date: 10/9/2020  EXAMINATION: THREE XRAY VIEWS OF THE LEFT KNEE 10/9/2020 7:03 am COMPARISON: None. HISTORY: ORDERING SYSTEM PROVIDED HISTORY: trauma TECHNOLOGIST PROVIDED HISTORY: Reason for exam:->trauma What reading provider will be dictating this exam?->CRC FINDINGS: There is no fracture dislocation. There is no joint effusion or degenerative change. No acute process     Xr Knee Right (3 Views)    Result Date: 10/9/2020  EXAMINATION: THREE XRAY VIEWS OF THE RIGHT KNEE 10/9/2020 8:06 am COMPARISON: None. HISTORY: ORDERING SYSTEM PROVIDED HISTORY: trauma TECHNOLOGIST PROVIDED HISTORY: Reason for exam:->trauma What reading provider will be dictating this exam?->CRC FINDINGS: There is no fracture dislocation. There is no joint space narrowing or effusion. No acute process     Ct Head Wo Contrast    Result Date: 10/9/2020  EXAMINATION: CT OF THE HEAD WITHOUT CONTRAST  10/9/2020 4:07 am TECHNIQUE: CT of the head was performed without the administration of intravenous contrast. Dose modulation, iterative reconstruction, and/or weight based adjustment of the mA/kV was utilized to reduce the radiation dose to as low as reasonably achievable. COMPARISON: CT head 10/08/2020 HISTORY: ORDERING SYSTEM PROVIDED HISTORY: evaluate head bleed TECHNOLOGIST PROVIDED HISTORY: Has a \"code stroke\" or \"stroke alert\" been called? ->No Reason for exam:->evaluate head bleed What reading provider will be dictating this exam?->CRC FINDINGS: BRAIN/VENTRICLES: Interval increase in size of right frontal parenchymal contusion, measuring 14 x 7 x 10 mm, previously 10 x 5 x 5 mm. Additional right frontal contusion, more inferiorly has also increased in size. Punctate contusions in the anterior-inferior frontal lobe along the gyrus rectus have also increased. Scattered bilateral hemispheric subarachnoid hemorrhage. Right convexity subdural hematoma measures up to 6 mm in thickness, not substantially changed. Trace left parietal subdural hematoma measuring 2 mm in thickness. 5 mm leftward midline shift, not substantially changed. No herniation. Patent basal cisterns. ORBITS: The visualized portion of the orbits demonstrate no acute abnormality. SINUSES: Nasopharyngeal opacities with partially imaged esophagogastric and endotracheal tubes. Scattered paranasal sinus opacities. SOFT TISSUES/SKULL:  Nondisplaced left temporal bone fracture extending to the otic capsule. Comminuted mildly displaced left parietal fracture. Nondisplaced right temporal bone fracture which is otic capsule sparing. Diffuse scalp swelling with posterior scalp contusions. Skin staples present. Mild interval increase in size of scattered parenchymal hematomas, mainly in the right frontal lobe, suspicious for diffuse axonal injury. No substantial change in acute right convexity subdural and left parietal subdural hematomas. Stable 5 mm leftward midline shift. Unchanged calvarial fractures, notable for a nondisplaced left temporal bone fracture possibly extending to the otic capsule. Recommend follow-up temporal bone CT.      Ct Head Wo Contrast    Result Date: 10/9/2020  EXAMINATION: CT OF THE HEAD WITHOUT CONTRAST  10/9/2020 12:11 pm TECHNIQUE: CT of the head was performed without the administration of intravenous contrast. Dose modulation, iterative reconstruction, and/or weight based adjustment of the mA/kV was utilized to reduce the radiation dose to as low as reasonably achievable. COMPARISON: 8 hours prior. HISTORY: ORDERING SYSTEM PROVIDED HISTORY: s/p ventric TECHNOLOGIST PROVIDED HISTORY: Reason for exam:->s/p ventric Has a \"code stroke\" or \"stroke alert\" been called? ->No What reading provider will be dictating this exam?->CRC FINDINGS: There has been interval placement of a right frontal approach ventriculostomy catheter terminating within the right lateral ventricle frontal horn abutting the septum pellucidum. There is split like configuration of the right lateral ventricle that may reflect over shunting. There is no temporal horn dilatation. There is diffuse cerebral edema with diffuse sulcal effacement. There is similar appearance of multiple foci of hemorrhagic contusions involving the right frontal lobe and to lesser extent left frontal lobe and right temporal lobe. The hemorrhagic contusions demonstrates mild surrounding edema. There is similar appearance of acute subarachnoid hemorrhage along the bilateral frontal convexities and right temporal convexity and to a lesser extent at the vertices. There is small volume of subdural hemorrhage along the right lateral tentorial leaflet. There is small volume subarachnoid hemorrhage within the interpeduncular cistern. There is new wedge-shaped region of low attenuation involving the left middle cerebellar peduncle and left cerebellar hemisphere, concerning for acute ischemia. There is no significant midline shift. There are bilateral parietal fractures, comminuted on the left, extending into the left temporal bone including the mastoid segment. Please refer to concurrently performed CT temporal bone imaging report. There is additional depressed fracture of the left superior orbital wall. There is diffuse subgaleal soft tissue swelling. Interval placement of right ventriculostomy catheter terminating within the right lateral ventricle frontal horn.   Interval development of slit-like appearance of the right lateral ventricle. Suspect acute ischemia involving the left middle cerebellar peduncle and left cerebellar hemisphere. Similar appearance of intracranial hemorrhage in hemorrhagic contusions as above. Findings discussed with Dr. Bonny Harvey at 12:53 p.m. on December 9, 2020. Ct Head Wo Contrast    Result Date: 10/9/2020  EXAMINATION: CT OF THE HEAD and cervical spine WITHOUT CONTRAST; CT OF THE FACE WITHOUT CONTRAST  10/8/2020 9:56 pm TECHNIQUE: CT of the head was performed without the administration of intravenous contrast. Dose modulation, iterative reconstruction, and/or weight based adjustment of the mA/kV was utilized to reduce the radiation dose to as low as reasonably achievable.; CT of the face was performed without the administration of intravenous contrast. Multiplanar reformatted images are provided for review. Dose modulation, iterative reconstruction, and/or weight based adjustment of the mA/kV was utilized to reduce the radiation dose to as low as reasonably achievable.; CT of the cervical spine was performed without the administration of intravenous contrast. Multiplanar reformatted images are provided for review. Dose modulation, iterative reconstruction, and/or weight based adjustment of the mA/kV was utilized to reduce the radiation dose to as low as reasonably achievable. COMPARISON: None. HISTORY: ORDERING SYSTEM PROVIDED HISTORY: trauma TECHNOLOGIST PROVIDED HISTORY: Reason for exam:->trauma Has a \"code stroke\" or \"stroke alert\" been called? ->No What reading provider will be dictating this exam?->CRC FINDINGS: Head: There is mild right frontoparietal holohemispheric subdural hemorrhage, measuring up to 6 mm. Mild mass effect and minimal midline shift of approximately 4 mm. Visualized skull demonstrates multiple mildly displaced fractures in the skull, involving left temporal bone, right temporal bone, bilateral parietal bones.   Left temporoparietal bone skull fractures appear to be comminuted in multiple locations. There also small hemorrhagic contusions in bilateral frontal lobes. Small amount of subarachnoid hemorrhage in the right frontal lobe. Mild fluid layering in the sphenoid sinus. Fractures involving the left orbit as well. The mastoid air cells are clear. However, left temporal bone fracture does extend through the region of the left external auditory canal and extends to the middle ear. Cervical spine: Vertebral body heights are intact. Alignment is intact. Facets are normally aligned. The odontoid process is intact. No significant prevertebral soft tissue swelling. Facial bones: Mandible is intact. The zygomatic arches are intact. Nasal bones are intact. Nasal bony septum is midline. Sphenoid temporal buttress is intact. Mildly displaced fracture of the roof of the left orbit. The orbital floor is intact. Globes are intact and not proptotic. No retro bulbar soft tissue hematoma. Mild left periorbital preseptal soft tissue swelling. Bilateral comminuted skull bone fractures involving bilateral temporal bones and parietal bones. Fractures are worse on the left side. Mild right holohemispheric subdural hematoma with mild midline shift. Bilateral frontal small hemorrhagic contusions. Left orbital roof mildly displaced fracture. No evidence for cervical fracture or subluxation. Critical findings reported to the ER physician at time of dictation. Ct Iac Posterior Fossa Wo Contrast    Result Date: 10/10/2020  EXAMINATION: CT OF THE INTERNAL AUDITORY CANAL WITHOUT CONTRAST 10/9/2020 12:11 pm TECHNIQUE: CT of the internal auditory canal was performed without contrast was performed without the administration of intravenous contrast. Multiplanar reformatted images are provided for review.  Dose modulation, iterative reconstruction, and/or weight based adjustment of the mA/kV was utilized to reduce the radiation dose to as low as reasonably achievable. COMPARISON: None HISTORY: ORDERING SYSTEM PROVIDED HISTORY: TRAUMA TECHNOLOGIST PROVIDED HISTORY: Reason for exam:->trauma What reading provider will be dictating this exam?->CRC FINDINGS: RIGHT TEMPORAL BONE:  The right external auditory canal is normal in appearance. There is no right temporal bone fracture identified. There is a small volume of fluid within the right mastoid air cells. The right middle ear is clear. The ossicles are normally aligned. The tegmen tympani is intact. The scutum is intact. There is no osseous dehiscence. The cochlea, vestibule and semicircular canals are normal in appearance. LEFT TEMPORAL BONE: There is a longitudinal fracture of the mastoid segment of the left temporal bone. There is incudomalleolar subluxation involving the head of the malleus and body of the incus. The fracture does not extend into the otic capsule. Hemorrhage is present within the left middle ear and mastoid air cells. A comminuted fracture of the squamous portion of the left temporal bone is noted. The cochlea, vestibule and semicircular canals are normal.     Longitudinal fracture of the mastoid portion of the left temporal bone with associated incudomalleolar subluxation involving the head of the malleus and body of the incus. The fracture does not extend into the otic capsule. Small volume of fluid within the right mastoid air cells but no acute traumatic injury of the mastoid portion of the right temporal bone evident.      Ct Facial Bones Wo Contrast    Result Date: 10/9/2020  EXAMINATION: CT OF THE HEAD and cervical spine WITHOUT CONTRAST; CT OF THE FACE WITHOUT CONTRAST  10/8/2020 9:56 pm TECHNIQUE: CT of the head was performed without the administration of intravenous contrast. Dose modulation, iterative reconstruction, and/or weight based adjustment of the mA/kV was utilized to reduce the radiation dose to as low as reasonably achievable.; CT of the face was performed without the administration of intravenous contrast. Multiplanar reformatted images are provided for review. Dose modulation, iterative reconstruction, and/or weight based adjustment of the mA/kV was utilized to reduce the radiation dose to as low as reasonably achievable.; CT of the cervical spine was performed without the administration of intravenous contrast. Multiplanar reformatted images are provided for review. Dose modulation, iterative reconstruction, and/or weight based adjustment of the mA/kV was utilized to reduce the radiation dose to as low as reasonably achievable. COMPARISON: None. HISTORY: ORDERING SYSTEM PROVIDED HISTORY: trauma TECHNOLOGIST PROVIDED HISTORY: Reason for exam:->trauma Has a \"code stroke\" or \"stroke alert\" been called? ->No What reading provider will be dictating this exam?->CRC FINDINGS: Head: There is mild right frontoparietal holohemispheric subdural hemorrhage, measuring up to 6 mm. Mild mass effect and minimal midline shift of approximately 4 mm. Visualized skull demonstrates multiple mildly displaced fractures in the skull, involving left temporal bone, right temporal bone, bilateral parietal bones. Left temporoparietal bone skull fractures appear to be comminuted in multiple locations. There also small hemorrhagic contusions in bilateral frontal lobes. Small amount of subarachnoid hemorrhage in the right frontal lobe. Mild fluid layering in the sphenoid sinus. Fractures involving the left orbit as well. The mastoid air cells are clear. However, left temporal bone fracture does extend through the region of the left external auditory canal and extends to the middle ear. Cervical spine: Vertebral body heights are intact. Alignment is intact. Facets are normally aligned. The odontoid process is intact. No significant prevertebral soft tissue swelling. Facial bones: Mandible is intact. The zygomatic arches are intact. Nasal bones are intact.   Nasal bony septum is midline. Sphenoid temporal buttress is intact. Mildly displaced fracture of the roof of the left orbit. The orbital floor is intact. Globes are intact and not proptotic. No retro bulbar soft tissue hematoma. Mild left periorbital preseptal soft tissue swelling. Bilateral comminuted skull bone fractures involving bilateral temporal bones and parietal bones. Fractures are worse on the left side. Mild right holohemispheric subdural hematoma with mild midline shift. Bilateral frontal small hemorrhagic contusions. Left orbital roof mildly displaced fracture. No evidence for cervical fracture or subluxation. Critical findings reported to the ER physician at time of dictation. Ct Chest W Contrast    Result Date: 10/9/2020  EXAMINATION: CT OF THE CHEST WITH CONTRAST 10/8/2020 10:56 pm TECHNIQUE: CT of the chest was performed with the administration of intravenous contrast. Multiplanar reformatted images are provided for review. Dose modulation, iterative reconstruction, and/or weight based adjustment of the mA/kV was utilized to reduce the radiation dose to as low as reasonably achievable. COMPARISON: None. HISTORY: ORDERING SYSTEM PROVIDED HISTORY: trauma TECHNOLOGIST PROVIDED HISTORY: Reason for exam:->trauma What reading provider will be dictating this exam?->CRC FINDINGS: An endotracheal and enteric tubes are noted in place and are appropriately positioned. Mediastinum: Normal heart size. The great vessels are within normal limits. No pericardial effusion. No significantly enlarged lymph nodes. Lungs/pleura: Mild dependent congestion involving the bilateral posterior lungs. No pulmonary mass or augustin consolidation. No pleural effusion. Upper Abdomen: Within normal limits. Soft Tissues/Bones: Nondisplaced acute fractures involving the lateral left 5th through 7th ribs. Nondisplaced acute fractures of the posterior left 4th through 7th ribs.   Acute mildly displaced fractures of the right temporal bone, bilateral parietal bones. Left temporoparietal bone skull fractures appear to be comminuted in multiple locations. There also small hemorrhagic contusions in bilateral frontal lobes. Small amount of subarachnoid hemorrhage in the right frontal lobe. Mild fluid layering in the sphenoid sinus. Fractures involving the left orbit as well. The mastoid air cells are clear. However, left temporal bone fracture does extend through the region of the left external auditory canal and extends to the middle ear. Cervical spine: Vertebral body heights are intact. Alignment is intact. Facets are normally aligned. The odontoid process is intact. No significant prevertebral soft tissue swelling. Facial bones: Mandible is intact. The zygomatic arches are intact. Nasal bones are intact. Nasal bony septum is midline. Sphenoid temporal buttress is intact. Mildly displaced fracture of the roof of the left orbit. The orbital floor is intact. Globes are intact and not proptotic. No retro bulbar soft tissue hematoma. Mild left periorbital preseptal soft tissue swelling. Bilateral comminuted skull bone fractures involving bilateral temporal bones and parietal bones. Fractures are worse on the left side. Mild right holohemispheric subdural hematoma with mild midline shift. Bilateral frontal small hemorrhagic contusions. Left orbital roof mildly displaced fracture. No evidence for cervical fracture or subluxation. Critical findings reported to the ER physician at time of dictation. Ct Thoracic Spine Wo Contrast    Result Date: 10/9/2020  EXAMINATION: CT OF THE THORACIC SPINE WITHOUT CONTRAST  10/8/2020 10:56 pm: TECHNIQUE: CT of the thoracic spine was performed without the administration of intravenous contrast. Multiplanar reformatted images are provided for review.  Dose modulation, iterative reconstruction, and/or weight based adjustment of the mA/kV was utilized to reduce the radiation dose to as low as reasonably achievable. COMPARISON: Same day lumbar spine CT; same day CT chest, abdomen and pelvis HISTORY: ORDERING SYSTEM PROVIDED HISTORY: trauma TECHNOLOGIST PROVIDED HISTORY: Reason for exam:->trauma What reading provider will be dictating this exam?->CRC FINDINGS: BONES/ALIGNMENT: There is normal alignment of the spine. The vertebral body heights are maintained. No osseous destructive lesion is seen. DEGENERATIVE CHANGES: Mild spondylosis without significant central canal narrowing. Mild right foraminal narrowing at T11-T12. SOFT TISSUES: No paraspinal mass is seen. No acute thoracic spine abnormality. Ct Lumbar Spine Wo Contrast    Result Date: 10/9/2020  EXAMINATION: CT OF THE LUMBAR SPINE WITHOUT CONTRAST  10/8/2020 TECHNIQUE: CT of the lumbar spine was performed without the administration of intravenous contrast. Multiplanar reformatted images are provided for review. Dose modulation, iterative reconstruction, and/or weight based adjustment of the mA/kV was utilized to reduce the radiation dose to as low as reasonably achievable. COMPARISON: None HISTORY: ORDERING SYSTEM PROVIDED HISTORY: trauma TECHNOLOGIST PROVIDED HISTORY: Reason for exam:->trauma What reading provider will be dictating this exam?->CRC FINDINGS: BONES/ALIGNMENT: Vertebral body heights are maintained. No compression deformity. L5 pars defects with grade 1 anterolisthesis of L5 on S1. DEGENERATIVE CHANGES: Moderate disc desiccation at L5-S1. No significant central canal stenosis. Moderate-to-severe foraminal stenosis at the listhesis level. SOFT TISSUES/RETROPERITONEUM: No paraspinal mass is seen. No acute lumbar spine abnormality. L5 pars defects with grade 1 anterolisthesis of L5 on S1 and moderate-to-severe foraminal stenosis.      Ct Abdomen Pelvis W Iv Contrast Additional Contrast? None    Result Date: 10/9/2020  EXAMINATION: CT OF THE ABDOMEN AND PELVIS WITH CONTRAST 10/8/2020 10:56 pm TECHNIQUE: CT of the abdomen and pelvis was performed with the administration of intravenous contrast. Multiplanar reformatted images are provided for review. Dose modulation, iterative reconstruction, and/or weight based adjustment of the mA/kV was utilized to reduce the radiation dose to as low as reasonably achievable. COMPARISON: None. HISTORY: ORDERING SYSTEM PROVIDED HISTORY: trauma TECHNOLOGIST PROVIDED HISTORY: Additional Contrast?->None Reason for exam:->trauma What reading provider will be dictating this exam?->CRC FINDINGS: Lower Chest: Described in detail on separate report of CT of the chest. Organs: The liver, spleen, pancreas, and bilateral adrenal glands are within normal limits. No radiopaque gallstones. No CT evidence of acute cholecystitis. No intra or extrahepatic ductal dilatation. The bilateral kidneys are within normal limits. No renal cysts or masses are noted. No hydronephrosis or hydroureter. GI/Bowel: The small and large bowel demonstrate normal caliber and appearance. A normal-appearing appendix is identified. Pelvis: A soft tissue structure is noted in the distal right inguinal canal which could represent a deeply retracted testicle versus an undescended testicle. Recommend clinical correlation. The urinary bladder is nearly decompressed with a Bentley catheter in place. Peritoneum/Retroperitoneum: No free fluid or free air. Bones/Soft Tissues: Multiple rib fractures, as described in report of CT of the chest.  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 A soft tissue structure is noted in the distal right inguinal canal presumably representing the right testicle and could represent a deeply retracted testicle versus an undescended testicle. Recommend clinical correlation.      Xr Chest Portable    Result Date: 10/9/2020  EXAMINATION: ONE XRAY VIEW OF THE CHEST 10/9/2020 6:59 am COMPARISON: 10/09/2020 HISTORY: ORDERING SYSTEM PROVIDED HISTORY: intubated TECHNOLOGIST PROVIDED HISTORY: Reason for exam:->intubated What reading provider will be dictating this exam?->CRC FINDINGS: Lines/tubes are appropriate. Heart size is normal.  There are no infiltrates or effusions. No acute process     Xr Chest Portable    Result Date: 10/9/2020  EXAMINATION: ONE XRAY VIEW OF THE CHEST 10/9/2020 12:42 am COMPARISON: 10/08/2020 at this 2248 hours. Vera Loyd HISTORY: ORDERING SYSTEM PROVIDED HISTORY: Line Placement TECHNOLOGIST PROVIDED HISTORY: Reason for exam:->Line Placement What reading provider will be dictating this exam?->CRC FINDINGS: Heart is not enlarged. Endotracheal tube and enteric tube are in place. Left IJ CVC is in place with tip in the proximal SVC. No pneumothorax. No pleural effusions. No pulmonary edema or pneumothorax. Endotracheal

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
## 2020-10-24 NOTE — PLAN OF CARE
Problem: Falls - Risk of:  Goal: Will remain free from falls  Description: Will remain free from falls  10/23/2020 2042 by Dalia James  Outcome: Met This Shift  10/23/2020 0829 by Johnna Marino RN  Outcome: Met This Shift  Goal: Absence of physical injury  Description: Absence of physical injury  10/23/2020 2042 by Dalia James  Outcome: Met This Shift  10/23/2020 0829 by Johnna Marino RN  Outcome: Met This Shift     Problem: Skin Integrity:  Goal: Will show no infection signs and symptoms  Description: Will show no infection signs and symptoms  10/23/2020 2042 by Dalia James  Outcome: Met This Shift  10/23/2020 0829 by Johnna Marino RN  Outcome: Met This Shift  Goal: Absence of new skin breakdown  Description: Absence of new skin breakdown  10/23/2020 2042 by Dalia James  Outcome: Met This Shift  10/23/2020 0829 by Johnna Marino RN  Outcome: Met This Shift     Problem: Pain:  Description: Pain management should include both nonpharmacologic and pharmacologic interventions. Goal: Pain level will decrease  Description: Pain level will decrease  10/23/2020 2042 by Dalia James  Outcome: Ongoing  10/23/2020 0829 by Johnna Marino RN  Outcome: Met This Shift  Goal: Control of acute pain  Description: Control of acute pain  Outcome: Met This Shift  Goal: Control of chronic pain  Description: Control of chronic pain  Outcome: Met This Shift     Problem: Airway Clearance - Ineffective:  Goal: Ability to maintain a clear airway will improve  Description: Ability to maintain a clear airway will improve  Outcome: Met This Shift     Problem: Anxiety/Stress:  Goal: Level of anxiety will decrease  Description: Level of anxiety will decrease  Outcome: Ongoing     Problem: Aspiration:  Goal: Absence of aspiration  Description: Absence of aspiration  Outcome: Met This Shift     Problem:  Bowel Function - Altered:  Goal: Bowel elimination is within specified parameters  Description: Bowel elimination is within specified parameters  Outcome: Met This Shift     Problem: Cardiac Output - Decreased:  Goal: Hemodynamic stability will improve  Description: Hemodynamic stability will improve  Outcome: Met This Shift     Problem: Mental Status - Impaired:  Goal: Mental status will be restored to baseline  Description: Mental status will be restored to baseline  Outcome: Not Met This Shift     Problem: Nutrition Deficit:  Goal: Ability to achieve adequate nutritional intake will improve  Description: Ability to achieve adequate nutritional intake will improve  Outcome: Met This Shift     Problem: Restraint Use - Nonviolent/Non-Self-Destructive Behavior:  Goal: Absence of restraint indications  Description: Absence of restraint indications  10/23/2020 2042 by Jeremie Garnica  Outcome: Not Met This Shift  10/23/2020 1828 by Francis Carlson RN  Outcome: Not Met This Shift  10/23/2020 0829 by Francis Carlson RN  Outcome: Not Met This Shift  Goal: Absence of restraint-related injury  Description: Absence of restraint-related injury  10/23/2020 2042 by Jeremie Garnica  Outcome: Met This Shift  10/23/2020 1828 by Francis Carlson RN  Outcome: Met This Shift  10/23/2020 0829 by Francis Carlson RN  Outcome: Met This Shift

## 2020-10-24 NOTE — FLOWSHEET NOTE
Patient will reach at lines and tubes, kick legs and throw arms over the side rails needing to be redirected much of time and at great risk of injury. Attempting to provide calm environment and reorientation, but patient still assessed to be a risk of harm to self. Family aware for need of restraints. Will continue to monitor patient and assess for earliest removal capability.

## 2020-10-24 NOTE — FLOWSHEET NOTE
Patient will reach at lines and tubes and kicks legs over the side rails needing to be redirected much of time and at great risk for injury. Attempting to provide calm environment and reorientation, but patient still assessed to be a risk of harm to self. Family aware for need of restraints. Will continue to monitor patient and assess for earliest removal capability.

## 2020-10-24 NOTE — FLOWSHEET NOTE
Patient will reach at lines and tubes  And kick legs over side rails needing to be redirected much of time. Attempting to provide calm environment and reorientation, but patient still assessed to be a risk of harm to self. Family aware for need of restraints. Will continue to monitor patient and assess for earliest removal capability.

## 2020-10-24 NOTE — PLAN OF CARE
Problem: Falls - Risk of:  Goal: Will remain free from falls  Description: Will remain free from falls  Outcome: Met This Shift  Goal: Absence of physical injury  Description: Absence of physical injury  Outcome: Met This Shift     Problem: Airway Clearance - Ineffective:  Goal: Ability to maintain a clear airway will improve  Description: Ability to maintain a clear airway will improve  Outcome: Met This Shift     Problem: Aspiration:  Goal: Absence of aspiration  Description: Absence of aspiration  Outcome: Met This Shift     Problem:  Bowel Function - Altered:  Goal: Bowel elimination is within specified parameters  Description: Bowel elimination is within specified parameters  Outcome: Met This Shift     Problem: Mental Status - Impaired:  Goal: Mental status will be restored to baseline  Description: Mental status will be restored to baseline  Outcome: Met This Shift     Problem: Restraint Use - Nonviolent/Non-Self-Destructive Behavior:  Goal: Absence of restraint-related injury  Description: Absence of restraint-related injury  10/24/2020 1325 by Kesha Devine RN  Outcome: Met This Shift  10/24/2020 0025 by Leopoldo Grater  Outcome: Met This Shift     Problem: Anxiety/Stress:  Goal: Level of anxiety will decrease  Description: Level of anxiety will decrease  Outcome: Ongoing     Problem: Restraint Use - Nonviolent/Non-Self-Destructive Behavior:  Goal: Absence of restraint indications  Description: Absence of restraint indications  10/24/2020 1325 by Kesha Devine RN  Outcome: Not Met This Shift  10/24/2020 0025 by Leopoldo Grater  Outcome: Not Met This Shift
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
## 2020-10-24 NOTE — PLAN OF CARE
Problem: Falls - Risk of:  Goal: Will remain free from falls  Description: Will remain free from falls  10/24/2020 1741 by Vernon Randhawa RN  Outcome: Met This Shift  10/24/2020 1325 by Vernon Randhawa RN  Outcome: Met This Shift  Goal: Absence of physical injury  Description: Absence of physical injury  10/24/2020 1741 by Vernon Randahwa RN  Outcome: Met This Shift  10/24/2020 1325 by Vernon Randhawa RN  Outcome: Met This Shift     Problem: Airway Clearance - Ineffective:  Goal: Ability to maintain a clear airway will improve  Description: Ability to maintain a clear airway will improve  Outcome: Met This Shift     Problem: Aspiration:  Goal: Absence of aspiration  Description: Absence of aspiration  Outcome: Met This Shift     Problem:  Bowel Function - Altered:  Goal: Bowel elimination is within specified parameters  Description: Bowel elimination is within specified parameters  Outcome: Met This Shift     Problem: Mental Status - Impaired:  Goal: Mental status will be restored to baseline  Description: Mental status will be restored to baseline  Outcome: Met This Shift     Problem: Restraint Use - Nonviolent/Non-Self-Destructive Behavior:  Goal: Absence of restraint-related injury  Description: Absence of restraint-related injury  10/24/2020 1741 by Vernon Randhawa RN  Outcome: Met This Shift  10/24/2020 1325 by Vernon Randhawa RN  Outcome: Met This Shift     Problem: Restraint Use - Nonviolent/Non-Self-Destructive Behavior:  Goal: Absence of restraint indications  Description: Absence of restraint indications  10/24/2020 1741 by Vernon Randhawa RN  Outcome: Not Met This Shift  10/24/2020 1325 by Vernon Randhawa RN  Outcome: Not Met This Shift

## 2020-10-25 PROCEDURE — 93005 ELECTROCARDIOGRAM TRACING: CPT | Performed by: STUDENT IN AN ORGANIZED HEALTH CARE EDUCATION/TRAINING PROGRAM

## 2020-10-25 PROCEDURE — 2000000000 HC ICU R&B

## 2020-10-25 PROCEDURE — 6370000000 HC RX 637 (ALT 250 FOR IP): Performed by: STUDENT IN AN ORGANIZED HEALTH CARE EDUCATION/TRAINING PROGRAM

## 2020-10-25 PROCEDURE — 2700000000 HC OXYGEN THERAPY PER DAY

## 2020-10-25 PROCEDURE — 2580000003 HC RX 258: Performed by: SURGERY

## 2020-10-25 PROCEDURE — 6360000002 HC RX W HCPCS: Performed by: STUDENT IN AN ORGANIZED HEALTH CARE EDUCATION/TRAINING PROGRAM

## 2020-10-25 PROCEDURE — 99291 CRITICAL CARE FIRST HOUR: CPT | Performed by: SURGERY

## 2020-10-25 PROCEDURE — 6370000000 HC RX 637 (ALT 250 FOR IP): Performed by: SURGERY

## 2020-10-25 RX ORDER — QUETIAPINE FUMARATE 25 MG/1
50 TABLET, FILM COATED ORAL 2 TIMES DAILY
Status: DISCONTINUED | OUTPATIENT
Start: 2020-10-25 | End: 2020-10-27

## 2020-10-25 RX ADMIN — NYSTATIN 500000 UNITS: 100000 SUSPENSION ORAL at 20:59

## 2020-10-25 RX ADMIN — CLONIDINE HYDROCHLORIDE 0.3 MG: 0.2 TABLET ORAL at 15:12

## 2020-10-25 RX ADMIN — BACITRACIN ZINC: 500 OINTMENT TOPICAL at 21:00

## 2020-10-25 RX ADMIN — CLONIDINE HYDROCHLORIDE 0.3 MG: 0.2 TABLET ORAL at 20:59

## 2020-10-25 RX ADMIN — SENNOSIDES 5 ML: 8.8 LIQUID ORAL at 21:00

## 2020-10-25 RX ADMIN — FAMOTIDINE 20 MG: 20 TABLET ORAL at 21:00

## 2020-10-25 RX ADMIN — ENOXAPARIN SODIUM 30 MG: 30 INJECTION SUBCUTANEOUS at 08:59

## 2020-10-25 RX ADMIN — FAMOTIDINE 20 MG: 20 TABLET ORAL at 08:08

## 2020-10-25 RX ADMIN — HEPARIN SODIUM 5000 UNITS: 10000 INJECTION INTRAVENOUS; SUBCUTANEOUS at 06:23

## 2020-10-25 RX ADMIN — ENOXAPARIN SODIUM 30 MG: 30 INJECTION SUBCUTANEOUS at 21:00

## 2020-10-25 RX ADMIN — OXYCODONE HYDROCHLORIDE 5 MG: 5 SOLUTION ORAL at 05:45

## 2020-10-25 RX ADMIN — QUETIAPINE FUMARATE 50 MG: 25 TABLET ORAL at 08:59

## 2020-10-25 RX ADMIN — NYSTATIN 500000 UNITS: 100000 SUSPENSION ORAL at 17:30

## 2020-10-25 RX ADMIN — Medication 10 ML: at 21:00

## 2020-10-25 RX ADMIN — Medication 10 ML: at 08:59

## 2020-10-25 RX ADMIN — CLONIDINE HYDROCHLORIDE 0.3 MG: 0.2 TABLET ORAL at 08:07

## 2020-10-25 RX ADMIN — BACITRACIN ZINC: 500 OINTMENT TOPICAL at 09:00

## 2020-10-25 RX ADMIN — NYSTATIN 500000 UNITS: 100000 SUSPENSION ORAL at 08:08

## 2020-10-25 RX ADMIN — SODIUM CHLORIDE SOLN NEBU 3% 4 ML: 3 NEBU SOLN at 03:56

## 2020-10-25 ASSESSMENT — PAIN SCALES - GENERAL
PAINLEVEL_OUTOF10: 3
PAINLEVEL_OUTOF10: 0
PAINLEVEL_OUTOF10: 1
PAINLEVEL_OUTOF10: 0

## 2020-10-25 NOTE — FLOWSHEET NOTE
Patient will reach at lines and tubes and kicking legs over the side rails needing to be redirected much of time. Attempting to provide calm environment and reorientation, but patient still assessed to be a risk of harm to self. Family aware for need of restraints. Will continue to monitor patient and assess for earliest removal capability.
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
## 2020-10-25 NOTE — FLOWSHEET NOTE
Patient will reach at lines and tubes and kick legs over the side rails needing to be redirected much of time and at risk of injury to self. Attempting to provide calm environment and reorientation, but patient still assessed to be a risk of harm to self. The patient's family is aware for the need of restraints. Will continue to monitor patient and assess for the earliest removal capability.

## 2020-10-25 NOTE — PLAN OF CARE
Problem: Falls - Risk of:  Goal: Will remain free from falls  Description: Will remain free from falls  10/25/2020 0104 by Moriah Saha RN  Outcome: Met This Shift  10/24/2020 1741 by Chapito Acosta RN  Outcome: Met This Shift  10/24/2020 1325 by Chapito Acosta RN  Outcome: Met This Shift  Goal: Absence of physical injury  Description: Absence of physical injury  10/24/2020 1741 by Chapito Acosta RN  Outcome: Met This Shift  10/24/2020 1325 by Chapito Acosta RN  Outcome: Met This Shift     Problem: Skin Integrity:  Goal: Will show no infection signs and symptoms  Description: Will show no infection signs and symptoms  Outcome: Met This Shift     Problem: Pain:  Goal: Pain level will decrease  Description: Pain level will decrease  Outcome: Met This Shift     Problem: Airway Clearance - Ineffective:  Goal: Ability to maintain a clear airway will improve  Description: Ability to maintain a clear airway will improve  10/24/2020 1325 by Chapito Acosta RN  Outcome: Met This Shift     Problem: Aspiration:  Goal: Absence of aspiration  Description: Absence of aspiration  10/24/2020 1325 by Chapito Acosta RN  Outcome: Met This Shift     Problem:  Bowel Function - Altered:  Goal: Bowel elimination is within specified parameters  Description: Bowel elimination is within specified parameters  10/24/2020 1325 by Chapito Acosta RN  Outcome: Met This Shift     Problem: Mental Status - Impaired:  Goal: Mental status will be restored to baseline  Description: Mental status will be restored to baseline  10/24/2020 1325 by Chapito Acosta RN  Outcome: Met This Shift     Problem: Pain:  Goal: Pain level will decrease  Description: Pain level will decrease  Outcome: Met This Shift     Problem: Restraint Use - Nonviolent/Non-Self-Destructive Behavior:  Goal: Absence of restraint-related injury  Description: Absence of restraint-related injury  10/25/2020 0104 by Joanie Navarrete Valentino Nose RN  Outcome: Met This Shift  10/24/2020 1741 by Obey Busch RN  Outcome: Met This Shift  10/24/2020 1325 by Obey Busch, RN  Outcome: Met This Shift

## 2020-10-25 NOTE — FLOWSHEET NOTE
Pt on trach and confused. Unable to follow commands, and continues to be purposeful reaching for lines and tubes, and kicked and trying to get out of bed. Verbal re-direction unsuccessful at this time. Bilateral soft wrist and bilateral soft ankle restraints continued for patient safety. Will continue to monitor.

## 2020-10-25 NOTE — PLAN OF CARE
Restraint Use - Nonviolent/Non-Self-Destructive Behavior:  Goal: Absence of restraint-related injury  Description: Absence of restraint-related injury  10/25/2020 0105 by Raysa Wright RN  Outcome: Met This Shift  10/25/2020 0104 by Raysa Wright RN  Outcome: Met This Shift  10/24/2020 1741 by Crystal Santana RN  Outcome: Met This Shift  10/24/2020 1325 by Crystal Santana RN  Outcome: Met This Shift

## 2020-10-25 NOTE — PLAN OF CARE
Problem: Falls - Risk of:  Goal: Will remain free from falls  Description: Will remain free from falls  10/25/2020 1033 by Trish Blanton RN  Outcome: Met This Shift  10/25/2020 0105 by Adalberto Platt RN  Outcome: Met This Shift  10/25/2020 0104 by Adalberto Platt RN  Outcome: Met This Shift  Goal: Absence of physical injury  Description: Absence of physical injury  Outcome: Met This Shift     Problem: Skin Integrity:  Goal: Will show no infection signs and symptoms  Description: Will show no infection signs and symptoms  10/25/2020 0105 by Adalberto Platt RN  Outcome: Met This Shift  10/25/2020 0104 by Adalberto Platt RN  Outcome: Met This Shift     Problem: Pain:  Goal: Pain level will decrease  Description: Pain level will decrease  10/25/2020 0104 by Adalberto Platt RN  Outcome: Met This Shift     Problem: Airway Clearance - Ineffective:  Goal: Ability to maintain a clear airway will improve  Description: Ability to maintain a clear airway will improve  Outcome: Met This Shift     Problem: Gas Exchange - Impaired:  Goal: Levels of oxygenation will improve  Description: Levels of oxygenation will improve  Outcome: Met This Shift     Problem: Pain:  Goal: Pain level will decrease  Description: Pain level will decrease  10/25/2020 0104 by Adalberto Platt RN  Outcome: Met This Shift     Problem: Sleep Pattern Disturbance:  Goal: Appears well-rested  Description: Appears well-rested  Outcome: Met This Shift     Problem: Restraint Use - Nonviolent/Non-Self-Destructive Behavior:  Goal: Absence of restraint-related injury  Description: Absence of restraint-related injury  10/25/2020 1033 by Trish Blanton RN  Outcome: Met This Shift  10/25/2020 0105 by Adalberto Platt RN  Outcome: Met This Shift  10/25/2020 0104 by Adalberto Platt RN  Outcome: Met This Shift     Problem: Anxiety/Stress:  Goal: Level of anxiety will decrease  Description: Level of anxiety will decrease  10/25/2020

## 2020-10-25 NOTE — FLOWSHEET NOTE
Patient will reach at lines and tubes and kick legs and arms over the side rails needing to be redirected much of time, and at great risk of injury. Attempting to provide calm environment and reorientation, but patient still assessed to be a risk of harm to self. Family aware for need of restraints. Will continue to monitor patient and assess for earliest removal capability.

## 2020-10-25 NOTE — PLAN OF CARE
Problem: Falls - Risk of:  Goal: Will remain free from falls  Description: Will remain free from falls  10/25/2020 1845 by Deloris Lockhart RN  Outcome: Met This Shift  10/25/2020 1033 by Deloris Lockhart RN  Outcome: Met This Shift  Goal: Absence of physical injury  Description: Absence of physical injury  10/25/2020 1845 by Deloris Lockhart RN  Outcome: Met This Shift  10/25/2020 1033 by Deloris Lockhart RN  Outcome: Met This Shift     Problem: Airway Clearance - Ineffective:  Goal: Ability to maintain a clear airway will improve  Description: Ability to maintain a clear airway will improve  Outcome: Met This Shift     Problem: Gas Exchange - Impaired:  Goal: Levels of oxygenation will improve  Description: Levels of oxygenation will improve  Outcome: Met This Shift     Problem: Sleep Pattern Disturbance:  Goal: Appears well-rested  Description: Appears well-rested  Outcome: Met This Shift     Problem: Restraint Use - Nonviolent/Non-Self-Destructive Behavior:  Goal: Absence of restraint-related injury  Description: Absence of restraint-related injury  10/25/2020 1845 by Deloris Lockhart RN  Outcome: Met This Shift  10/25/2020 1033 by Deloris Lockhart RN  Outcome: Met This Shift     Problem: Anxiety/Stress:  Goal: Level of anxiety will decrease  Description: Level of anxiety will decrease  Outcome: Ongoing     Problem: Restraint Use - Nonviolent/Non-Self-Destructive Behavior:  Goal: Absence of restraint indications  Description: Absence of restraint indications  10/25/2020 1845 by Deloris Lockhart RN  Outcome: Not Met This Shift  10/25/2020 1033 by Deloris Lockhart RN  Outcome: Not Met This Shift

## 2020-10-25 NOTE — FLOWSHEET NOTE
Pt on trach, confused and unable to follow commands. Continues to be purposeful and pulls at lines and tubes, kicks legs and tries to get out of bed. Verbal re-direction unsuccessful at this time. Bilateral soft wrist and bilateral soft ankle restraints continued at this time for patient safety. Will continue to monitor.

## 2020-10-26 ENCOUNTER — APPOINTMENT (OUTPATIENT)
Dept: MRI IMAGING | Age: 30
DRG: 003 | End: 2020-10-26
Payer: COMMERCIAL

## 2020-10-26 LAB
ANION GAP SERPL CALCULATED.3IONS-SCNC: 13 MMOL/L (ref 7–16)
BASOPHILS ABSOLUTE: 0.17 E9/L (ref 0–0.2)
BASOPHILS RELATIVE PERCENT: 1.5 % (ref 0–2)
BUN BLDV-MCNC: 29 MG/DL (ref 6–20)
CALCIUM SERPL-MCNC: 9.1 MG/DL (ref 8.6–10.2)
CHLORIDE BLD-SCNC: 99 MMOL/L (ref 98–107)
CO2: 26 MMOL/L (ref 22–29)
CREAT SERPL-MCNC: 0.8 MG/DL (ref 0.7–1.2)
EOSINOPHILS ABSOLUTE: 0.38 E9/L (ref 0.05–0.5)
EOSINOPHILS RELATIVE PERCENT: 3.4 % (ref 0–6)
GFR AFRICAN AMERICAN: >60
GFR NON-AFRICAN AMERICAN: >60 ML/MIN/1.73
GLUCOSE BLD-MCNC: 88 MG/DL (ref 74–99)
HCT VFR BLD CALC: 42.6 % (ref 37–54)
HEMOGLOBIN: 13.5 G/DL (ref 12.5–16.5)
IMMATURE GRANULOCYTES #: 0.13 E9/L
IMMATURE GRANULOCYTES %: 1.2 % (ref 0–5)
LYMPHOCYTES ABSOLUTE: 1.94 E9/L (ref 1.5–4)
LYMPHOCYTES RELATIVE PERCENT: 17.5 % (ref 20–42)
MAGNESIUM: 2.5 MG/DL (ref 1.6–2.6)
MCH RBC QN AUTO: 30.1 PG (ref 26–35)
MCHC RBC AUTO-ENTMCNC: 31.7 % (ref 32–34.5)
MCV RBC AUTO: 94.9 FL (ref 80–99.9)
MONOCYTES ABSOLUTE: 1.11 E9/L (ref 0.1–0.95)
MONOCYTES RELATIVE PERCENT: 10 % (ref 2–12)
NEUTROPHILS ABSOLUTE: 7.33 E9/L (ref 1.8–7.3)
NEUTROPHILS RELATIVE PERCENT: 66.4 % (ref 43–80)
PDW BLD-RTO: 13.5 FL (ref 11.5–15)
PHOSPHORUS: 3.8 MG/DL (ref 2.5–4.5)
PLATELET # BLD: 431 E9/L (ref 130–450)
PMV BLD AUTO: 9.4 FL (ref 7–12)
POTASSIUM SERPL-SCNC: 4.9 MMOL/L (ref 3.5–5)
RBC # BLD: 4.49 E12/L (ref 3.8–5.8)
SODIUM BLD-SCNC: 138 MMOL/L (ref 132–146)
WBC # BLD: 11.1 E9/L (ref 4.5–11.5)

## 2020-10-26 PROCEDURE — 6370000000 HC RX 637 (ALT 250 FOR IP): Performed by: SURGERY

## 2020-10-26 PROCEDURE — 2000000000 HC ICU R&B

## 2020-10-26 PROCEDURE — 99291 CRITICAL CARE FIRST HOUR: CPT | Performed by: SURGERY

## 2020-10-26 PROCEDURE — 72141 MRI NECK SPINE W/O DYE: CPT

## 2020-10-26 PROCEDURE — 80048 BASIC METABOLIC PNL TOTAL CA: CPT

## 2020-10-26 PROCEDURE — 84100 ASSAY OF PHOSPHORUS: CPT

## 2020-10-26 PROCEDURE — 36415 COLL VENOUS BLD VENIPUNCTURE: CPT

## 2020-10-26 PROCEDURE — 6360000002 HC RX W HCPCS: Performed by: STUDENT IN AN ORGANIZED HEALTH CARE EDUCATION/TRAINING PROGRAM

## 2020-10-26 PROCEDURE — 6370000000 HC RX 637 (ALT 250 FOR IP): Performed by: STUDENT IN AN ORGANIZED HEALTH CARE EDUCATION/TRAINING PROGRAM

## 2020-10-26 PROCEDURE — 2700000000 HC OXYGEN THERAPY PER DAY

## 2020-10-26 PROCEDURE — 85025 COMPLETE CBC W/AUTO DIFF WBC: CPT

## 2020-10-26 PROCEDURE — 2580000003 HC RX 258: Performed by: SURGERY

## 2020-10-26 PROCEDURE — 83735 ASSAY OF MAGNESIUM: CPT

## 2020-10-26 RX ADMIN — FAMOTIDINE 20 MG: 20 TABLET ORAL at 20:16

## 2020-10-26 RX ADMIN — NYSTATIN 500000 UNITS: 100000 SUSPENSION ORAL at 20:18

## 2020-10-26 RX ADMIN — FAMOTIDINE 20 MG: 20 TABLET ORAL at 08:31

## 2020-10-26 RX ADMIN — CLONIDINE HYDROCHLORIDE 0.3 MG: 0.2 TABLET ORAL at 14:30

## 2020-10-26 RX ADMIN — Medication 10 ML: at 08:32

## 2020-10-26 RX ADMIN — QUETIAPINE FUMARATE 50 MG: 25 TABLET ORAL at 08:30

## 2020-10-26 RX ADMIN — ENOXAPARIN SODIUM 30 MG: 30 INJECTION SUBCUTANEOUS at 08:30

## 2020-10-26 RX ADMIN — ENOXAPARIN SODIUM 30 MG: 30 INJECTION SUBCUTANEOUS at 20:18

## 2020-10-26 RX ADMIN — Medication 10 ML: at 20:17

## 2020-10-26 RX ADMIN — BACITRACIN ZINC: 500 OINTMENT TOPICAL at 14:30

## 2020-10-26 RX ADMIN — NYSTATIN 500000 UNITS: 100000 SUSPENSION ORAL at 08:31

## 2020-10-26 RX ADMIN — BACITRACIN ZINC: 500 OINTMENT TOPICAL at 08:27

## 2020-10-26 RX ADMIN — CLONIDINE HYDROCHLORIDE 0.3 MG: 0.2 TABLET ORAL at 20:16

## 2020-10-26 RX ADMIN — CLONIDINE HYDROCHLORIDE 0.3 MG: 0.2 TABLET ORAL at 10:00

## 2020-10-26 RX ADMIN — QUETIAPINE FUMARATE 50 MG: 25 TABLET ORAL at 20:17

## 2020-10-26 RX ADMIN — BACITRACIN ZINC: 500 OINTMENT TOPICAL at 20:16

## 2020-10-26 RX ADMIN — NYSTATIN 500000 UNITS: 100000 SUSPENSION ORAL at 17:55

## 2020-10-26 RX ADMIN — SENNOSIDES 5 ML: 8.8 LIQUID ORAL at 20:16

## 2020-10-26 RX ADMIN — NYSTATIN 500000 UNITS: 100000 SUSPENSION ORAL at 13:44

## 2020-10-26 RX ADMIN — QUETIAPINE FUMARATE 50 MG: 25 TABLET ORAL at 00:54

## 2020-10-26 ASSESSMENT — PAIN SCALES - GENERAL
PAINLEVEL_OUTOF10: 0
PAINLEVEL_OUTOF10: 3
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
## 2020-10-26 NOTE — FLOWSHEET NOTE
Pt on trach. Continues to reach for lines and tubes upon agitation and kicks legs over the side of the bed. Verbal re-direction unsuccessful at this time. Bilateral soft wrist and bilateral soft ankle restraints continued at this time for patient safety. Will continue to monitor.

## 2020-10-26 NOTE — CARE COORDINATION
Per [facility], denial was upheld after expedited appeal. Per prev cm note, wife requesting Sn[facility] placement through the [facility]. Contacted Cristiano Wheeler at the [phone] x [phone]. [facility] form obtained and faxed back to Barrie Brandonmilylaila at [phone].       14:15  Spoke with pt's wife re: choices of [facility] facilities. Her first choice is [place]. Referral made to [facility]. 2nd choice is [facility], but they are not yet accepting pts at this time. Await acceptance from [place]. 14:30  [facility] cannot accept. Referrals were also made to [facility], [facility] and [facility]. None can accept. Message left with [facility] to inquire re: [facility] since all of the above snf's have declined.

## 2020-10-26 NOTE — PLAN OF CARE
Problem: Falls - Risk of:  Goal: Will remain free from falls  Description: Will remain free from falls  10/26/2020 0029 by Claudette Agreste, RN  Outcome: Met This Shift  10/25/2020 1845 by Tej Linares RN  Outcome: Met This Shift  10/25/2020 1033 by Tej Linares RN  Outcome: Met This Shift  Goal: Absence of physical injury  Description: Absence of physical injury  10/25/2020 2146 by Claudette Agreste, RN  Outcome: Met This Shift  10/25/2020 1845 by Tej Linares RN  Outcome: Met This Shift  10/25/2020 1033 by Tej Linares RN  Outcome: Met This Shift     Problem: Skin Integrity:  Goal: Will show no infection signs and symptoms  Description: Will show no infection signs and symptoms  10/26/2020 0029 by Claudette Agreste, RN  Outcome: Met This Shift  10/25/2020 2146 by Claudette Agreste, RN  Outcome: Met This Shift     Problem: Pain:  Goal: Pain level will decrease  Description: Pain level will decrease  10/26/2020 0029 by Claudette Agreste, RN  Outcome: Met This Shift  10/25/2020 2146 by Claudette Agreste, RN  Outcome: Met This Shift     Problem: Airway Clearance - Ineffective:  Goal: Ability to maintain a clear airway will improve  Description: Ability to maintain a clear airway will improve  10/25/2020 1033 by Tej Linares RN  Outcome: Met This Shift     Problem: Gas Exchange - Impaired:  Goal: Levels of oxygenation will improve  Description: Levels of oxygenation will improve  10/25/2020 1033 by Tej Linares RN  Outcome: Met This Shift     Problem: Pain:  Goal: Pain level will decrease  Description: Pain level will decrease  10/26/2020 0029 by Claudette Agreste, RN  Outcome: Met This Shift  10/25/2020 2146 by Claudette Agreste, RN  Outcome: Met This Shift     Problem: Sleep Pattern Disturbance:  Goal: Appears well-rested  Description: Appears well-rested  10/25/2020 1033 by Tej Linares RN  Outcome: Met This Shift     Problem: Restraint Use - Nonviolent/Non-Self-Destructive Behavior:  Goal: Absence of restraint-related injury  Description: Absence of restraint-related injury  10/26/2020 0029 by Lizzie Gomez RN  Outcome: Met This Shift  10/25/2020 2146 by Lizzie Gomez RN  Outcome: Met This Shift  10/25/2020 1845 by Victor Hugo Galan RN  Outcome: Met This Shift  10/25/2020 1033 by Victor Hugo Galan RN  Outcome: Met This Shift

## 2020-10-26 NOTE — PLAN OF CARE
restraint indications  Description: Absence of restraint indications  10/26/2020 1116 by Radha Tinoco RN  Outcome: Not Met This Shift  10/26/2020 0029 by Sadie Gonzalez RN  Outcome: Not Met This Shift  10/25/2020 2146 by Sadie Gonzalez RN  Outcome: Not Met This Shift

## 2020-10-26 NOTE — FLOWSHEET NOTE
Pt on trach and continues to pull at lines and tubes and kick legs over the side of the bed upon agitation. Verbal re-direction unsuccessful at this time. Bilateral soft wrist and bilateral soft ankle restraints continued for patient safety. Will continue to monitor.

## 2020-10-26 NOTE — PROGRESS NOTES
Neurosurg progress note  VITALS:  BP (!) 110/58   Pulse (!) 49   Temp 98.8 °F (37.1 °C) (Bladder)   Resp 17   Ht 6' 2\" (1.88 m)   Wt 226 lb 13.7 oz (102.9 kg)   SpO2 98%   BMI 29.13 kg/m²   24HR INTAKE/OUTPUT:    Intake/Output Summary (Last 24 hours) at 10/26/2020 1312  Last data filed at 10/26/2020 1200  Gross per 24 hour   Intake 1424 ml   Output 2900 ml   Net -1476 ml     Xr Pelvis (1-2 Views)    Result Date: 10/8/2020  EXAMINATION: ONE XRAY VIEW OF THE PELVIS 10/8/2020 9:52 pm COMPARISON: None. HISTORY: ORDERING SYSTEM PROVIDED HISTORY: trauma TECHNOLOGIST PROVIDED HISTORY: Reason for exam:->trauma What reading provider will be dictating this exam?->CRC FINDINGS: Left ischial tuberosity is not included on this examination. Entire left hip is not included on this examination. No fracture or dislocation involving pelvis or visualized hips. No diastasis involving sacroiliac joints or symphysis pubis. No fracture or dislocation involving visualized pelvis or hips. Xr Elbow Right (min 3 Views)    Result Date: 10/9/2020  EXAMINATION: THREE XRAY VIEWS OF THE RIGHT ELBOW 10/9/2020 7:00 am COMPARISON: None. HISTORY: ORDERING SYSTEM PROVIDED HISTORY: trauma, Skull fx TECHNOLOGIST PROVIDED HISTORY: Reason for exam:->trauma, Skull fx What reading provider will be dictating this exam?->CRC FINDINGS: There is no fracture dislocation. There is no elbow joint effusion. There are tiny degenerative spurs. No acute process     Xr Hand Left (min 3 Views)    Result Date: 10/9/2020  EXAMINATION: THREE XRAY VIEWS OF THE LEFT HAND; THREE XRAY VIEWS OF THE RIGHT HAND 10/9/2020 7:01 am COMPARISON: None. HISTORY: ORDERING SYSTEM PROVIDED HISTORY: trauma TECHNOLOGIST PROVIDED HISTORY: Reason for exam:->trauma What reading provider will be dictating this exam?->CRC FINDINGS: Positioning of both hands does somewhat limit evaluation. There are no definite fractures or dislocations.   Joint spaces are normal.     No acute process     Xr Hand Right (min 3 Views)    Result Date: 10/9/2020  EXAMINATION: THREE XRAY VIEWS OF THE LEFT HAND; THREE XRAY VIEWS OF THE RIGHT HAND 10/9/2020 7:01 am COMPARISON: None. HISTORY: ORDERING SYSTEM PROVIDED HISTORY: trauma TECHNOLOGIST PROVIDED HISTORY: Reason for exam:->trauma What reading provider will be dictating this exam?->CRC FINDINGS: Positioning of both hands does somewhat limit evaluation. There are no definite fractures or dislocations. Joint spaces are normal.     No acute process     Xr Knee Left (3 Views)    Result Date: 10/9/2020  EXAMINATION: THREE XRAY VIEWS OF THE LEFT KNEE 10/9/2020 7:03 am COMPARISON: None. HISTORY: ORDERING SYSTEM PROVIDED HISTORY: trauma TECHNOLOGIST PROVIDED HISTORY: Reason for exam:->trauma What reading provider will be dictating this exam?->CRC FINDINGS: There is no fracture dislocation. There is no joint effusion or degenerative change. No acute process     Xr Knee Right (3 Views)    Result Date: 10/9/2020  EXAMINATION: THREE XRAY VIEWS OF THE RIGHT KNEE 10/9/2020 8:06 am COMPARISON: None. HISTORY: ORDERING SYSTEM PROVIDED HISTORY: trauma TECHNOLOGIST PROVIDED HISTORY: Reason for exam:->trauma What reading provider will be dictating this exam?->CRC FINDINGS: There is no fracture dislocation. There is no joint space narrowing or effusion. No acute process     Ct Head Wo Contrast    Result Date: 10/9/2020  EXAMINATION: CT OF THE HEAD WITHOUT CONTRAST  10/9/2020 4:07 am TECHNIQUE: CT of the head was performed without the administration of intravenous contrast. Dose modulation, iterative reconstruction, and/or weight based adjustment of the mA/kV was utilized to reduce the radiation dose to as low as reasonably achievable. COMPARISON: CT head 10/08/2020 HISTORY: ORDERING SYSTEM PROVIDED HISTORY: evaluate head bleed TECHNOLOGIST PROVIDED HISTORY: Has a \"code stroke\" or \"stroke alert\" been called? ->No Reason for exam:->evaluate head bleed What reading provider will be dictating this exam?->CRC FINDINGS: BRAIN/VENTRICLES: Interval increase in size of right frontal parenchymal contusion, measuring 14 x 7 x 10 mm, previously 10 x 5 x 5 mm. Additional right frontal contusion, more inferiorly has also increased in size. Punctate contusions in the anterior-inferior frontal lobe along the gyrus rectus have also increased. Scattered bilateral hemispheric subarachnoid hemorrhage. Right convexity subdural hematoma measures up to 6 mm in thickness, not substantially changed. Trace left parietal subdural hematoma measuring 2 mm in thickness. 5 mm leftward midline shift, not substantially changed. No herniation. Patent basal cisterns. ORBITS: The visualized portion of the orbits demonstrate no acute abnormality. SINUSES: Nasopharyngeal opacities with partially imaged esophagogastric and endotracheal tubes. Scattered paranasal sinus opacities. SOFT TISSUES/SKULL:  Nondisplaced left temporal bone fracture extending to the otic capsule. Comminuted mildly displaced left parietal fracture. Nondisplaced right temporal bone fracture which is otic capsule sparing. Diffuse scalp swelling with posterior scalp contusions. Skin staples present. Mild interval increase in size of scattered parenchymal hematomas, mainly in the right frontal lobe, suspicious for diffuse axonal injury. No substantial change in acute right convexity subdural and left parietal subdural hematomas. Stable 5 mm leftward midline shift. Unchanged calvarial fractures, notable for a nondisplaced left temporal bone fracture possibly extending to the otic capsule. Recommend follow-up temporal bone CT.      Ct Head Wo Contrast    Result Date: 10/9/2020  EXAMINATION: CT OF THE HEAD WITHOUT CONTRAST  10/9/2020 12:11 pm TECHNIQUE: CT of the head was performed without the administration of intravenous contrast. Dose modulation, iterative reconstruction, and/or weight based adjustment of the mA/kV was utilized to reduce the radiation dose to as low as reasonably achievable. COMPARISON: 8 hours prior. HISTORY: ORDERING SYSTEM PROVIDED HISTORY: s/p ventric TECHNOLOGIST PROVIDED HISTORY: Reason for exam:->s/p ventric Has a \"code stroke\" or \"stroke alert\" been called? ->No What reading provider will be dictating this exam?->CRC FINDINGS: There has been interval placement of a right frontal approach ventriculostomy catheter terminating within the right lateral ventricle frontal horn abutting the septum pellucidum. There is split like configuration of the right lateral ventricle that may reflect over shunting. There is no temporal horn dilatation. There is diffuse cerebral edema with diffuse sulcal effacement. There is similar appearance of multiple foci of hemorrhagic contusions involving the right frontal lobe and to lesser extent left frontal lobe and right temporal lobe. The hemorrhagic contusions demonstrates mild surrounding edema. There is similar appearance of acute subarachnoid hemorrhage along the bilateral frontal convexities and right temporal convexity and to a lesser extent at the vertices. There is small volume of subdural hemorrhage along the right lateral tentorial leaflet. There is small volume subarachnoid hemorrhage within the interpeduncular cistern. There is new wedge-shaped region of low attenuation involving the left middle cerebellar peduncle and left cerebellar hemisphere, concerning for acute ischemia. There is no significant midline shift. There are bilateral parietal fractures, comminuted on the left, extending into the left temporal bone including the mastoid segment. Please refer to concurrently performed CT temporal bone imaging report. There is additional depressed fracture of the left superior orbital wall. There is diffuse subgaleal soft tissue swelling. Interval placement of right ventriculostomy catheter terminating within the right lateral ventricle frontal horn. Interval development of slit-like appearance of the right lateral ventricle. Suspect acute ischemia involving the left middle cerebellar peduncle and left cerebellar hemisphere. Similar appearance of intracranial hemorrhage in hemorrhagic contusions as above. Findings discussed with Dr. Manuela Garber at 12:53 p.m. on December 9, 2020. Ct Head Wo Contrast    Result Date: 10/9/2020  EXAMINATION: CT OF THE HEAD and cervical spine WITHOUT CONTRAST; CT OF THE FACE WITHOUT CONTRAST  10/8/2020 9:56 pm TECHNIQUE: CT of the head was performed without the administration of intravenous contrast. Dose modulation, iterative reconstruction, and/or weight based adjustment of the mA/kV was utilized to reduce the radiation dose to as low as reasonably achievable.; CT of the face was performed without the administration of intravenous contrast. Multiplanar reformatted images are provided for review. Dose modulation, iterative reconstruction, and/or weight based adjustment of the mA/kV was utilized to reduce the radiation dose to as low as reasonably achievable.; CT of the cervical spine was performed without the administration of intravenous contrast. Multiplanar reformatted images are provided for review. Dose modulation, iterative reconstruction, and/or weight based adjustment of the mA/kV was utilized to reduce the radiation dose to as low as reasonably achievable. COMPARISON: None. HISTORY: ORDERING SYSTEM PROVIDED HISTORY: trauma TECHNOLOGIST PROVIDED HISTORY: Reason for exam:->trauma Has a \"code stroke\" or \"stroke alert\" been called? ->No What reading provider will be dictating this exam?->CRC FINDINGS: Head: There is mild right frontoparietal holohemispheric subdural hemorrhage, measuring up to 6 mm. Mild mass effect and minimal midline shift of approximately 4 mm. Visualized skull demonstrates multiple mildly displaced fractures in the skull, involving left temporal bone, right temporal bone, bilateral parietal bones.   Left temporoparietal bone skull fractures appear to be comminuted in multiple locations. There also small hemorrhagic contusions in bilateral frontal lobes. Small amount of subarachnoid hemorrhage in the right frontal lobe. Mild fluid layering in the sphenoid sinus. Fractures involving the left orbit as well. The mastoid air cells are clear. However, left temporal bone fracture does extend through the region of the left external auditory canal and extends to the middle ear. Cervical spine: Vertebral body heights are intact. Alignment is intact. Facets are normally aligned. The odontoid process is intact. No significant prevertebral soft tissue swelling. Facial bones: Mandible is intact. The zygomatic arches are intact. Nasal bones are intact. Nasal bony septum is midline. Sphenoid temporal buttress is intact. Mildly displaced fracture of the roof of the left orbit. The orbital floor is intact. Globes are intact and not proptotic. No retro bulbar soft tissue hematoma. Mild left periorbital preseptal soft tissue swelling. Bilateral comminuted skull bone fractures involving bilateral temporal bones and parietal bones. Fractures are worse on the left side. Mild right holohemispheric subdural hematoma with mild midline shift. Bilateral frontal small hemorrhagic contusions. Left orbital roof mildly displaced fracture. No evidence for cervical fracture or subluxation. Critical findings reported to the ER physician at time of dictation. Ct Iac Posterior Fossa Wo Contrast    Result Date: 10/10/2020  EXAMINATION: CT OF THE INTERNAL AUDITORY CANAL WITHOUT CONTRAST 10/9/2020 12:11 pm TECHNIQUE: CT of the internal auditory canal was performed without contrast was performed without the administration of intravenous contrast. Multiplanar reformatted images are provided for review.  Dose modulation, iterative reconstruction, and/or weight based adjustment of the mA/kV was utilized to reduce the radiation dose to as low as reasonably achievable. COMPARISON: None HISTORY: ORDERING SYSTEM PROVIDED HISTORY: TRAUMA TECHNOLOGIST PROVIDED HISTORY: Reason for exam:->trauma What reading provider will be dictating this exam?->CRC FINDINGS: RIGHT TEMPORAL BONE:  The right external auditory canal is normal in appearance. There is no right temporal bone fracture identified. There is a small volume of fluid within the right mastoid air cells. The right middle ear is clear. The ossicles are normally aligned. The tegmen tympani is intact. The scutum is intact. There is no osseous dehiscence. The cochlea, vestibule and semicircular canals are normal in appearance. LEFT TEMPORAL BONE: There is a longitudinal fracture of the mastoid segment of the left temporal bone. There is incudomalleolar subluxation involving the head of the malleus and body of the incus. The fracture does not extend into the otic capsule. Hemorrhage is present within the left middle ear and mastoid air cells. A comminuted fracture of the squamous portion of the left temporal bone is noted. The cochlea, vestibule and semicircular canals are normal.     Longitudinal fracture of the mastoid portion of the left temporal bone with associated incudomalleolar subluxation involving the head of the malleus and body of the incus. The fracture does not extend into the otic capsule. Small volume of fluid within the right mastoid air cells but no acute traumatic injury of the mastoid portion of the right temporal bone evident.      Ct Facial Bones Wo Contrast    Result Date: 10/9/2020  EXAMINATION: CT OF THE HEAD and cervical spine WITHOUT CONTRAST; CT OF THE FACE WITHOUT CONTRAST  10/8/2020 9:56 pm TECHNIQUE: CT of the head was performed without the administration of intravenous contrast. Dose modulation, iterative reconstruction, and/or weight based adjustment of the mA/kV was utilized to reduce the radiation dose to as low as reasonably achievable.; CT of the face was performed without the administration of intravenous contrast. Multiplanar reformatted images are provided for review. Dose modulation, iterative reconstruction, and/or weight based adjustment of the mA/kV was utilized to reduce the radiation dose to as low as reasonably achievable.; CT of the cervical spine was performed without the administration of intravenous contrast. Multiplanar reformatted images are provided for review. Dose modulation, iterative reconstruction, and/or weight based adjustment of the mA/kV was utilized to reduce the radiation dose to as low as reasonably achievable. COMPARISON: None. HISTORY: ORDERING SYSTEM PROVIDED HISTORY: trauma TECHNOLOGIST PROVIDED HISTORY: Reason for exam:->trauma Has a \"code stroke\" or \"stroke alert\" been called? ->No What reading provider will be dictating this exam?->CRC FINDINGS: Head: There is mild right frontoparietal holohemispheric subdural hemorrhage, measuring up to 6 mm. Mild mass effect and minimal midline shift of approximately 4 mm. Visualized skull demonstrates multiple mildly displaced fractures in the skull, involving left temporal bone, right temporal bone, bilateral parietal bones. Left temporoparietal bone skull fractures appear to be comminuted in multiple locations. There also small hemorrhagic contusions in bilateral frontal lobes. Small amount of subarachnoid hemorrhage in the right frontal lobe. Mild fluid layering in the sphenoid sinus. Fractures involving the left orbit as well. The mastoid air cells are clear. However, left temporal bone fracture does extend through the region of the left external auditory canal and extends to the middle ear. Cervical spine: Vertebral body heights are intact. Alignment is intact. Facets are normally aligned. The odontoid process is intact. No significant prevertebral soft tissue swelling. Facial bones: Mandible is intact. The zygomatic arches are intact. Nasal bones are intact.   Nasal bony septum is midline. Sphenoid temporal buttress is intact. Mildly displaced fracture of the roof of the left orbit. The orbital floor is intact. Globes are intact and not proptotic. No retro bulbar soft tissue hematoma. Mild left periorbital preseptal soft tissue swelling. Bilateral comminuted skull bone fractures involving bilateral temporal bones and parietal bones. Fractures are worse on the left side. Mild right holohemispheric subdural hematoma with mild midline shift. Bilateral frontal small hemorrhagic contusions. Left orbital roof mildly displaced fracture. No evidence for cervical fracture or subluxation. Critical findings reported to the ER physician at time of dictation. Ct Chest W Contrast    Result Date: 10/9/2020  EXAMINATION: CT OF THE CHEST WITH CONTRAST 10/8/2020 10:56 pm TECHNIQUE: CT of the chest was performed with the administration of intravenous contrast. Multiplanar reformatted images are provided for review. Dose modulation, iterative reconstruction, and/or weight based adjustment of the mA/kV was utilized to reduce the radiation dose to as low as reasonably achievable. COMPARISON: None. HISTORY: ORDERING SYSTEM PROVIDED HISTORY: trauma TECHNOLOGIST PROVIDED HISTORY: Reason for exam:->trauma What reading provider will be dictating this exam?->CRC FINDINGS: An endotracheal and enteric tubes are noted in place and are appropriately positioned. Mediastinum: Normal heart size. The great vessels are within normal limits. No pericardial effusion. No significantly enlarged lymph nodes. Lungs/pleura: Mild dependent congestion involving the bilateral posterior lungs. No pulmonary mass or augustin consolidation. No pleural effusion. Upper Abdomen: Within normal limits. Soft Tissues/Bones: Nondisplaced acute fractures involving the lateral left 5th through 7th ribs. Nondisplaced acute fractures of the posterior left 4th through 7th ribs.   Acute mildly displaced fractures of the posterior left 8th rib. Mildly displaced acute fracture of the left body of the scapula. Nondisplaced acute fractures involving the lateral left 5th through 7th ribs. Nondisplaced acute fractures of the posterior left 4th through 7th ribs. Acute mildly displaced fractures of the posterior left 8th rib. Mildly displaced acute fracture of the left body of the scapula. Ct Cervical Spine Wo Contrast    Result Date: 10/9/2020  EXAMINATION: CT OF THE HEAD and cervical spine WITHOUT CONTRAST; CT OF THE FACE WITHOUT CONTRAST  10/8/2020 9:56 pm TECHNIQUE: CT of the head was performed without the administration of intravenous contrast. Dose modulation, iterative reconstruction, and/or weight based adjustment of the mA/kV was utilized to reduce the radiation dose to as low as reasonably achievable.; CT of the face was performed without the administration of intravenous contrast. Multiplanar reformatted images are provided for review. Dose modulation, iterative reconstruction, and/or weight based adjustment of the mA/kV was utilized to reduce the radiation dose to as low as reasonably achievable.; CT of the cervical spine was performed without the administration of intravenous contrast. Multiplanar reformatted images are provided for review. Dose modulation, iterative reconstruction, and/or weight based adjustment of the mA/kV was utilized to reduce the radiation dose to as low as reasonably achievable. COMPARISON: None. HISTORY: ORDERING SYSTEM PROVIDED HISTORY: trauma TECHNOLOGIST PROVIDED HISTORY: Reason for exam:->trauma Has a \"code stroke\" or \"stroke alert\" been called? ->No What reading provider will be dictating this exam?->CRC FINDINGS: Head: There is mild right frontoparietal holohemispheric subdural hemorrhage, measuring up to 6 mm. Mild mass effect and minimal midline shift of approximately 4 mm.   Visualized skull demonstrates multiple mildly displaced fractures in the skull, involving left temporal bone, right temporal bone, bilateral parietal bones. Left temporoparietal bone skull fractures appear to be comminuted in multiple locations. There also small hemorrhagic contusions in bilateral frontal lobes. Small amount of subarachnoid hemorrhage in the right frontal lobe. Mild fluid layering in the sphenoid sinus. Fractures involving the left orbit as well. The mastoid air cells are clear. However, left temporal bone fracture does extend through the region of the left external auditory canal and extends to the middle ear. Cervical spine: Vertebral body heights are intact. Alignment is intact. Facets are normally aligned. The odontoid process is intact. No significant prevertebral soft tissue swelling. Facial bones: Mandible is intact. The zygomatic arches are intact. Nasal bones are intact. Nasal bony septum is midline. Sphenoid temporal buttress is intact. Mildly displaced fracture of the roof of the left orbit. The orbital floor is intact. Globes are intact and not proptotic. No retro bulbar soft tissue hematoma. Mild left periorbital preseptal soft tissue swelling. Bilateral comminuted skull bone fractures involving bilateral temporal bones and parietal bones. Fractures are worse on the left side. Mild right holohemispheric subdural hematoma with mild midline shift. Bilateral frontal small hemorrhagic contusions. Left orbital roof mildly displaced fracture. No evidence for cervical fracture or subluxation. Critical findings reported to the ER physician at time of dictation. Ct Thoracic Spine Wo Contrast    Result Date: 10/9/2020  EXAMINATION: CT OF THE THORACIC SPINE WITHOUT CONTRAST  10/8/2020 10:56 pm: TECHNIQUE: CT of the thoracic spine was performed without the administration of intravenous contrast. Multiplanar reformatted images are provided for review.  Dose modulation, iterative reconstruction, and/or weight based adjustment of the mA/kV was utilized to reduce the radiation dose to as low as reasonably achievable. COMPARISON: Same day lumbar spine CT; same day CT chest, abdomen and pelvis HISTORY: ORDERING SYSTEM PROVIDED HISTORY: trauma TECHNOLOGIST PROVIDED HISTORY: Reason for exam:->trauma What reading provider will be dictating this exam?->CRC FINDINGS: BONES/ALIGNMENT: There is normal alignment of the spine. The vertebral body heights are maintained. No osseous destructive lesion is seen. DEGENERATIVE CHANGES: Mild spondylosis without significant central canal narrowing. Mild right foraminal narrowing at T11-T12. SOFT TISSUES: No paraspinal mass is seen. No acute thoracic spine abnormality. Ct Lumbar Spine Wo Contrast    Result Date: 10/9/2020  EXAMINATION: CT OF THE LUMBAR SPINE WITHOUT CONTRAST  10/8/2020 TECHNIQUE: CT of the lumbar spine was performed without the administration of intravenous contrast. Multiplanar reformatted images are provided for review. Dose modulation, iterative reconstruction, and/or weight based adjustment of the mA/kV was utilized to reduce the radiation dose to as low as reasonably achievable. COMPARISON: None HISTORY: ORDERING SYSTEM PROVIDED HISTORY: trauma TECHNOLOGIST PROVIDED HISTORY: Reason for exam:->trauma What reading provider will be dictating this exam?->CRC FINDINGS: BONES/ALIGNMENT: Vertebral body heights are maintained. No compression deformity. L5 pars defects with grade 1 anterolisthesis of L5 on S1. DEGENERATIVE CHANGES: Moderate disc desiccation at L5-S1. No significant central canal stenosis. Moderate-to-severe foraminal stenosis at the listhesis level. SOFT TISSUES/RETROPERITONEUM: No paraspinal mass is seen. No acute lumbar spine abnormality. L5 pars defects with grade 1 anterolisthesis of L5 on S1 and moderate-to-severe foraminal stenosis.      Ct Abdomen Pelvis W Iv Contrast Additional Contrast? None    Result Date: 10/9/2020  EXAMINATION: CT OF THE ABDOMEN AND PELVIS WITH CONTRAST 10/8/2020 10:56 pm TECHNIQUE: CT of HISTORY: Reason for exam:->intubated What reading provider will be dictating this exam?->CRC FINDINGS: Lines/tubes are appropriate. Heart size is normal.  There are no infiltrates or effusions. No acute process     Xr Chest Portable    Result Date: 10/9/2020  EXAMINATION: ONE XRAY VIEW OF THE CHEST 10/9/2020 12:42 am COMPARISON: 10/08/2020 at this 2248 hours. Jailyn Oviedo HISTORY: ORDERING SYSTEM PROVIDED HISTORY: Line Placement TECHNOLOGIST PROVIDED HISTORY: Reason for exam:->Line Placement What reading provider will be dictating this exam?->CRC FINDINGS: Heart is not enlarged. Endotracheal tube and enteric tube are in place. Left IJ CVC is in place with tip in the proximal SVC. No pneumothorax. No pleural effusions. No pulmonary edema or pneumothorax.

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
## 2020-10-26 NOTE — PLAN OF CARE
Claudette Agreste, RN  Outcome: Met This Shift  10/25/2020 1845 by Tej Linares, RN  Outcome: Met This Shift  10/25/2020 1033 by Tej Linares, RN  Outcome: Met This Shift

## 2020-10-26 NOTE — PLAN OF CARE
Problem: Falls - Risk of:  Goal: Will remain free from falls  Description: Will remain free from falls  Outcome: Met This Shift  Goal: Absence of physical injury  Description: Absence of physical injury  Outcome: Met This Shift     Problem: Airway Clearance - Ineffective:  Goal: Ability to maintain a clear airway will improve  Description: Ability to maintain a clear airway will improve  Outcome: Met This Shift     Problem: Aspiration:  Goal: Absence of aspiration  Description: Absence of aspiration  Outcome: Met This Shift     Problem: Restraint Use - Nonviolent/Non-Self-Destructive Behavior:  Goal: Absence of restraint-related injury  Description: Absence of restraint-related injury  10/26/2020 1721 by Chapito Acosta RN  Outcome: Met This Shift  10/26/2020 1116 by Chapito Acosta RN  Outcome: Met This Shift     Problem: Restraint Use - Nonviolent/Non-Self-Destructive Behavior:  Goal: Absence of restraint indications  Description: Absence of restraint indications  10/26/2020 1721 by Chapito Acosta RN  Outcome: Not Met This Shift  10/26/2020 1116 by Chapito Acosta RN  Outcome: Not Met This Shift

## 2020-10-26 NOTE — PROGRESS NOTES
Physical Therapy  Physical Therapy Attempt    Name: Kishore Guo  : 1990  MRN: [id number]      Date of Service: 10/26/2020  Chart reviewed. Spoke with RN who reported pt does not follow commands at this time. Will re-attempt as able.     Sravani Patel, PT, DPT  [id number]

## 2020-10-26 NOTE — PLAN OF CARE
Problem: Falls - Risk of:  Goal: Will remain free from falls  Description: Will remain free from falls  10/26/2020 1957 by Philomena Barraza RN  Outcome: Met This Shift  10/26/2020 1721 by Nancy Gregory RN  Outcome: Met This Shift  Goal: Absence of physical injury  Description: Absence of physical injury  10/26/2020 1721 by Nancy Gregory RN  Outcome: Met This Shift     Problem: Skin Integrity:  Goal: Will show no infection signs and symptoms  Description: Will show no infection signs and symptoms  Outcome: Met This Shift     Problem: Pain:  Goal: Pain level will decrease  Description: Pain level will decrease  Outcome: Met This Shift     Problem: Airway Clearance - Ineffective:  Goal: Ability to maintain a clear airway will improve  Description: Ability to maintain a clear airway will improve  10/26/2020 1721 by Nancy Gregory RN  Outcome: Met This Shift     Problem: Aspiration:  Goal: Absence of aspiration  Description: Absence of aspiration  10/26/2020 1721 by Nancy Gregory RN  Outcome: Met This Shift     Problem: Pain:  Goal: Pain level will decrease  Description: Pain level will decrease  Outcome: Met This Shift     Problem: Restraint Use - Nonviolent/Non-Self-Destructive Behavior:  Goal: Absence of restraint-related injury  Description: Absence of restraint-related injury  10/26/2020 1957 by Philomena Barraza RN  Outcome: Met This Shift  10/26/2020 1721 by Nancy Gregory RN  Outcome: Met This Shift  10/26/2020 1116 by Nancy Gregory RN  Outcome: Met This Shift

## 2020-10-26 NOTE — PROGRESS NOTES
Occupational Therapy  Date:10/26/2020  Patient Name: Grady Dominguez  MRN: [id number]  : 1990  Room: [address] [place] [address]-A     OT order received and appreciated. Chart reviewed. Hold OT evaluation, pt not following commands per RN . Will follow.     Sandro Mendez OTR/L #[id number]

## 2020-10-27 PROCEDURE — 2700000000 HC OXYGEN THERAPY PER DAY

## 2020-10-27 PROCEDURE — 6370000000 HC RX 637 (ALT 250 FOR IP): Performed by: SURGERY

## 2020-10-27 PROCEDURE — 6360000002 HC RX W HCPCS: Performed by: STUDENT IN AN ORGANIZED HEALTH CARE EDUCATION/TRAINING PROGRAM

## 2020-10-27 PROCEDURE — 6370000000 HC RX 637 (ALT 250 FOR IP): Performed by: STUDENT IN AN ORGANIZED HEALTH CARE EDUCATION/TRAINING PROGRAM

## 2020-10-27 PROCEDURE — 2000000000 HC ICU R&B

## 2020-10-27 PROCEDURE — 97530 THERAPEUTIC ACTIVITIES: CPT

## 2020-10-27 PROCEDURE — 2580000003 HC RX 258: Performed by: SURGERY

## 2020-10-27 PROCEDURE — 97162 PT EVAL MOD COMPLEX 30 MIN: CPT

## 2020-10-27 PROCEDURE — 97167 OT EVAL HIGH COMPLEX 60 MIN: CPT

## 2020-10-27 PROCEDURE — 99291 CRITICAL CARE FIRST HOUR: CPT | Performed by: SURGERY

## 2020-10-27 RX ORDER — POLYETHYLENE GLYCOL 3350 17 G/17G
17 POWDER, FOR SOLUTION ORAL DAILY
Status: DISCONTINUED | OUTPATIENT
Start: 2020-10-27 | End: 2020-11-01

## 2020-10-27 RX ORDER — QUETIAPINE FUMARATE 100 MG/1
100 TABLET, FILM COATED ORAL 2 TIMES DAILY
Status: DISCONTINUED | OUTPATIENT
Start: 2020-10-27 | End: 2020-10-30

## 2020-10-27 RX ADMIN — FAMOTIDINE 20 MG: 20 TABLET ORAL at 20:50

## 2020-10-27 RX ADMIN — NYSTATIN 500000 UNITS: 100000 SUSPENSION ORAL at 08:00

## 2020-10-27 RX ADMIN — SENNOSIDES 5 ML: 8.8 LIQUID ORAL at 20:49

## 2020-10-27 RX ADMIN — ENOXAPARIN SODIUM 30 MG: 30 INJECTION SUBCUTANEOUS at 20:50

## 2020-10-27 RX ADMIN — NYSTATIN 500000 UNITS: 100000 SUSPENSION ORAL at 20:50

## 2020-10-27 RX ADMIN — ACETAMINOPHEN ORAL SOLUTION 650 MG: 650 SOLUTION ORAL at 17:23

## 2020-10-27 RX ADMIN — FAMOTIDINE 20 MG: 20 TABLET ORAL at 08:00

## 2020-10-27 RX ADMIN — BACITRACIN ZINC: 500 OINTMENT TOPICAL at 15:00

## 2020-10-27 RX ADMIN — NYSTATIN 500000 UNITS: 100000 SUSPENSION ORAL at 17:02

## 2020-10-27 RX ADMIN — ENOXAPARIN SODIUM 30 MG: 30 INJECTION SUBCUTANEOUS at 08:53

## 2020-10-27 RX ADMIN — Medication 10 ML: at 08:00

## 2020-10-27 RX ADMIN — CLONIDINE HYDROCHLORIDE 0.3 MG: 0.2 TABLET ORAL at 08:00

## 2020-10-27 RX ADMIN — QUETIAPINE FUMARATE 50 MG: 25 TABLET ORAL at 08:00

## 2020-10-27 RX ADMIN — POLYETHYLENE GLYCOL 3350 17 G: 17 POWDER, FOR SOLUTION ORAL at 12:20

## 2020-10-27 RX ADMIN — NYSTATIN 500000 UNITS: 100000 SUSPENSION ORAL at 13:30

## 2020-10-27 RX ADMIN — BACITRACIN ZINC: 500 OINTMENT TOPICAL at 20:50

## 2020-10-27 RX ADMIN — CLONIDINE HYDROCHLORIDE 0.3 MG: 0.2 TABLET ORAL at 14:30

## 2020-10-27 RX ADMIN — QUETIAPINE FUMARATE 100 MG: 100 TABLET ORAL at 20:50

## 2020-10-27 RX ADMIN — Medication 10 ML: at 20:50

## 2020-10-27 RX ADMIN — BACITRACIN ZINC: 500 OINTMENT TOPICAL at 08:01

## 2020-10-27 ASSESSMENT — PAIN SCALES - GENERAL
PAINLEVEL_OUTOF10: 0

## 2020-10-27 NOTE — FLOWSHEET NOTE
Patient will reach at lines and tubes needing to be redirected often. Attempting to provide calm environment and reorientation, but patient still assessed to be a risk of harm to self. Patient continues to thrash legs and throw legs over railing. Family aware for need of restraints. Will continue to monitor patient and assess for earliest removal capability.
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
## 2020-10-27 NOTE — PLAN OF CARE
Problem: Falls - Risk of:  Goal: Will remain free from falls  Description: Will remain free from falls  10/27/2020 0738 by Juan Miguel Luong RN  Outcome: Met This Shift  10/26/2020 1957 by Alden Stover RN  Outcome: Met This Shift  Goal: Absence of physical injury  Description: Absence of physical injury  Outcome: Met This Shift     Problem: Skin Integrity:  Goal: Will show no infection signs and symptoms  Description: Will show no infection signs and symptoms  10/26/2020 1957 by Alden Stover RN  Outcome: Met This Shift     Problem: Pain:  Goal: Pain level will decrease  Description: Pain level will decrease  10/27/2020 0738 by Juan Miguel Luong RN  Outcome: Met This Shift  10/26/2020 1957 by Alden Stover RN  Outcome: Met This Shift  Goal: Control of acute pain  Description: Control of acute pain  Outcome: Met This Shift  Goal: Control of chronic pain  Description: Control of chronic pain  Outcome: Met This Shift     Problem: Gas Exchange - Impaired:  Goal: Levels of oxygenation will improve  Description: Levels of oxygenation will improve  Outcome: Met This Shift     Problem: Pain:  Goal: Pain level will decrease  Description: Pain level will decrease  10/27/2020 0738 by Juan Miguel Luong RN  Outcome: Met This Shift  10/26/2020 1957 by Alden Stover RN  Outcome: Met This Shift     Problem: Restraint Use - Nonviolent/Non-Self-Destructive Behavior:  Goal: Absence of restraint-related injury  Description: Absence of restraint-related injury  10/27/2020 0738 by Juan Miguel Luong RN  Outcome: Met This Shift  10/27/2020 0024 by Benjamin Meckel, RN  Outcome: Met This Shift  10/26/2020 1957 by Alden Stover RN  Outcome: Met This Shift     Problem:  Bowel Function - Altered:  Goal: Bowel elimination is within specified parameters  Description: Bowel elimination is within specified parameters  Outcome: Not Met This Shift     Problem: Restraint Use - Nonviolent/Non-Self-Destructive Behavior:  Goal: Absence of restraint indications  Description: Absence of restraint indications  10/27/2020 0738 by Bhupendra Cerda RN  Outcome: Not Met This Shift  10/27/2020 0024 by Renaldo Cid RN  Outcome: Not Met This Shift  10/26/2020 1957 by Eva Santoyo RN  Outcome: Not Met This Shift

## 2020-10-27 NOTE — FLOWSHEET NOTE
Pt on trach and continues to pull at lines and tubes and kick legs over side rails upon agitation. Verbal re-direction unsuccessful at this time. Bilateral soft wrist and bilateral soft ankle restraints continued for patient safety. Will continue to monitor.

## 2020-10-27 NOTE — PROGRESS NOTES
Occupational Therapy      OT consult received and appreciated. Chart reviewed. Will hold evaluation due to pt not following commands per RN. Will evaluate at a later time. Thank you.  Fina Man, OTR/L #[id number]
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
## 2020-10-27 NOTE — PROGRESS NOTES
respiratory function as evidenced by NPO or clear liquid status due to medical condition, intubation, nutrition support - enteral nutrition    Nutrition Interventions:   Nutrition Education/Counseling:  Education not indicated   Coordination of Nutrition Care:  Continued Inpatient Monitoring, Coordination of Community Care    Goals:   Tolerance to EN       Nutrition Monitoring and Evaluation:   Food/Nutrient Intake Outcomes:  Enteral Nutrition Intake/Tolerance  Physical Signs/Symptoms Outcomes:  Biochemical Data, Nutrition Focused Physical Findings, Skin, Weight, GI Status, Fluid Status or Edema, Hemodynamic Status     Electronically signed by Homer Samuels, MS, RD, LD on 10/27/20 at 11:50 AM EDT

## 2020-10-27 NOTE — PLAN OF CARE
Problem: Falls - Risk of:  Goal: Will remain free from falls  Description: Will remain free from falls  Outcome: Met This Shift  Goal: Absence of physical injury  Description: Absence of physical injury  Outcome: Met This Shift     Problem: Pain:  Goal: Pain level will decrease  Description: Pain level will decrease  Outcome: Met This Shift  Goal: Control of acute pain  Description: Control of acute pain  Outcome: Met This Shift  Goal: Control of chronic pain  Description: Control of chronic pain  Outcome: Met This Shift     Problem: Gas Exchange - Impaired:  Goal: Levels of oxygenation will improve  Description: Levels of oxygenation will improve  Outcome: Met This Shift     Problem: Pain:  Goal: Pain level will decrease  Description: Pain level will decrease  Outcome: Met This Shift     Problem: Inadequate oral food/beverage intake (NI-2.1)  Goal: Food and/or Nutrient Delivery  Description: Individualized approach for food/nutrient provision. 10/27/2020 1149 by Flakita Aguilar, MS, RD, LD  Outcome: Met This Shift     Problem: Restraint Use - Nonviolent/Non-Self-Destructive Behavior:  Goal: Absence of restraint-related injury  Description: Absence of restraint-related injury  10/27/2020 1736 by Fran Marlow RN  Outcome: Met This Shift  10/27/2020 0738 by Fran Marlow RN  Outcome: Met This Shift     Problem:  Bowel Function - Altered:  Goal: Bowel elimination is within specified parameters  Description: Bowel elimination is within specified parameters  Outcome: Not Met This Shift     Problem: Restraint Use - Nonviolent/Non-Self-Destructive Behavior:  Goal: Absence of restraint indications  Description: Absence of restraint indications  10/27/2020 1736 by Fran Marlow RN  Outcome: Not Met This Shift  10/27/2020 0738 by Fran Marlow RN  Outcome: Not Met This Shift
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
## 2020-10-27 NOTE — PROGRESS NOTES
Neurosurg progress note  VITALS:  BP (!) 114/55   Pulse 56   Temp 98.6 °F (37 °C) (Bladder)   Resp 17   Ht 6' 2\" (1.88 m)   Wt 225 lb 8 oz (102.3 kg)   SpO2 97%   BMI 28.95 kg/m²   24HR INTAKE/OUTPUT:    Intake/Output Summary (Last 24 hours) at 10/27/2020 0803  Last data filed at 10/27/2020 0700  Gross per 24 hour   Intake 1138 ml   Output 1370 ml   Net -232 ml     Xr Pelvis (1-2 Views)    Result Date: 10/8/2020  EXAMINATION: ONE XRAY VIEW OF THE PELVIS 10/8/2020 9:52 pm COMPARISON: None. HISTORY: ORDERING SYSTEM PROVIDED HISTORY: trauma TECHNOLOGIST PROVIDED HISTORY: Reason for exam:->trauma What reading provider will be dictating this exam?->CRC FINDINGS: Left ischial tuberosity is not included on this examination. Entire left hip is not included on this examination. No fracture or dislocation involving pelvis or visualized hips. No diastasis involving sacroiliac joints or symphysis pubis. No fracture or dislocation involving visualized pelvis or hips. Xr Elbow Right (min 3 Views)    Result Date: 10/9/2020  EXAMINATION: THREE XRAY VIEWS OF THE RIGHT ELBOW 10/9/2020 7:00 am COMPARISON: None. HISTORY: ORDERING SYSTEM PROVIDED HISTORY: trauma, Skull fx TECHNOLOGIST PROVIDED HISTORY: Reason for exam:->trauma, Skull fx What reading provider will be dictating this exam?->CRC FINDINGS: There is no fracture dislocation. There is no elbow joint effusion. There are tiny degenerative spurs. No acute process     Xr Hand Left (min 3 Views)    Result Date: 10/9/2020  EXAMINATION: THREE XRAY VIEWS OF THE LEFT HAND; THREE XRAY VIEWS OF THE RIGHT HAND 10/9/2020 7:01 am COMPARISON: None. HISTORY: ORDERING SYSTEM PROVIDED HISTORY: trauma TECHNOLOGIST PROVIDED HISTORY: Reason for exam:->trauma What reading provider will be dictating this exam?->CRC FINDINGS: Positioning of both hands does somewhat limit evaluation. There are no definite fractures or dislocations.   Joint spaces are normal.     No acute process will be dictating this exam?->CRC FINDINGS: BRAIN/VENTRICLES: Interval increase in size of right frontal parenchymal contusion, measuring 14 x 7 x 10 mm, previously 10 x 5 x 5 mm. Additional right frontal contusion, more inferiorly has also increased in size. Punctate contusions in the anterior-inferior frontal lobe along the gyrus rectus have also increased. Scattered bilateral hemispheric subarachnoid hemorrhage. Right convexity subdural hematoma measures up to 6 mm in thickness, not substantially changed. Trace left parietal subdural hematoma measuring 2 mm in thickness. 5 mm leftward midline shift, not substantially changed. No herniation. Patent basal cisterns. ORBITS: The visualized portion of the orbits demonstrate no acute abnormality. SINUSES: Nasopharyngeal opacities with partially imaged esophagogastric and endotracheal tubes. Scattered paranasal sinus opacities. SOFT TISSUES/SKULL:  Nondisplaced left temporal bone fracture extending to the otic capsule. Comminuted mildly displaced left parietal fracture. Nondisplaced right temporal bone fracture which is otic capsule sparing. Diffuse scalp swelling with posterior scalp contusions. Skin staples present. Mild interval increase in size of scattered parenchymal hematomas, mainly in the right frontal lobe, suspicious for diffuse axonal injury. No substantial change in acute right convexity subdural and left parietal subdural hematomas. Stable 5 mm leftward midline shift. Unchanged calvarial fractures, notable for a nondisplaced left temporal bone fracture possibly extending to the otic capsule. Recommend follow-up temporal bone CT.      Ct Head Wo Contrast    Result Date: 10/9/2020  EXAMINATION: CT OF THE HEAD WITHOUT CONTRAST  10/9/2020 12:11 pm TECHNIQUE: CT of the head was performed without the administration of intravenous contrast. Dose modulation, iterative reconstruction, and/or weight based adjustment of the mA/kV was utilized to reduce the radiation dose to as low as reasonably achievable. COMPARISON: 8 hours prior. HISTORY: ORDERING SYSTEM PROVIDED HISTORY: s/p ventric TECHNOLOGIST PROVIDED HISTORY: Reason for exam:->s/p ventric Has a \"code stroke\" or \"stroke alert\" been called? ->No What reading provider will be dictating this exam?->CRC FINDINGS: There has been interval placement of a right frontal approach ventriculostomy catheter terminating within the right lateral ventricle frontal horn abutting the septum pellucidum. There is split like configuration of the right lateral ventricle that may reflect over shunting. There is no temporal horn dilatation. There is diffuse cerebral edema with diffuse sulcal effacement. There is similar appearance of multiple foci of hemorrhagic contusions involving the right frontal lobe and to lesser extent left frontal lobe and right temporal lobe. The hemorrhagic contusions demonstrates mild surrounding edema. There is similar appearance of acute subarachnoid hemorrhage along the bilateral frontal convexities and right temporal convexity and to a lesser extent at the vertices. There is small volume of subdural hemorrhage along the right lateral tentorial leaflet. There is small volume subarachnoid hemorrhage within the interpeduncular cistern. There is new wedge-shaped region of low attenuation involving the left middle cerebellar peduncle and left cerebellar hemisphere, concerning for acute ischemia. There is no significant midline shift. There are bilateral parietal fractures, comminuted on the left, extending into the left temporal bone including the mastoid segment. Please refer to concurrently performed CT temporal bone imaging report. There is additional depressed fracture of the left superior orbital wall. There is diffuse subgaleal soft tissue swelling. Interval placement of right ventriculostomy catheter terminating within the right lateral ventricle frontal horn.   Interval development of slit-like appearance of the right lateral ventricle. Suspect acute ischemia involving the left middle cerebellar peduncle and left cerebellar hemisphere. Similar appearance of intracranial hemorrhage in hemorrhagic contusions as above. Findings discussed with Dr. Zoila Cruz at 12:53 p.m. on December 9, 2020. Ct Head Wo Contrast    Result Date: 10/9/2020  EXAMINATION: CT OF THE HEAD and cervical spine WITHOUT CONTRAST; CT OF THE FACE WITHOUT CONTRAST  10/8/2020 9:56 pm TECHNIQUE: CT of the head was performed without the administration of intravenous contrast. Dose modulation, iterative reconstruction, and/or weight based adjustment of the mA/kV was utilized to reduce the radiation dose to as low as reasonably achievable.; CT of the face was performed without the administration of intravenous contrast. Multiplanar reformatted images are provided for review. Dose modulation, iterative reconstruction, and/or weight based adjustment of the mA/kV was utilized to reduce the radiation dose to as low as reasonably achievable.; CT of the cervical spine was performed without the administration of intravenous contrast. Multiplanar reformatted images are provided for review. Dose modulation, iterative reconstruction, and/or weight based adjustment of the mA/kV was utilized to reduce the radiation dose to as low as reasonably achievable. COMPARISON: None. HISTORY: ORDERING SYSTEM PROVIDED HISTORY: trauma TECHNOLOGIST PROVIDED HISTORY: Reason for exam:->trauma Has a \"code stroke\" or \"stroke alert\" been called? ->No What reading provider will be dictating this exam?->CRC FINDINGS: Head: There is mild right frontoparietal holohemispheric subdural hemorrhage, measuring up to 6 mm. Mild mass effect and minimal midline shift of approximately 4 mm. Visualized skull demonstrates multiple mildly displaced fractures in the skull, involving left temporal bone, right temporal bone, bilateral parietal bones.   Left temporoparietal bone skull fractures appear to be comminuted in multiple locations. There also small hemorrhagic contusions in bilateral frontal lobes. Small amount of subarachnoid hemorrhage in the right frontal lobe. Mild fluid layering in the sphenoid sinus. Fractures involving the left orbit as well. The mastoid air cells are clear. However, left temporal bone fracture does extend through the region of the left external auditory canal and extends to the middle ear. Cervical spine: Vertebral body heights are intact. Alignment is intact. Facets are normally aligned. The odontoid process is intact. No significant prevertebral soft tissue swelling. Facial bones: Mandible is intact. The zygomatic arches are intact. Nasal bones are intact. Nasal bony septum is midline. Sphenoid temporal buttress is intact. Mildly displaced fracture of the roof of the left orbit. The orbital floor is intact. Globes are intact and not proptotic. No retro bulbar soft tissue hematoma. Mild left periorbital preseptal soft tissue swelling. Bilateral comminuted skull bone fractures involving bilateral temporal bones and parietal bones. Fractures are worse on the left side. Mild right holohemispheric subdural hematoma with mild midline shift. Bilateral frontal small hemorrhagic contusions. Left orbital roof mildly displaced fracture. No evidence for cervical fracture or subluxation. Critical findings reported to the ER physician at time of dictation. Ct Iac Posterior Fossa Wo Contrast    Result Date: 10/10/2020  EXAMINATION: CT OF THE INTERNAL AUDITORY CANAL WITHOUT CONTRAST 10/9/2020 12:11 pm TECHNIQUE: CT of the internal auditory canal was performed without contrast was performed without the administration of intravenous contrast. Multiplanar reformatted images are provided for review.  Dose modulation, iterative reconstruction, and/or weight based adjustment of the mA/kV was utilized to reduce the radiation dose to as low as reasonably achievable. COMPARISON: None HISTORY: ORDERING SYSTEM PROVIDED HISTORY: TRAUMA TECHNOLOGIST PROVIDED HISTORY: Reason for exam:->trauma What reading provider will be dictating this exam?->CRC FINDINGS: RIGHT TEMPORAL BONE:  The right external auditory canal is normal in appearance. There is no right temporal bone fracture identified. There is a small volume of fluid within the right mastoid air cells. The right middle ear is clear. The ossicles are normally aligned. The tegmen tympani is intact. The scutum is intact. There is no osseous dehiscence. The cochlea, vestibule and semicircular canals are normal in appearance. LEFT TEMPORAL BONE: There is a longitudinal fracture of the mastoid segment of the left temporal bone. There is incudomalleolar subluxation involving the head of the malleus and body of the incus. The fracture does not extend into the otic capsule. Hemorrhage is present within the left middle ear and mastoid air cells. A comminuted fracture of the squamous portion of the left temporal bone is noted. The cochlea, vestibule and semicircular canals are normal.     Longitudinal fracture of the mastoid portion of the left temporal bone with associated incudomalleolar subluxation involving the head of the malleus and body of the incus. The fracture does not extend into the otic capsule. Small volume of fluid within the right mastoid air cells but no acute traumatic injury of the mastoid portion of the right temporal bone evident.      Ct Facial Bones Wo Contrast    Result Date: 10/9/2020  EXAMINATION: CT OF THE HEAD and cervical spine WITHOUT CONTRAST; CT OF THE FACE WITHOUT CONTRAST  10/8/2020 9:56 pm TECHNIQUE: CT of the head was performed without the administration of intravenous contrast. Dose modulation, iterative reconstruction, and/or weight based adjustment of the mA/kV was utilized to reduce the radiation dose to as low as reasonably achievable.; CT of the face was performed without the administration of intravenous contrast. Multiplanar reformatted images are provided for review. Dose modulation, iterative reconstruction, and/or weight based adjustment of the mA/kV was utilized to reduce the radiation dose to as low as reasonably achievable.; CT of the cervical spine was performed without the administration of intravenous contrast. Multiplanar reformatted images are provided for review. Dose modulation, iterative reconstruction, and/or weight based adjustment of the mA/kV was utilized to reduce the radiation dose to as low as reasonably achievable. COMPARISON: None. HISTORY: ORDERING SYSTEM PROVIDED HISTORY: trauma TECHNOLOGIST PROVIDED HISTORY: Reason for exam:->trauma Has a \"code stroke\" or \"stroke alert\" been called? ->No What reading provider will be dictating this exam?->CRC FINDINGS: Head: There is mild right frontoparietal holohemispheric subdural hemorrhage, measuring up to 6 mm. Mild mass effect and minimal midline shift of approximately 4 mm. Visualized skull demonstrates multiple mildly displaced fractures in the skull, involving left temporal bone, right temporal bone, bilateral parietal bones. Left temporoparietal bone skull fractures appear to be comminuted in multiple locations. There also small hemorrhagic contusions in bilateral frontal lobes. Small amount of subarachnoid hemorrhage in the right frontal lobe. Mild fluid layering in the sphenoid sinus. Fractures involving the left orbit as well. The mastoid air cells are clear. However, left temporal bone fracture does extend through the region of the left external auditory canal and extends to the middle ear. Cervical spine: Vertebral body heights are intact. Alignment is intact. Facets are normally aligned. The odontoid process is intact. No significant prevertebral soft tissue swelling. Facial bones: Mandible is intact. The zygomatic arches are intact. Nasal bones are intact.   Nasal bony septum is midline. Sphenoid temporal buttress is intact. Mildly displaced fracture of the roof of the left orbit. The orbital floor is intact. Globes are intact and not proptotic. No retro bulbar soft tissue hematoma. Mild left periorbital preseptal soft tissue swelling. Bilateral comminuted skull bone fractures involving bilateral temporal bones and parietal bones. Fractures are worse on the left side. Mild right holohemispheric subdural hematoma with mild midline shift. Bilateral frontal small hemorrhagic contusions. Left orbital roof mildly displaced fracture. No evidence for cervical fracture or subluxation. Critical findings reported to the ER physician at time of dictation. Ct Chest W Contrast    Result Date: 10/9/2020  EXAMINATION: CT OF THE CHEST WITH CONTRAST 10/8/2020 10:56 pm TECHNIQUE: CT of the chest was performed with the administration of intravenous contrast. Multiplanar reformatted images are provided for review. Dose modulation, iterative reconstruction, and/or weight based adjustment of the mA/kV was utilized to reduce the radiation dose to as low as reasonably achievable. COMPARISON: None. HISTORY: ORDERING SYSTEM PROVIDED HISTORY: trauma TECHNOLOGIST PROVIDED HISTORY: Reason for exam:->trauma What reading provider will be dictating this exam?->CRC FINDINGS: An endotracheal and enteric tubes are noted in place and are appropriately positioned. Mediastinum: Normal heart size. The great vessels are within normal limits. No pericardial effusion. No significantly enlarged lymph nodes. Lungs/pleura: Mild dependent congestion involving the bilateral posterior lungs. No pulmonary mass or augustin consolidation. No pleural effusion. Upper Abdomen: Within normal limits. Soft Tissues/Bones: Nondisplaced acute fractures involving the lateral left 5th through 7th ribs. Nondisplaced acute fractures of the posterior left 4th through 7th ribs.   Acute mildly displaced fractures of the posterior left 8th rib.  Mildly displaced acute fracture of the left body of the scapula. Nondisplaced acute fractures involving the lateral left 5th through 7th ribs. Nondisplaced acute fractures of the posterior left 4th through 7th ribs. Acute mildly displaced fractures of the posterior left 8th rib. Mildly displaced acute fracture of the left body of the scapula. Ct Cervical Spine Wo Contrast    Result Date: 10/9/2020  EXAMINATION: CT OF THE HEAD and cervical spine WITHOUT CONTRAST; CT OF THE FACE WITHOUT CONTRAST  10/8/2020 9:56 pm TECHNIQUE: CT of the head was performed without the administration of intravenous contrast. Dose modulation, iterative reconstruction, and/or weight based adjustment of the mA/kV was utilized to reduce the radiation dose to as low as reasonably achievable.; CT of the face was performed without the administration of intravenous contrast. Multiplanar reformatted images are provided for review. Dose modulation, iterative reconstruction, and/or weight based adjustment of the mA/kV was utilized to reduce the radiation dose to as low as reasonably achievable.; CT of the cervical spine was performed without the administration of intravenous contrast. Multiplanar reformatted images are provided for review. Dose modulation, iterative reconstruction, and/or weight based adjustment of the mA/kV was utilized to reduce the radiation dose to as low as reasonably achievable. COMPARISON: None. HISTORY: ORDERING SYSTEM PROVIDED HISTORY: trauma TECHNOLOGIST PROVIDED HISTORY: Reason for exam:->trauma Has a \"code stroke\" or \"stroke alert\" been called? ->No What reading provider will be dictating this exam?->CRC FINDINGS: Head: There is mild right frontoparietal holohemispheric subdural hemorrhage, measuring up to 6 mm. Mild mass effect and minimal midline shift of approximately 4 mm.   Visualized skull demonstrates multiple mildly displaced fractures in the skull, involving left temporal bone, right temporal bone, bilateral parietal bones. Left temporoparietal bone skull fractures appear to be comminuted in multiple locations. There also small hemorrhagic contusions in bilateral frontal lobes. Small amount of subarachnoid hemorrhage in the right frontal lobe. Mild fluid layering in the sphenoid sinus. Fractures involving the left orbit as well. The mastoid air cells are clear. However, left temporal bone fracture does extend through the region of the left external auditory canal and extends to the middle ear. Cervical spine: Vertebral body heights are intact. Alignment is intact. Facets are normally aligned. The odontoid process is intact. No significant prevertebral soft tissue swelling. Facial bones: Mandible is intact. The zygomatic arches are intact. Nasal bones are intact. Nasal bony septum is midline. Sphenoid temporal buttress is intact. Mildly displaced fracture of the roof of the left orbit. The orbital floor is intact. Globes are intact and not proptotic. No retro bulbar soft tissue hematoma. Mild left periorbital preseptal soft tissue swelling. Bilateral comminuted skull bone fractures involving bilateral temporal bones and parietal bones. Fractures are worse on the left side. Mild right holohemispheric subdural hematoma with mild midline shift. Bilateral frontal small hemorrhagic contusions. Left orbital roof mildly displaced fracture. No evidence for cervical fracture or subluxation. Critical findings reported to the ER physician at time of dictation. Ct Thoracic Spine Wo Contrast    Result Date: 10/9/2020  EXAMINATION: CT OF THE THORACIC SPINE WITHOUT CONTRAST  10/8/2020 10:56 pm: TECHNIQUE: CT of the thoracic spine was performed without the administration of intravenous contrast. Multiplanar reformatted images are provided for review.  Dose modulation, iterative reconstruction, and/or weight based adjustment of the mA/kV was utilized to reduce the radiation dose to as low as reasonably achievable. COMPARISON: Same day lumbar spine CT; same day CT chest, abdomen and pelvis HISTORY: ORDERING SYSTEM PROVIDED HISTORY: trauma TECHNOLOGIST PROVIDED HISTORY: Reason for exam:->trauma What reading provider will be dictating this exam?->CRC FINDINGS: BONES/ALIGNMENT: There is normal alignment of the spine. The vertebral body heights are maintained. No osseous destructive lesion is seen. DEGENERATIVE CHANGES: Mild spondylosis without significant central canal narrowing. Mild right foraminal narrowing at T11-T12. SOFT TISSUES: No paraspinal mass is seen. No acute thoracic spine abnormality. Ct Lumbar Spine Wo Contrast    Result Date: 10/9/2020  EXAMINATION: CT OF THE LUMBAR SPINE WITHOUT CONTRAST  10/8/2020 TECHNIQUE: CT of the lumbar spine was performed without the administration of intravenous contrast. Multiplanar reformatted images are provided for review. Dose modulation, iterative reconstruction, and/or weight based adjustment of the mA/kV was utilized to reduce the radiation dose to as low as reasonably achievable. COMPARISON: None HISTORY: ORDERING SYSTEM PROVIDED HISTORY: trauma TECHNOLOGIST PROVIDED HISTORY: Reason for exam:->trauma What reading provider will be dictating this exam?->CRC FINDINGS: BONES/ALIGNMENT: Vertebral body heights are maintained. No compression deformity. L5 pars defects with grade 1 anterolisthesis of L5 on S1. DEGENERATIVE CHANGES: Moderate disc desiccation at L5-S1. No significant central canal stenosis. Moderate-to-severe foraminal stenosis at the listhesis level. SOFT TISSUES/RETROPERITONEUM: No paraspinal mass is seen. No acute lumbar spine abnormality. L5 pars defects with grade 1 anterolisthesis of L5 on S1 and moderate-to-severe foraminal stenosis.      Ct Abdomen Pelvis W Iv Contrast Additional Contrast? None    Result Date: 10/9/2020  EXAMINATION: CT OF THE ABDOMEN AND PELVIS WITH CONTRAST 10/8/2020 10:56 pm TECHNIQUE: CT of the abdomen and pelvis was performed with the administration of intravenous contrast. Multiplanar reformatted images are provided for review. Dose modulation, iterative reconstruction, and/or weight based adjustment of the mA/kV was utilized to reduce the radiation dose to as low as reasonably achievable. COMPARISON: None. HISTORY: ORDERING SYSTEM PROVIDED HISTORY: trauma TECHNOLOGIST PROVIDED HISTORY: Additional Contrast?->None Reason for exam:->trauma What reading provider will be dictating this exam?->CRC FINDINGS: Lower Chest: Described in detail on separate report of CT of the chest. Organs: The liver, spleen, pancreas, and bilateral adrenal glands are within normal limits. No radiopaque gallstones. No CT evidence of acute cholecystitis. No intra or extrahepatic ductal dilatation. The bilateral kidneys are within normal limits. No renal cysts or masses are noted. No hydronephrosis or hydroureter. GI/Bowel: The small and large bowel demonstrate normal caliber and appearance. A normal-appearing appendix is identified. Pelvis: A soft tissue structure is noted in the distal right inguinal canal which could represent a deeply retracted testicle versus an undescended testicle. Recommend clinical correlation. The urinary bladder is nearly decompressed with a Bentley catheter in place. Peritoneum/Retroperitoneum: No free fluid or free air. Bones/Soft Tissues: Multiple rib fractures, as described in report of CT of the chest.  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 A soft tissue structure is noted in the distal right inguinal canal presumably representing the right testicle and could represent a deeply retracted testicle versus an undescended testicle. Recommend clinical correlation.      Xr Chest Portable    Result Date: 10/9/2020  EXAMINATION: ONE XRAY VIEW OF THE CHEST 10/9/2020 6:59 am COMPARISON: 10/09/2020 HISTORY: ORDERING SYSTEM PROVIDED HISTORY: intubated TECHNOLOGIST PROVIDED HISTORY: Reason for exam:->intubated What reading provider will be dictating this exam?->CRC FINDINGS: Lines/tubes are appropriate. Heart size is normal.  There are no infiltrates or effusions. No acute process     Xr Chest Portable    Result Date: 10/9/2020  EXAMINATION: ONE XRAY VIEW OF THE CHEST 10/9/2020 12:42 am COMPARISON: 10/08/2020 at this 2248 hours. Graylon Yosi HISTORY: ORDERING SYSTEM PROVIDED HISTORY: Line Placement TECHNOLOGIST PROVIDED HISTORY: Reason for exam:->Line Placement What reading provider will be dictating this exam?->CRC FINDINGS: Heart is not enlarged. Endotracheal tube and enteric tube are in place. Left IJ CVC is in place with tip in the proximal SVC. No pneumothorax. No pleural effusions. No pulmonary edema or pneumothorax. Endotracheal

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
## 2020-10-28 LAB
ANION GAP SERPL CALCULATED.3IONS-SCNC: 10 MMOL/L (ref 7–16)
BASOPHILS ABSOLUTE: 0.15 E9/L (ref 0–0.2)
BASOPHILS RELATIVE PERCENT: 1.7 % (ref 0–2)
BUN BLDV-MCNC: 23 MG/DL (ref 6–20)
CALCIUM SERPL-MCNC: 9.3 MG/DL (ref 8.6–10.2)
CHLORIDE BLD-SCNC: 98 MMOL/L (ref 98–107)
CO2: 28 MMOL/L (ref 22–29)
CREAT SERPL-MCNC: 0.8 MG/DL (ref 0.7–1.2)
EKG ATRIAL RATE: 72 BPM
EKG P AXIS: 26 DEGREES
EKG P-R INTERVAL: 210 MS
EKG Q-T INTERVAL: 378 MS
EKG QRS DURATION: 92 MS
EKG QTC CALCULATION (BAZETT): 413 MS
EKG R AXIS: 28 DEGREES
EKG T AXIS: 31 DEGREES
EKG VENTRICULAR RATE: 72 BPM
EOSINOPHILS ABSOLUTE: 0.26 E9/L (ref 0.05–0.5)
EOSINOPHILS RELATIVE PERCENT: 2.9 % (ref 0–6)
GFR AFRICAN AMERICAN: >60
GFR NON-AFRICAN AMERICAN: >60 ML/MIN/1.73
GLUCOSE BLD-MCNC: 100 MG/DL (ref 74–99)
HCT VFR BLD CALC: 42 % (ref 37–54)
HEMOGLOBIN: 13.8 G/DL (ref 12.5–16.5)
IMMATURE GRANULOCYTES #: 0.07 E9/L
IMMATURE GRANULOCYTES %: 0.8 % (ref 0–5)
LYMPHOCYTES ABSOLUTE: 1.68 E9/L (ref 1.5–4)
LYMPHOCYTES RELATIVE PERCENT: 18.9 % (ref 20–42)
MAGNESIUM: 2.4 MG/DL (ref 1.6–2.6)
MCH RBC QN AUTO: 30.4 PG (ref 26–35)
MCHC RBC AUTO-ENTMCNC: 32.9 % (ref 32–34.5)
MCV RBC AUTO: 92.5 FL (ref 80–99.9)
MONOCYTES ABSOLUTE: 0.73 E9/L (ref 0.1–0.95)
MONOCYTES RELATIVE PERCENT: 8.2 % (ref 2–12)
NEUTROPHILS ABSOLUTE: 6 E9/L (ref 1.8–7.3)
NEUTROPHILS RELATIVE PERCENT: 67.5 % (ref 43–80)
PDW BLD-RTO: 13.2 FL (ref 11.5–15)
PHOSPHORUS: 4 MG/DL (ref 2.5–4.5)
PLATELET # BLD: 386 E9/L (ref 130–450)
PMV BLD AUTO: 9.4 FL (ref 7–12)
POTASSIUM SERPL-SCNC: 4.6 MMOL/L (ref 3.5–5)
RBC # BLD: 4.54 E12/L (ref 3.8–5.8)
SODIUM BLD-SCNC: 136 MMOL/L (ref 132–146)
WBC # BLD: 8.9 E9/L (ref 4.5–11.5)

## 2020-10-28 PROCEDURE — 85025 COMPLETE CBC W/AUTO DIFF WBC: CPT

## 2020-10-28 PROCEDURE — 84100 ASSAY OF PHOSPHORUS: CPT

## 2020-10-28 PROCEDURE — 97530 THERAPEUTIC ACTIVITIES: CPT

## 2020-10-28 PROCEDURE — 99233 SBSQ HOSP IP/OBS HIGH 50: CPT | Performed by: SURGERY

## 2020-10-28 PROCEDURE — 6370000000 HC RX 637 (ALT 250 FOR IP): Performed by: SURGERY

## 2020-10-28 PROCEDURE — 93010 ELECTROCARDIOGRAM REPORT: CPT | Performed by: INTERNAL MEDICINE

## 2020-10-28 PROCEDURE — 6360000002 HC RX W HCPCS: Performed by: STUDENT IN AN ORGANIZED HEALTH CARE EDUCATION/TRAINING PROGRAM

## 2020-10-28 PROCEDURE — 2580000003 HC RX 258: Performed by: SURGERY

## 2020-10-28 PROCEDURE — 83735 ASSAY OF MAGNESIUM: CPT

## 2020-10-28 PROCEDURE — 6360000002 HC RX W HCPCS: Performed by: SURGERY

## 2020-10-28 PROCEDURE — 6370000000 HC RX 637 (ALT 250 FOR IP): Performed by: STUDENT IN AN ORGANIZED HEALTH CARE EDUCATION/TRAINING PROGRAM

## 2020-10-28 PROCEDURE — 2060000000 HC ICU INTERMEDIATE R&B

## 2020-10-28 PROCEDURE — 80048 BASIC METABOLIC PNL TOTAL CA: CPT

## 2020-10-28 PROCEDURE — 2700000000 HC OXYGEN THERAPY PER DAY

## 2020-10-28 RX ORDER — CLONIDINE HYDROCHLORIDE 0.2 MG/1
0.2 TABLET ORAL 3 TIMES DAILY
Status: DISCONTINUED | OUTPATIENT
Start: 2020-10-28 | End: 2020-11-04 | Stop reason: HOSPADM

## 2020-10-28 RX ORDER — DOCUSATE SODIUM 50 MG/5ML
100 LIQUID ORAL 2 TIMES DAILY
Status: DISCONTINUED | OUTPATIENT
Start: 2020-10-28 | End: 2020-11-01

## 2020-10-28 RX ORDER — BISACODYL 10 MG
10 SUPPOSITORY, RECTAL RECTAL DAILY
Status: DISCONTINUED | OUTPATIENT
Start: 2020-10-28 | End: 2020-11-01

## 2020-10-28 RX ADMIN — POLYETHYLENE GLYCOL 3350 17 G: 17 POWDER, FOR SOLUTION ORAL at 08:16

## 2020-10-28 RX ADMIN — FAMOTIDINE 20 MG: 20 TABLET ORAL at 08:16

## 2020-10-28 RX ADMIN — BACITRACIN ZINC: 500 OINTMENT TOPICAL at 08:17

## 2020-10-28 RX ADMIN — Medication 10 ML: at 08:16

## 2020-10-28 RX ADMIN — DOCUSATE SODIUM 100 MG: 50 LIQUID ORAL at 21:45

## 2020-10-28 RX ADMIN — CLONIDINE HYDROCHLORIDE 0.2 MG: 0.2 TABLET ORAL at 13:51

## 2020-10-28 RX ADMIN — FAMOTIDINE 20 MG: 20 TABLET ORAL at 21:45

## 2020-10-28 RX ADMIN — QUETIAPINE FUMARATE 100 MG: 100 TABLET ORAL at 21:45

## 2020-10-28 RX ADMIN — ENOXAPARIN SODIUM 30 MG: 30 INJECTION SUBCUTANEOUS at 21:45

## 2020-10-28 RX ADMIN — NYSTATIN 500000 UNITS: 100000 SUSPENSION ORAL at 08:16

## 2020-10-28 RX ADMIN — BISACODYL 10 MG: 10 SUPPOSITORY RECTAL at 11:10

## 2020-10-28 RX ADMIN — QUETIAPINE FUMARATE 100 MG: 100 TABLET ORAL at 08:20

## 2020-10-28 RX ADMIN — NYSTATIN 500000 UNITS: 100000 SUSPENSION ORAL at 21:45

## 2020-10-28 RX ADMIN — Medication 10 ML: at 21:46

## 2020-10-28 RX ADMIN — BACITRACIN ZINC: 500 OINTMENT TOPICAL at 21:45

## 2020-10-28 RX ADMIN — ENOXAPARIN SODIUM 30 MG: 30 INJECTION SUBCUTANEOUS at 08:16

## 2020-10-28 RX ADMIN — CLONIDINE HYDROCHLORIDE 0.2 MG: 0.2 TABLET ORAL at 21:45

## 2020-10-28 RX ADMIN — BACITRACIN ZINC: 500 OINTMENT TOPICAL at 13:51

## 2020-10-28 RX ADMIN — NYSTATIN 500000 UNITS: 100000 SUSPENSION ORAL at 13:30

## 2020-10-28 RX ADMIN — NYSTATIN 500000 UNITS: 100000 SUSPENSION ORAL at 16:31

## 2020-10-28 RX ADMIN — CLONIDINE HYDROCHLORIDE 0.2 MG: 0.2 TABLET ORAL at 08:15

## 2020-10-28 RX ADMIN — DOCUSATE SODIUM 100 MG: 50 LIQUID ORAL at 11:10

## 2020-10-28 ASSESSMENT — PAIN SCALES - GENERAL
PAINLEVEL_OUTOF10: 0

## 2020-10-28 NOTE — PLAN OF CARE
Problem: Restraint Use - Nonviolent/Non-Self-Destructive Behavior:  Goal: Absence of restraint-related injury  10/28/2020 1458 by Myriam Boyle RN  Outcome: Met This Shift  10/28/2020 1458 by Zara Russo RN  Outcome: Met This Shift     Problem: Restraint Use - Nonviolent/Non-Self-Destructive Behavior:  Goal: Absence of restraint indications  10/28/2020 1458 by Myriam Boyle RN  Outcome: Not Met This Shift  10/28/2020 1458 by Zara Russo RN  Outcome: Not Met This Shift  10/28/2020 0750 by Myriam Boyle RN  Outcome: Met This Shift

## 2020-10-28 NOTE — PLAN OF CARE
Problem: Falls - Risk of:  Goal: Will remain free from falls  10/28/2020 0750 by Rosi Toure RN  Outcome: Met This Shift  10/28/2020 0053 by Jerald Lizama RN  Outcome: Met This Shift  10/27/2020 2205 by Jerald Lizama RN  Outcome: Met This Shift     Problem: Skin Integrity:  Goal: Will show no infection signs and symptoms  10/28/2020 0750 by Rosi Toure RN  Outcome: Met This Shift  10/28/2020 0053 by Jerald Lizama RN  Outcome: Met This Shift  10/27/2020 2205 by Jerald Lizama RN  Outcome: Met This Shift     Problem: Pain:  Goal: Pain level will decrease  10/28/2020 0750 by Rosi Toure RN  Outcome: Met This Shift  10/28/2020 0053 by Jerald Lizama RN  Outcome: Met This Shift  10/27/2020 2205 by Jerald Lizama RN  Outcome: Met This Shift     Problem: Airway Clearance - Ineffective:  Goal: Ability to maintain a clear airway will improve  10/28/2020 0750 by Rosi Toure RN  Outcome: Met This Shift  10/28/2020 0053 by Jerald Lizama RN  Outcome: Ongoing  10/27/2020 2205 by Jerald Lizama RN  Outcome: Ongoing     Problem: Anxiety/Stress:  Goal: Level of anxiety will decrease  10/28/2020 0750 by Rosi Toure RN  Outcome: Met This Shift  10/28/2020 0053 by Jerald Lizama RN  Outcome: Met This Shift  10/27/2020 2205 by Jerald Lizama RN  Outcome: Met This Shift     Problem: Aspiration:  Goal: Absence of aspiration  10/28/2020 0750 by Rosi Toure RN  Outcome: Met This Shift  10/28/2020 0053 by Jerald Lizama RN  Outcome: Met This Shift     Problem:  Bowel Function - Altered:  Goal: Bowel elimination is within specified parameters  10/28/2020 0750 by Rosi Toure RN  Outcome: Met This Shift  10/28/2020 0053 by Jerald Lizama RN  Outcome: Ongoing     Problem: Cardiac Output - Decreased:  Goal: Hemodynamic stability will improve  Outcome: Met This Shift     Problem: Fluid Volume - Imbalance:  Goal: Absence of imbalanced fluid volume signs and symptoms  Outcome: Met This Shift     Problem: Gas Exchange - Impaired:  Goal: Levels of oxygenation will improve  Outcome: Met This Shift     Problem: Mental Status - Impaired:  Goal: Mental status will be restored to baseline  10/28/2020 0750 by Darrion Archibald RN  Outcome: Met This Shift  10/28/2020 0053 by Jayesh Linares RN  Outcome: Not Met This Shift     Problem: Nutrition Deficit:  Goal: Ability to achieve adequate nutritional intake will improve  Outcome: Met This Shift     Problem: Pain:  Goal: Pain level will decrease  10/28/2020 0750 by Darrion Archibald RN  Outcome: Met This Shift  10/28/2020 0053 by Jayesh Linares RN  Outcome: Met This Shift  10/27/2020 2205 by Jayesh Linares RN  Outcome: Met This Shift     Problem: Skin Integrity - Impaired:  Goal: Will show no infection signs and symptoms  Outcome: Met This Shift     Problem: Sleep Pattern Disturbance:  Goal: Appears well-rested  Outcome: Met This Shift     Problem: Restraint Use - Nonviolent/Non-Self-Destructive Behavior:  Goal: Absence of restraint indications  10/28/2020 0750 by Darrion Archibald RN  Outcome: Met This Shift  10/28/2020 0053 by Jayesh Linares RN  Outcome: Not Met This Shift  10/27/2020 2205 by Jayesh Linares RN  Outcome: Not Met This Shift  Goal: Absence of restraint-related injury  10/28/2020 0053 by Jayesh Linares RN  Outcome: Met This Shift  10/27/2020 2205 by Jayesh Linares RN  Outcome: Met This Shift

## 2020-10-28 NOTE — FLOWSHEET NOTE
Pt is in 2 point soft wrist restraints due to he will pull at trach,iv and peter.   Pt needs to be in them for safety and rn will continue to monitor for need

## 2020-10-28 NOTE — PLAN OF CARE
Problem: Falls - Risk of:  Goal: Will remain free from falls  Description: Will remain free from falls  10/28/2020 0053 by Kristin Allison RN  Outcome: Met This Shift  10/27/2020 2205 by Kristin Allison RN  Outcome: Met This Shift     Problem: Skin Integrity:  Goal: Will show no infection signs and symptoms  Description: Will show no infection signs and symptoms  10/28/2020 0053 by Kristin Allison RN  Outcome: Met This Shift  10/27/2020 2205 by Kristin Allison RN  Outcome: Met This Shift     Problem: Pain:  Goal: Pain level will decrease  Description: Pain level will decrease  10/28/2020 0053 by Kristin Allison RN  Outcome: Met This Shift  10/27/2020 2205 by Kristin Allison RN  Outcome: Met This Shift     Problem: Anxiety/Stress:  Goal: Level of anxiety will decrease  Description: Level of anxiety will decrease  10/28/2020 0053 by Kristin Allison RN  Outcome: Met This Shift  10/27/2020 2205 by Kristin Allison RN  Outcome: Met This Shift     Problem: Aspiration:  Goal: Absence of aspiration  Description: Absence of aspiration  Outcome: Met This Shift     Problem: Pain:  Goal: Pain level will decrease  Description: Pain level will decrease  10/28/2020 0053 by Kristin Allison RN  Outcome: Met This Shift  10/27/2020 2205 by Kristin Allison RN  Outcome: Met This Shift     Problem: Inadequate oral food/beverage intake (NI-2.1)  Goal: Food and/or Nutrient Delivery  Description: Individualized approach for food/nutrient provision.   10/27/2020 1149 by Thalia Barrera, MS, RD, LD  Outcome: Met This Shift     Problem: Restraint Use - Nonviolent/Non-Self-Destructive Behavior:  Goal: Absence of restraint-related injury  Description: Absence of restraint-related injury  10/28/2020 0053 by Kristin Allison RN  Outcome: Met This Shift  10/27/2020 2205 by Kristin Allison RN  Outcome: Met This Shift  10/27/2020 1736 by Peterson Jean RN  Outcome: Met This Shift
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
## 2020-10-28 NOTE — FLOWSHEET NOTE
Both wife and father aware of transfer to [facility]/report called

## 2020-10-28 NOTE — PROGRESS NOTES
Physical Therapy    Physical Therapy Daily Treatment Note      Name: Sid Duarte  : 1990  MRN: [id number]    Referring Provider:  Shabana Soria MD    Date of Service: 10/28/2020    Evaluating PT:  Kylah Jackson PT, DPT [id number]     Room #:  0698/1503-G  Diagnosis:  [facility]  Procedure/Surgery:  Cranial ventriculostomy/ ICP monitor 10/9  Precautions:  Falls, NWB LUE s/p L scapular fx, LUE sling, L rib fxs 4-8, facial fxs, 4 pt restraint, Trach mask, PEG  Equipment Needs:  TBD    SUBJECTIVE:    Pt lives with wife in a 1 story home with 2 stairs to enter. Bedroom and bathroom are on the 1st level. Pt ambulated with no AD PTA. OBJECTIVE:   Initial Evaluation  Date: 10/27/20 Treatment  10/28/20 Short Term/ Long Term   Goals   AM-PAC 6 Clicks 3/19 6/06    Was pt agreeable to Eval/treatment? Yes Yes    Does pt have pain? Unable to report  Unable to report     Bed Mobility  Rolling: Dep  Supine to sit: Dep x2  Sit to supine: Dep x2  Scooting: Dep Rolling: Dep  Supine to sit: Max A x2  Sit to supine: Max A x2  Scooting: Dep Rolling: Max A  Supine to sit: Max A  Sit to supine: Max A  Scooting: Max A   Transfers Sit to stand: Dep>Max A  Stand to sit: Dep  Stand pivot: NT Sit to stand: Max<>Dep  Stand to sit: Dep  Stand pivot: NT Sit to stand: Max A  Stand to sit: Max A  Stand pivot: Max A   Ambulation    NT  >5 feet with AAD Max A   Stair negotiation: ascended and descended  NT  NA   ROM BUE:  Per OT eval  BLE:  WFL     Strength BUE:  Per OT eval   BLE:  Grossly >3/5 observed with function -- unable to follow commands during MMT     Balance Sitting EOB:  Mod<>max A  Static Standing: Max A Sitting EOB:  Mod<>max A  Static Standing: Max A Sitting EOB:  SBA  Dynamic Standing:   Max A     Pt is A & O x ? -- non verbal   RASS: +2  CAM-ICU:  Positive   Sensation:  Pt unable to report numbness and tingling to extremities  Edema:  Unremarkable     Vitals:  Blood Pressure at rest 109/64 Blood Pressure post session 132/77   Heart Rate at rest 68 bpm Heart Rate post session 62 bpm   SPO2 at rest 96% on trach mask  SPO2 post session 98% on trach mask        Functional Status Score-Intensive Care Unit (FSS-ICU)   Rolling 1/7   Supine to sit transfer 1/7   Unsupported sitting  2/7   Sit to stand transfers 1/7   Ambulation 0/7   Total  5/35     Therapeutic Exercises:     BLE PROM in supine    STS x1    Patient education  Pt educated on safety during functional mobility, PT role, sitting balance/posture, standing balance/posture     Patient response to education:   Pt verbalized understanding Pt demonstrated skill Pt requires further education in this area   No  Somewhat  Yes      ASSESSMENT:    Comments:  Pt received supine and agreeable to PT treatment with OT collaboration. Pt cleared for participation by RN prior to session. Vitals monitored during session. Pt more agitated and restless today. He did squeeze hands bilaterally. Questionable command following vs reflexive but he did not squeeze at all yesterday. Assisted to EOB. Pt restless at EOB with multidirectional LOB and increased coughing. Attempted functional activity at EOB but pt unable to participate at this time. Worked on trunk control and upright sitting posture at EOB. Performed STS today. Ambulation unsafe d/t pt's restlessness but he is able to stand once initiated by PT. Return to supine with HOB 30 degrees at end of session. Restraints replaced. Pt left with call button in reach, lines attached, and needs met.       Treatment:  Patient practiced and was instructed in the following treatment:     Bed mobility training - pt given verbal and tactile cues to facilitate proper sequencing and safety during rolling and supine>sit as well as provided with physical assistance to complete task     Sitting EOB for >12 minutes for upright tolerance, postural awareness and BLE ROM    STS training - pt educated on proper hand and foot placement, safety and sequencing, and use of no AD to safely complete sit<>stand with hands on assistance to complete task safely     Skilled positioning - Pt placed in the semi reclined position with pillows utilized to facilitate upright posture, joint and skin integrity, and interaction with environment.  Non-pharmacological treatment and prevention of ICU delirium - Pt oriented to date, time, time of day, place, and situation as well as provided with visual and auditory stimuli in order to improve cognition and combat effects of ICU delirium. PLAN:  Pt is making slow progress towards established goals. Continue PT POC.        Time in  0735  Time out  0800    Total Treatment Time  25 minutes     CPT codes:  [] Low Complexity PT evaluation 87668  [] Moderate Complexity PT evaluation 82479  [] High Complexity PT evaluation 02289  [] PT Re-evaluation 67782  [] Gait training 00594 -- minutes  [] Manual therapy 94495 -- minutes  [x] Therapeutic activities 75514 25 minutes  [] Therapeutic exercises 55990 - minutes  [] Neuromuscular reeducation 33469 -- minutes     Daly Pardo, PT, DPT  [id number]

## 2020-10-28 NOTE — PROGRESS NOTES
PROM/AROM WFL Deferred d/t NWB precautions Continue to assess in sessions         Sensation: No c/o numbness or tingling in extremities   Tone: WNL   Edema: none noted     Functional Assessment    Initial Eval Status  Date: 10/27/20 Treatment Status  Date: 10/28/20 STG=LTG  5-14  days    Feeding PEG  PEG                           Grooming Dep (attempted to wash hands, therapist performed to increase alertness)  Dep ([place] assist provided to wash face to increase alertness)                       Max A   while seated in bedside chair      UB dressing Dep  Dep                       Max A         LB dressing Dep  Dep Max A   using AE as needed for safe reach/ energy conservation     Bathing Dep Dep                        Max A   using AE as needed for safe reach/ energy conservation        Toileting Dep  Dep                       Max A      Bed Mobility  Supine to sit: Dep x 2     Sit to supine: Dep x2 Supine<>Sit: Max A x2                        Max A  in prep of ADL tasks & transfers   Functional Transfers Sit to stand: Dep>Max A     Stand to sit: Dep  Sit to stand: Dep>Max A  Stand to sit: Dep                       Max A  sit<>stand/functional bathroom transfers using AD/DME as needed for balance and safety   Functional Mobility NT  NT                      Max A   functional/bathroom mobility using AD as needed & demonstrating good safety      Balance Sitting:     Static: Max A    Dynamic:Max A  Standing: Max A<>Mod. A Sitting:     Static: Max A    Dynamic:Max A  Standing: Max A<>Mod. A SBA dynamic sitting balance; Max A dynamic standing balance  during ADL tasks & transfers   Endurance/Activity Tolerance    poor tolerance with light activity. Pt sat EOB >10 minutes  poor tolerance with light activity.  Pt sat EOB >10 minutes to improve sitting balance, alertness, upright posture for ADLs fair   tolerance with light/moderate activity/self care routine   Visual/  Perceptual Impaired: visual tracking; no eye contact; minimal eye opening; continue to assess in sessions     Pt able to look to verbal stimuli intermittently, minimal eye contact, impaired visual tracking                           Vitals:   HR at rest: 68 bpm HR at end of session: 62 bpm   Spo2 at rest:96% Spo2 at end of session 98%   BP at rest:109/64 mmHg BP at end of session 152/77 mmHg        Treatment:   · Bed mobility: Facilitated bed mobility with cues for proper body mechanics and sequencing to prepare for ADL completion. Assist of 2 for safety d/t medical complexity of pt and pt's decreased safety awareness. · Functional transfers: Facilitated transfers from EOB with cues for body alignment, safety and hand placement. · ADL completion: Self-care retraining for the above-mentioned ADLs; training on proper hand placement, safety technique, sequencing, and energy conservation techniques. Attempted to wash face with [employer] assist to increase alertness. · Therapeutic Exercises: B UE AROM/PROM completed at all levels to maintain strength/flexibility and to decrease edema/contractures to enhance ADL completion. Performed AROM to RUE and distally PROM to LUE to prevent contractures. · Postural Balance: Sitting and standing balance retraining to improve righting reactions with postural changes during ADLS. · Cognitive/Perceptual training: retraining exercises to improve attention, mentation, and spatial awareness for ADLs & transfers. · Skilled positioning: Proper positioning to improve interaction with environment, overall functioning and decrease/prevent edema and contractures. · Delirium Prevention/Management: Implementation of non-pharmacologic interventions for delirium prevention incorporated throughout session to patient. Including environmental and sensory modifications to decrease patient's internal distraction caused by delirium, and improve overall mentation. Comments: OK from RN to see patient. Upon arrival, patient lying in bed.   Pt demo poor

## 2020-10-28 NOTE — FLOWSHEET NOTE
Pt on trach and continues to pull at lines and tubes upon agitation and turning. Verbal re-direction unsuccessful at this time as patient does not follow commands. Bilateral soft wrist restraints continued for patient safety. Will continue to monitor.

## 2020-10-28 NOTE — PROGRESS NOTES
Neurosurg progress note  VITALS:  BP (!) 141/80   Pulse 66   Temp 98.6 °F (37 °C)   Resp 17   Ht 6' 2\" (1.88 m)   Wt 224 lb 10.4 oz (101.9 kg)   SpO2 97%   BMI 28.84 kg/m²   24HR INTAKE/OUTPUT:    Intake/Output Summary (Last 24 hours) at 10/28/2020 0856  Last data filed at 10/28/2020 0800  Gross per 24 hour   Intake 1355 ml   Output 1875 ml   Net -520 ml     Xr Pelvis (1-2 Views)    Result Date: 10/8/2020  EXAMINATION: ONE XRAY VIEW OF THE PELVIS 10/8/2020 9:52 pm COMPARISON: None. HISTORY: ORDERING SYSTEM PROVIDED HISTORY: trauma TECHNOLOGIST PROVIDED HISTORY: Reason for exam:->trauma What reading provider will be dictating this exam?->CRC FINDINGS: Left ischial tuberosity is not included on this examination. Entire left hip is not included on this examination. No fracture or dislocation involving pelvis or visualized hips. No diastasis involving sacroiliac joints or symphysis pubis. No fracture or dislocation involving visualized pelvis or hips. Xr Elbow Right (min 3 Views)    Result Date: 10/9/2020  EXAMINATION: THREE XRAY VIEWS OF THE RIGHT ELBOW 10/9/2020 7:00 am COMPARISON: None. HISTORY: ORDERING SYSTEM PROVIDED HISTORY: trauma, Skull fx TECHNOLOGIST PROVIDED HISTORY: Reason for exam:->trauma, Skull fx What reading provider will be dictating this exam?->CRC FINDINGS: There is no fracture dislocation. There is no elbow joint effusion. There are tiny degenerative spurs. No acute process     Xr Hand Left (min 3 Views)    Result Date: 10/9/2020  EXAMINATION: THREE XRAY VIEWS OF THE LEFT HAND; THREE XRAY VIEWS OF THE RIGHT HAND 10/9/2020 7:01 am COMPARISON: None. HISTORY: ORDERING SYSTEM PROVIDED HISTORY: trauma TECHNOLOGIST PROVIDED HISTORY: Reason for exam:->trauma What reading provider will be dictating this exam?->CRC FINDINGS: Positioning of both hands does somewhat limit evaluation. There are no definite fractures or dislocations.   Joint spaces are normal.     No acute process     Xr Hand Right (min 3 Views)    Result Date: 10/9/2020  EXAMINATION: THREE XRAY VIEWS OF THE LEFT HAND; THREE XRAY VIEWS OF THE RIGHT HAND 10/9/2020 7:01 am COMPARISON: None. HISTORY: ORDERING SYSTEM PROVIDED HISTORY: trauma TECHNOLOGIST PROVIDED HISTORY: Reason for exam:->trauma What reading provider will be dictating this exam?->CRC FINDINGS: Positioning of both hands does somewhat limit evaluation. There are no definite fractures or dislocations. Joint spaces are normal.     No acute process     Xr Knee Left (3 Views)    Result Date: 10/9/2020  EXAMINATION: THREE XRAY VIEWS OF THE LEFT KNEE 10/9/2020 7:03 am COMPARISON: None. HISTORY: ORDERING SYSTEM PROVIDED HISTORY: trauma TECHNOLOGIST PROVIDED HISTORY: Reason for exam:->trauma What reading provider will be dictating this exam?->CRC FINDINGS: There is no fracture dislocation. There is no joint effusion or degenerative change. No acute process     Xr Knee Right (3 Views)    Result Date: 10/9/2020  EXAMINATION: THREE XRAY VIEWS OF THE RIGHT KNEE 10/9/2020 8:06 am COMPARISON: None. HISTORY: ORDERING SYSTEM PROVIDED HISTORY: trauma TECHNOLOGIST PROVIDED HISTORY: Reason for exam:->trauma What reading provider will be dictating this exam?->CRC FINDINGS: There is no fracture dislocation. There is no joint space narrowing or effusion. No acute process     Ct Head Wo Contrast    Result Date: 10/9/2020  EXAMINATION: CT OF THE HEAD WITHOUT CONTRAST  10/9/2020 4:07 am TECHNIQUE: CT of the head was performed without the administration of intravenous contrast. Dose modulation, iterative reconstruction, and/or weight based adjustment of the mA/kV was utilized to reduce the radiation dose to as low as reasonably achievable. COMPARISON: CT head 10/08/2020 HISTORY: ORDERING SYSTEM PROVIDED HISTORY: evaluate head bleed TECHNOLOGIST PROVIDED HISTORY: Has a \"code stroke\" or \"stroke alert\" been called? ->No Reason for exam:->evaluate head bleed What reading provider will be dictating this exam?->CRC FINDINGS: BRAIN/VENTRICLES: Interval increase in size of right frontal parenchymal contusion, measuring 14 x 7 x 10 mm, previously 10 x 5 x 5 mm. Additional right frontal contusion, more inferiorly has also increased in size. Punctate contusions in the anterior-inferior frontal lobe along the gyrus rectus have also increased. Scattered bilateral hemispheric subarachnoid hemorrhage. Right convexity subdural hematoma measures up to 6 mm in thickness, not substantially changed. Trace left parietal subdural hematoma measuring 2 mm in thickness. 5 mm leftward midline shift, not substantially changed. No herniation. Patent basal cisterns. ORBITS: The visualized portion of the orbits demonstrate no acute abnormality. SINUSES: Nasopharyngeal opacities with partially imaged esophagogastric and endotracheal tubes. Scattered paranasal sinus opacities. SOFT TISSUES/SKULL:  Nondisplaced left temporal bone fracture extending to the otic capsule. Comminuted mildly displaced left parietal fracture. Nondisplaced right temporal bone fracture which is otic capsule sparing. Diffuse scalp swelling with posterior scalp contusions. Skin staples present. Mild interval increase in size of scattered parenchymal hematomas, mainly in the right frontal lobe, suspicious for diffuse axonal injury. No substantial change in acute right convexity subdural and left parietal subdural hematomas. Stable 5 mm leftward midline shift. Unchanged calvarial fractures, notable for a nondisplaced left temporal bone fracture possibly extending to the otic capsule. Recommend follow-up temporal bone CT.      Ct Head Wo Contrast    Result Date: 10/9/2020  EXAMINATION: CT OF THE HEAD WITHOUT CONTRAST  10/9/2020 12:11 pm TECHNIQUE: CT of the head was performed without the administration of intravenous contrast. Dose modulation, iterative reconstruction, and/or weight based adjustment of the mA/kV was utilized to reduce the radiation dose to as low as reasonably achievable. COMPARISON: 8 hours prior. HISTORY: ORDERING SYSTEM PROVIDED HISTORY: s/p ventric TECHNOLOGIST PROVIDED HISTORY: Reason for exam:->s/p ventric Has a \"code stroke\" or \"stroke alert\" been called? ->No What reading provider will be dictating this exam?->CRC FINDINGS: There has been interval placement of a right frontal approach ventriculostomy catheter terminating within the right lateral ventricle frontal horn abutting the septum pellucidum. There is split like configuration of the right lateral ventricle that may reflect over shunting. There is no temporal horn dilatation. There is diffuse cerebral edema with diffuse sulcal effacement. There is similar appearance of multiple foci of hemorrhagic contusions involving the right frontal lobe and to lesser extent left frontal lobe and right temporal lobe. The hemorrhagic contusions demonstrates mild surrounding edema. There is similar appearance of acute subarachnoid hemorrhage along the bilateral frontal convexities and right temporal convexity and to a lesser extent at the vertices. There is small volume of subdural hemorrhage along the right lateral tentorial leaflet. There is small volume subarachnoid hemorrhage within the interpeduncular cistern. There is new wedge-shaped region of low attenuation involving the left middle cerebellar peduncle and left cerebellar hemisphere, concerning for acute ischemia. There is no significant midline shift. There are bilateral parietal fractures, comminuted on the left, extending into the left temporal bone including the mastoid segment. Please refer to concurrently performed CT temporal bone imaging report. There is additional depressed fracture of the left superior orbital wall. There is diffuse subgaleal soft tissue swelling. Interval placement of right ventriculostomy catheter terminating within the right lateral ventricle frontal horn.   Interval development of slit-like appearance of the right lateral ventricle. Suspect acute ischemia involving the left middle cerebellar peduncle and left cerebellar hemisphere. Similar appearance of intracranial hemorrhage in hemorrhagic contusions as above. Findings discussed with Dr. Bonny Harvey at 12:53 p.m. on December 9, 2020. Ct Head Wo Contrast    Result Date: 10/9/2020  EXAMINATION: CT OF THE HEAD and cervical spine WITHOUT CONTRAST; CT OF THE FACE WITHOUT CONTRAST  10/8/2020 9:56 pm TECHNIQUE: CT of the head was performed without the administration of intravenous contrast. Dose modulation, iterative reconstruction, and/or weight based adjustment of the mA/kV was utilized to reduce the radiation dose to as low as reasonably achievable.; CT of the face was performed without the administration of intravenous contrast. Multiplanar reformatted images are provided for review. Dose modulation, iterative reconstruction, and/or weight based adjustment of the mA/kV was utilized to reduce the radiation dose to as low as reasonably achievable.; CT of the cervical spine was performed without the administration of intravenous contrast. Multiplanar reformatted images are provided for review. Dose modulation, iterative reconstruction, and/or weight based adjustment of the mA/kV was utilized to reduce the radiation dose to as low as reasonably achievable. COMPARISON: None. HISTORY: ORDERING SYSTEM PROVIDED HISTORY: trauma TECHNOLOGIST PROVIDED HISTORY: Reason for exam:->trauma Has a \"code stroke\" or \"stroke alert\" been called? ->No What reading provider will be dictating this exam?->CRC FINDINGS: Head: There is mild right frontoparietal holohemispheric subdural hemorrhage, measuring up to 6 mm. Mild mass effect and minimal midline shift of approximately 4 mm. Visualized skull demonstrates multiple mildly displaced fractures in the skull, involving left temporal bone, right temporal bone, bilateral parietal bones.   Left temporoparietal bone skull fractures appear to be comminuted in multiple locations. There also small hemorrhagic contusions in bilateral frontal lobes. Small amount of subarachnoid hemorrhage in the right frontal lobe. Mild fluid layering in the sphenoid sinus. Fractures involving the left orbit as well. The mastoid air cells are clear. However, left temporal bone fracture does extend through the region of the left external auditory canal and extends to the middle ear. Cervical spine: Vertebral body heights are intact. Alignment is intact. Facets are normally aligned. The odontoid process is intact. No significant prevertebral soft tissue swelling. Facial bones: Mandible is intact. The zygomatic arches are intact. Nasal bones are intact. Nasal bony septum is midline. Sphenoid temporal buttress is intact. Mildly displaced fracture of the roof of the left orbit. The orbital floor is intact. Globes are intact and not proptotic. No retro bulbar soft tissue hematoma. Mild left periorbital preseptal soft tissue swelling. Bilateral comminuted skull bone fractures involving bilateral temporal bones and parietal bones. Fractures are worse on the left side. Mild right holohemispheric subdural hematoma with mild midline shift. Bilateral frontal small hemorrhagic contusions. Left orbital roof mildly displaced fracture. No evidence for cervical fracture or subluxation. Critical findings reported to the ER physician at time of dictation. Ct Iac Posterior Fossa Wo Contrast    Result Date: 10/10/2020  EXAMINATION: CT OF THE INTERNAL AUDITORY CANAL WITHOUT CONTRAST 10/9/2020 12:11 pm TECHNIQUE: CT of the internal auditory canal was performed without contrast was performed without the administration of intravenous contrast. Multiplanar reformatted images are provided for review.  Dose modulation, iterative reconstruction, and/or weight based adjustment of the mA/kV was utilized to reduce the radiation dose to as low as reasonably achievable. COMPARISON: None HISTORY: ORDERING SYSTEM PROVIDED HISTORY: TRAUMA TECHNOLOGIST PROVIDED HISTORY: Reason for exam:->trauma What reading provider will be dictating this exam?->CRC FINDINGS: RIGHT TEMPORAL BONE:  The right external auditory canal is normal in appearance. There is no right temporal bone fracture identified. There is a small volume of fluid within the right mastoid air cells. The right middle ear is clear. The ossicles are normally aligned. The tegmen tympani is intact. The scutum is intact. There is no osseous dehiscence. The cochlea, vestibule and semicircular canals are normal in appearance. LEFT TEMPORAL BONE: There is a longitudinal fracture of the mastoid segment of the left temporal bone. There is incudomalleolar subluxation involving the head of the malleus and body of the incus. The fracture does not extend into the otic capsule. Hemorrhage is present within the left middle ear and mastoid air cells. A comminuted fracture of the squamous portion of the left temporal bone is noted. The cochlea, vestibule and semicircular canals are normal.     Longitudinal fracture of the mastoid portion of the left temporal bone with associated incudomalleolar subluxation involving the head of the malleus and body of the incus. The fracture does not extend into the otic capsule. Small volume of fluid within the right mastoid air cells but no acute traumatic injury of the mastoid portion of the right temporal bone evident.      Ct Facial Bones Wo Contrast    Result Date: 10/9/2020  EXAMINATION: CT OF THE HEAD and cervical spine WITHOUT CONTRAST; CT OF THE FACE WITHOUT CONTRAST  10/8/2020 9:56 pm TECHNIQUE: CT of the head was performed without the administration of intravenous contrast. Dose modulation, iterative reconstruction, and/or weight based adjustment of the mA/kV was utilized to reduce the radiation dose to as low as reasonably achievable.; CT of the face was performed without the administration of intravenous contrast. Multiplanar reformatted images are provided for review. Dose modulation, iterative reconstruction, and/or weight based adjustment of the mA/kV was utilized to reduce the radiation dose to as low as reasonably achievable.; CT of the cervical spine was performed without the administration of intravenous contrast. Multiplanar reformatted images are provided for review. Dose modulation, iterative reconstruction, and/or weight based adjustment of the mA/kV was utilized to reduce the radiation dose to as low as reasonably achievable. COMPARISON: None. HISTORY: ORDERING SYSTEM PROVIDED HISTORY: trauma TECHNOLOGIST PROVIDED HISTORY: Reason for exam:->trauma Has a \"code stroke\" or \"stroke alert\" been called? ->No What reading provider will be dictating this exam?->CRC FINDINGS: Head: There is mild right frontoparietal holohemispheric subdural hemorrhage, measuring up to 6 mm. Mild mass effect and minimal midline shift of approximately 4 mm. Visualized skull demonstrates multiple mildly displaced fractures in the skull, involving left temporal bone, right temporal bone, bilateral parietal bones. Left temporoparietal bone skull fractures appear to be comminuted in multiple locations. There also small hemorrhagic contusions in bilateral frontal lobes. Small amount of subarachnoid hemorrhage in the right frontal lobe. Mild fluid layering in the sphenoid sinus. Fractures involving the left orbit as well. The mastoid air cells are clear. However, left temporal bone fracture does extend through the region of the left external auditory canal and extends to the middle ear. Cervical spine: Vertebral body heights are intact. Alignment is intact. Facets are normally aligned. The odontoid process is intact. No significant prevertebral soft tissue swelling. Facial bones: Mandible is intact. The zygomatic arches are intact. Nasal bones are intact. Nasal bony septum is midline. Sphenoid temporal buttress is intact. Mildly displaced fracture of the roof of the left orbit. The orbital floor is intact. Globes are intact and not proptotic. No retro bulbar soft tissue hematoma. Mild left periorbital preseptal soft tissue swelling. Bilateral comminuted skull bone fractures involving bilateral temporal bones and parietal bones. Fractures are worse on the left side. Mild right holohemispheric subdural hematoma with mild midline shift. Bilateral frontal small hemorrhagic contusions. Left orbital roof mildly displaced fracture. No evidence for cervical fracture or subluxation. Critical findings reported to the ER physician at time of dictation. Ct Chest W Contrast    Result Date: 10/9/2020  EXAMINATION: CT OF THE CHEST WITH CONTRAST 10/8/2020 10:56 pm TECHNIQUE: CT of the chest was performed with the administration of intravenous contrast. Multiplanar reformatted images are provided for review. Dose modulation, iterative reconstruction, and/or weight based adjustment of the mA/kV was utilized to reduce the radiation dose to as low as reasonably achievable. COMPARISON: None. HISTORY: ORDERING SYSTEM PROVIDED HISTORY: trauma TECHNOLOGIST PROVIDED HISTORY: Reason for exam:->trauma What reading provider will be dictating this exam?->CRC FINDINGS: An endotracheal and enteric tubes are noted in place and are appropriately positioned. Mediastinum: Normal heart size. The great vessels are within normal limits. No pericardial effusion. No significantly enlarged lymph nodes. Lungs/pleura: Mild dependent congestion involving the bilateral posterior lungs. No pulmonary mass or augustin consolidation. No pleural effusion. Upper Abdomen: Within normal limits. Soft Tissues/Bones: Nondisplaced acute fractures involving the lateral left 5th through 7th ribs. Nondisplaced acute fractures of the posterior left 4th through 7th ribs. Acute mildly displaced fractures of the posterior left 8th rib. Mildly displaced acute fracture of the left body of the scapula. Nondisplaced acute fractures involving the lateral left 5th through 7th ribs. Nondisplaced acute fractures of the posterior left 4th through 7th ribs. Acute mildly displaced fractures of the posterior left 8th rib. Mildly displaced acute fracture of the left body of the scapula. Ct Cervical Spine Wo Contrast    Result Date: 10/9/2020  EXAMINATION: CT OF THE HEAD and cervical spine WITHOUT CONTRAST; CT OF THE FACE WITHOUT CONTRAST  10/8/2020 9:56 pm TECHNIQUE: CT of the head was performed without the administration of intravenous contrast. Dose modulation, iterative reconstruction, and/or weight based adjustment of the mA/kV was utilized to reduce the radiation dose to as low as reasonably achievable.; CT of the face was performed without the administration of intravenous contrast. Multiplanar reformatted images are provided for review. Dose modulation, iterative reconstruction, and/or weight based adjustment of the mA/kV was utilized to reduce the radiation dose to as low as reasonably achievable.; CT of the cervical spine was performed without the administration of intravenous contrast. Multiplanar reformatted images are provided for review. Dose modulation, iterative reconstruction, and/or weight based adjustment of the mA/kV was utilized to reduce the radiation dose to as low as reasonably achievable. COMPARISON: None. HISTORY: ORDERING SYSTEM PROVIDED HISTORY: trauma TECHNOLOGIST PROVIDED HISTORY: Reason for exam:->trauma Has a \"code stroke\" or \"stroke alert\" been called? ->No What reading provider will be dictating this exam?->CRC FINDINGS: Head: There is mild right frontoparietal holohemispheric subdural hemorrhage, measuring up to 6 mm. Mild mass effect and minimal midline shift of approximately 4 mm.   Visualized skull demonstrates multiple mildly displaced fractures in the skull, involving left temporal bone, right temporal bone, bilateral parietal bones. Left temporoparietal bone skull fractures appear to be comminuted in multiple locations. There also small hemorrhagic contusions in bilateral frontal lobes. Small amount of subarachnoid hemorrhage in the right frontal lobe. Mild fluid layering in the sphenoid sinus. Fractures involving the left orbit as well. The mastoid air cells are clear. However, left temporal bone fracture does extend through the region of the left external auditory canal and extends to the middle ear. Cervical spine: Vertebral body heights are intact. Alignment is intact. Facets are normally aligned. The odontoid process is intact. No significant prevertebral soft tissue swelling. Facial bones: Mandible is intact. The zygomatic arches are intact. Nasal bones are intact. Nasal bony septum is midline. Sphenoid temporal buttress is intact. Mildly displaced fracture of the roof of the left orbit. The orbital floor is intact. Globes are intact and not proptotic. No retro bulbar soft tissue hematoma. Mild left periorbital preseptal soft tissue swelling. Bilateral comminuted skull bone fractures involving bilateral temporal bones and parietal bones. Fractures are worse on the left side. Mild right holohemispheric subdural hematoma with mild midline shift. Bilateral frontal small hemorrhagic contusions. Left orbital roof mildly displaced fracture. No evidence for cervical fracture or subluxation. Critical findings reported to the ER physician at time of dictation. Ct Thoracic Spine Wo Contrast    Result Date: 10/9/2020  EXAMINATION: CT OF THE THORACIC SPINE WITHOUT CONTRAST  10/8/2020 10:56 pm: TECHNIQUE: CT of the thoracic spine was performed without the administration of intravenous contrast. Multiplanar reformatted images are provided for review.  Dose modulation, iterative reconstruction, and/or weight based adjustment of the mA/kV was utilized to reduce the radiation dose to as low as and pelvis was performed with the administration of intravenous contrast. Multiplanar reformatted images are provided for review. Dose modulation, iterative reconstruction, and/or weight based adjustment of the mA/kV was utilized to reduce the radiation dose to as low as reasonably achievable. COMPARISON: None. HISTORY: ORDERING SYSTEM PROVIDED HISTORY: trauma TECHNOLOGIST PROVIDED HISTORY: Additional Contrast?->None Reason for exam:->trauma What reading provider will be dictating this exam?->CRC FINDINGS: Lower Chest: Described in detail on separate report of CT of the chest. Organs: The liver, spleen, pancreas, and bilateral adrenal glands are within normal limits. No radiopaque gallstones. No CT evidence of acute cholecystitis. No intra or extrahepatic ductal dilatation. The bilateral kidneys are within normal limits. No renal cysts or masses are noted. No hydronephrosis or hydroureter. GI/Bowel: The small and large bowel demonstrate normal caliber and appearance. A normal-appearing appendix is identified. Pelvis: A soft tissue structure is noted in the distal right inguinal canal which could represent a deeply retracted testicle versus an undescended testicle. Recommend clinical correlation. The urinary bladder is nearly decompressed with a Bentley catheter in place. Peritoneum/Retroperitoneum: No free fluid or free air. Bones/Soft Tissues: Multiple rib fractures, as described in report of CT of the chest.  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 A soft tissue structure is noted in the distal right inguinal canal presumably representing the right testicle and could represent a deeply retracted testicle versus an undescended testicle. Recommend clinical correlation.      Xr Chest Portable    Result Date: 10/9/2020  EXAMINATION: ONE XRAY VIEW OF THE CHEST 10/9/2020 6:59 am COMPARISON: 10/09/2020 HISTORY: ORDERING SYSTEM PROVIDED HISTORY: intubated TECHNOLOGIST PROVIDED HISTORY: Reason for exam:->intubated What reading provider will be dictating this exam?->CRC FINDINGS: Lines/tubes are appropriate. Heart size is normal.  There are no infiltrates or effusions. No acute process     Xr Chest Portable    Result Date: 10/9/2020  EXAMINATION: ONE XRAY VIEW OF THE CHEST 10/9/2020 12:42 am COMPARISON: 10/08/2020 at this 2248 hours. Liseth Pires HISTORY: ORDERING SYSTEM PROVIDED HISTORY: Line Placement TECHNOLOGIST PROVIDED HISTORY: Reason for exam:->Line Placement What reading provider will be dictating this exam?->CRC FINDINGS: Heart is not enlarged. Endotracheal tube and enteric tube are in place. Left IJ CVC is in place with tip in the proximal SVC. No pneumothorax. No pleural effusions. No pulmonary edema or pneumothorax. Endotracheal tube,

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
## 2020-10-28 NOTE — PLAN OF CARE
Problem: Restraint Use - Nonviolent/Non-Self-Destructive Behavior:  Goal: Absence of restraint indications  Description: Absence of restraint indications  10/28/2020 1458 by Sravanthi Vasquze RN  Outcome: Not Met This Shift  10/28/2020 0750 by Jessica Charles RN  Outcome: Met This Shift     Problem: Restraint Use - Nonviolent/Non-Self-Destructive Behavior:  Goal: Absence of restraint-related injury  Description: Absence of restraint-related injury  Outcome: Met This Shift

## 2020-10-28 NOTE — FLOWSHEET NOTE
Pt on trach and reaches for lines and tubes upon agitation. Verbal re-direction unsuccessful at this time. No longer kicking legs over side rail. Bilateral soft wrist restraints continued for patient safety. Bilateral soft ankle restraints discontinued at this time. Will continue to monitor.

## 2020-10-29 PROCEDURE — 2580000003 HC RX 258: Performed by: SURGERY

## 2020-10-29 PROCEDURE — 2700000000 HC OXYGEN THERAPY PER DAY

## 2020-10-29 PROCEDURE — 6370000000 HC RX 637 (ALT 250 FOR IP): Performed by: SURGERY

## 2020-10-29 PROCEDURE — 6360000002 HC RX W HCPCS: Performed by: SURGERY

## 2020-10-29 PROCEDURE — 2060000000 HC ICU INTERMEDIATE R&B

## 2020-10-29 PROCEDURE — 97530 THERAPEUTIC ACTIVITIES: CPT

## 2020-10-29 PROCEDURE — 99232 SBSQ HOSP IP/OBS MODERATE 35: CPT | Performed by: SURGERY

## 2020-10-29 RX ADMIN — BACITRACIN ZINC: 500 OINTMENT TOPICAL at 09:04

## 2020-10-29 RX ADMIN — Medication 10 ML: at 21:20

## 2020-10-29 RX ADMIN — BISACODYL 10 MG: 10 SUPPOSITORY RECTAL at 09:04

## 2020-10-29 RX ADMIN — BACITRACIN ZINC: 500 OINTMENT TOPICAL at 13:55

## 2020-10-29 RX ADMIN — FAMOTIDINE 20 MG: 20 TABLET ORAL at 09:04

## 2020-10-29 RX ADMIN — POLYETHYLENE GLYCOL 3350 17 G: 17 POWDER, FOR SOLUTION ORAL at 09:04

## 2020-10-29 RX ADMIN — QUETIAPINE FUMARATE 100 MG: 100 TABLET ORAL at 09:04

## 2020-10-29 RX ADMIN — NYSTATIN 500000 UNITS: 100000 SUSPENSION ORAL at 09:04

## 2020-10-29 RX ADMIN — ENOXAPARIN SODIUM 30 MG: 30 INJECTION SUBCUTANEOUS at 09:04

## 2020-10-29 RX ADMIN — ENOXAPARIN SODIUM 30 MG: 30 INJECTION SUBCUTANEOUS at 21:20

## 2020-10-29 RX ADMIN — FAMOTIDINE 20 MG: 20 TABLET ORAL at 21:21

## 2020-10-29 RX ADMIN — DOCUSATE SODIUM 100 MG: 50 LIQUID ORAL at 09:04

## 2020-10-29 RX ADMIN — CLONIDINE HYDROCHLORIDE 0.2 MG: 0.2 TABLET ORAL at 09:04

## 2020-10-29 RX ADMIN — NYSTATIN 500000 UNITS: 100000 SUSPENSION ORAL at 17:56

## 2020-10-29 RX ADMIN — BACITRACIN ZINC: 500 OINTMENT TOPICAL at 21:21

## 2020-10-29 RX ADMIN — CLONIDINE HYDROCHLORIDE 0.2 MG: 0.2 TABLET ORAL at 21:21

## 2020-10-29 RX ADMIN — NYSTATIN 500000 UNITS: 100000 SUSPENSION ORAL at 21:20

## 2020-10-29 RX ADMIN — DOCUSATE SODIUM 100 MG: 50 LIQUID ORAL at 21:20

## 2020-10-29 RX ADMIN — QUETIAPINE FUMARATE 100 MG: 100 TABLET ORAL at 21:21

## 2020-10-29 RX ADMIN — NYSTATIN 500000 UNITS: 100000 SUSPENSION ORAL at 13:55

## 2020-10-29 RX ADMIN — Medication 10 ML: at 09:45

## 2020-10-29 ASSESSMENT — PAIN SCALES - GENERAL
PAINLEVEL_OUTOF10: 0

## 2020-10-29 NOTE — PROGRESS NOTES
Physical Therapy    Physical Therapy Daily Treatment Note      Name: Amaury Nice  : 1990  MRN: [id number]    Referring Provider:  Alessia Long MD    Date of Service: 10/29/2020    Evaluating PT:  Marcella Barnett, PT, DPT [id number]     Room #:  2887/1364-C  Diagnosis:  [facility]  Procedure/Surgery:  Cranial ventriculostomy/ ICP monitor 10/9  Precautions:  Falls, NWB LUE s/p L scapular fx, LUE sling, L rib fxs 4-8, facial fxs, 4 pt restraint, Trach mask, PEG  Equipment Needs:  TBD    SUBJECTIVE:    Pt lives with wife in a 1 story home with 2 stairs to enter. Bedroom and bathroom are on the 1st level. Pt ambulated with no AD PTA. OBJECTIVE:   Initial Evaluation  Date: 10/27/20 Treatment  10/28/20 Short Term/ Long Term   Goals   AM-PAC 6 Clicks     Was pt agreeable to Eval/treatment? Yes Yes    Does pt have pain? Unable to report  Unable to report     Bed Mobility  Rolling: Dep  Supine to sit: Dep x2  Sit to supine: Dep x2  Scooting: Dep Rolling: Dep 2  Supine to sit: dep 2     Sit to supine: dependent of 2   Scooting: Dep Rolling: Max A  Supine to sit: Max A  Sit to supine: Max A  Scooting: Max A   Transfers Sit to stand: Dep>Max A  Stand to sit: Dep  Stand pivot: NT Sit to stand: nt  Stand to sit: nt  Stand pivot: NT Sit to stand: Max A  Stand to sit: Max A  Stand pivot: Max A   Ambulation    NT NT >5 feet with AAD Max A   Stair negotiation: ascended and descended  NT  NA   ROM BUE:  Per OT eval  BLE:  WFL     Strength BUE:  Per OT eval   BLE:  Grossly >3/5 observed with function -- unable to follow commands during MMT     Balance Sitting EOB:  Mod<>max A  Static Standing: Max A Sitting EOB:  Mod<>max A  Static Standing: Max A Sitting EOB:  SBA  Dynamic Standing:   Max A     Pt is A & O x ? -- non verbal   Sensation:  Pt unable to report numbness and tingling to extremities  Edema:  Unremarkable     Therapeutic Exercises:    laq prom  b LE  Patient education  Pt educated on date and where he was Patient response to education:   Pt verbalized understanding Pt demonstrated skill Pt requires further education in this area   No  no Yes      ASSESSMENT:    Comments:  Pt received supine and  Nursing ok for therapy. Pt in B restraints. Pt dependently transferred to eob he did open eyes but no activity. Performed rom b le not able to arouse pt.   pt returned to bed and restraints applied and feeding tube put back on with hob elevated . Pt pulse ox on 8 liters 02 was 98% and HR 57. HOB > 30 degrees. Treatment:  Patient practiced and was instructed in the following treatment:     Pt not real alert not participating. PLAN:  Pt is making slow progress towards established goals. Continue PT POC.        Time in  1105  Time out  1118    Total Treatment Time  13 minutes     CPT codes:  [] Low Complexity PT evaluation 10320  [] Moderate Complexity PT evaluation 20960  [] High Complexity PT evaluation 63155  [] PT Re-evaluation 53877  [] Gait training 01118 -- minutes  [] Manual therapy 59197 -- minutes  [x] Therapeutic activities 23228 13 minutes  [] Therapeutic exercises 51619 - minutes  [] Neuromuscular reeducation 75867 -- minutes     Ayush Luciano, PTA  [id number]

## 2020-10-29 NOTE — DISCHARGE INSTR - COC
10/20/20 1800   Odor None 10/29/20 0800   Sumaya-wound Assessment Fragile 10/29/20 0800   Margins Defined edges 10/29/20 0800   Number of days: 20       Wound 10/09/20 Elbow Right;Posterior (Active)   Wound Etiology Traumatic 10/16/20 2000   Dressing Status Other (Comment) 10/29/20 0800   Wound Cleansed Irrigated with saline 10/21/20 1800   Dressing/Treatment Antibacterial ointment; Other (comment); Open to air 10/29/20 0800   Wound Assessment Epithelialization;Slough 10/24/20 1800   Drainage Amount None 10/29/20 0800   Drainage Description Serous 10/20/20 1800   Odor None 10/29/20 0800   Sumaya-wound Assessment Ecchymosis;Edematous; Blanchable erythema 10/29/20 0800   Margins Attached edges 10/29/20 0800   Number of days: 20        Elimination:  Continence:   · Bowel: No  · Bladder: No  Urinary Catheter: None   Colostomy/Ileostomy/Ileal Conduit: No       Date of Last BM: ***    Intake/Output Summary (Last 24 hours) at 10/29/2020 1203  Last data filed at 10/29/2020 0619  Gross per 24 hour   Intake 1190 ml   Output 876 ml   Net 314 ml     I/O last 3 completed shifts: In: 1961 [NG/GT:1190]  Out: [address] [Urine:1251]    Safety Concerns: At Risk for Falls    Impairments/Disabilities:      Speech    Nutrition Therapy:  Current Nutrition Therapy:   - Tube Feedings:  Fluid Restricted and at 45 ml/hr    Routes of Feeding: Gastrostomy Tube  Liquids: No Liquids  Daily Fluid Restriction: no  Last Modified Barium Swallow with Video (Video Swallowing Test): not done    Treatments at the Time of Hospital Discharge:   Respiratory Treatments: ***  Oxygen Therapy:  is not on home oxygen therapy.   Ventilator:    - No ventilator support    Rehab Therapies: Physical Therapy and Occupational Therapy  Therapy  Weight Bearing Status/Restrictions: No weight bearing restirctions  Other Medical Equipment (for information only, NOT a DME order):  ***  Other Treatments: ***    Patient's personal belongings (please select all that are sent with patient):  None    RN SIGNATURE:  Electronically signed by Gege Ash RN on 11/4/20 at 3:10 PM EST    CASE MANAGEMENT/SOCIAL WORK SECTION    Inpatient Status Date: ***    Readmission Risk Assessment Score:  Readmission Risk              Risk of Unplanned Readmission:        18           Discharging to Facility/ Agency   · Name: [facility]  · Address:  · Phone:  · Fax:    Dialysis Facility (if applicable)   · Name:  · Address:  · Dialysis Schedule:  · Phone:  · Fax:    / signature: {Esignature:[id number]}    PHYSICIAN SECTION    Prognosis: Fair    Condition at Discharge: Stable    Rehab Potential (if transferring to Rehab): Good    Recommended Labs or Other Treatments After Discharge: see discharge summary    Physician Certification: I certify the above information and transfer of Pinky Carmona  is necessary for the continuing treatment of the diagnosis listed and that he requires Moises Jorge for less 30 days.      Update Admission H&P: No change in H&P    PHYSICIAN SIGNATURE:  Electronically signed by Latrice Hanson DO on 11/4/20 at 3:26 PM EST

## 2020-10-29 NOTE — PROGRESS NOTES
Pt was removed from restraints to bathe and perform ROM, put attempted to pull at trach and peg tubes when untied, unable to verbally redirect patient. ROM performed, no sings of skin injury, pt placed back in BL soft wrist restraints at this time.
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
## 2020-10-29 NOTE — CARE COORDINATION
Lori Ordoñez following. Patient continues with restraints today, immediately reaches for trach when out of restraints. Will need to be restraint free 24 hours prior to discharge to [facility]. Facility is unsure at this point if they have auth through [place] for 30 days or not. They do need to call and find out more information. Approval will be through [place] as patient is service connected for [facility] aproval. Contact at [place] is Ramakrishna Hernández [phone]. Will not require COVID unless he becomes symptomatic. For questions I can be reached at [phone].  Ayesha [place]
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
## 2020-10-30 LAB
ANION GAP SERPL CALCULATED.3IONS-SCNC: 12 MMOL/L (ref 7–16)
BASOPHILS ABSOLUTE: 0.13 E9/L (ref 0–0.2)
BASOPHILS RELATIVE PERCENT: 1.5 % (ref 0–2)
BUN BLDV-MCNC: 27 MG/DL (ref 6–20)
CALCIUM SERPL-MCNC: 9.5 MG/DL (ref 8.6–10.2)
CHLORIDE BLD-SCNC: 101 MMOL/L (ref 98–107)
CO2: 29 MMOL/L (ref 22–29)
CREAT SERPL-MCNC: 0.9 MG/DL (ref 0.7–1.2)
EOSINOPHILS ABSOLUTE: 0.22 E9/L (ref 0.05–0.5)
EOSINOPHILS RELATIVE PERCENT: 2.6 % (ref 0–6)
GFR AFRICAN AMERICAN: >60
GFR NON-AFRICAN AMERICAN: >60 ML/MIN/1.73
GLUCOSE BLD-MCNC: 99 MG/DL (ref 74–99)
HCT VFR BLD CALC: 42.4 % (ref 37–54)
HEMOGLOBIN: 13.7 G/DL (ref 12.5–16.5)
IMMATURE GRANULOCYTES #: 0.04 E9/L
IMMATURE GRANULOCYTES %: 0.5 % (ref 0–5)
LYMPHOCYTES ABSOLUTE: 1.61 E9/L (ref 1.5–4)
LYMPHOCYTES RELATIVE PERCENT: 19.2 % (ref 20–42)
MAGNESIUM: 2.4 MG/DL (ref 1.6–2.6)
MCH RBC QN AUTO: 30.2 PG (ref 26–35)
MCHC RBC AUTO-ENTMCNC: 32.3 % (ref 32–34.5)
MCV RBC AUTO: 93.6 FL (ref 80–99.9)
MONOCYTES ABSOLUTE: 0.76 E9/L (ref 0.1–0.95)
MONOCYTES RELATIVE PERCENT: 9 % (ref 2–12)
NEUTROPHILS ABSOLUTE: 5.64 E9/L (ref 1.8–7.3)
NEUTROPHILS RELATIVE PERCENT: 67.2 % (ref 43–80)
PDW BLD-RTO: 13.2 FL (ref 11.5–15)
PHOSPHORUS: 4.3 MG/DL (ref 2.5–4.5)
PLATELET # BLD: 373 E9/L (ref 130–450)
PMV BLD AUTO: 10 FL (ref 7–12)
POTASSIUM SERPL-SCNC: 4.3 MMOL/L (ref 3.5–5)
RBC # BLD: 4.53 E12/L (ref 3.8–5.8)
SODIUM BLD-SCNC: 142 MMOL/L (ref 132–146)
WBC # BLD: 8.4 E9/L (ref 4.5–11.5)

## 2020-10-30 PROCEDURE — 84100 ASSAY OF PHOSPHORUS: CPT

## 2020-10-30 PROCEDURE — 6370000000 HC RX 637 (ALT 250 FOR IP): Performed by: SURGERY

## 2020-10-30 PROCEDURE — 83735 ASSAY OF MAGNESIUM: CPT

## 2020-10-30 PROCEDURE — 97530 THERAPEUTIC ACTIVITIES: CPT

## 2020-10-30 PROCEDURE — 36415 COLL VENOUS BLD VENIPUNCTURE: CPT

## 2020-10-30 PROCEDURE — 97110 THERAPEUTIC EXERCISES: CPT

## 2020-10-30 PROCEDURE — 6370000000 HC RX 637 (ALT 250 FOR IP): Performed by: STUDENT IN AN ORGANIZED HEALTH CARE EDUCATION/TRAINING PROGRAM

## 2020-10-30 PROCEDURE — 6360000002 HC RX W HCPCS: Performed by: SURGERY

## 2020-10-30 PROCEDURE — 2700000000 HC OXYGEN THERAPY PER DAY

## 2020-10-30 PROCEDURE — 2580000003 HC RX 258: Performed by: SURGERY

## 2020-10-30 PROCEDURE — 85025 COMPLETE CBC W/AUTO DIFF WBC: CPT

## 2020-10-30 PROCEDURE — 2060000000 HC ICU INTERMEDIATE R&B

## 2020-10-30 PROCEDURE — 80048 BASIC METABOLIC PNL TOTAL CA: CPT

## 2020-10-30 PROCEDURE — 99232 SBSQ HOSP IP/OBS MODERATE 35: CPT | Performed by: SURGERY

## 2020-10-30 RX ORDER — QUETIAPINE FUMARATE 200 MG/1
200 TABLET, FILM COATED ORAL 2 TIMES DAILY
Status: DISCONTINUED | OUTPATIENT
Start: 2020-10-30 | End: 2020-11-04 | Stop reason: HOSPADM

## 2020-10-30 RX ADMIN — CLONIDINE HYDROCHLORIDE 0.2 MG: 0.2 TABLET ORAL at 22:40

## 2020-10-30 RX ADMIN — CLONIDINE HYDROCHLORIDE 0.2 MG: 0.2 TABLET ORAL at 14:31

## 2020-10-30 RX ADMIN — FAMOTIDINE 20 MG: 20 TABLET ORAL at 08:27

## 2020-10-30 RX ADMIN — Medication 10 ML: at 08:27

## 2020-10-30 RX ADMIN — QUETIAPINE FUMARATE 200 MG: 100 TABLET ORAL at 22:40

## 2020-10-30 RX ADMIN — Medication 10 ML: at 22:42

## 2020-10-30 RX ADMIN — NYSTATIN 500000 UNITS: 100000 SUSPENSION ORAL at 08:27

## 2020-10-30 RX ADMIN — BACITRACIN ZINC: 500 OINTMENT TOPICAL at 14:31

## 2020-10-30 RX ADMIN — QUETIAPINE FUMARATE 200 MG: 100 TABLET ORAL at 08:27

## 2020-10-30 RX ADMIN — SENNOSIDES 5 ML: 8.8 LIQUID ORAL at 22:41

## 2020-10-30 RX ADMIN — ENOXAPARIN SODIUM 30 MG: 30 INJECTION SUBCUTANEOUS at 08:27

## 2020-10-30 RX ADMIN — DOCUSATE SODIUM 100 MG: 50 LIQUID ORAL at 22:40

## 2020-10-30 RX ADMIN — DOCUSATE SODIUM 100 MG: 50 LIQUID ORAL at 08:27

## 2020-10-30 RX ADMIN — ENOXAPARIN SODIUM 30 MG: 30 INJECTION SUBCUTANEOUS at 22:39

## 2020-10-30 RX ADMIN — NYSTATIN 500000 UNITS: 100000 SUSPENSION ORAL at 14:31

## 2020-10-30 RX ADMIN — BACITRACIN ZINC: 500 OINTMENT TOPICAL at 22:39

## 2020-10-30 RX ADMIN — FAMOTIDINE 20 MG: 20 TABLET ORAL at 22:40

## 2020-10-30 RX ADMIN — POLYETHYLENE GLYCOL 3350 17 G: 17 POWDER, FOR SOLUTION ORAL at 08:27

## 2020-10-30 RX ADMIN — BACITRACIN ZINC: 500 OINTMENT TOPICAL at 08:28

## 2020-10-30 RX ADMIN — NYSTATIN 500000 UNITS: 100000 SUSPENSION ORAL at 22:40

## 2020-10-30 RX ADMIN — CLONIDINE HYDROCHLORIDE 0.2 MG: 0.2 TABLET ORAL at 08:27

## 2020-10-30 RX ADMIN — BISACODYL 10 MG: 10 SUPPOSITORY RECTAL at 08:27

## 2020-10-30 ASSESSMENT — PAIN SCALES - GENERAL
PAINLEVEL_OUTOF10: 0

## 2020-10-30 NOTE — PROGRESS NOTES
When restraints released for ROM patient pulls at peg tube. Restraints continued for patient safety.

## 2020-10-30 NOTE — PROGRESS NOTES
GENERAL SURGERY  DAILY PROGRESS NOTE  10/30/2020    Subjective:  Patient's cuff was deflated earlier today, without any issues. His oxygen saturation has been above 92% during this time. I Decannulation this patient this afternoon with out any issue. After decannulation patient spoke, but then closes her eyes and went back to sleep. Patient tolerated decannulation very well. Nurse contacted wife to update her.     Objective:  /64   Pulse 63   Temp 99.5 °F (37.5 °C) (Temporal)   Resp 14   Ht 6' 2\" (1.88 m)   Wt 202 lb (91.6 kg)   SpO2 97%   BMI 25.94 kg/m²     Electronically signed by Lucinda Palacio DO on 10/30/2020 at 6:20 PM
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
## 2020-10-30 NOTE — PROGRESS NOTES
Patient calm and resting. Restraints removed per physician order. Wife notified. Sister at bedside and telesitter initiated for patient safety.

## 2020-10-30 NOTE — PLAN OF CARE
Problem: Falls - Risk of:  Goal: Will remain free from falls  Description: Will remain free from falls  Outcome: Met This Shift  Goal: Absence of physical injury  Description: Absence of physical injury  Outcome: Met This Shift     Problem: Restraint Use - Nonviolent/Non-Self-Destructive Behavior:  Goal: Absence of restraint indications  Description: Absence of restraint indications  Outcome: Ongoing  Goal: Absence of restraint-related injury  Description: Absence of restraint-related injury  Outcome: Met This Shift

## 2020-10-30 NOTE — PLAN OF CARE
Problem: Restraint Use - Nonviolent/Non-Self-Destructive Behavior:  Goal: Absence of restraint-related injury  Description: Absence of restraint-related injury  10/30/2020 1820 by Genie Sommers RN  Outcome: Met This Shift  10/30/2020 1435 by Genie Sommers RN  Outcome: Met This Shift     Problem: Restraint Use - Nonviolent/Non-Self-Destructive Behavior:  Goal: Absence of restraint indications  Description: Absence of restraint indications  10/30/2020 1820 by Genie Sommers RN  Outcome: Not Met This Shift  10/30/2020 1435 by Genie Sommers RN  Outcome: Not Met This Shift
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
## 2020-10-30 NOTE — PROGRESS NOTES
Restless T/O night. Atttempts to remove urinary catheter. Doesn't follow commands. 2pt soft wrist restraints continue per protocol.

## 2020-10-30 NOTE — PROGRESS NOTES
When restraints released for ROM, patient attempts to remove T piece and pulls at trach. Restraints continued for patient safety.

## 2020-10-30 NOTE — PROGRESS NOTES
Physical Therapy    Physical Therapy Daily Treatment Note      Name: Elda Fitzpatrick  : 1990  MRN: [id number]    Referring Provider:  Olga Rivero MD    Date of Service: 10/30/2020    Evaluating PT:  Torieanneliese Jackson, PT, DPT [id number]     Room #:  6272/8450-E  Diagnosis:  [facility]  Procedure/Surgery:  Cranial ventriculostomy/ ICP monitor 10/9  Precautions:  Falls, NWB LUE s/p L scapular fx, LUE sling, L rib fxs 4-8, facial fxs, 4 pt restraint, Trach mask, PEG  Equipment Needs:  TBD    SUBJECTIVE:    Pt lives with wife in a 1 story home with 2 stairs to enter. Bedroom and bathroom are on the 1st level. Pt ambulated with no AD PTA. OBJECTIVE:   Initial Evaluation  Date: 10/27/20 Treatment  10/28/20 Short Term/ Long Term   Goals   AM-PAC 6 Clicks     Was pt agreeable to Eval/treatment? Yes Yes    Does pt have pain? Unable to report  Unable to report     Bed Mobility  Rolling: Dep  Supine to sit: Dep x2  Sit to supine: Dep x2  Scooting: Dep Rolling: Dep 2  Supine to sit: NT     Sit to supine: NT   Scooting: Dep Rolling: Max A  Supine to sit: Max A  Sit to supine: Max A  Scooting: Max A   Transfers Sit to stand: Dep>Max A  Stand to sit: Dep  Stand pivot: NT Sit to stand: nt  Stand to sit: nt  Stand pivot: NT Sit to stand: Max A  Stand to sit: Max A  Stand pivot: Max A   Ambulation    NT NT >5 feet with AAD Max A   Stair negotiation: ascended and descended  NT  NA   ROM BUE:  Per OT eval  BLE:  WFL     Strength BUE:  Per OT eval   BLE:  Grossly >3/5 observed with function -- unable to follow commands during MMT     Balance Sitting EOB:  Mod<>max A  Static Standing: Max A Sitting EOB:  Mod<>max A  Static Standing: Max A Sitting EOB:  SBA  Dynamic Standing:   Max A     Pt is A & O x ? -- non verbal   Sensation:  Pt unable to report numbness and tingling to extremities  Edema:  Unremarkable     Therapeutic Exercises:    PROM  B LE  Patient education  nt    Patient response to education:   Pt verbalized understanding Pt demonstrated skill Pt requires further education in this area   No  no Yes      ASSESSMENT:    Comments:  Pt received supine and  Nursing ok for therapy. Pt in B restraints. Pt dependently scooted up in bed and rolled and care pads changed. Trach tube not attached upon arrival and pt had condom cathter  off. Performed rom b le not able to arouse pt. Restraints applied and feeding tube put back on with hob elevated . Nursing did come in and reattach trach tube and aware of his condom cathter off     Will check with PT on seeing pt every other day vs daily. Treatment:  Patient practiced and was instructed in the following treatment:     Pt not real alert not participating. PLAN:  Pt is making slow progress towards established goals. Continue PT POC.        Time in  943  Time out  1000    Total Treatment Time  17 minutes     CPT codes:  [] Low Complexity PT evaluation 39306  [] Moderate Complexity PT evaluation 06799  [] High Complexity PT evaluation 51893  [] PT Re-evaluation 86600  [] Gait training 29663 -- minutes  [] Manual therapy 44742 -- minutes  [x] Therapeutic activities 15181 17 minutes  [] Therapeutic exercises 65034 - minutes  [] Neuromuscular reeducation 75737 -- minutes     Lisandro Evans, PTA  [id number]

## 2020-10-31 ENCOUNTER — APPOINTMENT (OUTPATIENT)
Dept: GENERAL RADIOLOGY | Age: 30
DRG: 003 | End: 2020-10-31
Payer: COMMERCIAL

## 2020-10-31 PROCEDURE — 6370000000 HC RX 637 (ALT 250 FOR IP): Performed by: STUDENT IN AN ORGANIZED HEALTH CARE EDUCATION/TRAINING PROGRAM

## 2020-10-31 PROCEDURE — 6360000002 HC RX W HCPCS: Performed by: SURGERY

## 2020-10-31 PROCEDURE — 2060000000 HC ICU INTERMEDIATE R&B

## 2020-10-31 PROCEDURE — 6370000000 HC RX 637 (ALT 250 FOR IP): Performed by: SURGERY

## 2020-10-31 PROCEDURE — 73030 X-RAY EXAM OF SHOULDER: CPT

## 2020-10-31 PROCEDURE — 99232 SBSQ HOSP IP/OBS MODERATE 35: CPT | Performed by: SURGERY

## 2020-10-31 PROCEDURE — 2580000003 HC RX 258: Performed by: SURGERY

## 2020-10-31 RX ADMIN — CLONIDINE HYDROCHLORIDE 0.2 MG: 0.2 TABLET ORAL at 09:23

## 2020-10-31 RX ADMIN — NYSTATIN 500000 UNITS: 100000 SUSPENSION ORAL at 21:18

## 2020-10-31 RX ADMIN — ACETAMINOPHEN ORAL SOLUTION 650 MG: 650 SOLUTION ORAL at 09:22

## 2020-10-31 RX ADMIN — Medication 10 ML: at 09:23

## 2020-10-31 RX ADMIN — BACITRACIN ZINC: 500 OINTMENT TOPICAL at 21:18

## 2020-10-31 RX ADMIN — CLONIDINE HYDROCHLORIDE 0.2 MG: 0.2 TABLET ORAL at 21:16

## 2020-10-31 RX ADMIN — Medication 10 ML: at 21:19

## 2020-10-31 RX ADMIN — ENOXAPARIN SODIUM 30 MG: 30 INJECTION SUBCUTANEOUS at 21:18

## 2020-10-31 RX ADMIN — FAMOTIDINE 20 MG: 20 TABLET ORAL at 09:22

## 2020-10-31 RX ADMIN — SENNOSIDES 5 ML: 8.8 LIQUID ORAL at 21:17

## 2020-10-31 RX ADMIN — FAMOTIDINE 20 MG: 20 TABLET ORAL at 21:16

## 2020-10-31 RX ADMIN — BACITRACIN ZINC: 500 OINTMENT TOPICAL at 09:24

## 2020-10-31 RX ADMIN — QUETIAPINE FUMARATE 200 MG: 100 TABLET ORAL at 21:18

## 2020-10-31 RX ADMIN — DOCUSATE SODIUM 100 MG: 50 LIQUID ORAL at 21:18

## 2020-10-31 RX ADMIN — ENOXAPARIN SODIUM 30 MG: 30 INJECTION SUBCUTANEOUS at 09:23

## 2020-10-31 RX ADMIN — DOCUSATE SODIUM 100 MG: 50 LIQUID ORAL at 09:22

## 2020-10-31 RX ADMIN — POLYETHYLENE GLYCOL 3350 17 G: 17 POWDER, FOR SOLUTION ORAL at 09:23

## 2020-10-31 RX ADMIN — QUETIAPINE FUMARATE 200 MG: 100 TABLET ORAL at 09:23

## 2020-10-31 ASSESSMENT — PAIN SCALES - GENERAL
PAINLEVEL_OUTOF10: 0
PAINLEVEL_OUTOF10: 0
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
## 2020-10-31 NOTE — PROGRESS NOTES
Neurosurg progress note  VITALS:  BP (!) 115/56   Pulse 63   Temp 98.9 °F (37.2 °C) (Temporal)   Resp 18   Ht 6' 2\" (1.88 m)   Wt 199 lb 4.8 oz (90.4 kg)   SpO2 94%   BMI 25.59 kg/m²   24HR INTAKE/OUTPUT:    Intake/Output Summary (Last 24 hours) at 10/31/2020 1017  Last data filed at 10/31/2020 0038  Gross per 24 hour   Intake 948 ml   Output 300 ml   Net 648 ml     Xr Pelvis (1-2 Views)    Result Date: 10/8/2020  EXAMINATION: ONE XRAY VIEW OF THE PELVIS 10/8/2020 9:52 pm COMPARISON: None. HISTORY: ORDERING SYSTEM PROVIDED HISTORY: trauma TECHNOLOGIST PROVIDED HISTORY: Reason for exam:->trauma What reading provider will be dictating this exam?->CRC FINDINGS: Left ischial tuberosity is not included on this examination. Entire left hip is not included on this examination. No fracture or dislocation involving pelvis or visualized hips. No diastasis involving sacroiliac joints or symphysis pubis. No fracture or dislocation involving visualized pelvis or hips. Xr Elbow Right (min 3 Views)    Result Date: 10/9/2020  EXAMINATION: THREE XRAY VIEWS OF THE RIGHT ELBOW 10/9/2020 7:00 am COMPARISON: None. HISTORY: ORDERING SYSTEM PROVIDED HISTORY: trauma, Skull fx TECHNOLOGIST PROVIDED HISTORY: Reason for exam:->trauma, Skull fx What reading provider will be dictating this exam?->CRC FINDINGS: There is no fracture dislocation. There is no elbow joint effusion. There are tiny degenerative spurs. No acute process     Xr Hand Left (min 3 Views)    Result Date: 10/9/2020  EXAMINATION: THREE XRAY VIEWS OF THE LEFT HAND; THREE XRAY VIEWS OF THE RIGHT HAND 10/9/2020 7:01 am COMPARISON: None. HISTORY: ORDERING SYSTEM PROVIDED HISTORY: trauma TECHNOLOGIST PROVIDED HISTORY: Reason for exam:->trauma What reading provider will be dictating this exam?->CRC FINDINGS: Positioning of both hands does somewhat limit evaluation. There are no definite fractures or dislocations.   Joint spaces are normal.     No acute process     Xr Hand Right (min 3 Views)    Result Date: 10/9/2020  EXAMINATION: THREE XRAY VIEWS OF THE LEFT HAND; THREE XRAY VIEWS OF THE RIGHT HAND 10/9/2020 7:01 am COMPARISON: None. HISTORY: ORDERING SYSTEM PROVIDED HISTORY: trauma TECHNOLOGIST PROVIDED HISTORY: Reason for exam:->trauma What reading provider will be dictating this exam?->CRC FINDINGS: Positioning of both hands does somewhat limit evaluation. There are no definite fractures or dislocations. Joint spaces are normal.     No acute process     Xr Knee Left (3 Views)    Result Date: 10/9/2020  EXAMINATION: THREE XRAY VIEWS OF THE LEFT KNEE 10/9/2020 7:03 am COMPARISON: None. HISTORY: ORDERING SYSTEM PROVIDED HISTORY: trauma TECHNOLOGIST PROVIDED HISTORY: Reason for exam:->trauma What reading provider will be dictating this exam?->CRC FINDINGS: There is no fracture dislocation. There is no joint effusion or degenerative change. No acute process     Xr Knee Right (3 Views)    Result Date: 10/9/2020  EXAMINATION: THREE XRAY VIEWS OF THE RIGHT KNEE 10/9/2020 8:06 am COMPARISON: None. HISTORY: ORDERING SYSTEM PROVIDED HISTORY: trauma TECHNOLOGIST PROVIDED HISTORY: Reason for exam:->trauma What reading provider will be dictating this exam?->CRC FINDINGS: There is no fracture dislocation. There is no joint space narrowing or effusion. No acute process     Ct Head Wo Contrast    Result Date: 10/9/2020  EXAMINATION: CT OF THE HEAD WITHOUT CONTRAST  10/9/2020 4:07 am TECHNIQUE: CT of the head was performed without the administration of intravenous contrast. Dose modulation, iterative reconstruction, and/or weight based adjustment of the mA/kV was utilized to reduce the radiation dose to as low as reasonably achievable. COMPARISON: CT head 10/08/2020 HISTORY: ORDERING SYSTEM PROVIDED HISTORY: evaluate head bleed TECHNOLOGIST PROVIDED HISTORY: Has a \"code stroke\" or \"stroke alert\" been called? ->No Reason for exam:->evaluate head bleed What reading provider will be dictating this exam?->CRC FINDINGS: BRAIN/VENTRICLES: Interval increase in size of right frontal parenchymal contusion, measuring 14 x 7 x 10 mm, previously 10 x 5 x 5 mm. Additional right frontal contusion, more inferiorly has also increased in size. Punctate contusions in the anterior-inferior frontal lobe along the gyrus rectus have also increased. Scattered bilateral hemispheric subarachnoid hemorrhage. Right convexity subdural hematoma measures up to 6 mm in thickness, not substantially changed. Trace left parietal subdural hematoma measuring 2 mm in thickness. 5 mm leftward midline shift, not substantially changed. No herniation. Patent basal cisterns. ORBITS: The visualized portion of the orbits demonstrate no acute abnormality. SINUSES: Nasopharyngeal opacities with partially imaged esophagogastric and endotracheal tubes. Scattered paranasal sinus opacities. SOFT TISSUES/SKULL:  Nondisplaced left temporal bone fracture extending to the otic capsule. Comminuted mildly displaced left parietal fracture. Nondisplaced right temporal bone fracture which is otic capsule sparing. Diffuse scalp swelling with posterior scalp contusions. Skin staples present. Mild interval increase in size of scattered parenchymal hematomas, mainly in the right frontal lobe, suspicious for diffuse axonal injury. No substantial change in acute right convexity subdural and left parietal subdural hematomas. Stable 5 mm leftward midline shift. Unchanged calvarial fractures, notable for a nondisplaced left temporal bone fracture possibly extending to the otic capsule. Recommend follow-up temporal bone CT.      Ct Head Wo Contrast    Result Date: 10/9/2020  EXAMINATION: CT OF THE HEAD WITHOUT CONTRAST  10/9/2020 12:11 pm TECHNIQUE: CT of the head was performed without the administration of intravenous contrast. Dose modulation, iterative reconstruction, and/or weight based adjustment of the mA/kV was utilized to reduce the radiation dose to as low as reasonably achievable. COMPARISON: 8 hours prior. HISTORY: ORDERING SYSTEM PROVIDED HISTORY: s/p ventric TECHNOLOGIST PROVIDED HISTORY: Reason for exam:->s/p ventric Has a \"code stroke\" or \"stroke alert\" been called? ->No What reading provider will be dictating this exam?->CRC FINDINGS: There has been interval placement of a right frontal approach ventriculostomy catheter terminating within the right lateral ventricle frontal horn abutting the septum pellucidum. There is split like configuration of the right lateral ventricle that may reflect over shunting. There is no temporal horn dilatation. There is diffuse cerebral edema with diffuse sulcal effacement. There is similar appearance of multiple foci of hemorrhagic contusions involving the right frontal lobe and to lesser extent left frontal lobe and right temporal lobe. The hemorrhagic contusions demonstrates mild surrounding edema. There is similar appearance of acute subarachnoid hemorrhage along the bilateral frontal convexities and right temporal convexity and to a lesser extent at the vertices. There is small volume of subdural hemorrhage along the right lateral tentorial leaflet. There is small volume subarachnoid hemorrhage within the interpeduncular cistern. There is new wedge-shaped region of low attenuation involving the left middle cerebellar peduncle and left cerebellar hemisphere, concerning for acute ischemia. There is no significant midline shift. There are bilateral parietal fractures, comminuted on the left, extending into the left temporal bone including the mastoid segment. Please refer to concurrently performed CT temporal bone imaging report. There is additional depressed fracture of the left superior orbital wall. There is diffuse subgaleal soft tissue swelling. Interval placement of right ventriculostomy catheter terminating within the right lateral ventricle frontal horn.   Interval development of slit-like appearance of the right lateral ventricle. Suspect acute ischemia involving the left middle cerebellar peduncle and left cerebellar hemisphere. Similar appearance of intracranial hemorrhage in hemorrhagic contusions as above. Findings discussed with Dr. Joyce Matson at 12:53 p.m. on December 9, 2020. Ct Head Wo Contrast    Result Date: 10/9/2020  EXAMINATION: CT OF THE HEAD and cervical spine WITHOUT CONTRAST; CT OF THE FACE WITHOUT CONTRAST  10/8/2020 9:56 pm TECHNIQUE: CT of the head was performed without the administration of intravenous contrast. Dose modulation, iterative reconstruction, and/or weight based adjustment of the mA/kV was utilized to reduce the radiation dose to as low as reasonably achievable.; CT of the face was performed without the administration of intravenous contrast. Multiplanar reformatted images are provided for review. Dose modulation, iterative reconstruction, and/or weight based adjustment of the mA/kV was utilized to reduce the radiation dose to as low as reasonably achievable.; CT of the cervical spine was performed without the administration of intravenous contrast. Multiplanar reformatted images are provided for review. Dose modulation, iterative reconstruction, and/or weight based adjustment of the mA/kV was utilized to reduce the radiation dose to as low as reasonably achievable. COMPARISON: None. HISTORY: ORDERING SYSTEM PROVIDED HISTORY: trauma TECHNOLOGIST PROVIDED HISTORY: Reason for exam:->trauma Has a \"code stroke\" or \"stroke alert\" been called? ->No What reading provider will be dictating this exam?->CRC FINDINGS: Head: There is mild right frontoparietal holohemispheric subdural hemorrhage, measuring up to 6 mm. Mild mass effect and minimal midline shift of approximately 4 mm. Visualized skull demonstrates multiple mildly displaced fractures in the skull, involving left temporal bone, right temporal bone, bilateral parietal bones.   Left temporoparietal bone skull fractures appear to be comminuted in multiple locations. There also small hemorrhagic contusions in bilateral frontal lobes. Small amount of subarachnoid hemorrhage in the right frontal lobe. Mild fluid layering in the sphenoid sinus. Fractures involving the left orbit as well. The mastoid air cells are clear. However, left temporal bone fracture does extend through the region of the left external auditory canal and extends to the middle ear. Cervical spine: Vertebral body heights are intact. Alignment is intact. Facets are normally aligned. The odontoid process is intact. No significant prevertebral soft tissue swelling. Facial bones: Mandible is intact. The zygomatic arches are intact. Nasal bones are intact. Nasal bony septum is midline. Sphenoid temporal buttress is intact. Mildly displaced fracture of the roof of the left orbit. The orbital floor is intact. Globes are intact and not proptotic. No retro bulbar soft tissue hematoma. Mild left periorbital preseptal soft tissue swelling. Bilateral comminuted skull bone fractures involving bilateral temporal bones and parietal bones. Fractures are worse on the left side. Mild right holohemispheric subdural hematoma with mild midline shift. Bilateral frontal small hemorrhagic contusions. Left orbital roof mildly displaced fracture. No evidence for cervical fracture or subluxation. Critical findings reported to the ER physician at time of dictation. Ct Iac Posterior Fossa Wo Contrast    Result Date: 10/10/2020  EXAMINATION: CT OF THE INTERNAL AUDITORY CANAL WITHOUT CONTRAST 10/9/2020 12:11 pm TECHNIQUE: CT of the internal auditory canal was performed without contrast was performed without the administration of intravenous contrast. Multiplanar reformatted images are provided for review.  Dose modulation, iterative reconstruction, and/or weight based adjustment of the mA/kV was utilized to reduce the radiation dose to as low as reasonably achievable. COMPARISON: None HISTORY: ORDERING SYSTEM PROVIDED HISTORY: TRAUMA TECHNOLOGIST PROVIDED HISTORY: Reason for exam:->trauma What reading provider will be dictating this exam?->CRC FINDINGS: RIGHT TEMPORAL BONE:  The right external auditory canal is normal in appearance. There is no right temporal bone fracture identified. There is a small volume of fluid within the right mastoid air cells. The right middle ear is clear. The ossicles are normally aligned. The tegmen tympani is intact. The scutum is intact. There is no osseous dehiscence. The cochlea, vestibule and semicircular canals are normal in appearance. LEFT TEMPORAL BONE: There is a longitudinal fracture of the mastoid segment of the left temporal bone. There is incudomalleolar subluxation involving the head of the malleus and body of the incus. The fracture does not extend into the otic capsule. Hemorrhage is present within the left middle ear and mastoid air cells. A comminuted fracture of the squamous portion of the left temporal bone is noted. The cochlea, vestibule and semicircular canals are normal.     Longitudinal fracture of the mastoid portion of the left temporal bone with associated incudomalleolar subluxation involving the head of the malleus and body of the incus. The fracture does not extend into the otic capsule. Small volume of fluid within the right mastoid air cells but no acute traumatic injury of the mastoid portion of the right temporal bone evident.      Ct Facial Bones Wo Contrast    Result Date: 10/9/2020  EXAMINATION: CT OF THE HEAD and cervical spine WITHOUT CONTRAST; CT OF THE FACE WITHOUT CONTRAST  10/8/2020 9:56 pm TECHNIQUE: CT of the head was performed without the administration of intravenous contrast. Dose modulation, iterative reconstruction, and/or weight based adjustment of the mA/kV was utilized to reduce the radiation dose to as low as reasonably achievable.; CT of the face was performed without the administration of intravenous contrast. Multiplanar reformatted images are provided for review. Dose modulation, iterative reconstruction, and/or weight based adjustment of the mA/kV was utilized to reduce the radiation dose to as low as reasonably achievable.; CT of the cervical spine was performed without the administration of intravenous contrast. Multiplanar reformatted images are provided for review. Dose modulation, iterative reconstruction, and/or weight based adjustment of the mA/kV was utilized to reduce the radiation dose to as low as reasonably achievable. COMPARISON: None. HISTORY: ORDERING SYSTEM PROVIDED HISTORY: trauma TECHNOLOGIST PROVIDED HISTORY: Reason for exam:->trauma Has a \"code stroke\" or \"stroke alert\" been called? ->No What reading provider will be dictating this exam?->CRC FINDINGS: Head: There is mild right frontoparietal holohemispheric subdural hemorrhage, measuring up to 6 mm. Mild mass effect and minimal midline shift of approximately 4 mm. Visualized skull demonstrates multiple mildly displaced fractures in the skull, involving left temporal bone, right temporal bone, bilateral parietal bones. Left temporoparietal bone skull fractures appear to be comminuted in multiple locations. There also small hemorrhagic contusions in bilateral frontal lobes. Small amount of subarachnoid hemorrhage in the right frontal lobe. Mild fluid layering in the sphenoid sinus. Fractures involving the left orbit as well. The mastoid air cells are clear. However, left temporal bone fracture does extend through the region of the left external auditory canal and extends to the middle ear. Cervical spine: Vertebral body heights are intact. Alignment is intact. Facets are normally aligned. The odontoid process is intact. No significant prevertebral soft tissue swelling. Facial bones: Mandible is intact. The zygomatic arches are intact. Nasal bones are intact.   Nasal bony septum is midline. Sphenoid temporal buttress is intact. Mildly displaced fracture of the roof of the left orbit. The orbital floor is intact. Globes are intact and not proptotic. No retro bulbar soft tissue hematoma. Mild left periorbital preseptal soft tissue swelling. Bilateral comminuted skull bone fractures involving bilateral temporal bones and parietal bones. Fractures are worse on the left side. Mild right holohemispheric subdural hematoma with mild midline shift. Bilateral frontal small hemorrhagic contusions. Left orbital roof mildly displaced fracture. No evidence for cervical fracture or subluxation. Critical findings reported to the ER physician at time of dictation. Ct Chest W Contrast    Result Date: 10/9/2020  EXAMINATION: CT OF THE CHEST WITH CONTRAST 10/8/2020 10:56 pm TECHNIQUE: CT of the chest was performed with the administration of intravenous contrast. Multiplanar reformatted images are provided for review. Dose modulation, iterative reconstruction, and/or weight based adjustment of the mA/kV was utilized to reduce the radiation dose to as low as reasonably achievable. COMPARISON: None. HISTORY: ORDERING SYSTEM PROVIDED HISTORY: trauma TECHNOLOGIST PROVIDED HISTORY: Reason for exam:->trauma What reading provider will be dictating this exam?->CRC FINDINGS: An endotracheal and enteric tubes are noted in place and are appropriately positioned. Mediastinum: Normal heart size. The great vessels are within normal limits. No pericardial effusion. No significantly enlarged lymph nodes. Lungs/pleura: Mild dependent congestion involving the bilateral posterior lungs. No pulmonary mass or augustin consolidation. No pleural effusion. Upper Abdomen: Within normal limits. Soft Tissues/Bones: Nondisplaced acute fractures involving the lateral left 5th through 7th ribs. Nondisplaced acute fractures of the posterior left 4th through 7th ribs.   Acute mildly displaced fractures of the posterior left 8th rib. Mildly displaced acute fracture of the left body of the scapula. Nondisplaced acute fractures involving the lateral left 5th through 7th ribs. Nondisplaced acute fractures of the posterior left 4th through 7th ribs. Acute mildly displaced fractures of the posterior left 8th rib. Mildly displaced acute fracture of the left body of the scapula. Ct Cervical Spine Wo Contrast    Result Date: 10/9/2020  EXAMINATION: CT OF THE HEAD and cervical spine WITHOUT CONTRAST; CT OF THE FACE WITHOUT CONTRAST  10/8/2020 9:56 pm TECHNIQUE: CT of the head was performed without the administration of intravenous contrast. Dose modulation, iterative reconstruction, and/or weight based adjustment of the mA/kV was utilized to reduce the radiation dose to as low as reasonably achievable.; CT of the face was performed without the administration of intravenous contrast. Multiplanar reformatted images are provided for review. Dose modulation, iterative reconstruction, and/or weight based adjustment of the mA/kV was utilized to reduce the radiation dose to as low as reasonably achievable.; CT of the cervical spine was performed without the administration of intravenous contrast. Multiplanar reformatted images are provided for review. Dose modulation, iterative reconstruction, and/or weight based adjustment of the mA/kV was utilized to reduce the radiation dose to as low as reasonably achievable. COMPARISON: None. HISTORY: ORDERING SYSTEM PROVIDED HISTORY: trauma TECHNOLOGIST PROVIDED HISTORY: Reason for exam:->trauma Has a \"code stroke\" or \"stroke alert\" been called? ->No What reading provider will be dictating this exam?->CRC FINDINGS: Head: There is mild right frontoparietal holohemispheric subdural hemorrhage, measuring up to 6 mm. Mild mass effect and minimal midline shift of approximately 4 mm.   Visualized skull demonstrates multiple mildly displaced fractures in the skull, involving left temporal bone, right temporal bone, bilateral parietal bones. Left temporoparietal bone skull fractures appear to be comminuted in multiple locations. There also small hemorrhagic contusions in bilateral frontal lobes. Small amount of subarachnoid hemorrhage in the right frontal lobe. Mild fluid layering in the sphenoid sinus. Fractures involving the left orbit as well. The mastoid air cells are clear. However, left temporal bone fracture does extend through the region of the left external auditory canal and extends to the middle ear. Cervical spine: Vertebral body heights are intact. Alignment is intact. Facets are normally aligned. The odontoid process is intact. No significant prevertebral soft tissue swelling. Facial bones: Mandible is intact. The zygomatic arches are intact. Nasal bones are intact. Nasal bony septum is midline. Sphenoid temporal buttress is intact. Mildly displaced fracture of the roof of the left orbit. The orbital floor is intact. Globes are intact and not proptotic. No retro bulbar soft tissue hematoma. Mild left periorbital preseptal soft tissue swelling. Bilateral comminuted skull bone fractures involving bilateral temporal bones and parietal bones. Fractures are worse on the left side. Mild right holohemispheric subdural hematoma with mild midline shift. Bilateral frontal small hemorrhagic contusions. Left orbital roof mildly displaced fracture. No evidence for cervical fracture or subluxation. Critical findings reported to the ER physician at time of dictation. Ct Thoracic Spine Wo Contrast    Result Date: 10/9/2020  EXAMINATION: CT OF THE THORACIC SPINE WITHOUT CONTRAST  10/8/2020 10:56 pm: TECHNIQUE: CT of the thoracic spine was performed without the administration of intravenous contrast. Multiplanar reformatted images are provided for review.  Dose modulation, iterative reconstruction, and/or weight based adjustment of the mA/kV was utilized to reduce the radiation dose to as low as reasonably achievable. COMPARISON: Same day lumbar spine CT; same day CT chest, abdomen and pelvis HISTORY: ORDERING SYSTEM PROVIDED HISTORY: trauma TECHNOLOGIST PROVIDED HISTORY: Reason for exam:->trauma What reading provider will be dictating this exam?->CRC FINDINGS: BONES/ALIGNMENT: There is normal alignment of the spine. The vertebral body heights are maintained. No osseous destructive lesion is seen. DEGENERATIVE CHANGES: Mild spondylosis without significant central canal narrowing. Mild right foraminal narrowing at T11-T12. SOFT TISSUES: No paraspinal mass is seen. No acute thoracic spine abnormality. Ct Lumbar Spine Wo Contrast    Result Date: 10/9/2020  EXAMINATION: CT OF THE LUMBAR SPINE WITHOUT CONTRAST  10/8/2020 TECHNIQUE: CT of the lumbar spine was performed without the administration of intravenous contrast. Multiplanar reformatted images are provided for review. Dose modulation, iterative reconstruction, and/or weight based adjustment of the mA/kV was utilized to reduce the radiation dose to as low as reasonably achievable. COMPARISON: None HISTORY: ORDERING SYSTEM PROVIDED HISTORY: trauma TECHNOLOGIST PROVIDED HISTORY: Reason for exam:->trauma What reading provider will be dictating this exam?->CRC FINDINGS: BONES/ALIGNMENT: Vertebral body heights are maintained. No compression deformity. L5 pars defects with grade 1 anterolisthesis of L5 on S1. DEGENERATIVE CHANGES: Moderate disc desiccation at L5-S1. No significant central canal stenosis. Moderate-to-severe foraminal stenosis at the listhesis level. SOFT TISSUES/RETROPERITONEUM: No paraspinal mass is seen. No acute lumbar spine abnormality. L5 pars defects with grade 1 anterolisthesis of L5 on S1 and moderate-to-severe foraminal stenosis.      Ct Abdomen Pelvis W Iv Contrast Additional Contrast? None    Result Date: 10/9/2020  EXAMINATION: CT OF THE ABDOMEN AND PELVIS WITH CONTRAST 10/8/2020 10:56 pm TECHNIQUE: CT of the abdomen and pelvis was performed with the administration of intravenous contrast. Multiplanar reformatted images are provided for review. Dose modulation, iterative reconstruction, and/or weight based adjustment of the mA/kV was utilized to reduce the radiation dose to as low as reasonably achievable. COMPARISON: None. HISTORY: ORDERING SYSTEM PROVIDED HISTORY: trauma TECHNOLOGIST PROVIDED HISTORY: Additional Contrast?->None Reason for exam:->trauma What reading provider will be dictating this exam?->CRC FINDINGS: Lower Chest: Described in detail on separate report of CT of the chest. Organs: The liver, spleen, pancreas, and bilateral adrenal glands are within normal limits. No radiopaque gallstones. No CT evidence of acute cholecystitis. No intra or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

## 2020-11-01 PROCEDURE — 2580000003 HC RX 258: Performed by: SURGERY

## 2020-11-01 PROCEDURE — 99232 SBSQ HOSP IP/OBS MODERATE 35: CPT | Performed by: SURGERY

## 2020-11-01 PROCEDURE — 6370000000 HC RX 637 (ALT 250 FOR IP): Performed by: SURGERY

## 2020-11-01 PROCEDURE — 2060000000 HC ICU INTERMEDIATE R&B

## 2020-11-01 PROCEDURE — 6360000002 HC RX W HCPCS: Performed by: SURGERY

## 2020-11-01 PROCEDURE — 6370000000 HC RX 637 (ALT 250 FOR IP): Performed by: STUDENT IN AN ORGANIZED HEALTH CARE EDUCATION/TRAINING PROGRAM

## 2020-11-01 RX ADMIN — ENOXAPARIN SODIUM 30 MG: 30 INJECTION SUBCUTANEOUS at 21:58

## 2020-11-01 RX ADMIN — Medication 10 ML: at 21:58

## 2020-11-01 RX ADMIN — QUETIAPINE FUMARATE 200 MG: 100 TABLET ORAL at 10:35

## 2020-11-01 RX ADMIN — ENOXAPARIN SODIUM 30 MG: 30 INJECTION SUBCUTANEOUS at 10:35

## 2020-11-01 RX ADMIN — CLONIDINE HYDROCHLORIDE 0.2 MG: 0.2 TABLET ORAL at 21:58

## 2020-11-01 RX ADMIN — QUETIAPINE FUMARATE 200 MG: 100 TABLET ORAL at 21:57

## 2020-11-01 RX ADMIN — CLONIDINE HYDROCHLORIDE 0.2 MG: 0.2 TABLET ORAL at 10:35

## 2020-11-01 RX ADMIN — CLONIDINE HYDROCHLORIDE 0.2 MG: 0.2 TABLET ORAL at 15:35

## 2020-11-01 RX ADMIN — Medication 10 ML: at 10:35
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
## 2020-11-01 NOTE — PROGRESS NOTES
Neurosurg progress note  VITALS:  /69   Pulse 62   Temp 98 °F (36.7 °C) (Temporal)   Resp 16   Ht 6' 2\" (1.88 m)   Wt 199 lb 4.8 oz (90.4 kg)   SpO2 91%   BMI 25.59 kg/m²   24HR INTAKE/OUTPUT:    Intake/Output Summary (Last 24 hours) at 11/1/2020 0858  Last data filed at 11/1/2020 0085  Gross per 24 hour   Intake 1068 ml   Output --   Net 1068 ml     Xr Pelvis (1-2 Views)    Result Date: 10/8/2020  EXAMINATION: ONE XRAY VIEW OF THE PELVIS 10/8/2020 9:52 pm COMPARISON: None. HISTORY: ORDERING SYSTEM PROVIDED HISTORY: trauma TECHNOLOGIST PROVIDED HISTORY: Reason for exam:->trauma What reading provider will be dictating this exam?->CRC FINDINGS: Left ischial tuberosity is not included on this examination. Entire left hip is not included on this examination. No fracture or dislocation involving pelvis or visualized hips. No diastasis involving sacroiliac joints or symphysis pubis. No fracture or dislocation involving visualized pelvis or hips. Xr Elbow Right (min 3 Views)    Result Date: 10/9/2020  EXAMINATION: THREE XRAY VIEWS OF THE RIGHT ELBOW 10/9/2020 7:00 am COMPARISON: None. HISTORY: ORDERING SYSTEM PROVIDED HISTORY: trauma, Skull fx TECHNOLOGIST PROVIDED HISTORY: Reason for exam:->trauma, Skull fx What reading provider will be dictating this exam?->CRC FINDINGS: There is no fracture dislocation. There is no elbow joint effusion. There are tiny degenerative spurs. No acute process     Xr Hand Left (min 3 Views)    Result Date: 10/9/2020  EXAMINATION: THREE XRAY VIEWS OF THE LEFT HAND; THREE XRAY VIEWS OF THE RIGHT HAND 10/9/2020 7:01 am COMPARISON: None. HISTORY: ORDERING SYSTEM PROVIDED HISTORY: trauma TECHNOLOGIST PROVIDED HISTORY: Reason for exam:->trauma What reading provider will be dictating this exam?->CRC FINDINGS: Positioning of both hands does somewhat limit evaluation. There are no definite fractures or dislocations.   Joint spaces are normal.     No acute process     Xr Hand Right (min 3 Views)    Result Date: 10/9/2020  EXAMINATION: THREE XRAY VIEWS OF THE LEFT HAND; THREE XRAY VIEWS OF THE RIGHT HAND 10/9/2020 7:01 am COMPARISON: None. HISTORY: ORDERING SYSTEM PROVIDED HISTORY: trauma TECHNOLOGIST PROVIDED HISTORY: Reason for exam:->trauma What reading provider will be dictating this exam?->CRC FINDINGS: Positioning of both hands does somewhat limit evaluation. There are no definite fractures or dislocations. Joint spaces are normal.     No acute process     Xr Knee Left (3 Views)    Result Date: 10/9/2020  EXAMINATION: THREE XRAY VIEWS OF THE LEFT KNEE 10/9/2020 7:03 am COMPARISON: None. HISTORY: ORDERING SYSTEM PROVIDED HISTORY: trauma TECHNOLOGIST PROVIDED HISTORY: Reason for exam:->trauma What reading provider will be dictating this exam?->CRC FINDINGS: There is no fracture dislocation. There is no joint effusion or degenerative change. No acute process     Xr Knee Right (3 Views)    Result Date: 10/9/2020  EXAMINATION: THREE XRAY VIEWS OF THE RIGHT KNEE 10/9/2020 8:06 am COMPARISON: None. HISTORY: ORDERING SYSTEM PROVIDED HISTORY: trauma TECHNOLOGIST PROVIDED HISTORY: Reason for exam:->trauma What reading provider will be dictating this exam?->CRC FINDINGS: There is no fracture dislocation. There is no joint space narrowing or effusion. No acute process     Ct Head Wo Contrast    Result Date: 10/9/2020  EXAMINATION: CT OF THE HEAD WITHOUT CONTRAST  10/9/2020 4:07 am TECHNIQUE: CT of the head was performed without the administration of intravenous contrast. Dose modulation, iterative reconstruction, and/or weight based adjustment of the mA/kV was utilized to reduce the radiation dose to as low as reasonably achievable. COMPARISON: CT head 10/08/2020 HISTORY: ORDERING SYSTEM PROVIDED HISTORY: evaluate head bleed TECHNOLOGIST PROVIDED HISTORY: Has a \"code stroke\" or \"stroke alert\" been called? ->No Reason for exam:->evaluate head bleed What reading provider will be dictating this exam?->CRC FINDINGS: BRAIN/VENTRICLES: Interval increase in size of right frontal parenchymal contusion, measuring 14 x 7 x 10 mm, previously 10 x 5 x 5 mm. Additional right frontal contusion, more inferiorly has also increased in size. Punctate contusions in the anterior-inferior frontal lobe along the gyrus rectus have also increased. Scattered bilateral hemispheric subarachnoid hemorrhage. Right convexity subdural hematoma measures up to 6 mm in thickness, not substantially changed. Trace left parietal subdural hematoma measuring 2 mm in thickness. 5 mm leftward midline shift, not substantially changed. No herniation. Patent basal cisterns. ORBITS: The visualized portion of the orbits demonstrate no acute abnormality. SINUSES: Nasopharyngeal opacities with partially imaged esophagogastric and endotracheal tubes. Scattered paranasal sinus opacities. SOFT TISSUES/SKULL:  Nondisplaced left temporal bone fracture extending to the otic capsule. Comminuted mildly displaced left parietal fracture. Nondisplaced right temporal bone fracture which is otic capsule sparing. Diffuse scalp swelling with posterior scalp contusions. Skin staples present. Mild interval increase in size of scattered parenchymal hematomas, mainly in the right frontal lobe, suspicious for diffuse axonal injury. No substantial change in acute right convexity subdural and left parietal subdural hematomas. Stable 5 mm leftward midline shift. Unchanged calvarial fractures, notable for a nondisplaced left temporal bone fracture possibly extending to the otic capsule. Recommend follow-up temporal bone CT.      Ct Head Wo Contrast    Result Date: 10/9/2020  EXAMINATION: CT OF THE HEAD WITHOUT CONTRAST  10/9/2020 12:11 pm TECHNIQUE: CT of the head was performed without the administration of intravenous contrast. Dose modulation, iterative reconstruction, and/or weight based adjustment of the mA/kV was utilized to reduce the radiation dose to as low as reasonably achievable. COMPARISON: 8 hours prior. HISTORY: ORDERING SYSTEM PROVIDED HISTORY: s/p ventric TECHNOLOGIST PROVIDED HISTORY: Reason for exam:->s/p ventric Has a \"code stroke\" or \"stroke alert\" been called? ->No What reading provider will be dictating this exam?->CRC FINDINGS: There has been interval placement of a right frontal approach ventriculostomy catheter terminating within the right lateral ventricle frontal horn abutting the septum pellucidum. There is split like configuration of the right lateral ventricle that may reflect over shunting. There is no temporal horn dilatation. There is diffuse cerebral edema with diffuse sulcal effacement. There is similar appearance of multiple foci of hemorrhagic contusions involving the right frontal lobe and to lesser extent left frontal lobe and right temporal lobe. The hemorrhagic contusions demonstrates mild surrounding edema. There is similar appearance of acute subarachnoid hemorrhage along the bilateral frontal convexities and right temporal convexity and to a lesser extent at the vertices. There is small volume of subdural hemorrhage along the right lateral tentorial leaflet. There is small volume subarachnoid hemorrhage within the interpeduncular cistern. There is new wedge-shaped region of low attenuation involving the left middle cerebellar peduncle and left cerebellar hemisphere, concerning for acute ischemia. There is no significant midline shift. There are bilateral parietal fractures, comminuted on the left, extending into the left temporal bone including the mastoid segment. Please refer to concurrently performed CT temporal bone imaging report. There is additional depressed fracture of the left superior orbital wall. There is diffuse subgaleal soft tissue swelling. Interval placement of right ventriculostomy catheter terminating within the right lateral ventricle frontal horn.   Interval development of slit-like appearance of the right lateral ventricle. Suspect acute ischemia involving the left middle cerebellar peduncle and left cerebellar hemisphere. Similar appearance of intracranial hemorrhage in hemorrhagic contusions as above. Findings discussed with Dr. Larry Perez at 12:53 p.m. on December 9, 2020. Ct Head Wo Contrast    Result Date: 10/9/2020  EXAMINATION: CT OF THE HEAD and cervical spine WITHOUT CONTRAST; CT OF THE FACE WITHOUT CONTRAST  10/8/2020 9:56 pm TECHNIQUE: CT of the head was performed without the administration of intravenous contrast. Dose modulation, iterative reconstruction, and/or weight based adjustment of the mA/kV was utilized to reduce the radiation dose to as low as reasonably achievable.; CT of the face was performed without the administration of intravenous contrast. Multiplanar reformatted images are provided for review. Dose modulation, iterative reconstruction, and/or weight based adjustment of the mA/kV was utilized to reduce the radiation dose to as low as reasonably achievable.; CT of the cervical spine was performed without the administration of intravenous contrast. Multiplanar reformatted images are provided for review. Dose modulation, iterative reconstruction, and/or weight based adjustment of the mA/kV was utilized to reduce the radiation dose to as low as reasonably achievable. COMPARISON: None. HISTORY: ORDERING SYSTEM PROVIDED HISTORY: trauma TECHNOLOGIST PROVIDED HISTORY: Reason for exam:->trauma Has a \"code stroke\" or \"stroke alert\" been called? ->No What reading provider will be dictating this exam?->CRC FINDINGS: Head: There is mild right frontoparietal holohemispheric subdural hemorrhage, measuring up to 6 mm. Mild mass effect and minimal midline shift of approximately 4 mm. Visualized skull demonstrates multiple mildly displaced fractures in the skull, involving left temporal bone, right temporal bone, bilateral parietal bones.   Left temporoparietal bone skull fractures appear to be comminuted in multiple locations. There also small hemorrhagic contusions in bilateral frontal lobes. Small amount of subarachnoid hemorrhage in the right frontal lobe. Mild fluid layering in the sphenoid sinus. Fractures involving the left orbit as well. The mastoid air cells are clear. However, left temporal bone fracture does extend through the region of the left external auditory canal and extends to the middle ear. Cervical spine: Vertebral body heights are intact. Alignment is intact. Facets are normally aligned. The odontoid process is intact. No significant prevertebral soft tissue swelling. Facial bones: Mandible is intact. The zygomatic arches are intact. Nasal bones are intact. Nasal bony septum is midline. Sphenoid temporal buttress is intact. Mildly displaced fracture of the roof of the left orbit. The orbital floor is intact. Globes are intact and not proptotic. No retro bulbar soft tissue hematoma. Mild left periorbital preseptal soft tissue swelling. Bilateral comminuted skull bone fractures involving bilateral temporal bones and parietal bones. Fractures are worse on the left side. Mild right holohemispheric subdural hematoma with mild midline shift. Bilateral frontal small hemorrhagic contusions. Left orbital roof mildly displaced fracture. No evidence for cervical fracture or subluxation. Critical findings reported to the ER physician at time of dictation. Ct Iac Posterior Fossa Wo Contrast    Result Date: 10/10/2020  EXAMINATION: CT OF THE INTERNAL AUDITORY CANAL WITHOUT CONTRAST 10/9/2020 12:11 pm TECHNIQUE: CT of the internal auditory canal was performed without contrast was performed without the administration of intravenous contrast. Multiplanar reformatted images are provided for review.  Dose modulation, iterative reconstruction, and/or weight based adjustment of the mA/kV was utilized to reduce the radiation dose to as low as reasonably achievable. COMPARISON: None HISTORY: ORDERING SYSTEM PROVIDED HISTORY: TRAUMA TECHNOLOGIST PROVIDED HISTORY: Reason for exam:->trauma What reading provider will be dictating this exam?->CRC FINDINGS: RIGHT TEMPORAL BONE:  The right external auditory canal is normal in appearance. There is no right temporal bone fracture identified. There is a small volume of fluid within the right mastoid air cells. The right middle ear is clear. The ossicles are normally aligned. The tegmen tympani is intact. The scutum is intact. There is no osseous dehiscence. The cochlea, vestibule and semicircular canals are normal in appearance. LEFT TEMPORAL BONE: There is a longitudinal fracture of the mastoid segment of the left temporal bone. There is incudomalleolar subluxation involving the head of the malleus and body of the incus. The fracture does not extend into the otic capsule. Hemorrhage is present within the left middle ear and mastoid air cells. A comminuted fracture of the squamous portion of the left temporal bone is noted. The cochlea, vestibule and semicircular canals are normal.     Longitudinal fracture of the mastoid portion of the left temporal bone with associated incudomalleolar subluxation involving the head of the malleus and body of the incus. The fracture does not extend into the otic capsule. Small volume of fluid within the right mastoid air cells but no acute traumatic injury of the mastoid portion of the right temporal bone evident.      Ct Facial Bones Wo Contrast    Result Date: 10/9/2020  EXAMINATION: CT OF THE HEAD and cervical spine WITHOUT CONTRAST; CT OF THE FACE WITHOUT CONTRAST  10/8/2020 9:56 pm TECHNIQUE: CT of the head was performed without the administration of intravenous contrast. Dose modulation, iterative reconstruction, and/or weight based adjustment of the mA/kV was utilized to reduce the radiation dose to as low as reasonably achievable.; CT of the face was performed without the administration of intravenous contrast. Multiplanar reformatted images are provided for review. Dose modulation, iterative reconstruction, and/or weight based adjustment of the mA/kV was utilized to reduce the radiation dose to as low as reasonably achievable.; CT of the cervical spine was performed without the administration of intravenous contrast. Multiplanar reformatted images are provided for review. Dose modulation, iterative reconstruction, and/or weight based adjustment of the mA/kV was utilized to reduce the radiation dose to as low as reasonably achievable. COMPARISON: None. HISTORY: ORDERING SYSTEM PROVIDED HISTORY: trauma TECHNOLOGIST PROVIDED HISTORY: Reason for exam:->trauma Has a \"code stroke\" or \"stroke alert\" been called? ->No What reading provider will be dictating this exam?->CRC FINDINGS: Head: There is mild right frontoparietal holohemispheric subdural hemorrhage, measuring up to 6 mm. Mild mass effect and minimal midline shift of approximately 4 mm. Visualized skull demonstrates multiple mildly displaced fractures in the skull, involving left temporal bone, right temporal bone, bilateral parietal bones. Left temporoparietal bone skull fractures appear to be comminuted in multiple locations. There also small hemorrhagic contusions in bilateral frontal lobes. Small amount of subarachnoid hemorrhage in the right frontal lobe. Mild fluid layering in the sphenoid sinus. Fractures involving the left orbit as well. The mastoid air cells are clear. However, left temporal bone fracture does extend through the region of the left external auditory canal and extends to the middle ear. Cervical spine: Vertebral body heights are intact. Alignment is intact. Facets are normally aligned. The odontoid process is intact. No significant prevertebral soft tissue swelling. Facial bones: Mandible is intact. The zygomatic arches are intact. Nasal bones are intact. Nasal bony septum is midline. Sphenoid temporal buttress is intact. Mildly displaced fracture of the roof of the left orbit. The orbital floor is intact. Globes are intact and not proptotic. No retro bulbar soft tissue hematoma. Mild left periorbital preseptal soft tissue swelling. Bilateral comminuted skull bone fractures involving bilateral temporal bones and parietal bones. Fractures are worse on the left side. Mild right holohemispheric subdural hematoma with mild midline shift. Bilateral frontal small hemorrhagic contusions. Left orbital roof mildly displaced fracture. No evidence for cervical fracture or subluxation. Critical findings reported to the ER physician at time of dictation. Ct Chest W Contrast    Result Date: 10/9/2020  EXAMINATION: CT OF THE CHEST WITH CONTRAST 10/8/2020 10:56 pm TECHNIQUE: CT of the chest was performed with the administration of intravenous contrast. Multiplanar reformatted images are provided for review. Dose modulation, iterative reconstruction, and/or weight based adjustment of the mA/kV was utilized to reduce the radiation dose to as low as reasonably achievable. COMPARISON: None. HISTORY: ORDERING SYSTEM PROVIDED HISTORY: trauma TECHNOLOGIST PROVIDED HISTORY: Reason for exam:->trauma What reading provider will be dictating this exam?->CRC FINDINGS: An endotracheal and enteric tubes are noted in place and are appropriately positioned. Mediastinum: Normal heart size. The great vessels are within normal limits. No pericardial effusion. No significantly enlarged lymph nodes. Lungs/pleura: Mild dependent congestion involving the bilateral posterior lungs. No pulmonary mass or augustin consolidation. No pleural effusion. Upper Abdomen: Within normal limits. Soft Tissues/Bones: Nondisplaced acute fractures involving the lateral left 5th through 7th ribs. Nondisplaced acute fractures of the posterior left 4th through 7th ribs. Acute mildly displaced fractures of the posterior left 8th rib. Mildly displaced acute fracture of the left body of the scapula. Nondisplaced acute fractures involving the lateral left 5th through 7th ribs. Nondisplaced acute fractures of the posterior left 4th through 7th ribs. Acute mildly displaced fractures of the posterior left 8th rib. Mildly displaced acute fracture of the left body of the scapula. Ct Cervical Spine Wo Contrast    Result Date: 10/9/2020  EXAMINATION: CT OF THE HEAD and cervical spine WITHOUT CONTRAST; CT OF THE FACE WITHOUT CONTRAST  10/8/2020 9:56 pm TECHNIQUE: CT of the head was performed without the administration of intravenous contrast. Dose modulation, iterative reconstruction, and/or weight based adjustment of the mA/kV was utilized to reduce the radiation dose to as low as reasonably achievable.; CT of the face was performed without the administration of intravenous contrast. Multiplanar reformatted images are provided for review. Dose modulation, iterative reconstruction, and/or weight based adjustment of the mA/kV was utilized to reduce the radiation dose to as low as reasonably achievable.; CT of the cervical spine was performed without the administration of intravenous contrast. Multiplanar reformatted images are provided for review. Dose modulation, iterative reconstruction, and/or weight based adjustment of the mA/kV was utilized to reduce the radiation dose to as low as reasonably achievable. COMPARISON: None. HISTORY: ORDERING SYSTEM PROVIDED HISTORY: trauma TECHNOLOGIST PROVIDED HISTORY: Reason for exam:->trauma Has a \"code stroke\" or \"stroke alert\" been called? ->No What reading provider will be dictating this exam?->CRC FINDINGS: Head: There is mild right frontoparietal holohemispheric subdural hemorrhage, measuring up to 6 mm. Mild mass effect and minimal midline shift of approximately 4 mm.   Visualized skull demonstrates multiple mildly displaced fractures in the skull, involving left temporal bone, right temporal bone, bilateral parietal bones. Left temporoparietal bone skull fractures appear to be comminuted in multiple locations. There also small hemorrhagic contusions in bilateral frontal lobes. Small amount of subarachnoid hemorrhage in the right frontal lobe. Mild fluid layering in the sphenoid sinus. Fractures involving the left orbit as well. The mastoid air cells are clear. However, left temporal bone fracture does extend through the region of the left external auditory canal and extends to the middle ear. Cervical spine: Vertebral body heights are intact. Alignment is intact. Facets are normally aligned. The odontoid process is intact. No significant prevertebral soft tissue swelling. Facial bones: Mandible is intact. The zygomatic arches are intact. Nasal bones are intact. Nasal bony septum is midline. Sphenoid temporal buttress is intact. Mildly displaced fracture of the roof of the left orbit. The orbital floor is intact. Globes are intact and not proptotic. No retro bulbar soft tissue hematoma. Mild left periorbital preseptal soft tissue swelling. Bilateral comminuted skull bone fractures involving bilateral temporal bones and parietal bones. Fractures are worse on the left side. Mild right holohemispheric subdural hematoma with mild midline shift. Bilateral frontal small hemorrhagic contusions. Left orbital roof mildly displaced fracture. No evidence for cervical fracture or subluxation. Critical findings reported to the ER physician at time of dictation. Ct Thoracic Spine Wo Contrast    Result Date: 10/9/2020  EXAMINATION: CT OF THE THORACIC SPINE WITHOUT CONTRAST  10/8/2020 10:56 pm: TECHNIQUE: CT of the thoracic spine was performed without the administration of intravenous contrast. Multiplanar reformatted images are provided for review.  Dose modulation, iterative reconstruction, and/or weight based adjustment of the mA/kV was utilized to reduce the radiation dose to as low as reasonably achievable. COMPARISON: Same day lumbar spine CT; same day CT chest, abdomen and pelvis HISTORY: ORDERING SYSTEM PROVIDED HISTORY: trauma TECHNOLOGIST PROVIDED HISTORY: Reason for exam:->trauma What reading provider will be dictating this exam?->CRC FINDINGS: BONES/ALIGNMENT: There is normal alignment of the spine. The vertebral body heights are maintained. No osseous destructive lesion is seen. DEGENERATIVE CHANGES: Mild spondylosis without significant central canal narrowing. Mild right foraminal narrowing at T11-T12. SOFT TISSUES: No paraspinal mass is seen. No acute thoracic spine abnormality. Ct Lumbar Spine Wo Contrast    Result Date: 10/9/2020  EXAMINATION: CT OF THE LUMBAR SPINE WITHOUT CONTRAST  10/8/2020 TECHNIQUE: CT of the lumbar spine was performed without the administration of intravenous contrast. Multiplanar reformatted images are provided for review. Dose modulation, iterative reconstruction, and/or weight based adjustment of the mA/kV was utilized to reduce the radiation dose to as low as reasonably achievable. COMPARISON: None HISTORY: ORDERING SYSTEM PROVIDED HISTORY: trauma TECHNOLOGIST PROVIDED HISTORY: Reason for exam:->trauma What reading provider will be dictating this exam?->CRC FINDINGS: BONES/ALIGNMENT: Vertebral body heights are maintained. No compression deformity. L5 pars defects with grade 1 anterolisthesis of L5 on S1. DEGENERATIVE CHANGES: Moderate disc desiccation at L5-S1. No significant central canal stenosis. Moderate-to-severe foraminal stenosis at the listhesis level. SOFT TISSUES/RETROPERITONEUM: No paraspinal mass is seen. No acute lumbar spine abnormality. L5 pars defects with grade 1 anterolisthesis of L5 on S1 and moderate-to-severe foraminal stenosis.      Ct Abdomen Pelvis W Iv Contrast Additional Contrast? None    Result Date: 10/9/2020  EXAMINATION: CT OF THE ABDOMEN AND PELVIS WITH CONTRAST 10/8/2020 10:56 pm TECHNIQUE: CT of the abdomen for exam:->intubated What reading provider will be dictating this exam?->CRC FINDINGS: Lines/tubes are appropriate. Heart size is normal.  There are no infiltrates or effusions. No acute process     Xr Chest Portable    Result Date: 10/9/2020  EXAMINATION: ONE XRAY VIEW OF THE CHEST 10/9/2020 12:42 am COMPARISON: 10/08/2020 at this 2248 hours. Graylon Yosi HISTORY: ORDERING SYSTEM PROVIDED HISTORY: Line Placement TECHNOLOGIST PROVIDED HISTORY: Reason for exam:->Line Placement What reading provider will be dictating this exam?->CRC FINDINGS: Heart is not enlarged. Endotracheal tube and enteric tube are in place. Left IJ CVC is in place with tip in the proximal SVC. No pneumothorax. No pleural effusions. No pulmonary edema or pneumothorax. Endotracheal tube, enteric tube and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

## 2020-11-02 LAB
ANION GAP SERPL CALCULATED.3IONS-SCNC: 12 MMOL/L (ref 7–16)
BUN BLDV-MCNC: 32 MG/DL (ref 6–20)
CALCIUM SERPL-MCNC: 9.3 MG/DL (ref 8.6–10.2)
CHLORIDE BLD-SCNC: 106 MMOL/L (ref 98–107)
CO2: 27 MMOL/L (ref 22–29)
CREAT SERPL-MCNC: 0.9 MG/DL (ref 0.7–1.2)
GFR AFRICAN AMERICAN: >60
GFR NON-AFRICAN AMERICAN: >60 ML/MIN/1.73
GLUCOSE BLD-MCNC: 113 MG/DL (ref 74–99)
POTASSIUM SERPL-SCNC: 3.8 MMOL/L (ref 3.5–5)
SODIUM BLD-SCNC: 145 MMOL/L (ref 132–146)

## 2020-11-02 PROCEDURE — 36415 COLL VENOUS BLD VENIPUNCTURE: CPT

## 2020-11-02 PROCEDURE — 6360000002 HC RX W HCPCS: Performed by: SURGERY

## 2020-11-02 PROCEDURE — 6370000000 HC RX 637 (ALT 250 FOR IP): Performed by: SURGERY

## 2020-11-02 PROCEDURE — 2580000003 HC RX 258: Performed by: SURGERY

## 2020-11-02 PROCEDURE — 80048 BASIC METABOLIC PNL TOTAL CA: CPT

## 2020-11-02 PROCEDURE — 97530 THERAPEUTIC ACTIVITIES: CPT

## 2020-11-02 PROCEDURE — 6370000000 HC RX 637 (ALT 250 FOR IP): Performed by: STUDENT IN AN ORGANIZED HEALTH CARE EDUCATION/TRAINING PROGRAM

## 2020-11-02 PROCEDURE — 2060000000 HC ICU INTERMEDIATE R&B

## 2020-11-02 PROCEDURE — 99232 SBSQ HOSP IP/OBS MODERATE 35: CPT | Performed by: SURGERY

## 2020-11-02 RX ADMIN — ENOXAPARIN SODIUM 30 MG: 30 INJECTION SUBCUTANEOUS at 09:20

## 2020-11-02 RX ADMIN — QUETIAPINE FUMARATE 200 MG: 100 TABLET ORAL at 09:19

## 2020-11-02 RX ADMIN — QUETIAPINE FUMARATE 200 MG: 100 TABLET ORAL at 22:06

## 2020-11-02 RX ADMIN — CLONIDINE HYDROCHLORIDE 0.2 MG: 0.2 TABLET ORAL at 15:49

## 2020-11-02 RX ADMIN — CLONIDINE HYDROCHLORIDE 0.2 MG: 0.2 TABLET ORAL at 22:06

## 2020-11-02 RX ADMIN — Medication 10 ML: at 22:07

## 2020-11-02 RX ADMIN — ENOXAPARIN SODIUM 30 MG: 30 INJECTION SUBCUTANEOUS at 22:06

## 2020-11-02 RX ADMIN — Medication 10 ML: at 09:20

## 2020-11-02 RX ADMIN — CLONIDINE HYDROCHLORIDE 0.2 MG: 0.2 TABLET ORAL at 09:19

## 2020-11-02 ASSESSMENT — PAIN SCALES - GENERAL: PAINLEVEL_OUTOF10: 0

## 2020-11-02 NOTE — PROGRESS NOTES
Physical Therapy    Physical Therapy Daily Treatment Note      Name: Rosa Sousa  : 1990  MRN: [id number]    Referring Provider:  Lukasz Burgos MD    Date of Service: 2020    Evaluating PT:  Juan Chávez, PT, DPT [id number]     Room #:  5828/1664-N  Diagnosis:  [facility]  Procedure/Surgery:  Cranial ventriculostomy/ ICP monitor 10/9  Precautions:  Falls, NWB LUE s/p L scapular fx, LUE sling, L rib fxs 4-8, facial fxs, 4 pt restraint, Trach mask, PEG  Equipment Needs:  TBD    SUBJECTIVE:    Pt lives with wife in a 1 story home with 2 stairs to enter. Bedroom and bathroom are on the 1st level. Pt ambulated with no AD PTA. OBJECTIVE:   Initial Evaluation  Date: 10/27/20 Treatment  2020 Short Term/ Long Term   Goals   AM-PAC 6 Clicks     Was pt agreeable to Eval/treatment? Yes Yes    Does pt have pain? Unable to report  Unable to report     Bed Mobility  Rolling: Dep  Supine to sit: Dep x2  Sit to supine: Dep x2  Scooting: Dep Rolling: Dep   Supine to sit: Dep     Sit to supine: maxA x 2   Scooting: Dany Rolling: Max A  Supine to sit: Max A  Sit to supine: Max A  Scooting: Max A   Transfers Sit to stand: Dep>Max A  Stand to sit: Dep  Stand pivot: NT Sit to stand: nt  Stand to sit: nt  Stand pivot: NT Sit to stand: Max A  Stand to sit: Max A  Stand pivot: Max A   Ambulation    NT NT >5 feet with AAD Max A   Stair negotiation: ascended and descended  NT  NA   ROM BUE:  Per OT eval  BLE:  WFL     Strength BUE:  Per OT eval   BLE:  Grossly >3/5 observed with function -- unable to follow commands during MMT     Balance Sitting EOB:  Mod<>max A  Static Standing: Max A Sitting EOB:  Dany<>modA  Static Standing: NT Sitting EOB:  SBA  Dynamic Standing: Max A     Pt is A & O x 1 (self). Few garbles responses throughout session. Sensation:  Pt denies numbness and tingling to extremities  Edema:  unremarkable    Patient education  Pt educated on role of PT intervention.   Pt's spouse educated on importance of redirecting pt from laying flat with feed tube on. Patient response to education:   Pt verbalized understanding Pt demonstrated skill Pt requires further education in this area   yes yes yes     ASSESSMENT:    Comments:  RN cleared pt for activity prior to session. Pt received supine in bed and agreeable to PT intervention with OT collaboration at this time. Pt performed all functional mobility as noted above. Pt more alert on this date once assisted to EOB. Pt required constant redirection to maintain NWB of LUE. Pt better able to scoot towards EOB but required min/modA for seated balance to prevent him from sliding off EOB. Pt sat at EOB x 15 minutes before requesting to return to supine and began to become mildly resistant to further intervention. Pt returned to supine and left with all needs met and call light in reach. Pt requires continued skilled PT intervention for the purposes of maximizing functional mobility and independence. Treatment:  Patient practiced and was instructed in the following treatment:     Therapeutic Activities Completed:  o Functional mobility as noted above:   - Bed mobility: maxA/dependent depending on pt's level of alertness. Max VC and hand over guidance to increase pt's participation with bed mobility. Pt sat at EOB x 15 minutes working to improve level of alertness as well as trunk control. Cues and min/modA assistance provided throughout.    o Skilled repositioning in supine with HOB elevated for comfort and pressure relief. o Pt education as noted above. PLAN:    Patient is making fair progress towards established goals. Will continue with current POC.       Time in  1100  Time out  1125    Total Treatment Time  15 minutes     CPT codes:  [] Gait training 51221 0 minutes  [] Manual therapy 08911 0 minutes  [x] Therapeutic activities 91240 15 minutes  [] Therapeutic exercises 09211 0 minutes  [] Neuromuscular reeducation 50586 0 minutes    Xu Wilkinson, PT, DPT  [id number]
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
## 2020-11-02 NOTE — CARE COORDINATION
Restraints removed and pt decannulated over the weekend. [facility] following. Spoke to [address] with the [place], she asked that clinical updates be faxed to [phone] Attn: Orlando Mackey. They will initiate precert after clinicals reviewed. [facility] liason updated. Wife at bedside updated. No Covid needed per liason as long as pt is asymptomatic. HENS and ambulance form on soft chart.  CM to follow  Patience Hooper, RN  [facility] Case Management  [phone]

## 2020-11-02 NOTE — PROGRESS NOTES
Comprehensive Nutrition Assessment    Type and Reason for Visit:  Reassess    Nutrition Recommendations/Plan: Continue current EN as ordered. WIll continue to monitor/follow     Nutrition Assessment:  Pt w/ nutritional status as same. Will continue current TF as ordered to support needs.     Malnutrition Assessment:  Malnutrition Status:  No malnutrition        Estimated Daily Nutrient Needs:  Energy (kcal):  6763-8933; Weight Used for Energy Requirements:  Current     Protein (g):  110-125; Weight Used for Protein Requirements:  Ideal        Fluid (ml/day):  2300ml; Method Used for Fluid Requirements:  1 ml/kcal      Nutrition Related Findings:  -I/os, PEG w/ TF, active bs, no edema noted      Wounds:  Multiple, Open Wounds, Surgical Wound       Current Nutrition Therapies:    Current Tube Feeding (TF) Orders:  · Feeding Route: PEG  · Formula: Fluid restricted  · Schedule: Continuous  · Additives/Modulars: Protein(daily)  · Water Flushes: .  · Current TF & Flush Orders Provides: 2160 kcals, 90 gm protein, 756 ml free water (w/ pro mod 2264 kcals, 116 gm protein)  · Goal TF & Flush Orders Provides:        Anthropometric Measures:  · Height: 6' 2\" (188 cm)  · Current Body Weight: 199 lb (90.3 kg)(10/30 actual)   · Admission Body Weight: 230 lb (104.3 kg)(bed scale 10/9)    · Usual Body Weight: (UTO, no wt hx per EMR)     · Ideal Body Weight: 190 lbs; % Ideal Body Weight 118.4 %   · BMI: 25.5  · Adjusted Body Weight:  ; No Adjustment   · BMI Categories: Overweight (BMI 25.0-29.9)(noted wt loss since admit however current fluids - at this time)       Nutrition Diagnosis:   · Inadequate oral intake related to impaired respiratory function as evidenced by NPO or clear liquid status due to medical condition, intubation, nutrition support - enteral nutrition      Nutrition Interventions:   Food and/or Nutrient Delivery:  Continue Current Tube Feeding  Nutrition Education/Counseling:  No recommendation at this time
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
## 2020-11-03 PROCEDURE — 2060000000 HC ICU INTERMEDIATE R&B

## 2020-11-03 PROCEDURE — 6360000002 HC RX W HCPCS: Performed by: SURGERY

## 2020-11-03 PROCEDURE — 6370000000 HC RX 637 (ALT 250 FOR IP): Performed by: STUDENT IN AN ORGANIZED HEALTH CARE EDUCATION/TRAINING PROGRAM

## 2020-11-03 PROCEDURE — 99232 SBSQ HOSP IP/OBS MODERATE 35: CPT | Performed by: SURGERY

## 2020-11-03 PROCEDURE — 2580000003 HC RX 258: Performed by: SURGERY

## 2020-11-03 PROCEDURE — 6370000000 HC RX 637 (ALT 250 FOR IP): Performed by: SURGERY

## 2020-11-03 RX ADMIN — CLONIDINE HYDROCHLORIDE 0.2 MG: 0.2 TABLET ORAL at 14:27

## 2020-11-03 RX ADMIN — Medication 10 ML: at 22:46

## 2020-11-03 RX ADMIN — CLONIDINE HYDROCHLORIDE 0.2 MG: 0.2 TABLET ORAL at 22:45

## 2020-11-03 RX ADMIN — QUETIAPINE FUMARATE 200 MG: 100 TABLET ORAL at 09:20

## 2020-11-03 RX ADMIN — ENOXAPARIN SODIUM 30 MG: 30 INJECTION SUBCUTANEOUS at 09:19

## 2020-11-03 RX ADMIN — QUETIAPINE FUMARATE 200 MG: 100 TABLET ORAL at 22:45

## 2020-11-03 RX ADMIN — CLONIDINE HYDROCHLORIDE 0.2 MG: 0.2 TABLET ORAL at 09:20

## 2020-11-03 RX ADMIN — Medication 10 ML: at 09:19

## 2020-11-03 RX ADMIN — ENOXAPARIN SODIUM 30 MG: 30 INJECTION SUBCUTANEOUS at 22:46

## 2020-11-03 ASSESSMENT — PAIN SCALES - GENERAL: PAINLEVEL_OUTOF10: 0

## 2020-11-03 NOTE — CARE COORDINATION
Message left with Javi Joe at [facility] to check on [address] They are aware patient stable for discharge once auth obtained. [facility], [facility] are [facility] because of telesitter. I did discuss with Javi Joe that facility is willing to accept with telesitter in place until discharge. They are using terminology \"sitter\" when she read me the reason for denial. I asked if this was discontinued if they would approve. She is checking with the provider to see what type of parameters they are looking for before they approve, unclear at this point if they want telesitter discontinued and if so for how long before requesting another auth. She will have provider call me back to let me know. I also asked if patient could go under his [place] and then transition to [facility] coverage at the facility. She is unsure but looking into this as well. I also called [facility] and they are checking to see if patient could come under [place] first and transition to [facility] coverage. For questions I can be reached at [phone].  Reshma Skelton [place]
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
## 2020-11-03 NOTE — CARE COORDINATION
Attempted to call [place] [facility] [phone] ext [phone] twice, mailbox is full. Trying to follow up on precert they were initiating yesterday. Pt is medically stable for discharge. Will continue to try. [facility] liason updated. No Covid needed per liason as long as pt is asymptomatic. HENS and ambulance form on soft chart.  CM to follow  Henry Moreno RN  [facility] Case Management  [phone]

## 2020-11-04 VITALS
SYSTOLIC BLOOD PRESSURE: 107 MMHG | OXYGEN SATURATION: 90 % | WEIGHT: 193 LBS | TEMPERATURE: 98.2 F | BODY MASS INDEX: 24.77 KG/M2 | HEART RATE: 67 BPM | RESPIRATION RATE: 14 BRPM | HEIGHT: 74 IN | DIASTOLIC BLOOD PRESSURE: 57 MMHG

## 2020-11-04 PROCEDURE — 99232 SBSQ HOSP IP/OBS MODERATE 35: CPT | Performed by: SURGERY

## 2020-11-04 PROCEDURE — 6360000002 HC RX W HCPCS: Performed by: SURGERY

## 2020-11-04 PROCEDURE — 6370000000 HC RX 637 (ALT 250 FOR IP): Performed by: STUDENT IN AN ORGANIZED HEALTH CARE EDUCATION/TRAINING PROGRAM

## 2020-11-04 PROCEDURE — 6370000000 HC RX 637 (ALT 250 FOR IP): Performed by: SURGERY

## 2020-11-04 PROCEDURE — 97530 THERAPEUTIC ACTIVITIES: CPT

## 2020-11-04 PROCEDURE — 2580000003 HC RX 258: Performed by: SURGERY

## 2020-11-04 RX ORDER — CLONIDINE HYDROCHLORIDE 0.2 MG/1
0.2 TABLET ORAL 3 TIMES DAILY
Qty: 60 TABLET | Refills: 3 | DISCHARGE
Start: 2020-11-04

## 2020-11-04 RX ORDER — QUETIAPINE FUMARATE 200 MG/1
200 TABLET, FILM COATED ORAL 2 TIMES DAILY
Qty: 60 TABLET | Refills: 3 | DISCHARGE
Start: 2020-11-04

## 2020-11-04 RX ADMIN — QUETIAPINE FUMARATE 200 MG: 100 TABLET ORAL at 10:07

## 2020-11-04 RX ADMIN — ENOXAPARIN SODIUM 30 MG: 30 INJECTION SUBCUTANEOUS at 09:26

## 2020-11-04 RX ADMIN — CLONIDINE HYDROCHLORIDE 0.2 MG: 0.2 TABLET ORAL at 09:26

## 2020-11-04 RX ADMIN — ACETAMINOPHEN ORAL SOLUTION 650 MG: 650 SOLUTION ORAL at 09:26

## 2020-11-04 RX ADMIN — Medication 10 ML: at 09:26

## 2020-11-04 ASSESSMENT — PAIN SCALES - GENERAL: PAINLEVEL_OUTOF10: 6

## 2020-11-04 NOTE — CARE COORDINATION
Spoke with [facility], they will start with auth to [facility] and transition to [facility] coverage later. Discussed with spouse and she is in agreement. PASSR and ambulance on soft chart. Auth obtained for [facility]. Discharge arranged for 1630. Facility notified of discharge time. [facility] arranged. Spouse at bedside notified of discharge time. [address] called asking about a transfer to [facility]. I did discuss with spouse. She would prefer transfer to [facility]. I did let her know number to [facility] and I also let her know she can request a transfer to [facility] if he returns to the hospital in the future. For questions I can be reached at [phone].  Brett Knight [place]

## 2020-11-04 NOTE — PLAN OF CARE
Problem: Falls - Risk of:  Goal: Will remain free from falls  Description: Will remain free from falls  Outcome: Completed  Goal: Absence of physical injury  Description: Absence of physical injury  Outcome: Completed     Problem: Skin Integrity:  Goal: Will show no infection signs and symptoms  Description: Will show no infection signs and symptoms  Outcome: Completed  Goal: Absence of new skin breakdown  Description: Absence of new skin breakdown  Outcome: Completed     Problem: Pain:  Goal: Pain level will decrease  Description: Pain level will decrease  Outcome: Completed  Goal: Control of acute pain  Description: Control of acute pain  Outcome: Completed  Goal: Control of chronic pain  Description: Control of chronic pain  Outcome: Completed     Problem: Airway Clearance - Ineffective:  Goal: Ability to maintain a clear airway will improve  Description: Ability to maintain a clear airway will improve  Outcome: Completed     Problem: Anxiety/Stress:  Goal: Level of anxiety will decrease  Description: Level of anxiety will decrease  Outcome: Completed     Problem: Aspiration:  Goal: Absence of aspiration  Description: Absence of aspiration  Outcome: Completed     Problem:  Bowel Function - Altered:  Goal: Bowel elimination is within specified parameters  Description: Bowel elimination is within specified parameters  Outcome: Completed     Problem: Cardiac Output - Decreased:  Goal: Hemodynamic stability will improve  Description: Hemodynamic stability will improve  Outcome: Completed     Problem: Fluid Volume - Imbalance:  Goal: Absence of imbalanced fluid volume signs and symptoms  Description: Absence of imbalanced fluid volume signs and symptoms  Outcome: Completed     Problem: Gas Exchange - Impaired:  Goal: Levels of oxygenation will improve  Description: Levels of oxygenation will improve  Outcome: Completed     Problem: Mental Status - Impaired:  Goal: Mental status will be restored to baseline  Description: Mental status will be restored to baseline  Outcome: Completed     Problem: Nutrition Deficit:  Goal: Ability to achieve adequate nutritional intake will improve  Description: Ability to achieve adequate nutritional intake will improve  Outcome: Completed     Problem: Pain:  Goal: Pain level will decrease  Description: Pain level will decrease  Outcome: Completed  Goal: Recognizes and communicates pain  Description: Recognizes and communicates pain  Outcome: Completed  Goal: Control of acute pain  Description: Control of acute pain  Outcome: Completed     Problem: Skin Integrity - Impaired:  Goal: Will show no infection signs and symptoms  Description: Will show no infection signs and symptoms  Outcome: Completed     Problem: Sleep Pattern Disturbance:  Goal: Appears well-rested  Description: Appears well-rested  Outcome: Completed

## 2020-11-04 NOTE — PROGRESS NOTES
Physical Therapy    Physical Therapy Daily Treatment Note      Name: Jose Garcia  : 1990  MRN: [id number]    Referring Provider:  Thu Bentley MD    Date of Service: 2020    Evaluating PT:  Flaquita Ha, PT, DPT [id number]     Room #:  9907/9039-A  Diagnosis:  [facility]  Procedure/Surgery:  Cranial ventriculostomy/ ICP monitor 10/9  Precautions:  Falls, NWB LUE s/p L scapular fx, LUE sling, L rib fxs 4-8, facial fxs, 4 pt restraint, Trach mask, PEG  Equipment Needs:  TBD    SUBJECTIVE:    Pt lives with wife in a 1 story home with 2 stairs to enter. Bedroom and bathroom are on the 1st level. Pt ambulated with no AD PTA. OBJECTIVE:   Initial Evaluation  Date: 10/27/20 Treatment  2020 Short Term/ Long Term   Goals   AM-PAC 6 Clicks  0/40    Was pt agreeable to Eval/treatment? Yes Yes    Does pt have pain? Unable to report  Unable to report     Bed Mobility  Rolling: Dep  Supine to sit: Dep x2  Sit to supine: Dep x2  Scooting: Dep maxA all aspects Rolling: Max A  Supine to sit: Max A  Sit to supine: Max A  Scooting: Max A   Transfers Sit to stand: Dep>Max A  Stand to sit: Dep  Stand pivot: NT Sit to stand: NT  Stand to sit: NT  Stand pivot: NT Sit to stand: Max A  Stand to sit: Max A  Stand pivot: Max A   Ambulation    NT NT >5 feet with AAD Max A   Stair negotiation: ascended and descended  NT  NA   ROM BUE:  Per OT eval  BLE:  WFL     Strength BUE:  Per OT eval   BLE:  Grossly >3/5 observed with function -- unable to follow commands during MMT     Balance Sitting EOB:  Mod<>max A  Static Standing: Max A Sitting EOB:  Faye<>modA  Static Standing: NT Sitting EOB:  SBA  Dynamic Standing: Max A     Pt is A & O x difficult to assess. Pt more alert on this date but speech is still limited. Sensation:  Pt denies numbness and tingling to extremities  Edema:  unremarkable    Patient education  Pt educated on role of PT intervention.       Patient response to education:   Pt verbalized understanding Pt demonstrated skill Pt requires further education in this area   yes yes yes     ASSESSMENT:    Comments:  RN cleared pt for activity prior to session. Pt received supine in bed and agreeable to PT intervention at this time. Pt performed all functional mobility as noted above. Pt more alert on this date and attempted to verbalize more though most words are nonsensical.  Pt required constant redirection to maintain NWB of LUE. Pt impulsive at EOB and pushed his hips forward and leaned backwards multiple times requiring modA to correct and prevent LOB. Pt sat at EOB x 10 minutes working to improve trunk stability and level of alertness. Pt returned to supine and left with all needs met and call light in reach. Pt requires continued skilled PT intervention for the purposes of maximizing functional mobility and independence. Treatment:  Patient practiced and was instructed in the following treatment:     Therapeutic Activities Completed:  o Functional mobility as noted above:   - Bed mobility: maxA. Max VC and hand over guidance to increase pt's participation with bed mobility. Pt sat at EOB x 10 minutes working to improve level of alertness as well as trunk control. Cues and min/modA assistance provided throughout.    o Skilled repositioning in supine with HOB elevated for comfort and pressure relief. o Pt education as noted above. PLAN:    Patient is making fair progress towards established goals. Will continue with current POC.       Time in  1046  Time out  1100    Total Treatment Time  14 minutes     CPT codes:  [] Gait training 93994 0 minutes  [] Manual therapy 61045 0 minutes  [x] Therapeutic activities 60347 14 minutes  [] Therapeutic exercises 93703 0 minutes  [] Neuromuscular reeducation 73635 0 minutes    Daniel Barajas PT, DPT  [id number]

## 2021-02-04 ENCOUNTER — TELEPHONE (OUTPATIENT)
Dept: SURGERY | Age: 31
End: 2021-02-04

## 2021-02-04 NOTE — TELEPHONE ENCOUNTER
MA received phone call from Naseem with the [facility] in regards to a referral. Naseem states the patient was admitted following [facility] on 10/8/2020. Patient had received a PEG and Trache on 10/15/2020 with Dr Guillermo Jc. Naseem states they had seen a provider in [place] at the [facility] to discuss having the PEG Tube removed, but their providers refused and recommended him be referred back to [facility] and the provider whom placed it due to lack of information regarding how the tube was placed. Naseem stated there was complaints from patient's wife of irritation around the PEG site but could not provider more information. Ksenia offered to contact patient's wife but MA insisted on contacting patient's wife directly to discuss. Naseem states that she faxed a referral and authorization of visit from the [facility] to our office. Ksenia requesting return call [phone] or fax information ([phone]) regarding if the patient has an appointment or procedure scheduled. MA contacted patient's wife, Trenton Barajas, to discuss condition pertaining to PEG Tube. Trenton Barajas stated that there is some irritation and redness around the PEG site. Trenton Barajas states her biggest concern is that she will clean it twice daily, and at least once a day there is dried blood around the site of insertion. Trenton Barajas states otherwise it still flushes okay, but they have not used it for feeds or medication since December 2020. Trenton Barajas denies any swelling in the area or patient complaints of pain. Trenton Barajas states that at the office visit with the [facility] they recommended giving the patient anesthetic during the removal process as \"he will swing at you due to his aphasia regarding the situation. \" Trenton Barajas states he has become very agitated with assessment of the PEG previously. Trenton Barajas can be reached at [phone]. MA routing message to Dr Guillermo Jc to determine how appointment should be scheduled. MA routing message to [place], [place] informatively. Electronically signed by Louie Gonzalez on 2/4/21 at 3:44 PM EST

## 2021-02-04 NOTE — TELEPHONE ENCOUNTER
If the patient is not using the PEG it can be pulled out. He has to be tolerating a diet without losing weight and able to take his meds by mouth. If the [facility] is not going [facility] then please schedule patient for follow-up appointment in office. We can send the [facility] the op note if they wish to remove the PEG, but it was placed like a traditional PEG is placed.

## 2021-02-05 NOTE — TELEPHONE ENCOUNTER
MA attempt to contact patient to relay  message alonf with making sure patient meets all requirements to have PEG removed. IF patient meets all requirements to have PEG removed MA wanted to schedule a office visit for PEG removal next available. MA reached patient VM and left detailed message of why was calling and office number for patient to call office back and set up appt.         Electronically signed by Korin Lawrence MA on 2/5/21 at 8:55 AM EST

## 2021-02-09 NOTE — TELEPHONE ENCOUNTER
MA received incoming call from patient wife in regards to missed call. MA scheduled patient for office appointment on 2/22/2021 in [facility] [place] office at [address] with [address] [place], [place] [address]way [address] [place] [address]. Appointment letter was emailed to patient wife Desiree Crowe per request. Email was [email or web address].         Electronically signed by Goyo Go MA on 2/9/21 at 12:39 PM EST

## 2021-02-18 ENCOUNTER — HOSPITAL ENCOUNTER (EMERGENCY)
Age: 31
Discharge: HOME | End: 2021-02-18
Attending: EMERGENCY MEDICINE
Payer: OTHER GOVERNMENT

## 2021-02-18 ENCOUNTER — APPOINTMENT (OUTPATIENT)
Dept: CT IMAGING | Age: 31
End: 2021-02-18
Payer: OTHER GOVERNMENT

## 2021-02-18 VITALS
RESPIRATION RATE: 18 BRPM | HEART RATE: 77 BPM | OXYGEN SATURATION: 96 % | BODY MASS INDEX: 26.95 KG/M2 | TEMPERATURE: 97.3 F | HEIGHT: 74 IN | SYSTOLIC BLOOD PRESSURE: 136 MMHG | DIASTOLIC BLOOD PRESSURE: 86 MMHG | WEIGHT: 210 LBS

## 2021-02-18 DIAGNOSIS — R10.10 PAIN OF UPPER ABDOMEN: Primary | ICD-10-CM

## 2021-02-18 LAB
ALBUMIN SERPL-MCNC: 4.4 G/DL (ref 3.5–5.2)
ALP BLD-CCNC: 86 U/L (ref 40–129)
ALT SERPL-CCNC: 36 U/L (ref 0–40)
ANION GAP SERPL CALCULATED.3IONS-SCNC: 10 MMOL/L (ref 7–16)
AST SERPL-CCNC: 21 U/L (ref 0–39)
BASOPHILS ABSOLUTE: 0.06 E9/L (ref 0–0.2)
BASOPHILS RELATIVE PERCENT: 1.1 % (ref 0–2)
BILIRUB SERPL-MCNC: 0.3 MG/DL (ref 0–1.2)
BILIRUBIN DIRECT: <0.2 MG/DL (ref 0–0.3)
BILIRUBIN URINE: NEGATIVE
BILIRUBIN, INDIRECT: NORMAL MG/DL (ref 0–1)
BLOOD, URINE: NEGATIVE
BUN BLDV-MCNC: 10 MG/DL (ref 6–20)
CALCIUM SERPL-MCNC: 10.1 MG/DL (ref 8.6–10.2)
CHLORIDE BLD-SCNC: 97 MMOL/L (ref 98–107)
CLARITY: CLEAR
CO2: 30 MMOL/L (ref 22–29)
COLOR: YELLOW
CREAT SERPL-MCNC: 0.8 MG/DL (ref 0.7–1.2)
EOSINOPHILS ABSOLUTE: 0.22 E9/L (ref 0.05–0.5)
EOSINOPHILS RELATIVE PERCENT: 4 % (ref 0–6)
GFR AFRICAN AMERICAN: >60
GFR NON-AFRICAN AMERICAN: >60 ML/MIN/1.73
GLUCOSE BLD-MCNC: 92 MG/DL (ref 74–99)
GLUCOSE URINE: NEGATIVE MG/DL
HCT VFR BLD CALC: 51.7 % (ref 37–54)
HEMOGLOBIN: 17.3 G/DL (ref 12.5–16.5)
IMMATURE GRANULOCYTES #: 0.02 E9/L
IMMATURE GRANULOCYTES %: 0.4 % (ref 0–5)
KETONES, URINE: NEGATIVE MG/DL
LACTIC ACID, SEPSIS: 1.8 MMOL/L (ref 0.5–1.9)
LEUKOCYTE ESTERASE, URINE: NEGATIVE
LIPASE: 32 U/L (ref 13–60)
LYMPHOCYTES ABSOLUTE: 1.51 E9/L (ref 1.5–4)
LYMPHOCYTES RELATIVE PERCENT: 27.4 % (ref 20–42)
MCH RBC QN AUTO: 29.3 PG (ref 26–35)
MCHC RBC AUTO-ENTMCNC: 33.5 % (ref 32–34.5)
MCV RBC AUTO: 87.5 FL (ref 80–99.9)
MONOCYTES ABSOLUTE: 0.43 E9/L (ref 0.1–0.95)
MONOCYTES RELATIVE PERCENT: 7.8 % (ref 2–12)
NEUTROPHILS ABSOLUTE: 3.27 E9/L (ref 1.8–7.3)
NEUTROPHILS RELATIVE PERCENT: 59.3 % (ref 43–80)
NITRITE, URINE: NEGATIVE
PDW BLD-RTO: 12 FL (ref 11.5–15)
PH UA: 7 (ref 5–9)
PLATELET # BLD: 199 E9/L (ref 130–450)
PMV BLD AUTO: 9.4 FL (ref 7–12)
POTASSIUM REFLEX MAGNESIUM: 4 MMOL/L (ref 3.5–5)
PROTEIN UA: NEGATIVE MG/DL
RBC # BLD: 5.91 E12/L (ref 3.8–5.8)
SODIUM BLD-SCNC: 137 MMOL/L (ref 132–146)
SPECIFIC GRAVITY UA: 1.02 (ref 1–1.03)
TOTAL PROTEIN: 7.9 G/DL (ref 6.4–8.3)
UROBILINOGEN, URINE: 0.2 E.U./DL
WBC # BLD: 5.5 E9/L (ref 4.5–11.5)

## 2021-02-18 PROCEDURE — 99285 EMERGENCY DEPT VISIT HI MDM: CPT

## 2021-02-18 PROCEDURE — 81003 URINALYSIS AUTO W/O SCOPE: CPT

## 2021-02-18 PROCEDURE — 83690 ASSAY OF LIPASE: CPT

## 2021-02-18 PROCEDURE — 80076 HEPATIC FUNCTION PANEL: CPT

## 2021-02-18 PROCEDURE — 6360000004 HC RX CONTRAST MEDICATION: Performed by: RADIOLOGY

## 2021-02-18 PROCEDURE — 96374 THER/PROPH/DIAG INJ IV PUSH: CPT

## 2021-02-18 PROCEDURE — 2580000003 HC RX 258: Performed by: EMERGENCY MEDICINE

## 2021-02-18 PROCEDURE — 6360000002 HC RX W HCPCS: Performed by: EMERGENCY MEDICINE

## 2021-02-18 PROCEDURE — 96375 TX/PRO/DX INJ NEW DRUG ADDON: CPT

## 2021-02-18 PROCEDURE — 74177 CT ABD & PELVIS W/CONTRAST: CPT

## 2021-02-18 PROCEDURE — 87040 BLOOD CULTURE FOR BACTERIA: CPT

## 2021-02-18 PROCEDURE — 6370000000 HC RX 637 (ALT 250 FOR IP): Performed by: EMERGENCY MEDICINE

## 2021-02-18 PROCEDURE — 83605 ASSAY OF LACTIC ACID: CPT

## 2021-02-18 PROCEDURE — 80048 BASIC METABOLIC PNL TOTAL CA: CPT

## 2021-02-18 PROCEDURE — 85025 COMPLETE CBC W/AUTO DIFF WBC: CPT

## 2021-02-18 RX ORDER — HYDROCODONE BITARTRATE AND ACETAMINOPHEN 5; 325 MG/1; MG/1
1 TABLET ORAL EVERY 4 HOURS PRN
Qty: 18 TABLET | Refills: 0 | Status: SHIPPED | OUTPATIENT
Start: 2021-02-18 | End: 2021-02-21

## 2021-02-18 RX ORDER — ONDANSETRON 2 MG/ML
4 INJECTION INTRAMUSCULAR; INTRAVENOUS ONCE
Status: COMPLETED | OUTPATIENT
Start: 2021-02-18 | End: 2021-02-18

## 2021-02-18 RX ORDER — ONDANSETRON 4 MG/1
4 TABLET, ORALLY DISINTEGRATING ORAL ONCE
Qty: 30 TABLET | Refills: 0 | Status: SHIPPED | OUTPATIENT
Start: 2021-02-18 | End: 2021-02-18

## 2021-02-18 RX ORDER — SODIUM CHLORIDE 0.9 % (FLUSH) 0.9 %
10 SYRINGE (ML) INJECTION
Status: DISCONTINUED | OUTPATIENT
Start: 2021-02-18 | End: 2021-02-18 | Stop reason: HOSPADM

## 2021-02-18 RX ORDER — HYDROXYZINE PAMOATE 50 MG/1
50 CAPSULE ORAL ONCE
Status: COMPLETED | OUTPATIENT
Start: 2021-02-18 | End: 2021-02-18

## 2021-02-18 RX ORDER — SODIUM CHLORIDE, SODIUM LACTATE, POTASSIUM CHLORIDE, CALCIUM CHLORIDE 600; 310; 30; 20 MG/100ML; MG/100ML; MG/100ML; MG/100ML
1000 INJECTION, SOLUTION INTRAVENOUS ONCE
Status: COMPLETED | OUTPATIENT
Start: 2021-02-18 | End: 2021-02-18

## 2021-02-18 RX ADMIN — IOHEXOL 50 ML: 240 INJECTION, SOLUTION INTRATHECAL; INTRAVASCULAR; INTRAVENOUS; ORAL at 11:22

## 2021-02-18 RX ADMIN — HYDROMORPHONE HYDROCHLORIDE 1 MG: 1 INJECTION, SOLUTION INTRAMUSCULAR; INTRAVENOUS; SUBCUTANEOUS at 11:05

## 2021-02-18 RX ADMIN — IOPAMIDOL 90 ML: 755 INJECTION, SOLUTION INTRAVENOUS at 12:33

## 2021-02-18 RX ADMIN — ONDANSETRON 4 MG: 2 INJECTION INTRAMUSCULAR; INTRAVENOUS at 11:03

## 2021-02-18 RX ADMIN — HYDROXYZINE PAMOATE 50 MG: 50 CAPSULE ORAL at 13:58

## 2021-02-18 RX ADMIN — SODIUM CHLORIDE, POTASSIUM CHLORIDE, SODIUM LACTATE AND CALCIUM CHLORIDE 1000 ML: 600; 310; 30; 20 INJECTION, SOLUTION INTRAVENOUS at 11:35

## 2021-02-18 ASSESSMENT — PAIN DESCRIPTION - DESCRIPTORS: DESCRIPTORS: DISCOMFORT

## 2021-02-18 ASSESSMENT — PAIN SCALES - GENERAL: PAINLEVEL_OUTOF10: 6
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
## 2021-02-18 NOTE — ED NOTES
Bed: 16  Expected date:   Expected time:   Means of arrival:   Comments:  Triage      Franco Quevedo RN  02/18/21 1000

## 2021-02-18 NOTE — ED NOTES
Pt requesting to leave, informed CT results are back. Just waiting for the doctor to look over all of the results and put in a disposition.       Mary Sun, JOSEPH  02/18/21 8438

## 2021-02-19 ENCOUNTER — HOSPITAL ENCOUNTER (EMERGENCY)
Age: 31
Discharge: HOME | End: 2021-02-19
Attending: EMERGENCY MEDICINE
Payer: OTHER GOVERNMENT

## 2021-02-19 VITALS
OXYGEN SATURATION: 98 % | RESPIRATION RATE: 17 BRPM | DIASTOLIC BLOOD PRESSURE: 102 MMHG | TEMPERATURE: 98.1 F | HEART RATE: 83 BPM | SYSTOLIC BLOOD PRESSURE: 161 MMHG | WEIGHT: 210 LBS | HEIGHT: 74 IN | BODY MASS INDEX: 26.95 KG/M2

## 2021-02-19 DIAGNOSIS — Z43.1 ATTENTION TO G-TUBE (HCC): Primary | ICD-10-CM

## 2021-02-19 LAB
ALBUMIN SERPL-MCNC: 4 G/DL (ref 3.5–5.2)
ALP BLD-CCNC: 78 U/L (ref 40–129)
ALT SERPL-CCNC: 24 U/L (ref 0–40)
ANION GAP SERPL CALCULATED.3IONS-SCNC: 11 MMOL/L (ref 7–16)
AST SERPL-CCNC: 16 U/L (ref 0–39)
BASOPHILS ABSOLUTE: 0.08 E9/L (ref 0–0.2)
BASOPHILS RELATIVE PERCENT: 1.1 % (ref 0–2)
BILIRUB SERPL-MCNC: 0.2 MG/DL (ref 0–1.2)
BUN BLDV-MCNC: 11 MG/DL (ref 6–20)
CALCIUM SERPL-MCNC: 9 MG/DL (ref 8.6–10.2)
CHLORIDE BLD-SCNC: 99 MMOL/L (ref 98–107)
CO2: 28 MMOL/L (ref 22–29)
CREAT SERPL-MCNC: 0.9 MG/DL (ref 0.7–1.2)
EOSINOPHILS ABSOLUTE: 0.32 E9/L (ref 0.05–0.5)
EOSINOPHILS RELATIVE PERCENT: 4.3 % (ref 0–6)
GFR AFRICAN AMERICAN: >60
GFR NON-AFRICAN AMERICAN: >60 ML/MIN/1.73
GLUCOSE BLD-MCNC: 90 MG/DL (ref 74–99)
HCT VFR BLD CALC: 45.9 % (ref 37–54)
HEMOGLOBIN: 15.9 G/DL (ref 12.5–16.5)
IMMATURE GRANULOCYTES #: 0.04 E9/L
IMMATURE GRANULOCYTES %: 0.5 % (ref 0–5)
LACTIC ACID: 2 MMOL/L (ref 0.5–2.2)
LYMPHOCYTES ABSOLUTE: 2.35 E9/L (ref 1.5–4)
LYMPHOCYTES RELATIVE PERCENT: 31.9 % (ref 20–42)
MCH RBC QN AUTO: 30.1 PG (ref 26–35)
MCHC RBC AUTO-ENTMCNC: 34.6 % (ref 32–34.5)
MCV RBC AUTO: 86.9 FL (ref 80–99.9)
MONOCYTES ABSOLUTE: 0.53 E9/L (ref 0.1–0.95)
MONOCYTES RELATIVE PERCENT: 7.2 % (ref 2–12)
NEUTROPHILS ABSOLUTE: 4.04 E9/L (ref 1.8–7.3)
NEUTROPHILS RELATIVE PERCENT: 55 % (ref 43–80)
PDW BLD-RTO: 12 FL (ref 11.5–15)
PLATELET # BLD: 235 E9/L (ref 130–450)
PMV BLD AUTO: 9.5 FL (ref 7–12)
POTASSIUM SERPL-SCNC: 3.9 MMOL/L (ref 3.5–5)
RBC # BLD: 5.28 E12/L (ref 3.8–5.8)
SODIUM BLD-SCNC: 138 MMOL/L (ref 132–146)
TOTAL PROTEIN: 7.2 G/DL (ref 6.4–8.3)
WBC # BLD: 7.4 E9/L (ref 4.5–11.5)

## 2021-02-19 PROCEDURE — 83605 ASSAY OF LACTIC ACID: CPT

## 2021-02-19 PROCEDURE — 96372 THER/PROPH/DIAG INJ SC/IM: CPT

## 2021-02-19 PROCEDURE — 6360000002 HC RX W HCPCS: Performed by: STUDENT IN AN ORGANIZED HEALTH CARE EDUCATION/TRAINING PROGRAM

## 2021-02-19 PROCEDURE — 85025 COMPLETE CBC W/AUTO DIFF WBC: CPT

## 2021-02-19 PROCEDURE — 99284 EMERGENCY DEPT VISIT MOD MDM: CPT

## 2021-02-19 PROCEDURE — 80053 COMPREHEN METABOLIC PANEL: CPT

## 2021-02-19 RX ORDER — LORAZEPAM 2 MG/ML
2 INJECTION INTRAMUSCULAR ONCE
Status: COMPLETED | OUTPATIENT
Start: 2021-02-19 | End: 2021-02-19

## 2021-02-19 RX ORDER — LORAZEPAM 2 MG/ML
1 INJECTION INTRAMUSCULAR ONCE
Status: DISCONTINUED | OUTPATIENT
Start: 2021-02-19 | End: 2021-02-19

## 2021-02-19 RX ADMIN — LORAZEPAM 2 MG: 2 INJECTION INTRAMUSCULAR; INTRAVENOUS at 21:53
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
## 2021-02-20 NOTE — ED NOTES
Pt in from home for c/o abdominal tenderness around PEG tub site since yesterday. Pt states he was seen yday for the same thing. Pt states he was going to have the PEG tube removed yday, however the surgeon could had an emergency case and pt was sent home.  Pt came back to ER today for the same pain complaint     Zaid Sun RN  02/19/21 2034

## 2021-02-20 NOTE — ED NOTES
Pt's PEG tube removed by surgery team @ bedside     Freddie Aguilera, [address] [facility]  02/19/21 9287

## 2021-02-22 ENCOUNTER — TELEPHONE (OUTPATIENT)
Dept: SURGERY | Age: 31
End: 2021-02-22

## 2021-02-22 NOTE — TELEPHONE ENCOUNTER
MA attempt to contact patient to discuss his PEG. Pt has appt today 02/22/2021 for PEG removal but patient was seen in ER on 02/19/2021 and PEG was removed. MA reached patient VM and left detailed message of why was calling and office number for patient to call office back to verbalize that appointment for today is no longer needed as PEG is already removed.        Electronically signed by Meg Osborn MA on 2/22/21 at 8:08 AM EST

## 2021-02-23 LAB
BLOOD CULTURE, ROUTINE: NORMAL
CULTURE, BLOOD 2: NORMAL
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
## 2021-04-14 ENCOUNTER — HOSPITAL ENCOUNTER (EMERGENCY)
Age: 31
Discharge: HOME | End: 2021-04-14

## 2021-04-14 VITALS — OXYGEN SATURATION: 99 % | HEART RATE: 100 BPM | TEMPERATURE: 97 F

## 2021-05-27 ENCOUNTER — HOSPITAL ENCOUNTER (OUTPATIENT)
Dept: OCCUPATIONAL THERAPY | Age: 31
Setting detail: THERAPIES SERIES
Discharge: HOME | End: 2021-05-27
Payer: OTHER GOVERNMENT

## 2021-05-27 PROCEDURE — 97166 OT EVAL MOD COMPLEX 45 MIN: CPT | Performed by: OCCUPATIONAL THERAPIST
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
## 2021-06-03 ENCOUNTER — HOSPITAL ENCOUNTER (OUTPATIENT)
Dept: OCCUPATIONAL THERAPY | Age: 31
Setting detail: THERAPIES SERIES
Discharge: HOME | End: 2021-06-03
Payer: OTHER GOVERNMENT

## 2021-06-03 PROCEDURE — 97110 THERAPEUTIC EXERCISES: CPT | Performed by: OCCUPATIONAL THERAPIST

## 2021-06-03 PROCEDURE — 97530 THERAPEUTIC ACTIVITIES: CPT | Performed by: OCCUPATIONAL THERAPIST

## 2021-06-10 ENCOUNTER — HOSPITAL ENCOUNTER (OUTPATIENT)
Dept: OCCUPATIONAL THERAPY | Age: 31
Setting detail: THERAPIES SERIES
Discharge: HOME | End: 2021-06-10
Payer: OTHER GOVERNMENT

## 2021-06-10 PROCEDURE — 9900000074 HC THERAPEUTIC ACTIVITIES PER 15 MIN (SELF-PAY): Performed by: OCCUPATIONAL THERAPIST

## 2021-06-10 PROCEDURE — 97530 THERAPEUTIC ACTIVITIES: CPT | Performed by: OCCUPATIONAL THERAPIST

## 2021-06-10 NOTE — PROGRESS NOTES
solving, sequencing. Fair sequencing demonstrated during task. Haven Sky was able to identify errors and correct with min cues. Patient able to attend to task 30 minutes without need to re-direction. Good fine motor for use of tools   Visual Motor x -peg assembly using 6 different colors and up to 8 pegs to remember and assemble a pattern   Orientation-visual x -IQ board game assembly 4 patterns on a board with minimal verbal cues to assemble correctly   Problem solving x -rush hour board game-correctly problem solved the board with moderate verbal cues in order to complete 6 puzzles                                      Other:                 Assessment/Comments: Pt is making Good progress toward stated plan of care. Pt tolerated session well with increase in ability to complete activities with decreased verbal cues after several attempts of each novel activity. Pt demonstrated difficulty initiating novel tasks even after several attempts. Will increase as tolerated. 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 []  Alter Plan of care:   []  Discharge:       Kali Alarcon OTR/L; #[id number]
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
## 2021-06-17 ENCOUNTER — HOSPITAL ENCOUNTER (OUTPATIENT)
Dept: OCCUPATIONAL THERAPY | Age: 31
Setting detail: THERAPIES SERIES
Discharge: HOME | End: 2021-06-17
Payer: OTHER GOVERNMENT

## 2021-06-17 PROCEDURE — 97530 THERAPEUTIC ACTIVITIES: CPT | Performed by: OCCUPATIONAL THERAPIST

## 2021-06-17 PROCEDURE — 97110 THERAPEUTIC EXERCISES: CPT | Performed by: OCCUPATIONAL THERAPIST
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
## 2021-06-22 ENCOUNTER — HOSPITAL ENCOUNTER (OUTPATIENT)
Dept: OCCUPATIONAL THERAPY | Age: 31
Setting detail: THERAPIES SERIES
Discharge: HOME | End: 2021-06-22
Payer: OTHER GOVERNMENT

## 2021-06-22 PROCEDURE — 97530 THERAPEUTIC ACTIVITIES: CPT | Performed by: OCCUPATIONAL THERAPIST

## 2021-06-22 PROCEDURE — 97110 THERAPEUTIC EXERCISES: CPT | Performed by: OCCUPATIONAL THERAPIST

## 2021-06-22 NOTE — PROGRESS NOTES
sequencing and direction following for medication management. He was able to follow instruction to fill pill box. Patient required (2) verbal cues to complete tasks. Increased time required. Visual Motor  Use of Saccades to look back and forth from right to left to read messasge on wall while walking down hallway. Patient able to correctly read message but required to slow pace of walking. Orientation-visual  -IQ board game assembly 4 patterns on a board with minimal verbal cues to assemble correctly   Problem solving  -rush hour board game-correctly problem solved the board with moderate verbal cues in order to complete 6 puzzles   Reaction time x Patient completed tasks developed to challenge reaction time and mental flexibility. Simultaneous bouncing/tossing/catching balls. Patient demonstrated good reaction time but poor ability to recall which ball to bounce and which to throw. 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 Other:                 Assessment/Comments: Pt is making Good progress toward stated plan of care. Pt tolerated session well with increase mental flexibility noted this date with alternating activities throughout the session. Pt required minimal verbal cues to complete activities with moderate verbal cues for task initation. Tolerated well and Will increase as tolerated.      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 []  Alter Plan of care:   []  Discharge:       Roseline Raines OTR/L; #[id number]

## 2021-06-24 ENCOUNTER — APPOINTMENT (OUTPATIENT)
Dept: OCCUPATIONAL THERAPY | Age: 31
End: 2021-06-24
Payer: OTHER GOVERNMENT

## 2021-06-28 ENCOUNTER — HOSPITAL ENCOUNTER (OUTPATIENT)
Dept: OCCUPATIONAL THERAPY | Age: 31
Setting detail: THERAPIES SERIES
Discharge: HOME | End: 2021-06-28
Payer: OTHER GOVERNMENT

## 2021-06-28 PROCEDURE — 97530 THERAPEUTIC ACTIVITIES: CPT | Performed by: OCCUPATIONAL THERAPIST

## 2021-06-28 PROCEDURE — 97110 THERAPEUTIC EXERCISES: CPT | Performed by: OCCUPATIONAL THERAPIST
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
## 2021-07-06 ENCOUNTER — HOSPITAL ENCOUNTER (OUTPATIENT)
Dept: OCCUPATIONAL THERAPY | Age: 31
Setting detail: THERAPIES SERIES
Discharge: HOME | End: 2021-07-06
Payer: OTHER GOVERNMENT

## 2021-07-06 PROCEDURE — 97110 THERAPEUTIC EXERCISES: CPT | Performed by: OCCUPATIONAL THERAPIST

## 2021-07-06 PROCEDURE — 97530 THERAPEUTIC ACTIVITIES: CPT | Performed by: OCCUPATIONAL THERAPIST
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
## 2021-07-13 ENCOUNTER — HOSPITAL ENCOUNTER (OUTPATIENT)
Dept: OCCUPATIONAL THERAPY | Age: 31
Setting detail: THERAPIES SERIES
Discharge: HOME | End: 2021-07-13
Payer: OTHER GOVERNMENT

## 2021-07-13 PROCEDURE — 97110 THERAPEUTIC EXERCISES: CPT | Performed by: OCCUPATIONAL THERAPIST

## 2021-07-13 PROCEDURE — 97530 THERAPEUTIC ACTIVITIES: CPT | Performed by: OCCUPATIONAL THERAPIST
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
## 2021-07-20 ENCOUNTER — HOSPITAL ENCOUNTER (OUTPATIENT)
Dept: OCCUPATIONAL THERAPY | Age: 31
Setting detail: THERAPIES SERIES
Discharge: HOME | End: 2021-07-20
Payer: OTHER GOVERNMENT

## 2021-07-20 PROCEDURE — 97110 THERAPEUTIC EXERCISES: CPT | Performed by: OCCUPATIONAL THERAPIST

## 2021-07-20 PROCEDURE — 97530 THERAPEUTIC ACTIVITIES: CPT | Performed by: OCCUPATIONAL THERAPIST
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
## 2021-07-27 ENCOUNTER — HOSPITAL ENCOUNTER (OUTPATIENT)
Dept: OCCUPATIONAL THERAPY | Age: 31
Setting detail: THERAPIES SERIES
Discharge: HOME | End: 2021-07-27
Payer: OTHER GOVERNMENT

## 2021-07-27 PROCEDURE — 97110 THERAPEUTIC EXERCISES: CPT | Performed by: OCCUPATIONAL THERAPIST

## 2021-07-27 PROCEDURE — 97530 THERAPEUTIC ACTIVITIES: CPT | Performed by: OCCUPATIONAL THERAPIST
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
## 2022-05-23 ENCOUNTER — HOSPITAL ENCOUNTER (INPATIENT)
Age: 32
LOS: 3 days | Discharge: HOME | DRG: 100 | End: 2022-05-26
Attending: EMERGENCY MEDICINE | Admitting: SURGERY
Payer: COMMERCIAL

## 2022-05-23 ENCOUNTER — APPOINTMENT (OUTPATIENT)
Dept: GENERAL RADIOLOGY | Age: 32
DRG: 100 | End: 2022-05-23

## 2022-05-23 ENCOUNTER — ANESTHESIA (OUTPATIENT)
Dept: EMERGENCY DEPT | Age: 32
DRG: 100 | End: 2022-05-23

## 2022-05-23 ENCOUNTER — APPOINTMENT (OUTPATIENT)
Dept: CT IMAGING | Age: 32
DRG: 100 | End: 2022-05-23

## 2022-05-23 ENCOUNTER — ANESTHESIA EVENT (OUTPATIENT)
Dept: EMERGENCY DEPT | Age: 32
DRG: 100 | End: 2022-05-23
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
## 2022-05-23 DIAGNOSIS — S22.42XA MULTIPLE FRACTURES OF RIBS, LEFT SIDE, INITIAL ENCOUNTER FOR CLOSED FRACTURE: ICD-10-CM

## 2022-05-23 DIAGNOSIS — G40.901 STATUS EPILEPTICUS (HCC): Primary | ICD-10-CM

## 2022-05-23 DIAGNOSIS — E87.20 METABOLIC ACIDOSIS: ICD-10-CM

## 2022-05-23 DIAGNOSIS — S27.321A CONTUSION OF LEFT LUNG, INITIAL ENCOUNTER: ICD-10-CM

## 2022-05-23 DIAGNOSIS — J96.01 ACUTE RESPIRATORY FAILURE WITH HYPOXIA AND HYPERCAPNIA (HCC): ICD-10-CM

## 2022-05-23 DIAGNOSIS — E87.29 RESPIRATORY ACIDOSIS: ICD-10-CM

## 2022-05-23 DIAGNOSIS — E87.20 LACTIC ACIDOSIS: ICD-10-CM

## 2022-05-23 DIAGNOSIS — J96.02 ACUTE RESPIRATORY FAILURE WITH HYPOXIA AND HYPERCAPNIA (HCC): ICD-10-CM

## 2022-05-23 PROBLEM — R56.9 SEIZURES (HCC): Status: ACTIVE | Noted: 2022-05-23

## 2022-05-23 LAB
ABO/RH: NORMAL
ACETAMINOPHEN LEVEL: <5 MCG/ML (ref 10–30)
ALBUMIN SERPL-MCNC: 4.6 G/DL (ref 3.5–5.2)
ALP BLD-CCNC: 85 U/L (ref 40–129)
ALT SERPL-CCNC: 70 U/L (ref 0–40)
AMPHETAMINE SCREEN, URINE: NOT DETECTED
ANGLE (CLOT STRENGTH): 70.6 DEGREE (ref 59–74)
ANION GAP SERPL CALCULATED.3IONS-SCNC: 28 MMOL/L (ref 7–16)
ANTIBODY SCREEN: NORMAL
APTT: 29.9 SEC (ref 24.5–35.1)
AST SERPL-CCNC: 47 U/L (ref 0–39)
B.E.: -2.3 MMOL/L (ref -3–3)
B.E.: -21.5 MMOL/L (ref -3–3)
BARBITURATE SCREEN URINE: NOT DETECTED
BENZODIAZEPINE SCREEN, URINE: NOT DETECTED
BILIRUB SERPL-MCNC: <0.2 MG/DL (ref 0–1.2)
BUN BLDV-MCNC: 15 MG/DL (ref 6–20)
CALCIUM SERPL-MCNC: 9 MG/DL (ref 8.6–10.2)
CANNABINOID SCREEN URINE: NOT DETECTED
CHLORIDE BLD-SCNC: 94 MMOL/L (ref 98–107)
CO2: 18 MMOL/L (ref 22–29)
COCAINE METABOLITE SCREEN URINE: NOT DETECTED
COHB: 1.3 % (ref 0–1.5)
COHB: 2.1 % (ref 0–1.5)
CREAT SERPL-MCNC: 1.6 MG/DL (ref 0.7–1.2)
CRITICAL: ABNORMAL
CRITICAL: ABNORMAL
DATE ANALYZED: ABNORMAL
DATE ANALYZED: ABNORMAL
DATE OF COLLECTION: ABNORMAL
DATE OF COLLECTION: ABNORMAL
EPL-TEG: 0 % (ref 0–15)
ETHANOL: <10 MG/DL (ref 0–0.08)
FENTANYL SCREEN, URINE: POSITIVE
FIO2: 100 %
FIO2: 100 %
G-TEG: 11.6 K D/SC (ref 4.5–11)
GFR AFRICAN AMERICAN: >60
GFR NON-AFRICAN AMERICAN: 50 ML/MIN/1.73
GLUCOSE BLD-MCNC: 154 MG/DL (ref 74–99)
HCO3: 14.6 MMOL/L (ref 22–26)
HCO3: 25.3 MMOL/L (ref 22–26)
HCT VFR BLD CALC: 51.6 % (ref 37–54)
HEMOGLOBIN: 17.5 G/DL (ref 12.5–16.5)
HHB: 0.7 % (ref 0–5)
HHB: 1.1 % (ref 0–5)
INR BLD: 1
K (CLOTTING TIME): 1.2 MIN (ref 1–3)
LAB: ABNORMAL
LAB: ABNORMAL
LACTIC ACID: 16.7 MMOL/L (ref 0.5–2.2)
LY30 (FIBRINOLYSIS): 0 % (ref 0–8)
Lab: ABNORMAL
MA (MAX AMPLITUDE): 69.8 MM (ref 50–70)
MCH RBC QN AUTO: 31.2 PG (ref 26–35)
MCHC RBC AUTO-ENTMCNC: 33.9 % (ref 32–34.5)
MCV RBC AUTO: 92 FL (ref 80–99.9)
METHADONE SCREEN, URINE: NOT DETECTED
METHB: 0.6 % (ref 0–1.5)
METHB: 0.8 % (ref 0–1.5)
MODE: AC
MODE: AC
O2 CONTENT: 23.2 ML/DL
O2 CONTENT: 25.1 ML/DL
O2 SATURATION: 98.9 % (ref 92–98.5)
O2 SATURATION: 99.3 % (ref 92–98.5)
O2HB: 96 % (ref 94–97)
O2HB: 97.4 % (ref 94–97)
OPERATOR ID: 2577
OPERATOR ID: 89
OPIATE SCREEN URINE: NOT DETECTED
OXYCODONE URINE: NOT DETECTED
PATIENT TEMP: 37 C
PATIENT TEMP: 37 C
PCO2: 54 MMHG (ref 35–45)
PCO2: 86.6 MMHG (ref 35–45)
PDW BLD-RTO: 12 FL (ref 11.5–15)
PEEP/CPAP: 5 CMH2O
PEEP/CPAP: 8 CMH2O
PFO2: 2.65 MMHG/%
PFO2: 2.78 MMHG/%
PH BLOOD GAS: 6.84 (ref 7.35–7.45)
PH BLOOD GAS: 7.29 (ref 7.35–7.45)
PHENCYCLIDINE SCREEN URINE: NOT DETECTED
PLATELET # BLD: 364 E9/L (ref 130–450)
PMV BLD AUTO: 10.1 FL (ref 7–12)
PO2: 264.8 MMHG (ref 75–100)
PO2: 277.7 MMHG (ref 75–100)
POTASSIUM SERPL-SCNC: 3.6 MMOL/L (ref 3.5–5)
POTASSIUM SERPL-SCNC: 3.87 MMOL/L (ref 3.5–5)
PROLACTIN: 65.26 NG/ML
PROTHROMBIN TIME: 10.9 SEC (ref 9.3–12.4)
R (REACTION TIME): 5.7 MIN (ref 5–10)
RBC # BLD: 5.61 E12/L (ref 3.8–5.8)
RR MECHANICAL: 20 B/MIN
RR MECHANICAL: 20 B/MIN
SALICYLATE, SERUM: 1.6 MG/DL (ref 0–30)
SODIUM BLD-SCNC: 140 MMOL/L (ref 132–146)
SOURCE, BLOOD GAS: ABNORMAL
SOURCE, BLOOD GAS: ABNORMAL
THB: 16.5 G/DL (ref 11.5–16.5)
THB: 18.2 G/DL (ref 11.5–16.5)
TIME ANALYZED: 1924
TIME ANALYZED: 2126
TOTAL PROTEIN: 7.3 G/DL (ref 6.4–8.3)
TRICYCLIC ANTIDEPRESSANTS SCREEN SERUM: NEGATIVE NG/ML
VT MECHANICAL: 500 ML
VT MECHANICAL: 550 ML
WBC # BLD: 23.9 E9/L (ref 4.5–11.5)

## 2022-05-23 PROCEDURE — 74018 RADEX ABDOMEN 1 VIEW: CPT

## 2022-05-23 PROCEDURE — 72128 CT CHEST SPINE W/O DYE: CPT

## 2022-05-23 PROCEDURE — 36415 COLL VENOUS BLD VENIPUNCTURE: CPT

## 2022-05-23 PROCEDURE — 0BH17EZ INSERTION OF ENDOTRACHEAL AIRWAY INTO TRACHEA, VIA NATURAL OR ARTIFICIAL OPENING: ICD-10-PCS | Performed by: SURGERY

## 2022-05-23 PROCEDURE — 84132 ASSAY OF SERUM POTASSIUM: CPT

## 2022-05-23 PROCEDURE — 2580000003 HC RX 258: Performed by: SURGERY

## 2022-05-23 PROCEDURE — 31500 INSERT EMERGENCY AIRWAY: CPT

## 2022-05-23 PROCEDURE — 80053 COMPREHEN METABOLIC PANEL: CPT

## 2022-05-23 PROCEDURE — 85027 COMPLETE CBC AUTOMATED: CPT

## 2022-05-23 PROCEDURE — 85730 THROMBOPLASTIN TIME PARTIAL: CPT

## 2022-05-23 PROCEDURE — 6360000002 HC RX W HCPCS: Performed by: SURGERY

## 2022-05-23 PROCEDURE — 94002 VENT MGMT INPAT INIT DAY: CPT

## 2022-05-23 PROCEDURE — 99223 1ST HOSP IP/OBS HIGH 75: CPT | Performed by: SURGERY

## 2022-05-23 PROCEDURE — 85347 COAGULATION TIME ACTIVATED: CPT

## 2022-05-23 PROCEDURE — 71260 CT THORAX DX C+: CPT

## 2022-05-23 PROCEDURE — 86850 RBC ANTIBODY SCREEN: CPT

## 2022-05-23 PROCEDURE — 96374 THER/PROPH/DIAG INJ IV PUSH: CPT

## 2022-05-23 PROCEDURE — 96375 TX/PRO/DX INJ NEW DRUG ADDON: CPT

## 2022-05-23 PROCEDURE — 84146 ASSAY OF PROLACTIN: CPT

## 2022-05-23 PROCEDURE — 82077 ASSAY SPEC XCP UR&BREATH IA: CPT

## 2022-05-23 PROCEDURE — 71045 X-RAY EXAM CHEST 1 VIEW: CPT

## 2022-05-23 PROCEDURE — 80307 DRUG TEST PRSMV CHEM ANLYZR: CPT

## 2022-05-23 PROCEDURE — 82805 BLOOD GASES W/O2 SATURATION: CPT

## 2022-05-23 PROCEDURE — 2500000003 HC RX 250 WO HCPCS: Performed by: SURGERY

## 2022-05-23 PROCEDURE — 36000 PLACE NEEDLE IN VEIN: CPT | Performed by: SURGERY

## 2022-05-23 PROCEDURE — 72125 CT NECK SPINE W/O DYE: CPT

## 2022-05-23 PROCEDURE — 2000000000 HC ICU R&B

## 2022-05-23 PROCEDURE — 72170 X-RAY EXAM OF PELVIS: CPT

## 2022-05-23 PROCEDURE — 36600 WITHDRAWAL OF ARTERIAL BLOOD: CPT | Performed by: SURGERY

## 2022-05-23 PROCEDURE — 85384 FIBRINOGEN ACTIVITY: CPT

## 2022-05-23 PROCEDURE — 31500 INSERT EMERGENCY AIRWAY: CPT | Performed by: NURSE ANESTHETIST, CERTIFIED REGISTERED

## 2022-05-23 PROCEDURE — 86900 BLOOD TYPING SEROLOGIC ABO: CPT

## 2022-05-23 PROCEDURE — 86901 BLOOD TYPING SEROLOGIC RH(D): CPT

## 2022-05-23 PROCEDURE — 6810039001 HC L1 TRAUMA PRIORITY

## 2022-05-23 PROCEDURE — 36410 VNPNXR 3YR/> PHY/QHP DX/THER: CPT | Performed by: SURGERY

## 2022-05-23 PROCEDURE — 99285 EMERGENCY DEPT VISIT HI MDM: CPT

## 2022-05-23 PROCEDURE — 70450 CT HEAD/BRAIN W/O DYE: CPT

## 2022-05-23 PROCEDURE — A4216 STERILE WATER/SALINE, 10 ML: HCPCS | Performed by: SURGERY

## 2022-05-23 PROCEDURE — 72131 CT LUMBAR SPINE W/O DYE: CPT

## 2022-05-23 PROCEDURE — 80143 DRUG ASSAY ACETAMINOPHEN: CPT

## 2022-05-23 PROCEDURE — 83605 ASSAY OF LACTIC ACID: CPT

## 2022-05-23 PROCEDURE — 6370000000 HC RX 637 (ALT 250 FOR IP): Performed by: SURGERY

## 2022-05-23 PROCEDURE — 85576 BLOOD PLATELET AGGREGATION: CPT

## 2022-05-23 PROCEDURE — 85610 PROTHROMBIN TIME: CPT

## 2022-05-23 PROCEDURE — 5A1935Z RESPIRATORY VENTILATION, LESS THAN 24 CONSECUTIVE HOURS: ICD-10-PCS | Performed by: SURGERY

## 2022-05-23 PROCEDURE — 80179 DRUG ASSAY SALICYLATE: CPT

## 2022-05-23 PROCEDURE — 74177 CT ABD & PELVIS W/CONTRAST: CPT

## 2022-05-23 PROCEDURE — 6360000004 HC RX CONTRAST MEDICATION: Performed by: RADIOLOGY

## 2022-05-23 PROCEDURE — 6360000002 HC RX W HCPCS

## 2022-05-23 RX ORDER — ONDANSETRON 4 MG/1
4 TABLET, ORALLY DISINTEGRATING ORAL EVERY 8 HOURS PRN
Status: DISCONTINUED | OUTPATIENT
Start: 2022-05-23 | End: 2022-05-26 | Stop reason: HOSPADM

## 2022-05-23 RX ORDER — LEVETIRACETAM 10 MG/ML
1000 INJECTION INTRAVASCULAR EVERY 12 HOURS
Status: DISCONTINUED | OUTPATIENT
Start: 2022-05-23 | End: 2022-05-25

## 2022-05-23 RX ORDER — MIDAZOLAM HYDROCHLORIDE 10 MG/2ML
10 INJECTION, SOLUTION INTRAMUSCULAR; INTRAVENOUS ONCE
Status: DISCONTINUED | OUTPATIENT
Start: 2022-05-23 | End: 2022-05-23

## 2022-05-23 RX ORDER — FENTANYL CITRATE 50 UG/ML
INJECTION, SOLUTION INTRAMUSCULAR; INTRAVENOUS DAILY PRN
Status: COMPLETED | OUTPATIENT
Start: 2022-05-23 | End: 2022-05-23

## 2022-05-23 RX ORDER — SODIUM CHLORIDE 9 MG/ML
INJECTION, SOLUTION INTRAVENOUS CONTINUOUS
Status: DISCONTINUED | OUTPATIENT
Start: 2022-05-23 | End: 2022-05-24

## 2022-05-23 RX ORDER — SUCCINYLCHOLINE CHLORIDE 20 MG/ML
INJECTION INTRAMUSCULAR; INTRAVENOUS DAILY PRN
Status: COMPLETED | OUTPATIENT
Start: 2022-05-23 | End: 2022-05-23

## 2022-05-23 RX ORDER — SODIUM CHLORIDE 0.9 % (FLUSH) 0.9 %
10 SYRINGE (ML) INJECTION EVERY 12 HOURS SCHEDULED
Status: DISCONTINUED | OUTPATIENT
Start: 2022-05-23 | End: 2022-05-26 | Stop reason: HOSPADM

## 2022-05-23 RX ORDER — SODIUM CHLORIDE 9 MG/ML
INJECTION, SOLUTION INTRAVENOUS PRN
Status: DISCONTINUED | OUTPATIENT
Start: 2022-05-23 | End: 2022-05-26 | Stop reason: HOSPADM

## 2022-05-23 RX ORDER — PROPOFOL 10 MG/ML
INJECTION, EMULSION INTRAVENOUS DAILY PRN
Status: COMPLETED | OUTPATIENT
Start: 2022-05-23 | End: 2022-05-23

## 2022-05-23 RX ORDER — ACETAMINOPHEN 160 MG/5ML
650 SOLUTION ORAL EVERY 4 HOURS PRN
Status: DISCONTINUED | OUTPATIENT
Start: 2022-05-23 | End: 2022-05-26 | Stop reason: HOSPADM

## 2022-05-23 RX ORDER — PROPOFOL 10 MG/ML
5-50 INJECTION, EMULSION INTRAVENOUS CONTINUOUS
Status: DISCONTINUED | OUTPATIENT
Start: 2022-05-23 | End: 2022-05-24

## 2022-05-23 RX ORDER — LORAZEPAM 2 MG/ML
4 INJECTION INTRAMUSCULAR PRN
Status: DISCONTINUED | OUTPATIENT
Start: 2022-05-23 | End: 2022-05-26 | Stop reason: HOSPADM

## 2022-05-23 RX ORDER — SODIUM CHLORIDE 0.9 % (FLUSH) 0.9 %
10 SYRINGE (ML) INJECTION PRN
Status: DISCONTINUED | OUTPATIENT
Start: 2022-05-23 | End: 2022-05-26 | Stop reason: HOSPADM

## 2022-05-23 RX ORDER — ONDANSETRON 2 MG/ML
4 INJECTION INTRAMUSCULAR; INTRAVENOUS EVERY 6 HOURS PRN
Status: DISCONTINUED | OUTPATIENT
Start: 2022-05-23 | End: 2022-05-26 | Stop reason: HOSPADM

## 2022-05-23 RX ORDER — POLYETHYLENE GLYCOL 3350 17 G/17G
17 POWDER, FOR SOLUTION ORAL DAILY
Status: DISCONTINUED | OUTPATIENT
Start: 2022-05-24 | End: 2022-05-26 | Stop reason: HOSPADM

## 2022-05-23 RX ORDER — LEVETIRACETAM 100 MG/ML
500 SOLUTION ORAL 2 TIMES DAILY
Status: DISCONTINUED | OUTPATIENT
Start: 2022-05-24 | End: 2022-05-23 | Stop reason: ALTCHOICE

## 2022-05-23 RX ORDER — MINERAL OIL AND WHITE PETROLATUM 150; 830 MG/G; MG/G
OINTMENT OPHTHALMIC EVERY 4 HOURS
Status: DISCONTINUED | OUTPATIENT
Start: 2022-05-23 | End: 2022-05-24

## 2022-05-23 RX ORDER — LORAZEPAM 2 MG/ML
1 INJECTION INTRAMUSCULAR EVERY 5 MIN PRN
Status: DISCONTINUED | OUTPATIENT
Start: 2022-05-23 | End: 2022-05-26 | Stop reason: HOSPADM

## 2022-05-23 RX ORDER — DIAPER,BRIEF,INFANT-TODD,DISP
EACH MISCELLANEOUS 3 TIMES DAILY
Status: DISCONTINUED | OUTPATIENT
Start: 2022-05-23 | End: 2022-05-26 | Stop reason: HOSPADM

## 2022-05-23 RX ORDER — POLYVINYL ALCOHOL 14 MG/ML
1 SOLUTION/ DROPS OPHTHALMIC EVERY 4 HOURS
Status: DISCONTINUED | OUTPATIENT
Start: 2022-05-23 | End: 2022-05-24

## 2022-05-23 RX ORDER — PROPOFOL 10 MG/ML
INJECTION, EMULSION INTRAVENOUS
Status: COMPLETED
Start: 2022-05-23 | End: 2022-05-23

## 2022-05-23 RX ORDER — ETOMIDATE 2 MG/ML
INJECTION INTRAVENOUS DAILY PRN
Status: COMPLETED | OUTPATIENT
Start: 2022-05-23 | End: 2022-05-23

## 2022-05-23 RX ORDER — FENTANYL CITRATE 50 UG/ML
100 INJECTION, SOLUTION INTRAMUSCULAR; INTRAVENOUS
Status: DISCONTINUED | OUTPATIENT
Start: 2022-05-23 | End: 2022-05-24

## 2022-05-23 RX ORDER — CHLORHEXIDINE GLUCONATE 0.12 MG/ML
15 RINSE ORAL 2 TIMES DAILY
Status: DISCONTINUED | OUTPATIENT
Start: 2022-05-23 | End: 2022-05-24

## 2022-05-23 RX ORDER — PROPOFOL 10 MG/ML
5-50 INJECTION, EMULSION INTRAVENOUS CONTINUOUS
Status: DISCONTINUED | OUTPATIENT
Start: 2022-05-23 | End: 2022-05-23

## 2022-05-23 RX ORDER — BISACODYL 10 MG
10 SUPPOSITORY, RECTAL RECTAL DAILY PRN
Status: DISCONTINUED | OUTPATIENT
Start: 2022-05-23 | End: 2022-05-26 | Stop reason: HOSPADM

## 2022-05-23 RX ORDER — MIDAZOLAM HYDROCHLORIDE 1 MG/ML
INJECTION INTRAMUSCULAR; INTRAVENOUS
Status: COMPLETED
Start: 2022-05-23 | End: 2022-05-23

## 2022-05-23 RX ADMIN — SODIUM CHLORIDE: 9 INJECTION, SOLUTION INTRAVENOUS at 21:43

## 2022-05-23 RX ADMIN — FENTANYL CITRATE 100 MCG: 50 INJECTION, SOLUTION INTRAMUSCULAR; INTRAVENOUS at 19:28

## 2022-05-23 RX ADMIN — LEVETIRACETAM 1000 MG: 10 INJECTION INTRAVASCULAR at 20:49

## 2022-05-23 RX ADMIN — PROPOFOL 30 MCG/KG/MIN: 10 INJECTION, EMULSION INTRAVENOUS at 20:02

## 2022-05-23 RX ADMIN — ETOMIDATE 10 MG: 2 INJECTION, SOLUTION INTRAVENOUS at 19:26

## 2022-05-23 RX ADMIN — POLYVINYL ALCOHOL 1 DROP: 14 SOLUTION/ DROPS OPHTHALMIC at 22:16

## 2022-05-23 RX ADMIN — BACITRACIN ZINC: 500 OINTMENT TOPICAL at 22:16

## 2022-05-23 RX ADMIN — IOPAMIDOL 90 ML: 755 INJECTION, SOLUTION INTRAVENOUS at 19:54

## 2022-05-23 RX ADMIN — PROPOFOL 110 MG: 10 INJECTION, EMULSION INTRAVENOUS at 19:27

## 2022-05-23 RX ADMIN — MINERAL OIL AND WHITE PETROLATUM: 150; 830 OINTMENT OPHTHALMIC at 22:17

## 2022-05-23 RX ADMIN — MIDAZOLAM 10 MG: 1 INJECTION, SOLUTION INTRAMUSCULAR; INTRAVENOUS at 20:51

## 2022-05-23 RX ADMIN — FAMOTIDINE 20 MG: 10 INJECTION INTRAVENOUS at 22:16

## 2022-05-23 RX ADMIN — PROPOFOL 30 MCG/KG/MIN: 10 INJECTION, EMULSION INTRAVENOUS at 22:14

## 2022-05-23 RX ADMIN — SUCCINYLCHOLINE CHLORIDE 200 MG: 20 INJECTION, SOLUTION INTRAMUSCULAR; INTRAVENOUS at 19:27

## 2022-05-23 RX ADMIN — 0.12% CHLORHEXIDINE GLUCONATE 15 ML: 1.2 RINSE ORAL at 22:16

## 2022-05-23 ASSESSMENT — PULMONARY FUNCTION TESTS
PIF_VALUE: 21
PIF_VALUE: 23
PIF_VALUE: 26
PIF_VALUE: 28
PIF_VALUE: 27
PIF_VALUE: 22

## 2022-05-23 NOTE — ANESTHESIA PROCEDURE NOTES
Airway  Date/Time: 5/23/2022 7:20 PM  Urgency: emergent      General Information and Staff    Patient location during procedure: ED  Resident/CRNA: DEBORA Naik CRNA  Performed: resident/CRNA     Consent for Airway (if performed for an anesthetic, see related documentation for consents)  Patient identity confirmed: per hospital policy  Consent: The procedure was performed in an emergent situation. Verbal consent not obtained. Written consent not obtained.   Risks and benefits: risks, benefits and alternatives were not discussed      Code status verified:yes  Indications and Patient Condition  Indications for airway management: respiratory failure, airway protection, hypercapnia and CNS depression  Spontaneous ventilation: present  Sedation level: deep (Propofol 100 mg, Fentanyl 100 mcg, Amidate 10 mg, Anectine 180 mg)  Preoxygenated: yes  Patient position: sniffing  MILS maintained throughout (cervical collar on)  Mask difficulty assessment: vent by bag mask    Final Airway Details  Final airway type: endotracheal airway      Successful airway: ETT  Cuffed: yes   Successful intubation technique: direct laryngoscopy  Facilitating devices/methods: cricoid pressure  Endotracheal tube insertion site: oral  Blade size: #4  ETT size (mm): 8.0  Cormack-Lehane Classification: grade III - view of epiglottis only  Placement verified by: chest auscultation and capnometry   Measured from: lips  ETT to teeth (cm): 23  Number of attempts at approach: 1  Ventilation between attempts: bag mask  Number of other approaches attempted: 0
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
## 2022-05-23 NOTE — ED NOTES
Pt log rolled c-spine maintained in neutral position.   So step offs no deformites no signs of trauma per resident      Soco Pugh RN  05/23/22 1929
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
## 2022-05-24 ENCOUNTER — APPOINTMENT (OUTPATIENT)
Dept: GENERAL RADIOLOGY | Age: 32
DRG: 100 | End: 2022-05-24

## 2022-05-24 ENCOUNTER — APPOINTMENT (OUTPATIENT)
Dept: NEUROLOGY | Age: 32
DRG: 100 | End: 2022-05-24

## 2022-05-24 PROBLEM — N17.9 ACUTE RENAL INJURY (HCC): Status: ACTIVE | Noted: 2022-05-24

## 2022-05-24 LAB
ANION GAP SERPL CALCULATED.3IONS-SCNC: 14 MMOL/L (ref 7–16)
B.E.: -1.9 MMOL/L (ref -3–3)
BASOPHILS ABSOLUTE: 0.09 E9/L (ref 0–0.2)
BASOPHILS RELATIVE PERCENT: 0.8 % (ref 0–2)
BUN BLDV-MCNC: 11 MG/DL (ref 6–20)
CALCIUM IONIZED: 1.11 MMOL/L (ref 1.15–1.33)
CALCIUM SERPL-MCNC: 7.3 MG/DL (ref 8.6–10.2)
CHLORIDE BLD-SCNC: 108 MMOL/L (ref 98–107)
CO2: 20 MMOL/L (ref 22–29)
COHB: 0.8 % (ref 0–1.5)
CREAT SERPL-MCNC: 1.2 MG/DL (ref 0.7–1.2)
CRITICAL: ABNORMAL
DATE ANALYZED: ABNORMAL
DATE OF COLLECTION: ABNORMAL
EOSINOPHILS ABSOLUTE: 0.08 E9/L (ref 0.05–0.5)
EOSINOPHILS RELATIVE PERCENT: 0.7 % (ref 0–6)
FIO2: 40 %
GFR AFRICAN AMERICAN: >60
GFR NON-AFRICAN AMERICAN: >60 ML/MIN/1.73
GLUCOSE BLD-MCNC: 97 MG/DL (ref 74–99)
HCO3: 24.9 MMOL/L (ref 22–26)
HCT VFR BLD CALC: 41.2 % (ref 37–54)
HEMOGLOBIN: 14.1 G/DL (ref 12.5–16.5)
HHB: 2.1 % (ref 0–5)
IMMATURE GRANULOCYTES #: 0.1 E9/L
IMMATURE GRANULOCYTES %: 0.8 % (ref 0–5)
LAB: ABNORMAL
LYMPHOCYTES ABSOLUTE: 1.3 E9/L (ref 1.5–4)
LYMPHOCYTES RELATIVE PERCENT: 10.9 % (ref 20–42)
Lab: ABNORMAL
MAGNESIUM: 2.4 MG/DL (ref 1.6–2.6)
MCH RBC QN AUTO: 30.9 PG (ref 26–35)
MCHC RBC AUTO-ENTMCNC: 34.2 % (ref 32–34.5)
MCV RBC AUTO: 90.2 FL (ref 80–99.9)
METHB: 0.3 % (ref 0–1.5)
MODE: ABNORMAL
MONOCYTES ABSOLUTE: 0.97 E9/L (ref 0.1–0.95)
MONOCYTES RELATIVE PERCENT: 8.1 % (ref 2–12)
NEUTROPHILS ABSOLUTE: 9.42 E9/L (ref 1.8–7.3)
NEUTROPHILS RELATIVE PERCENT: 78.7 % (ref 43–80)
O2 CONTENT: 21.5 ML/DL
O2 SATURATION: 97.9 % (ref 92–98.5)
O2HB: 96.8 % (ref 94–97)
OPERATOR ID: 2577
PATIENT TEMP: 37 C
PCO2: 50.3 MMHG (ref 35–45)
PDW BLD-RTO: 12 FL (ref 11.5–15)
PEEP/CPAP: 8 CMH2O
PFO2: 2.75 MMHG/%
PH BLOOD GAS: 7.31 (ref 7.35–7.45)
PHOSPHORUS: 3.3 MG/DL (ref 2.5–4.5)
PLATELET # BLD: 201 E9/L (ref 130–450)
PMV BLD AUTO: 10.2 FL (ref 7–12)
PO2: 110 MMHG (ref 75–100)
POTASSIUM REFLEX MAGNESIUM: 4.1 MMOL/L (ref 3.5–5)
PS: 10 CMH20
RBC # BLD: 4.57 E12/L (ref 3.8–5.8)
SODIUM BLD-SCNC: 142 MMOL/L (ref 132–146)
SOURCE, BLOOD GAS: ABNORMAL
THB: 15.7 G/DL (ref 11.5–16.5)
TIME ANALYZED: 619
WBC # BLD: 12 E9/L (ref 4.5–11.5)

## 2022-05-24 PROCEDURE — 97161 PT EVAL LOW COMPLEX 20 MIN: CPT

## 2022-05-24 PROCEDURE — 0BP1XDZ REMOVAL OF INTRALUMINAL DEVICE FROM TRACHEA, EXTERNAL APPROACH: ICD-10-PCS | Performed by: SURGERY

## 2022-05-24 PROCEDURE — 80048 BASIC METABOLIC PNL TOTAL CA: CPT

## 2022-05-24 PROCEDURE — 71045 X-RAY EXAM CHEST 1 VIEW: CPT

## 2022-05-24 PROCEDURE — 2580000003 HC RX 258: Performed by: SURGERY

## 2022-05-24 PROCEDURE — 99233 SBSQ HOSP IP/OBS HIGH 50: CPT | Performed by: SURGERY

## 2022-05-24 PROCEDURE — 82805 BLOOD GASES W/O2 SATURATION: CPT

## 2022-05-24 PROCEDURE — 2580000003 HC RX 258: Performed by: STUDENT IN AN ORGANIZED HEALTH CARE EDUCATION/TRAINING PROGRAM

## 2022-05-24 PROCEDURE — 97530 THERAPEUTIC ACTIVITIES: CPT

## 2022-05-24 PROCEDURE — 85025 COMPLETE CBC W/AUTO DIFF WBC: CPT

## 2022-05-24 PROCEDURE — 6370000000 HC RX 637 (ALT 250 FOR IP): Performed by: STUDENT IN AN ORGANIZED HEALTH CARE EDUCATION/TRAINING PROGRAM

## 2022-05-24 PROCEDURE — 94003 VENT MGMT INPAT SUBQ DAY: CPT

## 2022-05-24 PROCEDURE — 6360000002 HC RX W HCPCS: Performed by: STUDENT IN AN ORGANIZED HEALTH CARE EDUCATION/TRAINING PROGRAM

## 2022-05-24 PROCEDURE — 97166 OT EVAL MOD COMPLEX 45 MIN: CPT

## 2022-05-24 PROCEDURE — 84100 ASSAY OF PHOSPHORUS: CPT

## 2022-05-24 PROCEDURE — 83735 ASSAY OF MAGNESIUM: CPT

## 2022-05-24 PROCEDURE — 2000000000 HC ICU R&B

## 2022-05-24 PROCEDURE — 6370000000 HC RX 637 (ALT 250 FOR IP): Performed by: SURGERY

## 2022-05-24 PROCEDURE — 6360000002 HC RX W HCPCS: Performed by: SURGERY

## 2022-05-24 PROCEDURE — 95816 EEG AWAKE AND DROWSY: CPT

## 2022-05-24 PROCEDURE — 82330 ASSAY OF CALCIUM: CPT

## 2022-05-24 RX ORDER — TRAZODONE HYDROCHLORIDE 100 MG/1
200 TABLET ORAL NIGHTLY
COMMUNITY

## 2022-05-24 RX ORDER — VENLAFAXINE HYDROCHLORIDE 37.5 MG/1
75 CAPSULE, EXTENDED RELEASE ORAL NIGHTLY
Status: DISCONTINUED | OUTPATIENT
Start: 2022-05-24 | End: 2022-05-26 | Stop reason: HOSPADM

## 2022-05-24 RX ORDER — QUETIAPINE FUMARATE 100 MG/1
200 TABLET, FILM COATED ORAL 2 TIMES DAILY
Status: DISCONTINUED | OUTPATIENT
Start: 2022-05-24 | End: 2022-05-26 | Stop reason: HOSPADM

## 2022-05-24 RX ORDER — HYDROXYZINE HYDROCHLORIDE 10 MG/1
50 TABLET, FILM COATED ORAL 3 TIMES DAILY PRN
Status: DISCONTINUED | OUTPATIENT
Start: 2022-05-24 | End: 2022-05-24 | Stop reason: SDUPTHER

## 2022-05-24 RX ORDER — CLONIDINE HYDROCHLORIDE 0.1 MG/1
0.1 TABLET ORAL NIGHTLY
Status: DISCONTINUED | OUTPATIENT
Start: 2022-05-24 | End: 2022-05-26 | Stop reason: HOSPADM

## 2022-05-24 RX ORDER — OXYCODONE HYDROCHLORIDE 10 MG/1
10 TABLET ORAL EVERY 4 HOURS PRN
Status: DISCONTINUED | OUTPATIENT
Start: 2022-05-24 | End: 2022-05-26 | Stop reason: HOSPADM

## 2022-05-24 RX ORDER — HYDROXYZINE 50 MG/1
50 TABLET, FILM COATED ORAL DAILY PRN
COMMUNITY

## 2022-05-24 RX ORDER — VENLAFAXINE 75 MG/1
75 TABLET ORAL NIGHTLY
Status: ON HOLD | COMMUNITY
End: 2022-05-24

## 2022-05-24 RX ORDER — HYDROXYZINE PAMOATE 50 MG/1
50 CAPSULE ORAL 3 TIMES DAILY PRN
Status: DISCONTINUED | OUTPATIENT
Start: 2022-05-24 | End: 2022-05-26 | Stop reason: HOSPADM

## 2022-05-24 RX ORDER — QUETIAPINE FUMARATE 200 MG/1
200 TABLET, FILM COATED ORAL 2 TIMES DAILY
COMMUNITY

## 2022-05-24 RX ORDER — OXYCODONE HYDROCHLORIDE 5 MG/1
5 TABLET ORAL EVERY 4 HOURS PRN
Status: DISCONTINUED | OUTPATIENT
Start: 2022-05-24 | End: 2022-05-26 | Stop reason: HOSPADM

## 2022-05-24 RX ORDER — VENLAFAXINE HYDROCHLORIDE 75 MG/1
75 CAPSULE, EXTENDED RELEASE ORAL NIGHTLY
COMMUNITY

## 2022-05-24 RX ORDER — CLONIDINE HYDROCHLORIDE 0.1 MG/1
0.1 TABLET ORAL NIGHTLY
COMMUNITY

## 2022-05-24 RX ORDER — METHOCARBAMOL 750 MG/1
750 TABLET, FILM COATED ORAL 4 TIMES DAILY
Status: DISCONTINUED | OUTPATIENT
Start: 2022-05-24 | End: 2022-05-26 | Stop reason: HOSPADM

## 2022-05-24 RX ADMIN — CLONIDINE HYDROCHLORIDE 0.1 MG: 0.1 TABLET ORAL at 20:35

## 2022-05-24 RX ADMIN — METHOCARBAMOL 750 MG: 750 TABLET ORAL at 16:10

## 2022-05-24 RX ADMIN — BACITRACIN ZINC: 500 OINTMENT TOPICAL at 07:54

## 2022-05-24 RX ADMIN — MINERAL OIL AND WHITE PETROLATUM: 150; 830 OINTMENT OPHTHALMIC at 05:06

## 2022-05-24 RX ADMIN — QUETIAPINE FUMARATE 200 MG: 100 TABLET ORAL at 20:33

## 2022-05-24 RX ADMIN — METHOCARBAMOL 750 MG: 750 TABLET ORAL at 20:32

## 2022-05-24 RX ADMIN — POLYETHYLENE GLYCOL 3350 17 G: 17 POWDER, FOR SOLUTION ORAL at 08:32

## 2022-05-24 RX ADMIN — TRAZODONE HYDROCHLORIDE 200 MG: 50 TABLET ORAL at 20:33

## 2022-05-24 RX ADMIN — LEVETIRACETAM 1000 MG: 10 INJECTION INTRAVASCULAR at 19:46

## 2022-05-24 RX ADMIN — BACITRACIN ZINC: 500 OINTMENT TOPICAL at 13:54

## 2022-05-24 RX ADMIN — METHOCARBAMOL 750 MG: 750 TABLET ORAL at 13:50

## 2022-05-24 RX ADMIN — CALCIUM GLUCONATE 4000 MG: 98 INJECTION, SOLUTION INTRAVENOUS at 08:30

## 2022-05-24 RX ADMIN — POLYVINYL ALCOHOL 1 DROP: 14 SOLUTION/ DROPS OPHTHALMIC at 05:06

## 2022-05-24 RX ADMIN — SODIUM CHLORIDE, PRESERVATIVE FREE 10 ML: 5 INJECTION INTRAVENOUS at 07:38

## 2022-05-24 RX ADMIN — LEVETIRACETAM 1000 MG: 10 INJECTION INTRAVASCULAR at 07:53

## 2022-05-24 RX ADMIN — VENLAFAXINE HYDROCHLORIDE 75 MG: 37.5 CAPSULE, EXTENDED RELEASE ORAL at 20:33

## 2022-05-24 RX ADMIN — BACITRACIN ZINC: 500 OINTMENT TOPICAL at 20:31

## 2022-05-24 RX ADMIN — METHOCARBAMOL 750 MG: 750 TABLET ORAL at 08:32

## 2022-05-24 RX ADMIN — SENNOSIDES 5 ML: 8.8 SYRUP ORAL at 07:56

## 2022-05-24 RX ADMIN — HYDROXYZINE PAMOATE 50 MG: 50 CAPSULE ORAL at 14:59

## 2022-05-24 RX ADMIN — SODIUM CHLORIDE, PRESERVATIVE FREE 10 ML: 5 INJECTION INTRAVENOUS at 20:34

## 2022-05-24 ASSESSMENT — PULMONARY FUNCTION TESTS
PIF_VALUE: 19
PIF_VALUE: 31
PIF_VALUE: 38
PIF_VALUE: 17
PIF_VALUE: 19

## 2022-05-24 ASSESSMENT — PAIN SCALES - GENERAL
PAINLEVEL_OUTOF10: 0
PAINLEVEL_OUTOF10: 5
PAINLEVEL_OUTOF10: 0
PAINLEVEL_OUTOF10: 0

## 2022-05-24 ASSESSMENT — PAIN DESCRIPTION - FREQUENCY: FREQUENCY: INTERMITTENT

## 2022-05-24 ASSESSMENT — PAIN - FUNCTIONAL ASSESSMENT: PAIN_FUNCTIONAL_ASSESSMENT: PREVENTS OR INTERFERES WITH ALL ACTIVE AND SOME PASSIVE ACTIVITIES

## 2022-05-24 ASSESSMENT — PAIN DESCRIPTION - ONSET: ONSET: GRADUAL

## 2022-05-24 ASSESSMENT — PAIN DESCRIPTION - PAIN TYPE: TYPE: ACUTE PAIN

## 2022-05-24 ASSESSMENT — PAIN DESCRIPTION - LOCATION: LOCATION: RIB CAGE

## 2022-05-24 ASSESSMENT — PAIN DESCRIPTION - DESCRIPTORS: DESCRIPTORS: ACHING;SORE

## 2022-05-24 ASSESSMENT — PAIN DESCRIPTION - ORIENTATION: ORIENTATION: MID
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
## 2022-05-24 NOTE — ED NOTES
Report given to [facility] and [facility] faxed     Susanne Giron RN  05/23/22 [id number]
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
## 2022-05-24 NOTE — CONSULTS
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 to the age of   patient and multiple areas of infarct as noted. A craniotomy defect is also   noted in the right frontal area. Consider MRI for better assessment of the   brain. CT cervical spine. There is no acute fracture or dislocation in the cervical spine. Mild   degenerative changes are identified from C3-T1. An old fracture deformity of   the tip of the spinous process of T1 is identified. Endotracheal tube and   nasogastric tube are noted with the soft tissue density/secretions in the   nasopharynx/and oropharynx.       Impression      No acute fracture or dislocation in the cervical spine. Degenerative changes from C3-T1. CT chest.      Heart size is in the upper limits of normal.  Endotracheal tube and   nasogastric tube are noted with patulous esophagus. Trachea and major   bronchi are patent. There is atelectasis/infiltrates and contusions in the   lung bases. Multiple acute and old rib fractures are identified in the left   side. There is no pneumothorax. Impression      Multiple left rib fractures most of which are old and healing with possible   acute fractures of the 3rd and 4th ribs anterolaterally. There is no   pneumothorax. Atelectasis/infiltrates and contusions in the lung bases. CT abdomen and pelvis. The liver is fatty infiltrated. Gallbladder, spleen, pancreas, the adrenals,   and the kidneys are normal.  Degenerative changes are identified in the   lumbar spine with the bilateral pars defect at L5 and grade 2   spondylolisthesis at L5-S1. Nonspecific lymph nodes are identified in the   upper abdomen probably inflammatory. Pelvis. The bladder is partially contracted with Bentley catheter and the wall   thickening. Prostate gland is 4.3 x 3.2 cm. Colon is collapsed. Appendix   is normal.      Impression      There is no acute traumatic injury or solid organ injury/acute inflammation   in the abdomen pelvis. Bilateral pars defect at L5 with the spondylolisthesis at L5-S1      CT thoracic spine. Old fracture deformity of the tip of the spinous process of T1 is identified. No other acute fractures are identified in the thoracic spine. There is mild   degenerative changes in the thoracic spine. Previously reported multiple   left rib fractures are noted. There is atelectasis/contusions and   infiltrates in the lung bases. Impression      No acute fracture or dislocation in the thoracic spine. Atelectasis/contusions the lung bases. CT lumbar spine.       There is bilateral pars defect at L5 with grade  2 spondylolisthesis at   L5-S1. There is  mild central disc bulge. The combination causes moderate   spinal stenosis at this level. No other acute fractures are identified in   the lumbar spine. Impression      Bilateral pars defect at L5 with grade 2 spondylolisthesis at L5-S1 with mild   to moderate spinal stenosis. RECOMMENDATIONS:   Unavailable         XR CHEST PORTABLE   Final Result   Questionable visualization of the tip of the endotracheal tube. Recommend   confirmation on subsequent CT. XR ABDOMEN FOR NG/OG/NE TUBE PLACEMENT   Final Result   Catheter projects in expected stomach location. XR CHEST 1 VIEW   Final Result   Tubes and catheters as noted above. XR PELVIS (1-2 VIEWS)   Final Result   No acute bony abnormalities.                I have personally reviewed the following images: CT head,  CT C-spine, CT T-spine, CT L-spine    Other Testing Results  Cr 1.6 > 1.2  Lactic acid 16.7  Bicarb 18 > 20  Prolactin 65.26  WBC 23.9 > 12        Electronically signed by Jun Belle MD on 5/24/2022 at 10:56 AM

## 2022-05-24 NOTE — PLAN OF CARE
Problem: Safety - Medical Restraint  Goal: Remains free of injury from restraints (Restraint for Interference with Medical Device)  Description: INTERVENTIONS:  1. Determine that other, less restrictive measures have been tried or would not be effective before applying the restraint  2. Evaluate the patient's condition at the time of restraint application  3. Inform patient/family regarding the reason for restraint  4.  Q2H: Monitor safety, psychosocial status, comfort, nutrition and hydration  5/24/2022 1646 by Sly Schmitt RN  Outcome: Not Progressing
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
## 2022-05-25 PROBLEM — R56.1 POST TRAUMATIC SEIZURE (HCC): Status: ACTIVE | Noted: 2022-05-23

## 2022-05-25 LAB
ANION GAP SERPL CALCULATED.3IONS-SCNC: 9 MMOL/L (ref 7–16)
BASOPHILS ABSOLUTE: 0.09 E9/L (ref 0–0.2)
BASOPHILS RELATIVE PERCENT: 1.1 % (ref 0–2)
BUN BLDV-MCNC: 9 MG/DL (ref 6–20)
CALCIUM IONIZED: 1.15 MMOL/L (ref 1.15–1.33)
CALCIUM SERPL-MCNC: 8.3 MG/DL (ref 8.6–10.2)
CHLORIDE BLD-SCNC: 105 MMOL/L (ref 98–107)
CO2: 26 MMOL/L (ref 22–29)
CREAT SERPL-MCNC: 1.1 MG/DL (ref 0.7–1.2)
EOSINOPHILS ABSOLUTE: 0.31 E9/L (ref 0.05–0.5)
EOSINOPHILS RELATIVE PERCENT: 3.8 % (ref 0–6)
GFR AFRICAN AMERICAN: >60
GFR NON-AFRICAN AMERICAN: >60 ML/MIN/1.73
GLUCOSE BLD-MCNC: 84 MG/DL (ref 74–99)
HCT VFR BLD CALC: 45 % (ref 37–54)
HEMOGLOBIN: 15.5 G/DL (ref 12.5–16.5)
IMMATURE GRANULOCYTES #: 0.04 E9/L
IMMATURE GRANULOCYTES %: 0.5 % (ref 0–5)
LYMPHOCYTES ABSOLUTE: 2.19 E9/L (ref 1.5–4)
LYMPHOCYTES RELATIVE PERCENT: 26.9 % (ref 20–42)
MAGNESIUM: 2.3 MG/DL (ref 1.6–2.6)
MCH RBC QN AUTO: 30.2 PG (ref 26–35)
MCHC RBC AUTO-ENTMCNC: 34.4 % (ref 32–34.5)
MCV RBC AUTO: 87.5 FL (ref 80–99.9)
MONOCYTES ABSOLUTE: 0.83 E9/L (ref 0.1–0.95)
MONOCYTES RELATIVE PERCENT: 10.2 % (ref 2–12)
NEUTROPHILS ABSOLUTE: 4.67 E9/L (ref 1.8–7.3)
NEUTROPHILS RELATIVE PERCENT: 57.5 % (ref 43–80)
PDW BLD-RTO: 12 FL (ref 11.5–15)
PHOSPHORUS: 2.1 MG/DL (ref 2.5–4.5)
PLATELET # BLD: 200 E9/L (ref 130–450)
PMV BLD AUTO: 9.7 FL (ref 7–12)
POTASSIUM REFLEX MAGNESIUM: 3.8 MMOL/L (ref 3.5–5)
RBC # BLD: 5.14 E12/L (ref 3.8–5.8)
SODIUM BLD-SCNC: 140 MMOL/L (ref 132–146)
WBC # BLD: 8.1 E9/L (ref 4.5–11.5)

## 2022-05-25 PROCEDURE — 85025 COMPLETE CBC W/AUTO DIFF WBC: CPT

## 2022-05-25 PROCEDURE — 99232 SBSQ HOSP IP/OBS MODERATE 35: CPT | Performed by: PHYSICIAN ASSISTANT

## 2022-05-25 PROCEDURE — 36415 COLL VENOUS BLD VENIPUNCTURE: CPT

## 2022-05-25 PROCEDURE — 84100 ASSAY OF PHOSPHORUS: CPT

## 2022-05-25 PROCEDURE — 80048 BASIC METABOLIC PNL TOTAL CA: CPT

## 2022-05-25 PROCEDURE — 6370000000 HC RX 637 (ALT 250 FOR IP): Performed by: STUDENT IN AN ORGANIZED HEALTH CARE EDUCATION/TRAINING PROGRAM

## 2022-05-25 PROCEDURE — 99233 SBSQ HOSP IP/OBS HIGH 50: CPT | Performed by: SURGERY

## 2022-05-25 PROCEDURE — 2580000003 HC RX 258: Performed by: SURGERY

## 2022-05-25 PROCEDURE — 2000000000 HC ICU R&B

## 2022-05-25 PROCEDURE — 82330 ASSAY OF CALCIUM: CPT

## 2022-05-25 PROCEDURE — 6370000000 HC RX 637 (ALT 250 FOR IP): Performed by: SURGERY

## 2022-05-25 PROCEDURE — 83735 ASSAY OF MAGNESIUM: CPT

## 2022-05-25 RX ORDER — LEVETIRACETAM 500 MG/1
1000 TABLET ORAL 2 TIMES DAILY
Status: DISCONTINUED | OUTPATIENT
Start: 2022-05-25 | End: 2022-05-26 | Stop reason: HOSPADM

## 2022-05-25 RX ADMIN — Medication 250 MG: at 10:06

## 2022-05-25 RX ADMIN — Medication 250 MG: at 13:59

## 2022-05-25 RX ADMIN — BACITRACIN ZINC: 500 OINTMENT TOPICAL at 19:47

## 2022-05-25 RX ADMIN — BACITRACIN ZINC: 500 OINTMENT TOPICAL at 10:09

## 2022-05-25 RX ADMIN — SODIUM CHLORIDE, PRESERVATIVE FREE 10 ML: 5 INJECTION INTRAVENOUS at 10:06

## 2022-05-25 RX ADMIN — SENNOSIDES 5 ML: 8.8 SYRUP ORAL at 10:09

## 2022-05-25 RX ADMIN — Medication 250 MG: at 16:47

## 2022-05-25 RX ADMIN — BACITRACIN ZINC: 500 OINTMENT TOPICAL at 13:59

## 2022-05-25 RX ADMIN — QUETIAPINE FUMARATE 200 MG: 100 TABLET ORAL at 10:05

## 2022-05-25 RX ADMIN — METHOCARBAMOL 750 MG: 750 TABLET ORAL at 16:46

## 2022-05-25 RX ADMIN — VENLAFAXINE HYDROCHLORIDE 75 MG: 37.5 CAPSULE, EXTENDED RELEASE ORAL at 19:47

## 2022-05-25 RX ADMIN — TRAZODONE HYDROCHLORIDE 200 MG: 50 TABLET ORAL at 19:47

## 2022-05-25 RX ADMIN — LEVETIRACETAM 1000 MG: 500 TABLET, FILM COATED ORAL at 10:05

## 2022-05-25 RX ADMIN — Medication 250 MG: at 19:48

## 2022-05-25 RX ADMIN — QUETIAPINE FUMARATE 200 MG: 100 TABLET ORAL at 19:47

## 2022-05-25 RX ADMIN — HYDROXYZINE PAMOATE 50 MG: 50 CAPSULE ORAL at 19:47

## 2022-05-25 RX ADMIN — CLONIDINE HYDROCHLORIDE 0.1 MG: 0.1 TABLET ORAL at 19:48

## 2022-05-25 RX ADMIN — METHOCARBAMOL 750 MG: 750 TABLET ORAL at 10:06

## 2022-05-25 RX ADMIN — METHOCARBAMOL 750 MG: 750 TABLET ORAL at 19:48

## 2022-05-25 RX ADMIN — METHOCARBAMOL 750 MG: 750 TABLET ORAL at 13:59

## 2022-05-25 RX ADMIN — HYDROXYZINE PAMOATE 50 MG: 50 CAPSULE ORAL at 10:10

## 2022-05-25 RX ADMIN — POLYETHYLENE GLYCOL 3350 17 G: 17 POWDER, FOR SOLUTION ORAL at 10:06

## 2022-05-25 RX ADMIN — LEVETIRACETAM 1000 MG: 500 TABLET, FILM COATED ORAL at 19:48

## 2022-05-25 ASSESSMENT — PAIN DESCRIPTION - LOCATION: LOCATION: RIB CAGE

## 2022-05-25 ASSESSMENT — PAIN DESCRIPTION - ORIENTATION: ORIENTATION: LEFT

## 2022-05-25 ASSESSMENT — PAIN SCALES - GENERAL: PAINLEVEL_OUTOF10: 4
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
## 2022-05-25 NOTE — PLAN OF CARE
Problem: Safety - Medical Restraint  Goal: Remains free of injury from restraints (Restraint for Interference with Medical Device)  Description: INTERVENTIONS:  1. Determine that other, less restrictive measures have been tried or would not be effective before applying the restraint  2. Evaluate the patient's condition at the time of restraint application  3. Inform patient/family regarding the reason for restraint  4.  Q2H: Monitor safety, psychosocial status, comfort, nutrition and hydration  5/24/2022 2056 by Vivi Petit RN  Outcome: Progressing  Flowsheets (Taken 5/24/2022 2000)  Remains free of injury from restraints (restraint for interference with medical device): Determine that other, less restrictive measures have been tried or would not be effective before applying the restraint  5/24/2022 1647 by Arti Aquino RN  Outcome: Not Progressing  5/24/2022 1646 by Arti Aquino, RN  Outcome: Not Progressing

## 2022-05-25 NOTE — PLAN OF CARE
Problem: Discharge Planning  Goal: Discharge to home or other facility with appropriate resources  Outcome: Progressing  Flowsheets (Taken 5/24/2022 2000)  Discharge to home or other facility with appropriate resources: Identify barriers to discharge with patient and caregiver     Problem: Skin/Tissue Integrity  Goal: Absence of new skin breakdown  Description: 1. Monitor for areas of redness and/or skin breakdown  2. Assess vascular access sites hourly  3. Every 4-6 hours minimum:  Change oxygen saturation probe site  4. Every 4-6 hours:  If on nasal continuous positive airway pressure, respiratory therapy assess nares and determine need for appliance change or resting period. Outcome: Progressing     Problem: ABCDS Injury Assessment  Goal: Absence of physical injury  Outcome: Progressing     Problem: Pain  Goal: Verbalizes/displays adequate comfort level or baseline comfort level  Outcome: Progressing     Problem: Safety - Medical Restraint  Goal: Remains free of injury from restraints (Restraint for Interference with Medical Device)  Description: INTERVENTIONS:  1. Determine that other, less restrictive measures have been tried or would not be effective before applying the restraint  2. Evaluate the patient's condition at the time of restraint application  3. Inform patient/family regarding the reason for restraint  4.  Q2H: Monitor safety, psychosocial status, comfort, nutrition and hydration  5/25/2022 0036 by Tiffany Tran RN  Outcome: Progressing  5/24/2022 2056 by Tiffany Tran RN  Outcome: Progressing  Flowsheets (Taken 5/24/2022 2000)  Remains free of injury from restraints (restraint for interference with medical device): Determine that other, less restrictive measures have been tried or would not be effective before applying the restraint  5/24/2022 1647 by Amy Woods RN  Outcome: Not Progressing  5/24/2022 1646 by Amy Woods RN  Outcome: Not Progressing     Problem: Safety - Adult  Goal: Free from fall injury  Outcome: Progressing  Flowsheets (Taken 5/25/2022 0032)  Free From Fall Injury:   Instruct family/caregiver on patient safety   Based on caregiver fall risk screen, instruct family/caregiver to ask for assistance with transferring infant if caregiver noted to have fall risk factors     Problem: Respiratory - Adult  Goal: Achieves optimal ventilation and oxygenation  Outcome: Progressing

## 2022-05-25 NOTE — FLOWSHEET NOTE
Pt exhibits poor safety awareness and is impulsive. Pt will reach for lines, tubes, and equipment and fails to redirect to repeated verbal commands. Restraints secured for patient safety.
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
## 2022-05-25 NOTE — PLAN OF CARE
Problem: Safety - Medical Restraint  Goal: Remains free of injury from restraints (Restraint for Interference with Medical Device)  Description: INTERVENTIONS:  1. Determine that other, less restrictive measures have been tried or would not be effective before applying the restraint  2. Evaluate the patient's condition at the time of restraint application  3. Inform patient/family regarding the reason for restraint  4.  Q2H: Monitor safety, psychosocial status, comfort, nutrition and hydration  5/25/2022 1054 by Carly Shine RN  Outcome: Progressing

## 2022-05-25 NOTE — FLOWSHEET NOTE
Pt will reach for lines, tubes, and equipment and fails to redirect to repeated verbal commands. Pt is impulsive with poor impulse control and lacks safety awareness. Restraints secured for patient safety.

## 2022-05-25 NOTE — CARE COORDINATION
Patient is back to baseline Neuro status. EEG consistent with moderate to severe encephalopathy. ( hx of tbi in 2020)  Pulmonary status is back to baseline. No Pt/Ot needs. Dc plan is to return home with wife. Noted that Pt has [employer] ins. VM left at [facility] transport center with pt's admission date and diagnosis.
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
## 2022-05-26 VITALS
BODY MASS INDEX: 36.03 KG/M2 | HEART RATE: 80 BPM | OXYGEN SATURATION: 94 % | HEIGHT: 72 IN | DIASTOLIC BLOOD PRESSURE: 63 MMHG | WEIGHT: 266 LBS | SYSTOLIC BLOOD PRESSURE: 100 MMHG | RESPIRATION RATE: 18 BRPM | TEMPERATURE: 98.1 F

## 2022-05-26 PROCEDURE — 2580000003 HC RX 258: Performed by: SURGERY

## 2022-05-26 PROCEDURE — 99239 HOSP IP/OBS DSCHRG MGMT >30: CPT | Performed by: SURGERY

## 2022-05-26 PROCEDURE — 6370000000 HC RX 637 (ALT 250 FOR IP): Performed by: STUDENT IN AN ORGANIZED HEALTH CARE EDUCATION/TRAINING PROGRAM

## 2022-05-26 RX ORDER — METHOCARBAMOL 750 MG/1
750 TABLET, FILM COATED ORAL 4 TIMES DAILY
Qty: 40 TABLET | Refills: 0 | Status: SHIPPED | OUTPATIENT
Start: 2022-05-26 | End: 2022-06-05

## 2022-05-26 RX ORDER — METHOCARBAMOL 750 MG/1
750 TABLET, FILM COATED ORAL 4 TIMES DAILY
Qty: 40 TABLET | Refills: 0 | Status: SHIPPED | OUTPATIENT
Start: 2022-05-26 | End: 2022-05-26

## 2022-05-26 RX ORDER — LEVETIRACETAM 1000 MG/1
1000 TABLET ORAL 2 TIMES DAILY
Qty: 60 TABLET | Refills: 3 | Status: SHIPPED | OUTPATIENT
Start: 2022-05-26 | End: 2022-05-26

## 2022-05-26 RX ORDER — LEVETIRACETAM 1000 MG/1
1000 TABLET ORAL 2 TIMES DAILY
Qty: 60 TABLET | Refills: 0 | Status: SHIPPED | OUTPATIENT
Start: 2022-05-26

## 2022-05-26 RX ORDER — ENOXAPARIN SODIUM 100 MG/ML
30 INJECTION SUBCUTANEOUS 2 TIMES DAILY
Status: DISCONTINUED | OUTPATIENT
Start: 2022-05-26 | End: 2022-05-26 | Stop reason: HOSPADM

## 2022-05-26 RX ADMIN — METHOCARBAMOL 750 MG: 750 TABLET ORAL at 13:42

## 2022-05-26 RX ADMIN — QUETIAPINE FUMARATE 200 MG: 100 TABLET ORAL at 08:11

## 2022-05-26 RX ADMIN — METHOCARBAMOL 750 MG: 750 TABLET ORAL at 08:10

## 2022-05-26 RX ADMIN — SODIUM CHLORIDE, PRESERVATIVE FREE 10 ML: 5 INJECTION INTRAVENOUS at 08:12

## 2022-05-26 RX ADMIN — LEVETIRACETAM 1000 MG: 500 TABLET, FILM COATED ORAL at 08:11

## 2022-05-26 ASSESSMENT — PAIN SCALES - GENERAL: PAINLEVEL_OUTOF10: 0

## 2022-05-26 NOTE — PLAN OF CARE
MRI brain can be completed as OP. Patient is okay to d/c from neuro POV with OP follow up-- already follows with [facility]. Continue Keppra  No driving     Neuro will sign off, please call with questions or new issues.      VELASQUEZ TenorioC
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
## 2022-05-26 NOTE — CARE COORDINATION
Discharge order noted. Pt will return home with family.  Electronically signed by Gurpreet Noriega RN on 5/26/2022 at 12:57 PM

## 2022-06-02 ENCOUNTER — TELEPHONE (OUTPATIENT)
Dept: SURGERY | Age: 32
End: 2022-06-02

## 2022-06-02 NOTE — TELEPHONE ENCOUNTER
MA spoke with patients wife, Claudette Coello, who states patient is schedule for follow up appointment at [facility] today. They did not want to follow up with [facility]uma [facility].   Electronically signed by Alfred Levi MA on 6/2/22 at 10:31 AM EDT

## 2022-12-15 ENCOUNTER — HOSPITAL ENCOUNTER (OUTPATIENT)
Dept: ULTRASOUND IMAGING | Age: 32
Discharge: HOME | End: 2022-12-17
Payer: OTHER GOVERNMENT

## 2022-12-15 DIAGNOSIS — R74.01 ELEVATION OF LEVELS OF LIVER TRANSAMINASE LEVELS: ICD-10-CM

## 2022-12-15 PROCEDURE — 76705 ECHO EXAM OF ABDOMEN: CPT

## 2023-11-23 NOTE — TELEPHONE ENCOUNTER
MA attempt to contact [facility] at the [address] to discuss patient. MA reached  and left detailed message of why was calling and office number to call office back if has any questions.          Electronically signed by Deboraha Carrel, MA on 2/8/21 at 3:54 PM EST Home

## 2024-10-29 ENCOUNTER — HOSPITAL ENCOUNTER (EMERGENCY)
Age: 34
Discharge: HOME | End: 2024-10-30
Attending: EMERGENCY MEDICINE

## 2024-10-29 ENCOUNTER — APPOINTMENT (OUTPATIENT)
Dept: CT IMAGING | Age: 34
End: 2024-10-29
Attending: EMERGENCY MEDICINE

## 2024-10-29 ENCOUNTER — APPOINTMENT (OUTPATIENT)
Dept: GENERAL RADIOLOGY | Age: 34
End: 2024-10-29

## 2024-10-29 VITALS
WEIGHT: 220 LBS | HEART RATE: 66 BPM | SYSTOLIC BLOOD PRESSURE: 119 MMHG | BODY MASS INDEX: 28.23 KG/M2 | TEMPERATURE: 98.9 F | DIASTOLIC BLOOD PRESSURE: 77 MMHG | HEIGHT: 74 IN | RESPIRATION RATE: 16 BRPM | OXYGEN SATURATION: 100 %

## 2024-10-29 DIAGNOSIS — R55 SYNCOPE AND COLLAPSE: ICD-10-CM

## 2024-10-29 DIAGNOSIS — R51.9 HEADACHE, UNSPECIFIED HEADACHE TYPE: Primary | ICD-10-CM

## 2024-10-29 DIAGNOSIS — N17.9 AKI (ACUTE KIDNEY INJURY) (HCC): ICD-10-CM

## 2024-10-29 LAB
ALBUMIN SERPL-MCNC: 4.4 G/DL (ref 3.5–5.2)
ALP SERPL-CCNC: 43 U/L (ref 40–129)
ALT SERPL-CCNC: 32 U/L (ref 0–40)
ANION GAP SERPL CALCULATED.3IONS-SCNC: 8 MMOL/L (ref 7–16)
AST SERPL-CCNC: 21 U/L (ref 0–39)
BASOPHILS # BLD: 0.12 K/UL (ref 0–0.2)
BASOPHILS NFR BLD: 2 % (ref 0–2)
BILIRUB SERPL-MCNC: 0.3 MG/DL (ref 0–1.2)
BUN SERPL-MCNC: 13 MG/DL (ref 6–20)
CALCIUM SERPL-MCNC: 9.3 MG/DL (ref 8.6–10.2)
CHLORIDE SERPL-SCNC: 107 MMOL/L (ref 98–107)
CO2 SERPL-SCNC: 25 MMOL/L (ref 22–29)
CREAT SERPL-MCNC: 1.6 MG/DL (ref 0.7–1.2)
EOSINOPHIL # BLD: 0.3 K/UL (ref 0.05–0.5)
EOSINOPHILS RELATIVE PERCENT: 4 % (ref 0–6)
ERYTHROCYTE [DISTWIDTH] IN BLOOD BY AUTOMATED COUNT: 13.9 % (ref 11.5–15)
GFR, ESTIMATED: 56 ML/MIN/1.73M2
GLUCOSE SERPL-MCNC: 92 MG/DL (ref 74–99)
HCT VFR BLD AUTO: 47.3 % (ref 37–54)
HGB BLD-MCNC: 15.2 G/DL (ref 12.5–16.5)
IMM GRANULOCYTES # BLD AUTO: 0.03 K/UL (ref 0–0.58)
IMM GRANULOCYTES NFR BLD: 0 % (ref 0–5)
LYMPHOCYTES NFR BLD: 1.74 K/UL (ref 1.5–4)
LYMPHOCYTES RELATIVE PERCENT: 22 % (ref 20–42)
MCH RBC QN AUTO: 27.2 PG (ref 26–35)
MCHC RBC AUTO-ENTMCNC: 32.1 G/DL (ref 32–34.5)
MCV RBC AUTO: 84.8 FL (ref 80–99.9)
MONOCYTES NFR BLD: 0.63 K/UL (ref 0.1–0.95)
MONOCYTES NFR BLD: 8 % (ref 2–12)
NEUTROPHILS NFR BLD: 64 % (ref 43–80)
NEUTS SEG NFR BLD: 4.96 K/UL (ref 1.8–7.3)
PLATELET # BLD AUTO: 304 K/UL (ref 130–450)
PMV BLD AUTO: 9.8 FL (ref 7–12)
POTASSIUM SERPL-SCNC: 3.7 MMOL/L (ref 3.5–5)
PROT SERPL-MCNC: 7.3 G/DL (ref 6.4–8.3)
RBC # BLD AUTO: 5.58 M/UL (ref 3.8–5.8)
SODIUM SERPL-SCNC: 140 MMOL/L (ref 132–146)
TROPONIN I SERPL HS-MCNC: 7 NG/L (ref 0–11)
TROPONIN I SERPL HS-MCNC: 7 NG/L (ref 0–11)
WBC OTHER # BLD: 7.8 K/UL (ref 4.5–11.5)

## 2024-10-29 PROCEDURE — 70450 CT HEAD/BRAIN W/O DYE: CPT

## 2024-10-29 PROCEDURE — 99285 EMERGENCY DEPT VISIT HI MDM: CPT

## 2024-10-29 PROCEDURE — 80053 COMPREHEN METABOLIC PANEL: CPT

## 2024-10-29 PROCEDURE — 85025 COMPLETE CBC W/AUTO DIFF WBC: CPT

## 2024-10-29 PROCEDURE — 71045 X-RAY EXAM CHEST 1 VIEW: CPT

## 2024-10-29 PROCEDURE — 84484 ASSAY OF TROPONIN QUANT: CPT

## 2024-10-29 PROCEDURE — 93005 ELECTROCARDIOGRAM TRACING: CPT | Performed by: EMERGENCY MEDICINE

## 2024-10-29 ASSESSMENT — PAIN - FUNCTIONAL ASSESSMENT: PAIN_FUNCTIONAL_ASSESSMENT: NONE - DENIES PAIN

## 2024-10-30 LAB
EKG ATRIAL RATE: 53 BPM
EKG P AXIS: 13 DEGREES
EKG P-R INTERVAL: 160 MS
EKG Q-T INTERVAL: 422 MS
EKG QRS DURATION: 92 MS
EKG QTC CALCULATION (BAZETT): 395 MS
EKG R AXIS: 56 DEGREES
EKG T AXIS: 16 DEGREES
EKG VENTRICULAR RATE: 53 BPM

## 2024-10-30 PROCEDURE — 93010 ELECTROCARDIOGRAM REPORT: CPT | Performed by: INTERNAL MEDICINE
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
## 2024-10-30 NOTE — DISCHARGE INSTRUCTIONS
Followed up with Randy Nichols on 10/30/2024 at 12:06 AM. Patient left the ED with a disposition of AMA on . Patient cited personal obligations as reason. Advised patient to follow up with a primary care physician or return to the Emergency Department if symptoms worsen.   Dianna Leung, DO    Follow-up with PCP and neurology. Return to ED if symptoms change or worsen.

## (undated) DEVICE — ADAPTER TBNG DIA15MM SWVL FBROPT BRONCHSCP TERM 2 AXIS PEEP

## (undated) DEVICE — SYRINGE 20ML LL S/C 50

## (undated) DEVICE — GOWN,SIRUS,FABRNF,2XL,18/CS: Brand: MEDLINE

## (undated) DEVICE — SURGICAL PROCEDURE PACK BRONCH

## (undated) DEVICE — Device

## (undated) DEVICE — DOUBLE BASIN SET: Brand: MEDLINE INDUSTRIES, INC.

## (undated) DEVICE — PERFORATOR CRAN AD PED 14/11MM DGR O ROUNDED CUT EDGE DISP

## (undated) DEVICE — SINGLE USE BIOPSY VALVE MAJ-210: Brand: SINGLE USE BIOPSY VALVE (STERILE)

## (undated) DEVICE — DEFENDO AIR WATER SUCTION AND BIOPSY VALVE KIT FOR  OLYMPUS: Brand: DEFENDO AIR/WATER/SUCTION AND BIOPSY VALVE

## (undated) DEVICE — STANDARD HYPODERMIC NEEDLE,ALUMINUM HUB: Brand: MONOJECT

## (undated) DEVICE — SOLUTION IV IRRIG 500ML 0.9% SODIUM CHL 2F7123

## (undated) DEVICE — TRAP,MUCUS SPECIMEN,40CC: Brand: MEDLINE

## (undated) DEVICE — GLOVE SURG SZ 85 STD WHT LTX SYN POLYMER BEAD REINF ANTI RL

## (undated) DEVICE — SYRINGE,EAR/ULCER, 3 OZ, STERILE: Brand: MEDLINE

## (undated) DEVICE — BLADE CLP TAPR HD WET DRY CAPABILITY GTT IN CHARGING USE

## (undated) DEVICE — VENTRICULAR DRAINAGE SYSTEM

## (undated) DEVICE — SOLUTION IV IRRIG WATER 1000ML POUR BRL 2F7114

## (undated) DEVICE — SURGICAL PROCEDURE PACK CRANIOTOMY CUST

## (undated) DEVICE — DRESSING TRNSPAR W4XL55IN ACRYL SUP FLM W ADH WTRPRF OPSITE

## (undated) DEVICE — APPLICATOR PREP 26ML 0.7% IOD POVACRYLEX 74% ISO ALC ST

## (undated) DEVICE — LIMITORR™ VOLUME LIMITING EXTERNAL CSF DRAINAGE AND MONITORING SYSTEM: Brand: LIMITORR™

## (undated) DEVICE — BITEBLOCK 54FR W/ DENT RIM BLOX

## (undated) DEVICE — INTENDED FOR TISSUE SEPARATION, AND OTHER PROCEDURES THAT REQUIRE A SHARP SURGICAL BLADE TO PUNCTURE OR CUT.: Brand: BARD-PARKER ® STAINLESS STEEL BLADES

## (undated) DEVICE — THE CAMINO FLEX VENTRICULAR INTRACRANIAL PRESSURE MONITORING KIT IS INDICATED WHEN DIRECT AND CONTINUOUS INTRAVENTRICULAR INTRACRANIAL PRESSURE MONITORING AND CEREBROSPINAL FLUID DRAINAGE ARE REQUIRED.  THE CATHETER IS EMBEDDED WITH A STRAIN GAUGE PRESSURE TRANSDUCER.THE SYSTEM IS DESIGNED FOR NO MORE THAN FIVE DAYS OF CONTINUOUS MONITORING.: Brand: CAMINO®

## (undated) DEVICE — GAUZE,SPONGE,POST-OP,4X3,STRL,LF: Brand: MEDLINE

## (undated) DEVICE — SINGLE USE SUCTION VALVE MAJ-209: Brand: SINGLE USE SUCTION VALVE (STERILE)